# Patient Record
Sex: MALE | Race: WHITE | NOT HISPANIC OR LATINO | Employment: OTHER | URBAN - METROPOLITAN AREA
[De-identification: names, ages, dates, MRNs, and addresses within clinical notes are randomized per-mention and may not be internally consistent; named-entity substitution may affect disease eponyms.]

---

## 2017-04-18 ENCOUNTER — TRANSFERRED RECORDS (OUTPATIENT)
Dept: HEALTH INFORMATION MANAGEMENT | Facility: CLINIC | Age: 72
End: 2017-04-18

## 2017-04-28 ENCOUNTER — TRANSFERRED RECORDS (OUTPATIENT)
Dept: HEALTH INFORMATION MANAGEMENT | Facility: CLINIC | Age: 72
End: 2017-04-28

## 2017-05-02 ENCOUNTER — THERAPY VISIT (OUTPATIENT)
Dept: PHYSICAL THERAPY | Facility: CLINIC | Age: 72
End: 2017-05-02
Payer: MEDICARE

## 2017-05-02 DIAGNOSIS — M47.816 SPONDYLOSIS OF LUMBAR REGION WITHOUT MYELOPATHY OR RADICULOPATHY: Primary | ICD-10-CM

## 2017-05-02 PROCEDURE — G8979 MOBILITY GOAL STATUS: HCPCS | Mod: GP | Performed by: PHYSICAL THERAPIST

## 2017-05-02 PROCEDURE — G8978 MOBILITY CURRENT STATUS: HCPCS | Mod: GP | Performed by: PHYSICAL THERAPIST

## 2017-05-02 PROCEDURE — 97110 THERAPEUTIC EXERCISES: CPT | Mod: GP | Performed by: PHYSICAL THERAPIST

## 2017-05-02 PROCEDURE — 97161 PT EVAL LOW COMPLEX 20 MIN: CPT | Mod: GP | Performed by: PHYSICAL THERAPIST

## 2017-05-02 ASSESSMENT — ACTIVITIES OF DAILY LIVING (ADL)
HOS_ADL_ITEM_SCORE_TOTAL: 53
PUTTING_ON_SOCKS_AND_SHOES: SLIGHT DIFFICULTY
GETTING_INTO_AND_OUT_OF_AN_AVERAGE_CAR: SLIGHT DIFFICULTY
GOING_DOWN_1_FLIGHT_OF_STAIRS: MODERATE DIFFICULTY
WALKING_INITIALLY: NO DIFFICULTY AT ALL
GETTING_INTO_AND_OUT_OF_A_BATHTUB: NO DIFFICULTY AT ALL
GOING_UP_1_FLIGHT_OF_STAIRS: NO DIFFICULTY AT ALL
LIGHT_TO_MODERATE_WORK: NO DIFFICULTY AT ALL
HOS_ADL_HIGHEST_POTENTIAL_SCORE: 68
STANDING_FOR_15_MINUTES: EXTREME DIFFICULTY
TWISTING/PIVOTING_ON_INVOLVED_LEG: MODERATE DIFFICULTY
HOW_WOULD_YOU_RATE_YOUR_CURRENT_LEVEL_OF_FUNCTION_DURING_YOUR_USUAL_ACTIVITIES_OF_DAILY_LIVING_FROM_0_TO_100_WITH_100_BEING_YOUR_LEVEL_OF_FUNCTION_PRIOR_TO_YOUR_HIP_PROBLEM_AND_0_BEING_THE_INABILITY_TO_PERFORM_ANY_OF_YOUR_USUAL_DAILY_ACTIVITIES?: 90
SITTING_FOR_15_MINUTES: NO DIFFICULTY AT ALL
RECREATIONAL_ACTIVITIES: MODERATE DIFFICULTY
DEEP_SQUATTING: MODERATE DIFFICULTY
WALKING_DOWN_STEEP_HILLS: SLIGHT DIFFICULTY
ROLLING_OVER_IN_BED: NO DIFFICULTY AT ALL
WALKING_15_MINUTES_OR_GREATER: NO DIFFICULTY AT ALL
WALKING_UP_STEEP_HILLS: SLIGHT DIFFICULTY
HOS_ADL_SCORE(%): 77.94
HEAVY_WORK: SLIGHT DIFFICULTY
STEPPING_UP_AND_DOWN_CURBS: NO DIFFICULTY AT ALL
WALKING_APPROXIMATELY_10_MINUTES: NO DIFFICULTY AT ALL
HOS_ADL_COUNT: 17

## 2017-05-02 NOTE — PROGRESS NOTES
Subjective:    HPI                    Objective:    System    Physical Exam    General     ROS     Physical Therapy Initial Evaluation:   May 2, 2017  Precautions/Restrictions/MD instructions:   Per Orders: 1-2 visits. Patient needs reeducation PT for Lumbar condition. Diagnoses: Lx spondylosis without myelopathy and facet arthrosis. Lx Spinal Stenosis    Therapist Impression: Jeffrey is a 71 year-old male who presents to physical therapy with a complaint of groin pain and severe muscle spasms of the medial thigh. The location of the pain would typically suggest the hip as a pain generator, but the clinical findings leave it unclear if this is coming from the hip and leave open the possibility of the lumbar spine as the source of the pain. Given his history of lumbar conditions this increases in likelihood of his pain coming from the lumbar spine. Jeffrey was approved for one additional visit which we will use to further investigate the source of the pain and to establish the need for any exercises beyond his current home program that will improve his ability to function with tasks in daily life.     Subjective:   Chief Complaint:    Pain: In the left inguinal crease and the posteromedial thigh on the left.    Numbness/Tingling: None   Weakness: Sensation of wanting to give out on the right   Stiffness: None  New/Recurrent/Chronic: Chronic  Patient's Goal(s): Wants his hip to feel better.   DOI/onset: approximately 6 months   Referral Date: 4/28/2017  Mechanism of onset: Unknown  PMH/surgical history/trauma: Osteoarthritis, Prostate Cancer (surgically removed 2 years ago), Surgical history on the left ankle, left knee, back, left shoulder (twice), right shoulder, and appendectomy.   Medications: ibuprofen  General health as reported by patient: Excellent    Previous Treatment (Effect): Massage (helpful)  Imaging: MRI - Slightly progressed from previously, Provider is thinking it may be the hip.   Symptom Stability:  Changing, not present all the time, can be provoked  AM/PM: Worse in AM  Quality of Pain: muscle spasm  Pain: 0/10 at present, 0/10 at best, 8/10 at worst  Better: Has to let it go  Worse: standing up after sitting, hip flexion/lifting the thigh  Progression of Symptoms since onset: getting worse   Occupation: Retired Teacher Job duties: prolonged standing, lifting/carrying, pushing/pulling, repetitive tasks, maintaining a home  Sleeping: No disturbance from back or hip   Other current functional challenges: standing after sitting, going down stairs  Current Functional Status: stairs - pain when going down stairs and turning at the landing ; standing up - pain with standing after sitting, needs to go slowly   Current HEP/exercise regimen: pickleball, racquet ball, golf, lawn mowing  Transportation: Independent with driving  Live with Others: Wife at home, able bodied  Red Flags:   - Patient denies the following: Pain with Cough / Sneeze / Laughing ; Night Pain ; Weakness ; Numbness/Tingling ;     Objective:    Posture: Normal standing posture    AROM: (Major, Moderate, Minimal or Nil loss)  Movement Loss Kenneth Mod Min Nil Pain   Flexion    x    Extension x x   Groin pain   Side Gliding L    x    Side Gliding R x x   Groin pain       Neurological:    Motor Deficit:  Myotomes R L   L1-2 (hip flexion) 5 5   L3 (knee extension) 5 5   L4 (ankle DF) 5 5   L5 (g. toe ext) 5 5   S1 (ankle PF or knee flex) 5 5     Reflexes:    R L   Patellar 0+ 2+   Achilles 2+ 2+   Babinski (-) (-)     HIP: (* indicates patient's pain)   MMT L MMT R   Flexion 5 5   Abd ** 5   Ext Not tested Not tested   IR 5 5   ER 4+ 4+   Patient had pain that limited abduction strength testing in sidelying. Resisted abduction in supine did not provoke pain, suggesting the position--rather than the resistance alone--may have caused the pain. This fails to implicate the hip over the lumbar spine as the source of the pain    Other:  - FADIR and CHELSIE were  positive on the left. Create pain in the groin.   - No pain with ER/IR of the hip into overpressure  - Reviewed patient's exercise which included: Repeat flexion in lying, standing hip flexor stretch, calf stretch, quad stretch in standing with single leg stance, lower trunk rotation with TFL stretch, pretzel stretch, butterfly stretch      Assessment/Plan:      Patient is a 71 year old male with lumbar and left side hip complaints.    Patient has the following significant findings with corresponding treatment plan.                Diagnosis 1:  Lumbar spondylosis without myelopathy, facet arthrosis, lumbar spinal stenosis  Pain -  hot/cold therapy, manual therapy, splint/taping/bracing/orthotics, self management, education, directional preference exercise and home program  Decreased ROM/flexibility - manual therapy, therapeutic exercise, therapeutic activity and home program  Decreased function - therapeutic activities and home program    Therapy Evaluation Codes:   1) History comprised of:   Personal factors that impact the plan of care:      None.    Comorbidity factors that impact the plan of care are:      Hx of lumbar pain/conditions.     Medications impacting care: Anti-inflammatory.  2) Examination of Body Systems comprised of:   Body structures and functions that impact the plan of care:      Hip and Lumbar spine.   Activity limitations that impact the plan of care are:      Stairs and Standing.  3) Clinical presentation characteristics are:   Unstable/Unpredictable.  4) Decision-Making    Moderate complexity using standardized patient assessment instrument and/or measureable assessment of functional outcome.  Cumulative Therapy Evaluation is: Low complexity.    Previous and current functional limitations:  (See Goal Flow Sheet for this information)    Short term and Long term goals: (See Goal Flow Sheet for this information)     Communication ability:  Patient appears to be able to clearly communicate and  understand verbal and written communication and follow directions correctly.  Treatment Explanation - The following has been discussed with the patient:   RX ordered/plan of care  Anticipated outcomes  Possible risks and side effects  This patient would benefit from PT intervention to resume normal activities.   Rehab potential is good.    Frequency:  2 X week, once daily  Duration:  for 1 weeks to fulfill physician orders for 2 visits  Discharge Plan:  Achieve all LTG.  Independent in home treatment program.  Reach maximal therapeutic benefit.    Please refer to the daily flowsheet for treatment today, total treatment time and time spent performing 1:1 timed codes.

## 2017-05-02 NOTE — LETTER
DEPARTMENT OF HEALTH AND HUMAN SERVICES  [facility]    PLAN/UPDATED PLAN OF PROGRESS FOR OUTPATIENT REHABILITATION    PATIENTS NAME:  Jasen Rosario   : 1945  PROVIDER NUMBER:    [id number]  [id number]  PROVIDER NAME: [facility] PT  MEDICAL RECORD NUMBER: [id number]     START OF CARE DATE:  SOC Date: 17   TYPE:  PT    PRIMARY/TREATMENT DIAGNOSIS: (Pertinent Medical Diagnosis)  Spondylosis of lumbar region without myelopathy or radiculopathy    VISITS FROM START OF CARE:  Rxs Used: 1     Physical Therapy Initial Evaluation:   May 2, 2017  Precautions/Restrictions/MD instructions:   Per Orders: 1-2 visits. Patient needs reeducation PT for Lumbar condition. Diagnoses: Lx spondylosis without myelopathy and facet arthrosis. Lx Spinal Stenosis  Therapist Impression: Jeffrey is a 71 year-old male who presents to physical therapy with a complaint of groin pain and severe muscle spasms of the medial thigh. The location of the pain would typically suggest the hip as a pain generator, but the clinical findings leave it unclear if this is coming from the hip and leave open the possibility of the lumbar spine as the source of the pain. Given his history of lumbar conditions this increases in likelihood of his pain coming from the lumbar spine. Jeffrey was approved for one additional visit which we will use to further investigate the source of the pain and to establish the need for any exercises beyond his current home program that will improve his ability to function with tasks in daily life.   Subjective:   Chief Complaint:    Pain: In the left inguinal crease and the posteromedial thigh on the left.    Numbness/Tingling: None   Weakness: Sensation of wanting to give out on the right   Stiffness: None    PATIENTS NAME:  Jasen Rosario   : 1945  New/Recurrent/Chronic: Chronic  Patient's Goal(s): Wants his hip to feel better.   DOI/onset: approximately 6 months    Referral Date: 4/28/2017  Mechanism of onset: Unknown  PMH/surgical history/trauma: Osteoarthritis, Prostate Cancer (surgically removed 2 years ago), Surgical history on the left ankle, left knee, back, left shoulder (twice), right shoulder, and appendectomy.   Medications: ibuprofen  General health as reported by patient: Excellent    Previous Treatment (Effect): Massage (helpful)  Imaging: MRI - Slightly progressed from previously, Provider is thinking it may be the hip.   Symptom Stability: Changing, not present all the time, can be provoked  AM/PM: Worse in AM  Quality of Pain: muscle spasm  Pain: 0/10 at present, 0/10 at best, 8/10 at worst  Better: Has to let it go  Worse: standing up after sitting, hip flexion/lifting the thigh  Progression of Symptoms since onset: getting worse   Occupation: Retired Teacher Job duties: prolonged standing, lifting/carrying, pushing/pulling, repetitive tasks, maintaining a home  Sleeping: No disturbance from back or hip   Other current functional challenges: standing after sitting, going down stairs  Current Functional Status: stairs - pain when going down stairs and turning at the landing ; standing up - pain with standing after sitting, needs to go slowly   Current HEP/exercise regimen: pickleball, racquet ball, golf, lawn mowing  Transportation: Independent with driving  Live with Others: Wife at home, able bodied  Red Flags:   - Patient denies the following: Pain with Cough / Sneeze / Laughing ; Night Pain ; Weakness ; Numbness/Tingling ;     Objective:    Posture: Normal standing posture    AROM: (Major, Moderate, Minimal or Nil loss)  Movement Loss Kenneth Mod Min Nil Pain   Flexion    x    Extension x x   Groin pain   Side Gliding L    x    Side Gliding R x x   Groin pain       Neurological:    Motor Deficit:  Myotomes R L   L1-2 (hip flexion) 5 5   L3 (knee extension) 5 5   L4 (ankle DF) 5 5   L5 (g. toe ext) 5 5   S1 (ankle PF or knee flex) 5 5     Reflexes:    R L    Patellar 0+ 2+   Achilles 2+ 2+   Babinski (-) (-)     HIP: (* indicates patient's pain)   MMT L MMT R   Flexion 5 5   Abd ** 5   Ext Not tested Not tested   IR 5 5   ER 4+ 4+   Patient had pain that limited abduction strength testing in sidelying. Resisted abduction in supine did not provoke pain, suggesting the position--rather than the resistance alone--may have caused the pain. This fails to implicate the hip over the lumbar spine as the source of the pain          PATIENTS NAME:  Jasen Rosario   : 1945    Other:  - FADIR and CHELSIE were positive on the left. Create pain in the groin.   - No pain with ER/IR of the hip into overpressure  - Reviewed patient's exercise which included: Repeat flexion in lying, standing hip flexor stretch, calf stretch, quad stretch in standing with single leg stance, lower trunk rotation with TFL stretch, pretzel stretch, butterfly stretch    Assessment/Plan:      Patient is a 71 year old male with lumbar and left side hip complaints.    Patient has the following significant findings with corresponding treatment plan.                Diagnosis 1:  Lumbar spondylosis without myelopathy, facet arthrosis, lumbar spinal stenosis  Pain -  hot/cold therapy, manual therapy, splint/taping/bracing/orthotics, self management, education, directional preference exercise and home program  Decreased ROM/flexibility - manual therapy, therapeutic exercise, therapeutic activity and home program  Decreased function - therapeutic activities and home program    Therapy Evaluation Codes:   1) History comprised of:   Personal factors that impact the plan of care:      None.    Comorbidity factors that impact the plan of care are:      Hx of lumbar pain/conditions.     Medications impacting care: Anti-inflammatory.  2) Examination of Body Systems comprised of:   Body structures and functions that impact the plan of care:      Hip and Lumbar spine.   Activity limitations that impact the plan  "of care are:      Stairs and Standing.  3) Clinical presentation characteristics are:   Unstable/Unpredictable.  4) Decision-Making    Moderate complexity using standardized patient assessment instrument and/or measureable assessment of functional outcome.  Cumulative Therapy Evaluation is: Low complexity.    Previous and current functional limitations:  (See Goal Flow Sheet for this information)    Short term and Long term goals: (See Goal Flow Sheet for this information)     Communication ability:  Patient appears to be able to clearly communicate and understand verbal and written communication and follow directions correctly.        PATIENTS NAME:  Jasen Rosario   : 1945    Treatment Explanation - The following has been discussed with the patient:   RX ordered/plan of care  Anticipated outcomes  Possible risks and side effects  This patient would benefit from PT intervention to resume normal activities.   Rehab potential is good.    Frequency:  2 X week, once daily  Duration:  for 1 weeks to fulfill physician orders for 2 visits  Discharge Plan:  Achieve all LTG.  Independent in home treatment program.  Reach maximal therapeutic benefit.    Caregiver Signature/Credentials _____________________________ Date ________      Treating Provider: Sim Romo, PT   I have reviewed and certified the need for these services and plan of treatment while under my care.        PHYSICIAN'S SIGNATURE:   _____________________________________ Date___________     Matthew Baker    Certification period:  Beginning of Cert date period: 17 to  End of Cert period date: 17     Functional Level Progress Report: Please see attached \"Goal Flow sheet for Functional level.\"    ____X____ Continue Services or       ________ DC Services                Service dates: From  SOC Date: 17 date to present                         "

## 2017-05-03 PROBLEM — M47.816 SPONDYLOSIS OF LUMBAR REGION WITHOUT MYELOPATHY OR RADICULOPATHY: Status: ACTIVE | Noted: 2017-05-03

## 2017-05-12 ENCOUNTER — THERAPY VISIT (OUTPATIENT)
Dept: PHYSICAL THERAPY | Facility: CLINIC | Age: 72
End: 2017-05-12
Payer: MEDICARE

## 2017-05-12 DIAGNOSIS — M47.816 SPONDYLOSIS OF LUMBAR REGION WITHOUT MYELOPATHY OR RADICULOPATHY: ICD-10-CM

## 2017-05-12 PROCEDURE — G8979 MOBILITY GOAL STATUS: HCPCS | Mod: GP | Performed by: PHYSICAL THERAPIST

## 2017-05-12 PROCEDURE — 97112 NEUROMUSCULAR REEDUCATION: CPT | Mod: GP | Performed by: PHYSICAL THERAPIST

## 2017-05-12 PROCEDURE — G8980 MOBILITY D/C STATUS: HCPCS | Mod: GP | Performed by: PHYSICAL THERAPIST

## 2017-05-12 ASSESSMENT — ACTIVITIES OF DAILY LIVING (ADL)
HOS_ADL_HIGHEST_POTENTIAL_SCORE: 68
HOS_ADL_COUNT: 17
SITTING_FOR_15_MINUTES: NO DIFFICULTY AT ALL
HEAVY_WORK: SLIGHT DIFFICULTY
WALKING_APPROXIMATELY_10_MINUTES: NO DIFFICULTY AT ALL
WALKING_DOWN_STEEP_HILLS: SLIGHT DIFFICULTY
STANDING_FOR_15_MINUTES: NO DIFFICULTY AT ALL
HOS_ADL_SCORE(%): 89.71
STEPPING_UP_AND_DOWN_CURBS: NO DIFFICULTY AT ALL
GETTING_INTO_AND_OUT_OF_AN_AVERAGE_CAR: NO DIFFICULTY AT ALL
WALKING_INITIALLY: SLIGHT DIFFICULTY
WALKING_UP_STEEP_HILLS: NO DIFFICULTY AT ALL
LIGHT_TO_MODERATE_WORK: NO DIFFICULTY AT ALL
WALKING_15_MINUTES_OR_GREATER: NO DIFFICULTY AT ALL
RECREATIONAL_ACTIVITIES: SLIGHT DIFFICULTY
DEEP_SQUATTING: SLIGHT DIFFICULTY
PUTTING_ON_SOCKS_AND_SHOES: NO DIFFICULTY AT ALL
HOW_WOULD_YOU_RATE_YOUR_CURRENT_LEVEL_OF_FUNCTION_DURING_YOUR_USUAL_ACTIVITIES_OF_DAILY_LIVING_FROM_0_TO_100_WITH_100_BEING_YOUR_LEVEL_OF_FUNCTION_PRIOR_TO_YOUR_HIP_PROBLEM_AND_0_BEING_THE_INABILITY_TO_PERFORM_ANY_OF_YOUR_USUAL_DAILY_ACTIVITIES?: 95
TWISTING/PIVOTING_ON_INVOLVED_LEG: MODERATE DIFFICULTY
GOING_UP_1_FLIGHT_OF_STAIRS: NO DIFFICULTY AT ALL
HOS_ADL_ITEM_SCORE_TOTAL: 61
ROLLING_OVER_IN_BED: NO DIFFICULTY AT ALL
GOING_DOWN_1_FLIGHT_OF_STAIRS: NO DIFFICULTY AT ALL
GETTING_INTO_AND_OUT_OF_A_BATHTUB: NO DIFFICULTY AT ALL

## 2017-05-12 NOTE — PROGRESS NOTES
"Subjective:    HPI                    Objective:    System    Physical Exam    General     ROS    Assessment/Plan:      DISCHARGE REPORT    Progress reporting period is from 5/2/2107 to 5/12/2017.       SUBJECTIVE  Subjective changes noted by patient: Patient had an injection in the left hip 2 days ago and he is feeling pretty good. The muscle spasms are less and he was able to go for a bike ride yesterday. He was up and down from the floor a lot and still had some symptoms, but felt \"okay\".     Current Pain level: 1/10.     Initial Pain level: 8/10.   Changes in function:  Yes (See Goal flowsheet attached for changes in current functional level)  Adverse reaction to treatment or activity: None    OBJECTIVE  Changes noted in objective findings:  Yes, Patient demonstrated improved movement with less pain.   Objective:   Patient was able to walk and transfer without deviation. He had some difficulty with stabilization exercises targeting the core and the hips. This ultimately suggests lack of pelvifemoral control which may explain why his symptom-origin was unclear in the first place.     ASSESSMENT/PLAN  Updated problem list and treatment plan: Diagnosis 1:  Lumbar spondylosis without myelopathy, facet arthrosis, lumbar spinal stenosis  STG/LTGs have been met or progress has been made towards goals:  Yes (See Goal flow sheet completed today.)  Assessment of Progress: The patient's condition is improving.  Self Management Plans:  Patient has been instructed in a home treatment program.  Patient  has been instructed in self management of symptoms.  Jasen continues to require the following intervention to meet STG and LTG's:  PT intervention is no longer required to meet STG/LTG.    Recommendations:  Jeffrey has completed the two visits for which he was initially referred. His pain has improved significantly following the injection implicating the hip more than the low back. Jeffrey is read to be discharged from this " episode of physical therapy for his low back with instructions to return with orders for the hip if the pain should return.     Please refer to the daily flowsheet for treatment today, total treatment time and time spent performing 1:1 timed codes.

## 2017-06-01 ENCOUNTER — TRANSFERRED RECORDS (OUTPATIENT)
Dept: HEALTH INFORMATION MANAGEMENT | Facility: CLINIC | Age: 72
End: 2017-06-01

## 2017-06-06 ENCOUNTER — TRANSFERRED RECORDS (OUTPATIENT)
Dept: HEALTH INFORMATION MANAGEMENT | Facility: CLINIC | Age: 72
End: 2017-06-06

## 2017-06-06 ENCOUNTER — TELEPHONE (OUTPATIENT)
Dept: FAMILY MEDICINE | Facility: CLINIC | Age: 72
End: 2017-06-06

## 2017-06-06 NOTE — TELEPHONE ENCOUNTER
Reason for Call:  Same Day Appointment, Requested Provider:  Archie Meadows MD    PCP: Archie Meadows    Reason for visit: Pre op for hip surgery [facility] with Dr Sebastian Madrigal on 6/21.  Not Tues or Thursday's b4 10 am.     Duration of symptoms: ongoing    Have you been treated for this in the past? Yes    Additional comments: Please work him in before that.     Can we leave a detailed message on this number? YES    Phone number patient can be reached at: Cell number on file:    Telephone Information:   Mobile [phone]       Best Time: any    Call taken on 6/6/2017 at 2:43 PM by Francesca Maza

## 2017-06-07 NOTE — TELEPHONE ENCOUNTER
Pt called & was scheduled at 3pm which was an available time.  Nimisha Maza, Clinic Receptionist

## 2017-06-14 ENCOUNTER — OFFICE VISIT (OUTPATIENT)
Dept: FAMILY MEDICINE | Facility: CLINIC | Age: 72
End: 2017-06-14
Payer: COMMERCIAL

## 2017-06-14 VITALS
BODY MASS INDEX: 21.37 KG/M2 | WEIGHT: 141 LBS | TEMPERATURE: 97.8 F | HEIGHT: 68 IN | HEART RATE: 56 BPM | DIASTOLIC BLOOD PRESSURE: 66 MMHG | OXYGEN SATURATION: 97 % | SYSTOLIC BLOOD PRESSURE: 108 MMHG

## 2017-06-14 DIAGNOSIS — C85.90 LYMPHOMA, UNSPECIFIED BODY REGION, UNSPECIFIED LYMPHOMA TYPE (H): ICD-10-CM

## 2017-06-14 DIAGNOSIS — F41.1 ANXIETY STATE: ICD-10-CM

## 2017-06-14 DIAGNOSIS — Z51.81 MEDICATION MONITORING ENCOUNTER: ICD-10-CM

## 2017-06-14 DIAGNOSIS — M16.12 PRIMARY OSTEOARTHRITIS OF LEFT HIP: ICD-10-CM

## 2017-06-14 DIAGNOSIS — Z91.09 ENVIRONMENTAL ALLERGIES: ICD-10-CM

## 2017-06-14 DIAGNOSIS — F32.5 MAJOR DEPRESSIVE DISORDER, SINGLE EPISODE IN FULL REMISSION (H): ICD-10-CM

## 2017-06-14 DIAGNOSIS — C61 MALIGNANT NEOPLASM OF PROSTATE (H): ICD-10-CM

## 2017-06-14 DIAGNOSIS — Z01.818 PREOPERATIVE EXAMINATION: Primary | ICD-10-CM

## 2017-06-14 DIAGNOSIS — C61 PROSTATE CANCER (H): ICD-10-CM

## 2017-06-14 LAB
ALBUMIN UR-MCNC: ABNORMAL MG/DL
APPEARANCE UR: CLEAR
BILIRUB UR QL STRIP: NEGATIVE
COLOR UR AUTO: YELLOW
ERYTHROCYTE [DISTWIDTH] IN BLOOD BY AUTOMATED COUNT: 12.9 % (ref 10–15)
GLUCOSE UR STRIP-MCNC: NEGATIVE MG/DL
HCT VFR BLD AUTO: 43 % (ref 40–53)
HGB BLD-MCNC: 15 G/DL (ref 13.3–17.7)
HGB UR QL STRIP: NEGATIVE
KETONES UR STRIP-MCNC: ABNORMAL MG/DL
LEUKOCYTE ESTERASE UR QL STRIP: NEGATIVE
MCH RBC QN AUTO: 31.9 PG (ref 26.5–33)
MCHC RBC AUTO-ENTMCNC: 34.9 G/DL (ref 31.5–36.5)
MCV RBC AUTO: 92 FL (ref 78–100)
MUCOUS THREADS #/AREA URNS LPF: PRESENT /LPF
NITRATE UR QL: NEGATIVE
PH UR STRIP: 5.5 PH (ref 5–7)
PLATELET # BLD AUTO: 214 10E9/L (ref 150–450)
RBC # BLD AUTO: 4.7 10E12/L (ref 4.4–5.9)
RBC #/AREA URNS AUTO: ABNORMAL /HPF (ref 0–2)
SP GR UR STRIP: >1.03 (ref 1–1.03)
URN SPEC COLLECT METH UR: ABNORMAL
UROBILINOGEN UR STRIP-ACNC: 0.2 EU/DL (ref 0.2–1)
WBC # BLD AUTO: 10.5 10E9/L (ref 4–11)
WBC #/AREA URNS AUTO: ABNORMAL /HPF (ref 0–2)

## 2017-06-14 PROCEDURE — 36415 COLL VENOUS BLD VENIPUNCTURE: CPT | Performed by: FAMILY MEDICINE

## 2017-06-14 PROCEDURE — 81001 URINALYSIS AUTO W/SCOPE: CPT | Performed by: FAMILY MEDICINE

## 2017-06-14 PROCEDURE — 85027 COMPLETE CBC AUTOMATED: CPT | Performed by: FAMILY MEDICINE

## 2017-06-14 PROCEDURE — 80053 COMPREHEN METABOLIC PANEL: CPT | Performed by: FAMILY MEDICINE

## 2017-06-14 PROCEDURE — 93000 ELECTROCARDIOGRAM COMPLETE: CPT | Performed by: FAMILY MEDICINE

## 2017-06-14 PROCEDURE — 84153 ASSAY OF PSA TOTAL: CPT | Performed by: FAMILY MEDICINE

## 2017-06-14 PROCEDURE — 99214 OFFICE O/P EST MOD 30 MIN: CPT | Performed by: FAMILY MEDICINE

## 2017-06-14 NOTE — PATIENT INSTRUCTIONS
Follow up regarding Lymphoma and if you need to get a recheck appointment    Colonoscopy due in December 2017 -- follow up to schedule     Hold Baby Aspirin, multivitamin, fish oil, ibuprofen/advil/aleve prior to surgery - Tylenol is okay    [facility]                        To reach your care team during and after hours:   [phone]  To reach our pharmacy:        [phone]    Clinic Hours                        Our clinic hours are:    Monday   7:30 am to 7:00 pm                  Tuesday through Friday 7:30 am to 5:00 pm                             Saturday   8:00 am to 12:00 pm      Sunday   Closed      Pharmacy Hours                        Our pharmacy hours are:    Monday   8:30 am to 7:00 pm       Tuesday to Friday  8:30 am to 6:00 pm                       Saturday    9:00 am to 1:00 pm              Sunday    Closed              There is also information available at our web site:  [email or web address]    If your provider ordered any lab tests and you do not receive the results within 10 business days, please call the clinic.    If you need a medication refill please contact your pharmacy.  Please allow 2-3 business days for your refill to be completed.    Our clinic offers telephone visits and e visits.  Please ask one of your team members to explain more.      Use MyChart (secure email communication and access to your chart) to send your primary care provider a message or make an appointment. Ask someone on your Team how to sign up for [employer]t.  Immunizations                      Immunization History   Administered Date(s) Administered     Hepatitis A Vac Ped/Adol-2 Dose 06/28/2006, 12/28/2006     Hepatitis B 01/01/1995, 02/01/1995, 06/01/1995     Pneumococcal (PCV 13) 12/01/2015     Pneumococcal 23 valent 06/28/2006, 04/03/2013     TD (ADULT, 7+) 06/10/2001, 12/10/2010     TDAP Vaccine (Boostrix) 04/03/2013     Zoster vaccine, live 04/03/2013        Health Maintenance                          Health Maintenance Due   Topic Date Due     Prostate Test (PSA) - yearly  12/08/2015       Before Your Surgery      Call your surgeon if there is any change in your health. This includes signs of a cold or flu (such as a sore throat, runny nose, cough, rash or fever).    Do not smoke, drink alcohol or take over the counter medicine (unless your surgeon or primary care doctor tells you to) for the 24 hours before and after surgery.    If you take prescribed drugs: Follow your doctor s orders about which medicines to take and which to stop until after surgery.    Eating and drinking prior to surgery: follow the instructions from your surgeon    Take a shower or bath the night before surgery. Use the soap your surgeon gave you to gently clean your skin. If you do not have soap from your surgeon, use your regular soap. Do not shave or scrub the surgery site.  Wear clean pajamas and have clean sheets on your bed.

## 2017-06-14 NOTE — LETTER
[facility]  [address] [place] [address], [place] [address]                  [phone]   June 16, 2017    Jasen Rosario  [address] [facility] [id number]      Dear Jasen,    Here is a summary of your recent test results:    Labs are overall quite good, except low protein.     We advise:     Please proceed with surgery.   Balanced low cholesterol diet.     For additional lab test information, [email or web address] is an excellent reference.    Your test results are enclosed.      Please contact me if you have any questions.    In addition, here is a list of due or overdue Health Maintenance reminders.    There are no preventive care reminders to display for this patient.    Please call us at [phone] (or use "[employer]") to address the above recommendations.            Thank you very much for trusting [facility]..     Healthy regards,        Archie Meadows M.D.        Results for orders placed or performed in visit on 06/14/17   Comprehensive metabolic panel   Result Value Ref Range    Sodium 144 133 - 144 mmol/L    Potassium 4.6 3.4 - 5.3 mmol/L    Chloride 109 94 - 109 mmol/L    Carbon Dioxide 26 20 - 32 mmol/L    Anion Gap 9 3 - 14 mmol/L    Glucose 78 70 - 99 mg/dL    Urea Nitrogen 22 7 - 30 mg/dL    Creatinine 1.14 0.66 - 1.25 mg/dL    GFR Estimate 63 >60 mL/min/1.7m2    GFR Estimate If Black 76 >60 mL/min/1.7m2    Calcium 8.8 8.5 - 10.1 mg/dL    Bilirubin Total 0.5 0.2 - 1.3 mg/dL    Albumin 3.9 3.4 - 5.0 g/dL    Protein Total 6.3 (L) 6.8 - 8.8 g/dL    Alkaline Phosphatase 74 40 - 150 U/L    ALT 24 0 - 70 U/L    AST 17 0 - 45 U/L   CBC with platelets   Result Value Ref Range    WBC 10.5 4.0 - 11.0 10e9/L    RBC Count 4.70 4.4 - 5.9 10e12/L    Hemoglobin 15.0 13.3 - 17.7 g/dL    Hematocrit 43.0 40.0 - 53.0 %    MCV 92 78 - 100 fl    MCH 31.9 26.5 - 33.0 pg    MCHC 34.9 31.5 - 36.5 g/dL    RDW 12.9 10.0 - 15.0 %    Platelet Count 214 150 - 450  10e9/L   *UA reflex to Microscopic and Culture ([facility] and [facility] (except [place] and [place])   Result Value Ref Range    Color Urine Yellow     Appearance Urine Clear     Glucose Urine Negative NEG mg/dL    Bilirubin Urine Negative NEG    Ketones Urine Trace (A) NEG mg/dL    Specific Gravity Urine >1.030 1.003 - 1.035    Blood Urine Negative NEG    pH Urine 5.5 5.0 - 7.0 pH    Protein Albumin Urine Trace (A) NEG mg/dL    Urobilinogen Urine 0.2 0.2 - 1.0 EU/dL    Nitrite Urine Negative NEG    Leukocyte Esterase Urine Negative NEG    Source Midstream Urine    Urine Microscopic   Result Value Ref Range    WBC Urine O - 2 0 - 2 /HPF    RBC Urine O - 2 0 - 2 /HPF    Mucous Urine Present (A) NEG /LPF

## 2017-06-14 NOTE — PROGRESS NOTES
[address] [address]  [phone]  Dept: [phone]    PRE-OP EVALUATION:  Today's date: 2017    Jasen Rosario (: 1945) presents for pre-operative evaluation assessment as requested by Dr. Sebastian Madrigal.  He requires evaluation and anesthesia risk assessment prior to undergoing surgery/procedure for treatment of left hip pain.  Proposed procedure: Left hip replacement    Date of Surgery/ Procedure: 17  Time of Surgery/ Procedure: 1p  Hospital/Surgical Facility: [facility]  Primary Physician: Archie Meadows  Type of Anesthesia Anticipated: General    Patient has a Health Care Directive or Living Will:  YES     1. NO - Do you have a history of heart attack, stroke, stent, bypass or surgery on an artery in the head, neck, heart or legs?  2. NO - Do you ever have any pain or discomfort in your chest?  3. NO - Do you have a history of  Heart Failure?  4. NO - Are you troubled by shortness of breath when: walking on the level, up a slight hill or at night?  5. NO - Do you currently have a cold, bronchitis or other respiratory infection?  6. NO - Do you have a cough, shortness of breath or wheezing?  7. NO - Do you sometimes get pains in the calves of your legs when you walk?  8. NO - Do you or anyone in your family have previous history of blood clots?  9. NO - Do you or does anyone in your family have a serious bleeding problem such as prolonged bleeding following surgeries or cuts?  10. NO - Have you ever had problems with anemia or been told to take iron pills?  11. NO - Have you had any abnormal blood loss such as black, tarry or bloody stools, or abnormal vaginal bleeding?  12. NO - Have you ever had a blood transfusion?  13. NO - Have you or any of your relatives ever had problems with anesthesia?  14. NO - Do you have sleep apnea, excessive snoring or daytime drowsiness?  15. NO - Do you have any prosthetic heart valves?  16.  NO - Do you have prosthetic joints?  17. NO - Is there any chance that you may be pregnant?    HPI:                                                      Brief HPI related to upcoming procedure: Jasen reported to the clinic regarding a preoperative examination for a proposed left hip replacement procedure to treat chronic left hip pain.    Anxiety/Depression -- Jeffrey reported feeling a little more stressed out due to upcoming surgeries and needing to get chores done.     PHQ-9: 2  WALDO-7: 4    Left Knee -- Jeffrey reported plans for a left knee surgery after his hip heals, tentatively scheduled for 10/02/17.     Lymphoma -- Followed by [facility], unsure of need for recheck appointment.     Colon screening -- Next screening due 12/17.     Past/recent records reviewed and discussed for -- medications, oncology, family hx, surgical hx, social hx, immunizations, allergies.    See problem list for active medical problems.  Problems all longstanding and stable, except as noted/documented. See ROS for pertinent symptoms related to these conditions.                                                                                                  .  MEDICAL HISTORY:                                                      Patient Active Problem List    Diagnosis Date Noted     Prostate cancer (H)      Priority: High     Dr Smyth [facility] 3+3       Hyperlipidemia LDL goal <130      Priority: High     Lumbar stenosis      Priority: Medium     [facility] - Dr Irene - with neuritis       Lymphoma (H)      Priority: Medium     B Cell - Dr Barrios       Colon polyp      Priority: Medium     ED (erectile dysfunction)      Priority: Medium     Seasonal allergies      Priority: Medium     OA (osteoarthritis)      Priority: Medium     Left knee synvisc- Dr Balderas       Advanced directives, counseling/discussion 04/02/2013     Priority: Low     Major depressive disorder, single episode in full remission (H)      Priority: Low     Anxiety state  11/30/2004     Priority: Low     Problem list name updated by automated process. Provider to review        Past Medical History:   Diagnosis Date     Anxiety state, unspecified 1991     Colon polyp      ED (erectile dysfunction) 2005     Hyperlipidemia LDL goal <130 2001     Hypoglycemia, unspecified 1991     Infectious mononucleosis 1969     Lumbar stenosis     [facility] - Dr Irene - with neuritis     Lymphoma (H)     SLL - [facility] Cell - Dr Barrios     Major depressive disorder, single episode in full remission (H) 1991     OA (osteoarthritis)     Severe Lt Hip     Prostate cancer (H) 5/13    Dr Smyth - [facility] [place] 3+3 - intermediate progression risk - pT2cN0     Seasonal allergies      Past Surgical History:   Procedure Laterality Date     DAVINCI PROSTATECTOMY, LYMPHADENECTOMY N/A 4/13/2015    RALP - Dr Smyth     [facility] COLONOSCOPY THRU STOMA, DIAGNOSTIC  12/04, 12/14    colon polyp - serrated adenoma 5 mm - due 3 yrs     SURGICAL HISTORY OF -   1980    BACK SURGERY     SURGICAL HISTORY OF -   1987    ANKLE FX ORIF - Lt     SURGICAL HISTORY OF -   1972    LT KNEE MCL TEAR AND MENISCUS      SURGICAL HISTORY OF -   1960    APPY     SURGICAL HISTORY OF -   2001, 2008    LT SHOULDER ARTHROSCOPY     SURGICAL HISTORY OF -   1/07    Rt Shoulder Surgery - Dr Ferrer     Current Outpatient Prescriptions   Medication Sig Dispense Refill     Multiple Vitamin (DAILY MULTIVITAMIN PO) Take  by mouth daily.       NEW MED herba supplement  0     GLUCOSAMINE CHONDROITIN OR TABS 2 tabs daily  0     FISH OIL MAX-EPA 1000 MG OR CAPS 2 tabs daily  0     OTC products: None, except as noted above    Allergies   Allergen Reactions     Seasonal Allergies       Latex Allergy: NO    Social History   Substance Use Topics     Smoking status: Former Smoker     Packs/day: 2.00     Years: 7.00     Types: Cigarettes     Quit date: 1/1/1965     Smokeless tobacco: Never Used     Alcohol use 4.2 oz/week     7 Standard drinks or equivalent per week  "    History   Drug Use No       REVIEW OF SYSTEMS:                                                    Constitutional, HEENT, cardiovascular, pulmonary, GI, , musculoskeletal, neuro, skin, endocrine and psych systems are negative, except as in HPI or otherwise noted     This document serves as a record of the services and decisions personally performed and made by Archie Meadows MD FAA. It was created on their behalf by Sumit Almeida, a trained medical scribe. The creation of this document is based the provider's statements to the medical scribe.  Sumit Almeida June 14, 2017 3:57 PM      EXAM:                                                    /66  Pulse 56  Temp 97.8  F (36.6  C) (Oral)  Ht 5' 7.52\" (1.715 m)  Wt 141 lb (64 kg)  SpO2 97%  BMI 21.74 kg/m2    GENERAL APPEARANCE: healthy, alert and no distress, underweight     EYES: EOMI,  PERRL     HENT: ear canals and TMs- normal upon viewing with otoscope; Nose and Mouth- no ulcers, no lesions upon viewing with otoscope     NECK: no adenopathy, no asymmetry, masses, or scars and thyroid normal to palpation     RESP: lungs clear to auscultation - no rales, rhonchi or wheezes     CV: regular rates and rhythm, normal S1 S2, no S3 or S4 and no murmur, click or rub     ABDOMEN:  soft, nontender, no HSM or masses and bowel sounds normal     MS: extremities normal- no gross deformities noted, no evidence of inflammation in joints, FROM in all extremities.     SKIN: no suspicious lesions or rashes to visible skin     NEURO: mentation intact and speech normal     PSYCH: mentation appears normal. and affect normal/bright    DIAGNOSTICS:                                                    EKG: appears normal, NSR, normal axis, normal intervals, no acute ST/T changes c/w ischemia, no LVH by voltage criteria, unchanged from previous tracings    Recent Labs   Lab Test  08/04/16   0903  06/30/16   1355   12/23/09   1039   HGB  16.3  14.6   < >  15.9   PLT  183  158   < >  " 169   NA  139  137   < >  134   POTASSIUM  4.4  4.5   < >  5.3   CR  0.97  1.01   < >  1.08   A1C   --    --    --   5.7    < > = values in this interval not displayed.     IMPRESSION:                                                    Diagnosis/reason for consult: chronic left hip pain    The proposed surgical procedure is considered INTERMEDIATE risk.    REVISED CARDIAC RISK INDEX  The patient has the following serious cardiovascular risks for perioperative complications such as (MI, PE, VFib and 3  AV Block):  No serious cardiac risks  INTERPRETATION: 1 risks: Class II (low risk - 0.9% complication rate)    The patient has the following additional risks for perioperative complications:  The 10-year ASCVD risk score (Jimena HATCH Jr, et al., 2013) is: 11.8%    Values used to calculate the score:      Age: 71 years      Sex: Male      Is Non- : No      Diabetic: No      Tobacco smoker: No      Systolic Blood Pressure: 108 mmHg      Is BP treated: No      HDL Cholesterol: 83 mg/dL      Total Cholesterol: 197 mg/dL      ICD-10-CM    1. Preoperative examination Z01.818 EKG 12-lead complete w/read - Clinics     Comprehensive metabolic panel     CBC with platelets     *UA reflex to Microscopic and Culture ([facility] (except [place] and [place])     Urine Microscopic   2. Primary osteoarthritis of left hip M16.12    3. Prostate cancer (H) C61 Comprehensive metabolic panel     CBC with platelets   4. Lymphoma, unspecified body region, unspecified lymphoma type (H) C85.90 Comprehensive metabolic panel     CBC with platelets   5. Major depressive disorder, single episode in full remission (H) F32.5    6. Anxiety state F41.1    7. Environmental allergies Z91.09    8. Medication monitoring encounter Z51.81 EKG 12-lead complete w/read - Clinics     Comprehensive metabolic panel     CBC with platelets     *UA reflex to Microscopic and Culture ([facility] (except [place])       RECOMMENDATIONS:                                                      --Consult hospital rounder / IM to assist post-op medical management    --Patient is to take all scheduled medications on the day of surgery EXCEPT for modifications listed below.    Hold baby aspirin, multivitamin, fish oil, ibuprofen/advil/aleve prior to surgery -- okay to take Tylenol as needed for pain    APPROVAL GIVEN to proceed with proposed procedure, without further diagnostic evaluation     The information in this document, created by the medical scribe for me, accurately reflects the services I personally performed and the decisions made by me. I have reviewed and approved this document for accuracy.   Archie Meadows MD FAAFP     Signed Electronically by: MD Archie Hammond MD, FAAFP    [address]  [address] [phone] Fax    Copy of this evaluation report is provided to requesting physician.    [facility] Preop Guidelines

## 2017-06-14 NOTE — NURSING NOTE
"No chief complaint on file.      Initial /66  Pulse 56  Temp 97.8  F (36.6  C) (Oral)  Ht 5' 7.52\" (1.715 m)  Wt 141 lb (64 kg)  SpO2 97%  BMI 21.74 kg/m2 Estimated body mass index is 21.74 kg/(m^2) as calculated from the following:    Height as of this encounter: 5' 7.52\" (1.715 m).    Weight as of this encounter: 141 lb (64 kg)..  BP completed using cuff size: tosha Phillips MA  "

## 2017-06-14 NOTE — MR AVS SNAPSHOT
After Visit Summary   6/14/2017    Jasen Rosario    MRN: [id number]           Patient Information     Date Of Birth          1945        Visit Information        Provider Department      6/14/2017 3:00 PM Archie Meadows MD [facility]        Today's Diagnoses     Preoperative examination    -  1    Primary osteoarthritis of left hip        Prostate cancer (H)        Lymphoma, unspecified body region, unspecified lymphoma type (H)        Major depressive disorder, single episode in full remission (H)        Anxiety state        Environmental allergies        Medication monitoring encounter          Care Instructions    Follow up regarding Lymphoma and if you need to get a recheck appointment    Colonoscopy due in December 2017 -- follow up to schedule     Hold Baby Aspirin, multivitamin, fish oil, ibuprofen/advil/aleve prior to surgery - Tylenol is okay    [facility]  [place]                        To reach your care team during and after hours:   [phone]  To reach our pharmacy:        [phone]    Clinic Hours                        Our clinic hours are:    Monday   7:30 am to 7:00 pm                  Tuesday through Friday 7:30 am to 5:00 pm                             Saturday   8:00 am to 12:00 pm      Sunday   Closed      Pharmacy Hours                        Our pharmacy hours are:    Monday   8:30 am to 7:00 pm       Tuesday to Friday  8:30 am to 6:00 pm                       Saturday    9:00 am to 1:00 pm              Sunday    Closed              There is also information available at our web site:  [email or web address]    If your provider ordered any lab tests and you do not receive the results within 10 business days, please call the clinic.    If you need a medication refill please contact your pharmacy.  Please allow 2-3 business days for your refill to be completed.    Our clinic offers telephone visits and e visits.  Please ask one of your team  members to explain more.      Use [employer] (secure email communication and access to your chart) to send your primary care provider a message or make an appointment. Ask someone on your Team how to sign up for [employer].  Immunizations                      Immunization History   Administered Date(s) Administered     Hepatitis A Vac Ped/Adol-2 Dose 06/28/2006, 12/28/2006     Hepatitis B 01/01/1995, 02/01/1995, 06/01/1995     Pneumococcal (PCV 13) 12/01/2015     Pneumococcal 23 valent 06/28/2006, 04/03/2013     TD (ADULT, 7+) 06/10/2001, 12/10/2010     TDAP Vaccine (Boostrix) 04/03/2013     Zoster vaccine, live 04/03/2013        Health Maintenance                         Health Maintenance Due   Topic Date Due     Prostate Test (PSA) - yearly  12/08/2015       Before Your Surgery      Call your surgeon if there is any change in your health. This includes signs of a cold or flu (such as a sore throat, runny nose, cough, rash or fever).    Do not smoke, drink alcohol or take over the counter medicine (unless your surgeon or primary care doctor tells you to) for the 24 hours before and after surgery.    If you take prescribed drugs: Follow your doctor s orders about which medicines to take and which to stop until after surgery.    Eating and drinking prior to surgery: follow the instructions from your surgeon    Take a shower or bath the night before surgery. Use the soap your surgeon gave you to gently clean your skin. If you do not have soap from your surgeon, use your regular soap. Do not shave or scrub the surgery site.  Wear clean pajamas and have clean sheets on your bed.           Follow-ups after your visit        Your next 10 appointments already scheduled     Jun 21, 2017   Procedure with MD Seema Moody [facility] (--)    [address]  [facility] [address]   [phone]            Oct 02, 2017   Procedure with MD Seema Moody  "[facility] (--)    [address]  [place] [address] [phone]              Who to contact     If you have questions or need follow up information about today's clinic visit or your schedule please contact [facility] directly at [phone].  Normal or non-critical lab and imaging results will be communicated to you by MyChart, letter or phone within 4 business days after the clinic has received the results. If you do not hear from us within 7 days, please contact the clinic through [employer] or phone. If you have a critical or abnormal lab result, we will notify you by phone as soon as possible.  Submit refill requests through [employer] or call your pharmacy and they will forward the refill request to us. Please allow 3 business days for your refill to be completed.          Additional Information About Your Visit        [employer] Information     [employer] gives you secure access to your electronic health record. If you see a primary care provider, you can also send messages to your care team and make appointments. If you have questions, please call your primary care clinic.  If you do not have a primary care provider, please call [phone] and they will assist you.        Care EveryWhere ID     This is your Care EveryWhere ID. This could be used by other organizations to access your [place] medical records  [id number]        Your Vitals Were     Pulse Temperature Height Pulse Oximetry BMI (Body Mass Index)       56 97.8  F (36.6  C) (Oral) 5' 7.52\" (1.715 m) 97% 21.74 kg/m2        Blood Pressure from Last 3 Encounters:   06/14/17 108/66   08/04/16 108/62   06/30/16 124/78    Weight from Last 3 Encounters:   06/14/17 141 lb (64 kg)   08/04/16 141 lb (64 kg)   06/30/16 145 lb 8 oz (66 kg)              We Performed the Following     *UA reflex to Microscopic and Culture ([facility] (except [place] and [place])     CBC with platelets     Comprehensive " metabolic panel     EKG 12-lead complete w/read - Clinics     Urine Microscopic        Primary Care Provider Office Phone # Fax #    Archie Meadows [phone]       [facility] [address] [place] [address]        Thank you!     Thank you for choosing [facility]  for your care. Our goal is always to provide you with excellent care. Hearing back from our patients is one way we can continue to improve our services. Please take a few minutes to complete the written survey that you may receive in the mail after your visit with us. Thank you!             Your Updated Medication List - Protect others around you: Learn how to safely use, store and throw away your medicines at www.disposemymeds.org.          This list is accurate as of: 6/14/17  4:08 PM.  Always use your most recent med list.                   Brand Name Dispense Instructions for use    DAILY MULTIVITAMIN PO      Take  by mouth daily.       FISH OIL MAX-EPA 1000 MG Caps      2 tabs daily       GLUCOSAMINE CHONDROITIN Tabs      2 tabs daily       NEW MED      herba supplement

## 2017-06-15 LAB
ALBUMIN SERPL-MCNC: 3.9 G/DL (ref 3.4–5)
ALP SERPL-CCNC: 74 U/L (ref 40–150)
ALT SERPL W P-5'-P-CCNC: 24 U/L (ref 0–70)
ANION GAP SERPL CALCULATED.3IONS-SCNC: 9 MMOL/L (ref 3–14)
AST SERPL W P-5'-P-CCNC: 17 U/L (ref 0–45)
BILIRUB SERPL-MCNC: 0.5 MG/DL (ref 0.2–1.3)
BUN SERPL-MCNC: 22 MG/DL (ref 7–30)
CALCIUM SERPL-MCNC: 8.8 MG/DL (ref 8.5–10.1)
CHLORIDE SERPL-SCNC: 109 MMOL/L (ref 94–109)
CO2 SERPL-SCNC: 26 MMOL/L (ref 20–32)
CREAT SERPL-MCNC: 1.14 MG/DL (ref 0.66–1.25)
GFR SERPL CREATININE-BSD FRML MDRD: 63 ML/MIN/1.7M2
GLUCOSE SERPL-MCNC: 78 MG/DL (ref 70–99)
POTASSIUM SERPL-SCNC: 4.6 MMOL/L (ref 3.4–5.3)
PROT SERPL-MCNC: 6.3 G/DL (ref 6.8–8.8)
PSA SERPL-MCNC: NORMAL UG/L (ref 0–4)
SODIUM SERPL-SCNC: 144 MMOL/L (ref 133–144)

## 2017-06-15 ASSESSMENT — ANXIETY QUESTIONNAIRES
3. WORRYING TOO MUCH ABOUT DIFFERENT THINGS: SEVERAL DAYS
5. BEING SO RESTLESS THAT IT IS HARD TO SIT STILL: NOT AT ALL
GAD7 TOTAL SCORE: 4
2. NOT BEING ABLE TO STOP OR CONTROL WORRYING: SEVERAL DAYS
1. FEELING NERVOUS, ANXIOUS, OR ON EDGE: NOT AT ALL
6. BECOMING EASILY ANNOYED OR IRRITABLE: SEVERAL DAYS
7. FEELING AFRAID AS IF SOMETHING AWFUL MIGHT HAPPEN: NOT AT ALL
IF YOU CHECKED OFF ANY PROBLEMS ON THIS QUESTIONNAIRE, HOW DIFFICULT HAVE THESE PROBLEMS MADE IT FOR YOU TO DO YOUR WORK, TAKE CARE OF THINGS AT HOME, OR GET ALONG WITH OTHER PEOPLE: NOT DIFFICULT AT ALL

## 2017-06-15 ASSESSMENT — PATIENT HEALTH QUESTIONNAIRE - PHQ9: 5. POOR APPETITE OR OVEREATING: SEVERAL DAYS

## 2017-06-16 ASSESSMENT — ANXIETY QUESTIONNAIRES: GAD7 TOTAL SCORE: 4

## 2017-06-16 ASSESSMENT — PATIENT HEALTH QUESTIONNAIRE - PHQ9: SUM OF ALL RESPONSES TO PHQ QUESTIONS 1-9: 2

## 2017-06-20 NOTE — H&P (VIEW-ONLY)
[address] [address]  [phone]  Dept: [phone]    PRE-OP EVALUATION:  Today's date: 2017    Jasen Rosario (: 1945) presents for pre-operative evaluation assessment as requested by Dr. Sebastian Madrigal.  He requires evaluation and anesthesia risk assessment prior to undergoing surgery/procedure for treatment of left hip pain.  Proposed procedure: Left hip replacement    Date of Surgery/ Procedure: 17  Time of Surgery/ Procedure: 1p  Hospital/Surgical Facility: [facility]  Primary Physician: Archie Meadows  Type of Anesthesia Anticipated: General    Patient has a Health Care Directive or Living Will:  YES     1. NO - Do you have a history of heart attack, stroke, stent, bypass or surgery on an artery in the head, neck, heart or legs?  2. NO - Do you ever have any pain or discomfort in your chest?  3. NO - Do you have a history of  Heart Failure?  4. NO - Are you troubled by shortness of breath when: walking on the level, up a slight hill or at night?  5. NO - Do you currently have a cold, bronchitis or other respiratory infection?  6. NO - Do you have a cough, shortness of breath or wheezing?  7. NO - Do you sometimes get pains in the calves of your legs when you walk?  8. NO - Do you or anyone in your family have previous history of blood clots?  9. NO - Do you or does anyone in your family have a serious bleeding problem such as prolonged bleeding following surgeries or cuts?  10. NO - Have you ever had problems with anemia or been told to take iron pills?  11. NO - Have you had any abnormal blood loss such as black, tarry or bloody stools, or abnormal vaginal bleeding?  12. NO - Have you ever had a blood transfusion?  13. NO - Have you or any of your relatives ever had problems with anesthesia?  14. NO - Do you have sleep apnea, excessive snoring or daytime drowsiness?  15. NO - Do you have any prosthetic heart valves?  16.  NO - Do you have prosthetic joints?  17. NO - Is there any chance that you may be pregnant?    HPI:                                                      Brief HPI related to upcoming procedure: Jasen reported to the clinic regarding a preoperative examination for a proposed left hip replacement procedure to treat chronic left hip pain.    Anxiety/Depression -- Jeffrey reported feeling a little more stressed out due to upcoming surgeries and needing to get chores done.     PHQ-9: 2  WALDO-7: 4    Left Knee -- Jeffrey reported plans for a left knee surgery after his hip heals, tentatively scheduled for 10/02/17.     Lymphoma -- Followed by [facility], unsure of need for recheck appointment.     Colon screening -- Next screening due 12/17.     Past/recent records reviewed and discussed for -- medications, oncology, family hx, surgical hx, social hx, immunizations, allergies.    See problem list for active medical problems.  Problems all longstanding and stable, except as noted/documented. See ROS for pertinent symptoms related to these conditions.                                                                                                  .  MEDICAL HISTORY:                                                      Patient Active Problem List    Diagnosis Date Noted     Prostate cancer (H)      Priority: High     Dr Smyth [facility] 3+3       Hyperlipidemia LDL goal <130      Priority: High     Lumbar stenosis      Priority: Medium     TCO - Dr Irene - with neuritis       Lymphoma (H)      Priority: Medium     B Cell - Dr Barrios       Colon polyp      Priority: Medium     ED (erectile dysfunction)      Priority: Medium     Seasonal allergies      Priority: Medium     OA (osteoarthritis)      Priority: Medium     Left knee synvisc- Dr Balderas       Advanced directives, counseling/discussion 04/02/2013     Priority: Low     Major depressive disorder, single episode in full remission (H)      Priority: Low     Anxiety state  11/30/2004     Priority: Low     Problem list name updated by automated process. Provider to review        Past Medical History:   Diagnosis Date     Anxiety state, unspecified 1991     Colon polyp      ED (erectile dysfunction) 2005     Hyperlipidemia LDL goal <130 2001     Hypoglycemia, unspecified 1991     Infectious mononucleosis 1969     Lumbar stenosis     [facility] - Dr Irene - with neuritis     Lymphoma (H)     SLL - [facility] Cell - Dr Barrios     Major depressive disorder, single episode in full remission (H) 1991     OA (osteoarthritis)     Severe Lt Hip     Prostate cancer (H) 5/13    Dr Smyth - [facility] 3+3 - intermediate progression risk - pT2cN0     Seasonal allergies      Past Surgical History:   Procedure Laterality Date     DAVINCI PROSTATECTOMY, LYMPHADENECTOMY N/A 4/13/2015    RALP - Dr Smyth     HC COLONOSCOPY THRU STOMA, DIAGNOSTIC  12/04, 12/14    colon polyp - serrated adenoma 5 mm - due 3 yrs     SURGICAL HISTORY OF -   1980    BACK SURGERY     SURGICAL HISTORY OF -   1987    ANKLE FX ORIF - Lt     SURGICAL HISTORY OF -   1972    LT KNEE MCL TEAR AND MENISCUS      SURGICAL HISTORY OF -   1960    APPY     SURGICAL HISTORY OF -   2001, 2008    LT SHOULDER ARTHROSCOPY     SURGICAL HISTORY OF -   1/07    Rt Shoulder Surgery - Dr Ferrer     Current Outpatient Prescriptions   Medication Sig Dispense Refill     Multiple Vitamin (DAILY MULTIVITAMIN PO) Take  by mouth daily.       NEW MED herba supplement  0     GLUCOSAMINE CHONDROITIN OR TABS 2 tabs daily  0     FISH OIL MAX-EPA 1000 MG OR CAPS 2 tabs daily  0     OTC products: None, except as noted above    Allergies   Allergen Reactions     Seasonal Allergies       Latex Allergy: NO    Social History   Substance Use Topics     Smoking status: Former Smoker     Packs/day: 2.00     Years: 7.00     Types: Cigarettes     Quit date: 1/1/1965     Smokeless tobacco: Never Used     Alcohol use 4.2 oz/week     7 Standard drinks or equivalent per week  "    History   Drug Use No       REVIEW OF SYSTEMS:                                                    Constitutional, HEENT, cardiovascular, pulmonary, GI, , musculoskeletal, neuro, skin, endocrine and psych systems are negative, except as in HPI or otherwise noted     This document serves as a record of the services and decisions personally performed and made by Archie Meadows MD FAA. It was created on their behalf by Sumit Almeida, a trained medical scribe. The creation of this document is based the provider's statements to the medical scribe.  Sumit Almeida June 14, 2017 3:57 PM      EXAM:                                                    /66  Pulse 56  Temp 97.8  F (36.6  C) (Oral)  Ht 5' 7.52\" (1.715 m)  Wt 141 lb (64 kg)  SpO2 97%  BMI 21.74 kg/m2    GENERAL APPEARANCE: healthy, alert and no distress, underweight     EYES: EOMI,  PERRL     HENT: ear canals and TMs- normal upon viewing with otoscope; Nose and Mouth- no ulcers, no lesions upon viewing with otoscope     NECK: no adenopathy, no asymmetry, masses, or scars and thyroid normal to palpation     RESP: lungs clear to auscultation - no rales, rhonchi or wheezes     CV: regular rates and rhythm, normal S1 S2, no S3 or S4 and no murmur, click or rub     ABDOMEN:  soft, nontender, no HSM or masses and bowel sounds normal     MS: extremities normal- no gross deformities noted, no evidence of inflammation in joints, FROM in all extremities.     SKIN: no suspicious lesions or rashes to visible skin     NEURO: mentation intact and speech normal     PSYCH: mentation appears normal. and affect normal/bright    DIAGNOSTICS:                                                    EKG: appears normal, NSR, normal axis, normal intervals, no acute ST/T changes c/w ischemia, no LVH by voltage criteria, unchanged from previous tracings    Recent Labs   Lab Test  08/04/16   0903  06/30/16   1355   12/23/09   1039   HGB  16.3  14.6   < >  15.9   PLT  183  158   < >  " 169   NA  139  137   < >  134   POTASSIUM  4.4  4.5   < >  5.3   CR  0.97  1.01   < >  1.08   A1C   --    --    --   5.7    < > = values in this interval not displayed.     IMPRESSION:                                                    Diagnosis/reason for consult: chronic left hip pain    The proposed surgical procedure is considered INTERMEDIATE risk.    REVISED CARDIAC RISK INDEX  The patient has the following serious cardiovascular risks for perioperative complications such as (MI, PE, VFib and 3  AV Block):  No serious cardiac risks  INTERPRETATION: 1 risks: Class II (low risk - 0.9% complication rate)    The patient has the following additional risks for perioperative complications:  The 10-year ASCVD risk score (Jimena HATCH Jr, et al., 2013) is: 11.8%    Values used to calculate the score:      Age: 71 years      Sex: Male      Is Non- : No      Diabetic: No      Tobacco smoker: No      Systolic Blood Pressure: 108 mmHg      Is BP treated: No      HDL Cholesterol: 83 mg/dL      Total Cholesterol: 197 mg/dL      ICD-10-CM    1. Preoperative examination Z01.818 EKG 12-lead complete w/read - Clinics     Comprehensive metabolic panel     CBC with platelets     *UA reflex to Microscopic and Culture ([facility] (except [place] and [facility])     Urine Microscopic   2. Primary osteoarthritis of left hip M16.12    3. Prostate cancer (H) C61 Comprehensive metabolic panel     CBC with platelets   4. Lymphoma, unspecified body region, unspecified lymphoma type (H) C85.90 Comprehensive metabolic panel     CBC with platelets   5. Major depressive disorder, single episode in full remission (H) F32.5    6. Anxiety state F41.1    7. Environmental allergies Z91.09    8. Medication monitoring encounter Z51.81 EKG 12-lead complete w/read - Clinics     Comprehensive metabolic panel     CBC with platelets     *UA reflex to Microscopic and Culture ([facility] (except [place])       RECOMMENDATIONS:                                                      --Consult hospital rounder / IM to assist post-op medical management    --Patient is to take all scheduled medications on the day of surgery EXCEPT for modifications listed below.    Hold baby aspirin, multivitamin, fish oil, ibuprofen/advil/aleve prior to surgery -- okay to take Tylenol as needed for pain    APPROVAL GIVEN to proceed with proposed procedure, without further diagnostic evaluation     The information in this document, created by the medical scribe for me, accurately reflects the services I personally performed and the decisions made by me. I have reviewed and approved this document for accuracy.   Archie Meadows MD FAAFP     Signed Electronically by: MD Archie Hammond MD, FAAFP    [address]  [address] [phone] Fax    Copy of this evaluation report is provided to requesting physician.    [place] Preop Guidelines

## 2017-06-21 ENCOUNTER — APPOINTMENT (OUTPATIENT)
Dept: GENERAL RADIOLOGY | Facility: CLINIC | Age: 72
DRG: 470 | End: 2017-06-21
Attending: ORTHOPAEDIC SURGERY
Payer: MEDICARE

## 2017-06-21 ENCOUNTER — ANESTHESIA (OUTPATIENT)
Dept: SURGERY | Facility: CLINIC | Age: 72
DRG: 470 | End: 2017-06-21
Payer: MEDICARE

## 2017-06-21 ENCOUNTER — HOSPITAL ENCOUNTER (INPATIENT)
Facility: CLINIC | Age: 72
LOS: 3 days | Discharge: HOME | DRG: 470 | End: 2017-06-24
Attending: ORTHOPAEDIC SURGERY | Admitting: ORTHOPAEDIC SURGERY
Payer: MEDICARE

## 2017-06-21 ENCOUNTER — ANESTHESIA EVENT (OUTPATIENT)
Dept: SURGERY | Facility: CLINIC | Age: 72
DRG: 470 | End: 2017-06-21
Payer: MEDICARE

## 2017-06-21 DIAGNOSIS — Z96.642 STATUS POST TOTAL REPLACEMENT OF LEFT HIP: Primary | ICD-10-CM

## 2017-06-21 PROBLEM — Z96.649 S/P TOTAL HIP ARTHROPLASTY: Status: ACTIVE | Noted: 2017-06-21

## 2017-06-21 LAB
ABO + RH BLD: NORMAL
ABO + RH BLD: NORMAL
ANION GAP SERPL CALCULATED.3IONS-SCNC: 5 MMOL/L (ref 3–14)
BLD GP AB SCN SERPL QL: NORMAL
BLOOD BANK CMNT PATIENT-IMP: NORMAL
BUN SERPL-MCNC: 19 MG/DL (ref 7–30)
CALCIUM SERPL-MCNC: 8.6 MG/DL (ref 8.5–10.1)
CHLORIDE SERPL-SCNC: 105 MMOL/L (ref 94–109)
CO2 SERPL-SCNC: 28 MMOL/L (ref 20–32)
CREAT SERPL-MCNC: 1.06 MG/DL (ref 0.66–1.25)
GFR SERPL CREATININE-BSD FRML MDRD: 69 ML/MIN/1.7M2
GLUCOSE SERPL-MCNC: 96 MG/DL (ref 70–99)
HGB BLD-MCNC: 15.7 G/DL (ref 13.3–17.7)
PLATELET # BLD AUTO: 178 10E9/L (ref 150–450)
POTASSIUM SERPL-SCNC: 4.2 MMOL/L (ref 3.4–5.3)
SODIUM SERPL-SCNC: 138 MMOL/L (ref 133–144)
SPECIMEN EXP DATE BLD: NORMAL

## 2017-06-21 PROCEDURE — C1713 ANCHOR/SCREW BN/BN,TIS/BN: HCPCS | Performed by: ORTHOPAEDIC SURGERY

## 2017-06-21 PROCEDURE — 25000128 H RX IP 250 OP 636: Performed by: ORTHOPAEDIC SURGERY

## 2017-06-21 PROCEDURE — 25000125 ZZHC RX 250: Performed by: ANESTHESIOLOGY

## 2017-06-21 PROCEDURE — 25800025 ZZH RX 258: Performed by: ORTHOPAEDIC SURGERY

## 2017-06-21 PROCEDURE — 25000125 ZZHC RX 250: Performed by: NURSE ANESTHETIST, CERTIFIED REGISTERED

## 2017-06-21 PROCEDURE — 27210995 ZZH RX 272: Performed by: ORTHOPAEDIC SURGERY

## 2017-06-21 PROCEDURE — 86901 BLOOD TYPING SEROLOGIC RH(D): CPT | Performed by: ORTHOPAEDIC SURGERY

## 2017-06-21 PROCEDURE — 27210794 ZZH OR GENERAL SUPPLY STERILE: Performed by: ORTHOPAEDIC SURGERY

## 2017-06-21 PROCEDURE — 36415 COLL VENOUS BLD VENIPUNCTURE: CPT | Performed by: ORTHOPAEDIC SURGERY

## 2017-06-21 PROCEDURE — 85049 AUTOMATED PLATELET COUNT: CPT | Performed by: ORTHOPAEDIC SURGERY

## 2017-06-21 PROCEDURE — 25000128 H RX IP 250 OP 636: Performed by: ANESTHESIOLOGY

## 2017-06-21 PROCEDURE — 37000008 ZZH ANESTHESIA TECHNICAL FEE, 1ST 30 MIN: Performed by: ORTHOPAEDIC SURGERY

## 2017-06-21 PROCEDURE — 12000007 ZZH R&B INTERMEDIATE

## 2017-06-21 PROCEDURE — 86850 RBC ANTIBODY SCREEN: CPT | Performed by: ORTHOPAEDIC SURGERY

## 2017-06-21 PROCEDURE — 71000012 ZZH RECOVERY PHASE 1 LEVEL 1 FIRST HR: Performed by: ORTHOPAEDIC SURGERY

## 2017-06-21 PROCEDURE — 25000566 ZZH SEVOFLURANE, EA 15 MIN: Performed by: ORTHOPAEDIC SURGERY

## 2017-06-21 PROCEDURE — 25000128 H RX IP 250 OP 636: Performed by: NURSE ANESTHETIST, CERTIFIED REGISTERED

## 2017-06-21 PROCEDURE — 25000132 ZZH RX MED GY IP 250 OP 250 PS 637: Mod: GY | Performed by: ORTHOPAEDIC SURGERY

## 2017-06-21 PROCEDURE — 36000093 ZZH SURGERY LEVEL 4 1ST 30 MIN: Performed by: ORTHOPAEDIC SURGERY

## 2017-06-21 PROCEDURE — 71000013 ZZH RECOVERY PHASE 1 LEVEL 1 EA ADDTL HR: Performed by: ORTHOPAEDIC SURGERY

## 2017-06-21 PROCEDURE — 37000009 ZZH ANESTHESIA TECHNICAL FEE, EACH ADDTL 15 MIN: Performed by: ORTHOPAEDIC SURGERY

## 2017-06-21 PROCEDURE — A9270 NON-COVERED ITEM OR SERVICE: HCPCS | Mod: GY | Performed by: ORTHOPAEDIC SURGERY

## 2017-06-21 PROCEDURE — 40000986 XR PELVIS AND HIP PORTABLE LEFT 1 VIEW

## 2017-06-21 PROCEDURE — 86900 BLOOD TYPING SEROLOGIC ABO: CPT | Performed by: ORTHOPAEDIC SURGERY

## 2017-06-21 PROCEDURE — 36000063 ZZH SURGERY LEVEL 4 EA 15 ADDTL MIN: Performed by: ORTHOPAEDIC SURGERY

## 2017-06-21 PROCEDURE — C1776 JOINT DEVICE (IMPLANTABLE): HCPCS | Performed by: ORTHOPAEDIC SURGERY

## 2017-06-21 PROCEDURE — 40000170 ZZH STATISTIC PRE-PROCEDURE ASSESSMENT II: Performed by: ORTHOPAEDIC SURGERY

## 2017-06-21 PROCEDURE — 85018 HEMOGLOBIN: CPT | Performed by: ORTHOPAEDIC SURGERY

## 2017-06-21 PROCEDURE — 25000125 ZZHC RX 250: Performed by: ORTHOPAEDIC SURGERY

## 2017-06-21 PROCEDURE — 80048 BASIC METABOLIC PNL TOTAL CA: CPT | Performed by: ORTHOPAEDIC SURGERY

## 2017-06-21 PROCEDURE — 0SRB0JA REPLACEMENT OF LEFT HIP JOINT WITH SYNTHETIC SUBSTITUTE, UNCEMENTED, OPEN APPROACH: ICD-10-PCS | Performed by: ORTHOPAEDIC SURGERY

## 2017-06-21 DEVICE — IMP HEAD FEMORAL BIOM MOD 36MM -3  11-363661: Type: IMPLANTABLE DEVICE | Site: HIP | Status: FUNCTIONAL

## 2017-06-21 DEVICE — IMPLANTABLE DEVICE: Type: IMPLANTABLE DEVICE | Site: HIP | Status: FUNCTIONAL

## 2017-06-21 DEVICE — IMP SHELL BIOM G7 ACETAB PPS LIM HOLE 54MM SZ F 010000664: Type: IMPLANTABLE DEVICE | Site: HIP | Status: FUNCTIONAL

## 2017-06-21 DEVICE — IMP SCR BIOM G7 ACETABULAR DOME 6.5X25MM LOW PRO 010000998: Type: IMPLANTABLE DEVICE | Site: HIP | Status: FUNCTIONAL

## 2017-06-21 RX ORDER — DIPHENHYDRAMINE HYDROCHLORIDE 50 MG/ML
12.5 INJECTION INTRAMUSCULAR; INTRAVENOUS EVERY 6 HOURS PRN
Status: DISCONTINUED | OUTPATIENT
Start: 2017-06-21 | End: 2017-06-24 | Stop reason: HOSPADM

## 2017-06-21 RX ORDER — ONDANSETRON 4 MG/1
4 TABLET, ORALLY DISINTEGRATING ORAL EVERY 30 MIN PRN
Status: DISCONTINUED | OUTPATIENT
Start: 2017-06-21 | End: 2017-06-21 | Stop reason: HOSPADM

## 2017-06-21 RX ORDER — SODIUM CHLORIDE, SODIUM LACTATE, POTASSIUM CHLORIDE, CALCIUM CHLORIDE 600; 310; 30; 20 MG/100ML; MG/100ML; MG/100ML; MG/100ML
INJECTION, SOLUTION INTRAVENOUS CONTINUOUS
Status: DISCONTINUED | OUTPATIENT
Start: 2017-06-21 | End: 2017-06-21 | Stop reason: HOSPADM

## 2017-06-21 RX ORDER — KETOROLAC TROMETHAMINE 15 MG/ML
15 INJECTION, SOLUTION INTRAMUSCULAR; INTRAVENOUS EVERY 6 HOURS
Status: COMPLETED | OUTPATIENT
Start: 2017-06-21 | End: 2017-06-22

## 2017-06-21 RX ORDER — FENTANYL CITRATE 0.05 MG/ML
25-50 INJECTION, SOLUTION INTRAMUSCULAR; INTRAVENOUS
Status: DISCONTINUED | OUTPATIENT
Start: 2017-06-21 | End: 2017-06-21 | Stop reason: HOSPADM

## 2017-06-21 RX ORDER — GLYCOPYRROLATE 0.2 MG/ML
INJECTION, SOLUTION INTRAMUSCULAR; INTRAVENOUS PRN
Status: DISCONTINUED | OUTPATIENT
Start: 2017-06-21 | End: 2017-06-21

## 2017-06-21 RX ORDER — DIPHENHYDRAMINE HCL 12.5MG/5ML
12.5 LIQUID (ML) ORAL EVERY 6 HOURS PRN
Status: DISCONTINUED | OUTPATIENT
Start: 2017-06-21 | End: 2017-06-24 | Stop reason: HOSPADM

## 2017-06-21 RX ORDER — CYCLOBENZAPRINE HCL 5 MG
5 TABLET ORAL 3 TIMES DAILY PRN
Status: DISCONTINUED | OUTPATIENT
Start: 2017-06-21 | End: 2017-06-24 | Stop reason: HOSPADM

## 2017-06-21 RX ORDER — AMOXICILLIN 250 MG
1-2 CAPSULE ORAL 2 TIMES DAILY
Status: DISCONTINUED | OUTPATIENT
Start: 2017-06-21 | End: 2017-06-24 | Stop reason: HOSPADM

## 2017-06-21 RX ORDER — FENTANYL CITRATE 50 UG/ML
INJECTION, SOLUTION INTRAMUSCULAR; INTRAVENOUS PRN
Status: DISCONTINUED | OUTPATIENT
Start: 2017-06-21 | End: 2017-06-21

## 2017-06-21 RX ORDER — MEPERIDINE HYDROCHLORIDE 25 MG/ML
12.5 INJECTION INTRAMUSCULAR; INTRAVENOUS; SUBCUTANEOUS EVERY 5 MIN PRN
Status: DISCONTINUED | OUTPATIENT
Start: 2017-06-21 | End: 2017-06-21 | Stop reason: HOSPADM

## 2017-06-21 RX ORDER — ONDANSETRON 2 MG/ML
4 INJECTION INTRAMUSCULAR; INTRAVENOUS EVERY 6 HOURS PRN
Status: DISCONTINUED | OUTPATIENT
Start: 2017-06-21 | End: 2017-06-24 | Stop reason: HOSPADM

## 2017-06-21 RX ORDER — ONDANSETRON 2 MG/ML
INJECTION INTRAMUSCULAR; INTRAVENOUS PRN
Status: DISCONTINUED | OUTPATIENT
Start: 2017-06-21 | End: 2017-06-21

## 2017-06-21 RX ORDER — OXYCODONE HYDROCHLORIDE 5 MG/1
5-10 TABLET ORAL EVERY 4 HOURS PRN
Status: DISCONTINUED | OUTPATIENT
Start: 2017-06-21 | End: 2017-06-24 | Stop reason: HOSPADM

## 2017-06-21 RX ORDER — ONDANSETRON 4 MG/1
4 TABLET, ORALLY DISINTEGRATING ORAL EVERY 6 HOURS PRN
Status: DISCONTINUED | OUTPATIENT
Start: 2017-06-21 | End: 2017-06-24 | Stop reason: HOSPADM

## 2017-06-21 RX ORDER — LIDOCAINE HYDROCHLORIDE 20 MG/ML
INJECTION, SOLUTION INFILTRATION; PERINEURAL PRN
Status: DISCONTINUED | OUTPATIENT
Start: 2017-06-21 | End: 2017-06-21

## 2017-06-21 RX ORDER — CEFAZOLIN SODIUM 1 G/3ML
1 INJECTION, POWDER, FOR SOLUTION INTRAMUSCULAR; INTRAVENOUS EVERY 8 HOURS
Status: COMPLETED | OUTPATIENT
Start: 2017-06-21 | End: 2017-06-22

## 2017-06-21 RX ORDER — DEXTROSE MONOHYDRATE, SODIUM CHLORIDE, AND POTASSIUM CHLORIDE 50; 1.49; 4.5 G/1000ML; G/1000ML; G/1000ML
INJECTION, SOLUTION INTRAVENOUS CONTINUOUS
Status: DISCONTINUED | OUTPATIENT
Start: 2017-06-21 | End: 2017-06-24 | Stop reason: HOSPADM

## 2017-06-21 RX ORDER — PROPOFOL 10 MG/ML
INJECTION, EMULSION INTRAVENOUS PRN
Status: DISCONTINUED | OUTPATIENT
Start: 2017-06-21 | End: 2017-06-21

## 2017-06-21 RX ORDER — CEFAZOLIN SODIUM 2 G/100ML
2 INJECTION, SOLUTION INTRAVENOUS
Status: COMPLETED | OUTPATIENT
Start: 2017-06-21 | End: 2017-06-21

## 2017-06-21 RX ORDER — PROCHLORPERAZINE MALEATE 5 MG
5 TABLET ORAL EVERY 6 HOURS PRN
Status: DISCONTINUED | OUTPATIENT
Start: 2017-06-21 | End: 2017-06-24 | Stop reason: HOSPADM

## 2017-06-21 RX ORDER — ACETAMINOPHEN 325 MG/1
975 TABLET ORAL EVERY 8 HOURS
Status: COMPLETED | OUTPATIENT
Start: 2017-06-21 | End: 2017-06-24

## 2017-06-21 RX ORDER — CEFAZOLIN SODIUM 1 G/3ML
1 INJECTION, POWDER, FOR SOLUTION INTRAMUSCULAR; INTRAVENOUS SEE ADMIN INSTRUCTIONS
Status: DISCONTINUED | OUTPATIENT
Start: 2017-06-21 | End: 2017-06-21 | Stop reason: HOSPADM

## 2017-06-21 RX ORDER — PROPOFOL 10 MG/ML
INJECTION, EMULSION INTRAVENOUS CONTINUOUS PRN
Status: DISCONTINUED | OUTPATIENT
Start: 2017-06-21 | End: 2017-06-21

## 2017-06-21 RX ORDER — ACETAMINOPHEN 325 MG/1
650 TABLET ORAL EVERY 4 HOURS PRN
Status: DISCONTINUED | OUTPATIENT
Start: 2017-06-24 | End: 2017-06-24 | Stop reason: HOSPADM

## 2017-06-21 RX ORDER — NEOSTIGMINE METHYLSULFATE 1 MG/ML
VIAL (ML) INJECTION PRN
Status: DISCONTINUED | OUTPATIENT
Start: 2017-06-21 | End: 2017-06-21

## 2017-06-21 RX ORDER — EPHEDRINE SULFATE 50 MG/ML
INJECTION, SOLUTION INTRAMUSCULAR; INTRAVENOUS; SUBCUTANEOUS PRN
Status: DISCONTINUED | OUTPATIENT
Start: 2017-06-21 | End: 2017-06-21

## 2017-06-21 RX ORDER — NALOXONE HYDROCHLORIDE 0.4 MG/ML
.1-.4 INJECTION, SOLUTION INTRAMUSCULAR; INTRAVENOUS; SUBCUTANEOUS
Status: DISCONTINUED | OUTPATIENT
Start: 2017-06-21 | End: 2017-06-24 | Stop reason: HOSPADM

## 2017-06-21 RX ORDER — LIDOCAINE 40 MG/G
CREAM TOPICAL
Status: DISCONTINUED | OUTPATIENT
Start: 2017-06-21 | End: 2017-06-24 | Stop reason: HOSPADM

## 2017-06-21 RX ORDER — HYDROMORPHONE HYDROCHLORIDE 1 MG/ML
.3-.5 INJECTION, SOLUTION INTRAMUSCULAR; INTRAVENOUS; SUBCUTANEOUS
Status: DISCONTINUED | OUTPATIENT
Start: 2017-06-21 | End: 2017-06-24 | Stop reason: HOSPADM

## 2017-06-21 RX ORDER — ONDANSETRON 2 MG/ML
4 INJECTION INTRAMUSCULAR; INTRAVENOUS EVERY 30 MIN PRN
Status: DISCONTINUED | OUTPATIENT
Start: 2017-06-21 | End: 2017-06-21 | Stop reason: HOSPADM

## 2017-06-21 RX ORDER — ALBUTEROL SULFATE 0.83 MG/ML
2.5 SOLUTION RESPIRATORY (INHALATION) EVERY 4 HOURS PRN
Status: DISCONTINUED | OUTPATIENT
Start: 2017-06-21 | End: 2017-06-21 | Stop reason: HOSPADM

## 2017-06-21 RX ORDER — DEXAMETHASONE SODIUM PHOSPHATE 4 MG/ML
INJECTION, SOLUTION INTRA-ARTICULAR; INTRALESIONAL; INTRAMUSCULAR; INTRAVENOUS; SOFT TISSUE PRN
Status: DISCONTINUED | OUTPATIENT
Start: 2017-06-21 | End: 2017-06-21

## 2017-06-21 RX ORDER — MAGNESIUM HYDROXIDE 1200 MG/15ML
LIQUID ORAL PRN
Status: DISCONTINUED | OUTPATIENT
Start: 2017-06-21 | End: 2017-06-21 | Stop reason: HOSPADM

## 2017-06-21 RX ADMIN — PROPOFOL 50 MCG/KG/MIN: 10 INJECTION, EMULSION INTRAVENOUS at 12:37

## 2017-06-21 RX ADMIN — ACETAMINOPHEN 975 MG: 325 TABLET, FILM COATED ORAL at 23:27

## 2017-06-21 RX ADMIN — LIDOCAINE HYDROCHLORIDE 1 ML: 10 INJECTION, SOLUTION EPIDURAL; INFILTRATION; INTRACAUDAL; PERINEURAL at 10:28

## 2017-06-21 RX ADMIN — PROPOFOL 200 MG: 10 INJECTION, EMULSION INTRAVENOUS at 11:41

## 2017-06-21 RX ADMIN — ONDANSETRON 4 MG: 2 INJECTION INTRAMUSCULAR; INTRAVENOUS at 13:09

## 2017-06-21 RX ADMIN — HYDROMORPHONE HYDROCHLORIDE 0.3 MG: 1 INJECTION, SOLUTION INTRAMUSCULAR; INTRAVENOUS; SUBCUTANEOUS at 14:09

## 2017-06-21 RX ADMIN — Medication 5 MG: at 12:03

## 2017-06-21 RX ADMIN — DEXAMETHASONE SODIUM PHOSPHATE 4 MG: 4 INJECTION, SOLUTION INTRA-ARTICULAR; INTRALESIONAL; INTRAMUSCULAR; INTRAVENOUS; SOFT TISSUE at 11:43

## 2017-06-21 RX ADMIN — SODIUM CHLORIDE, POTASSIUM CHLORIDE, SODIUM LACTATE AND CALCIUM CHLORIDE: 600; 310; 30; 20 INJECTION, SOLUTION INTRAVENOUS at 10:29

## 2017-06-21 RX ADMIN — KETOROLAC TROMETHAMINE 15 MG: 15 INJECTION, SOLUTION INTRAMUSCULAR; INTRAVENOUS at 20:11

## 2017-06-21 RX ADMIN — GLYCOPYRROLATE 0.6 MG: 0.2 INJECTION, SOLUTION INTRAMUSCULAR; INTRAVENOUS at 13:14

## 2017-06-21 RX ADMIN — HYDROMORPHONE HYDROCHLORIDE 0.5 MG: 1 INJECTION, SOLUTION INTRAMUSCULAR; INTRAVENOUS; SUBCUTANEOUS at 13:26

## 2017-06-21 RX ADMIN — HYDROMORPHONE HYDROCHLORIDE 0.5 MG: 1 INJECTION, SOLUTION INTRAMUSCULAR; INTRAVENOUS; SUBCUTANEOUS at 16:03

## 2017-06-21 RX ADMIN — HYDROMORPHONE HYDROCHLORIDE 0.5 MG: 1 INJECTION, SOLUTION INTRAMUSCULAR; INTRAVENOUS; SUBCUTANEOUS at 15:01

## 2017-06-21 RX ADMIN — HYDROMORPHONE HYDROCHLORIDE 0.3 MG: 1 INJECTION, SOLUTION INTRAMUSCULAR; INTRAVENOUS; SUBCUTANEOUS at 14:18

## 2017-06-21 RX ADMIN — FENTANYL CITRATE 50 MCG: 50 INJECTION, SOLUTION INTRAMUSCULAR; INTRAVENOUS at 12:31

## 2017-06-21 RX ADMIN — LIDOCAINE HYDROCHLORIDE 50 MG: 20 INJECTION, SOLUTION INFILTRATION; PERINEURAL at 11:41

## 2017-06-21 RX ADMIN — CEFAZOLIN SODIUM 1 G: 1 INJECTION, POWDER, FOR SOLUTION INTRAMUSCULAR; INTRAVENOUS at 21:57

## 2017-06-21 RX ADMIN — TRANEXAMIC ACID 1 G: 100 INJECTION, SOLUTION INTRAVENOUS at 11:50

## 2017-06-21 RX ADMIN — OXYCODONE HYDROCHLORIDE 10 MG: 5 TABLET ORAL at 23:26

## 2017-06-21 RX ADMIN — ACETAMINOPHEN 325 MG: 325 TABLET, FILM COATED ORAL at 15:15

## 2017-06-21 RX ADMIN — TRANEXAMIC ACID 1 G: 100 INJECTION, SOLUTION INTRAVENOUS at 13:08

## 2017-06-21 RX ADMIN — MIDAZOLAM HYDROCHLORIDE 1 MG: 1 INJECTION, SOLUTION INTRAMUSCULAR; INTRAVENOUS at 11:27

## 2017-06-21 RX ADMIN — ROCURONIUM BROMIDE 40 MG: 10 INJECTION INTRAVENOUS at 11:41

## 2017-06-21 RX ADMIN — POTASSIUM CHLORIDE, DEXTROSE MONOHYDRATE AND SODIUM CHLORIDE: 150; 5; 450 INJECTION, SOLUTION INTRAVENOUS at 15:52

## 2017-06-21 RX ADMIN — ONDANSETRON 4 MG: 2 SOLUTION INTRAMUSCULAR; INTRAVENOUS at 15:07

## 2017-06-21 RX ADMIN — SODIUM CHLORIDE, POTASSIUM CHLORIDE, SODIUM LACTATE AND CALCIUM CHLORIDE: 600; 310; 30; 20 INJECTION, SOLUTION INTRAVENOUS at 13:11

## 2017-06-21 RX ADMIN — FENTANYL CITRATE 50 MCG: 50 INJECTION, SOLUTION INTRAMUSCULAR; INTRAVENOUS at 11:41

## 2017-06-21 RX ADMIN — OXYCODONE HYDROCHLORIDE 10 MG: 5 TABLET ORAL at 18:50

## 2017-06-21 RX ADMIN — HYDROMORPHONE HYDROCHLORIDE 0.5 MG: 1 INJECTION, SOLUTION INTRAMUSCULAR; INTRAVENOUS; SUBCUTANEOUS at 14:35

## 2017-06-21 RX ADMIN — HYDROMORPHONE HYDROCHLORIDE 0.4 MG: 1 INJECTION, SOLUTION INTRAMUSCULAR; INTRAVENOUS; SUBCUTANEOUS at 14:24

## 2017-06-21 RX ADMIN — NEOSTIGMINE METHYLSULFATE 3 MG: 1 INJECTION INTRAMUSCULAR; INTRAVENOUS; SUBCUTANEOUS at 13:14

## 2017-06-21 RX ADMIN — CEFAZOLIN SODIUM 1 G: 2 INJECTION, SOLUTION INTRAVENOUS at 13:30

## 2017-06-21 RX ADMIN — CEFAZOLIN SODIUM 2 G: 2 INJECTION, SOLUTION INTRAVENOUS at 11:43

## 2017-06-21 RX ADMIN — MIDAZOLAM HYDROCHLORIDE 1 MG: 1 INJECTION, SOLUTION INTRAMUSCULAR; INTRAVENOUS at 11:41

## 2017-06-21 RX ADMIN — FENTANYL CITRATE 50 MCG: 50 INJECTION, SOLUTION INTRAMUSCULAR; INTRAVENOUS at 12:26

## 2017-06-21 RX ADMIN — KETOROLAC TROMETHAMINE 15 MG: 15 INJECTION, SOLUTION INTRAMUSCULAR; INTRAVENOUS at 14:16

## 2017-06-21 RX ADMIN — FENTANYL CITRATE 50 MCG: 50 INJECTION, SOLUTION INTRAMUSCULAR; INTRAVENOUS at 12:17

## 2017-06-21 RX ADMIN — SENNOSIDES AND DOCUSATE SODIUM 1 TABLET: 8.6; 5 TABLET ORAL at 20:11

## 2017-06-21 RX ADMIN — PROCHLORPERAZINE EDISYLATE 5 MG: 5 INJECTION INTRAMUSCULAR; INTRAVENOUS at 15:52

## 2017-06-21 RX ADMIN — Medication 5 MG: at 13:13

## 2017-06-21 NOTE — PROGRESS NOTES
Admission medication history interview status for the 6/21/2017  admission is complete. See EPIC admission navigator for prior to admission medications     Medication history source reliability:Good    Medication history interview source(s):Patient    Medication history resources (including written lists, pill bottles, clinic record):None    Primary pharmacy[facility]    Additional medication history information not noted on PTA med list :None    Time spent in this activity: 45 minutes    Prior to Admission medications    Medication Sig Last Dose Taking? Auth Provider   Omega-3 Fatty Acids (FISH OIL PO) Take 1 tablet by mouth 2 times daily 6/14/2017 Yes Reported, Patient   GLUCOSAMINE-CHONDROITIN PO Take 2 tablets by mouth daily 6/14/2017 Yes Reported, Patient   TURMERIC PO Take 2 tablets by mouth 2 times daily 6/14/2017 Yes Reported, Patient   Acetaminophen (TYLENOL PO) Take 325-650 mg by mouth every 6 hours as needed for mild pain or fever 6/18/2017 at prn Yes Reported, Patient   Multiple Vitamin (DAILY MULTIVITAMIN PO) Take 2 tablets by mouth 2 times daily  6/14/2017 Yes Reported, Patient

## 2017-06-21 NOTE — IP AVS SNAPSHOT
MRN:[id number]                      After Visit Summary   6/21/2017    Jasen Rosario    MRN: [id number]           Thank you!     Thank you for choosing [facility] for your care. Our goal is always to provide you with excellent care. Hearing back from our patients is one way we can continue to improve our services. Please take a few minutes to complete the written survey that you may receive in the mail after you visit with us. Thank you!        Patient Information     Date Of Birth          1945        Designated Caregiver       Most Recent Value    Caregiver    Will someone help with your care after discharge? yes    Name of designated caregiver [facility]    Phone number of caregiver [phone]    Caregiver address [address]  [place]      About your hospital stay     You were admitted on:  June 21, 2017 You last received care in the:  [place] [address] [facility]    You were discharged on:  June 24, 2017        Reason for your hospital stay       BETTY                  Who to Call     For medical emergencies, please call 911.  For non-urgent questions about your medical care, please call your primary care provider or clinic, [phone]  For questions related to your surgery, please call your surgery clinic        Attending Provider     Provider Specialty    Anaya Madrigal MD Orthopedics       Primary Care Provider Office Phone # Fax #    Archie Meadows [phone]      Follow-up Appointments     Follow-up and recommended labs and tests        Office visit prearranged                  Your next 10 appointments already scheduled     Oct 02, 2017   Procedure with Anaya Madrigal MD   [facility] (--)    [address]  [facility] [id number] [phone]              Further instructions from your care team       DISCHARGE INSTRUCTIONS FOR YOUR TOTAL HIP REPLACEMENT     ANAYA MADRIGAL  MD    Wound Care:    Change your dressing daily. Inspect your incision at the time of dressing change for increased redness, swelling, and drainage.  Some discoloration and bruising is usually seen, but call my office if you are concerned or if you see changes in the wound appearance.  Also, call if you develop a fever above 101 degrees.     You may shower directly over the wound beginning 4 days after your surgery, but do not submerge wound under water until after the post operative clinic visit when the sutures are removed and the wound is completely sealed and without any drainage. Keep a dressing over the wound until after your post operative clinic visit.      Activities and outpatient Physical Therapy:  Physical activity may be resumed gradually according to your comfort level and the restrictions on your hip positioning as instructed in the pre-op class and by therapy. Do not cross your legs and do not bend past 90 degrees. You may bear weight as tolerated on the operated leg.  Use crutches or the walker as instructed by Physical Therapy.  Follow your home exercise program as instructed by your therapist.     Wear your anti-embolism stockings day and night until seen for your post operative appointment.  Keep them flat on your skin.  Do not allow them to roll up or crease your skin.  Remove twice daily for one hour at a time.  You may hand wash and air dry your stockings.  Notify my office if you experience calf pain.  Pump your ankles frequently.        If indicated, you may have outpatient physical therapy sessions when your return home. These visits are prescribed for you at the time of your surgery scheduling.  If so, you should have already scheduled your therapy sessions in advance.  If you have not done so, please immediately contact the therapy location of your choice to schedule the appointments for the physical therapy regimen that has been prescribed for you. You may discontinue the crutches or  "walker per the therapist's recommendation.      Medications:  New medications for you on discharge include a pain medication, a stool softener, and a blood thinner.  Detailed instructions come with those medications.  You will also receive instructions on when to resume your home medications.     If you routinely take Aspirin 81 mg, hold the aspirin while taking the formal blood thinner medication.  When the formal blood thinner is completed, please take Aspirin 325 mg   (1 tablet) daily for 4 weeks.  Then resume your Aspirin 81 mg daily if that is your routine.      Antibiotic coverage will be needed before any type of dental procedure. This is a life long recommendation.  You should notify your dentist of your total hip surgery and call your dentist or our office one week before a dental appointment for antibiotics.         Post operative clinic appointment:  Your post operative clinic visit has been prearranged.   Call [phone] with any questions.    Sebastian Madrigal MD    Pending Results     No orders found from 6/19/2017 to 6/22/2017.            Statement of Approval     Ordered          06/23/17 0910  I have reviewed and agree with all the recommendations and orders detailed in this document.  EFFECTIVE NOW     Approved and electronically signed by:  Sebastian Madrigal MD             Admission Information     Date & Time Provider Department Dept. Phone    6/21/2017 Sebastian Madrigal MD [place] [address] [facility] [phone]      Your Vitals Were     Blood Pressure Pulse Temperature Respirations Height Weight    106/66 (BP Location: Right arm) 63 98.8  F (37.1  C) (Oral) 16 1.715 m (5' 7.5\") 63 kg (139 lb)    Pulse Oximetry BMI (Body Mass Index)                98% 21.45 kg/m2          MyChart Information     [email or web address] gives you secure access to your electronic health record. If you see a primary care provider, you can also send messages to your care team and make appointments. If " you have questions, please call your primary care clinic.  If you do not have a primary care provider, please call [phone] and they will assist you.        Care EveryWhere ID     This is your Care EveryWhere ID. This could be used by other organizations to access your [facility] medical records  [id number]        Equal Access to Services     DEEPTHI MEDINA : Yumiko Singh, waaxda luqadaha, qaybta kaaldilma chu, lindy cadet. So [facility] [phone].    ATENCIÓN: Si habla español, tiene a dumont disposición servicios gratuitos de asistencia lingüística. Llame al [phone].    We comply with applicable federal civil rights laws and [place] laws. We do not discriminate on the basis of race, color, national origin, age, disability sex, sexual orientation or gender identity.               Review of your medicines      START taking        Dose / Directions    enoxaparin 40 MG/0.4ML injection   Commonly known as:  LOVENOX        Dose:  40 mg   Inject 0.4 mLs (40 mg) Subcutaneous every 24 hours for 10 days   Quantity:  4 mL   Refills:  0       oxyCODONE 5 MG IR tablet   Commonly known as:  ROXICODONE        Dose:  5-10 mg   Take 1-2 tablets (5-10 mg) by mouth every 4 hours as needed for moderate to severe pain   Quantity:  40 tablet   Refills:  0       senna-docusate 8.6-50 MG per tablet   Commonly known as:  SENOKOT-S;PERICOLACE        Dose:  1-2 tablet   Take 1-2 tablets by mouth 2 times daily   Quantity:  50 tablet   Refills:  0         CONTINUE these medicines which have NOT CHANGED        Dose / Directions    DAILY MULTIVITAMIN PO        Dose:  2 tablet   Take 2 tablets by mouth 2 times daily   Refills:  0       FISH OIL PO        Dose:  1 tablet   Take 1 tablet by mouth 2 times daily   Refills:  0       GLUCOSAMINE-CHONDROITIN PO        Dose:  2 tablet   Take 2 tablets by mouth daily   Refills:  0       TURMERIC PO        Dose:  2 tablet   Take 2 tablets by mouth 2  times daily   Refills:  0       TYLENOL PO        Dose:  325-650 mg   Take 325-650 mg by mouth every 6 hours as needed for mild pain or fever   Refills:  0            Where to get your medicines      These medications were sent to [facility] [place] - [address]  [address] [place] [address]     Phone:  [phone]     enoxaparin 40 MG/0.4ML injection    senna-docusate 8.6-50 MG per tablet         Some of these will need a paper prescription and others can be bought over the counter. Ask your nurse if you have questions.     Bring a paper prescription for each of these medications     oxyCODONE 5 MG IR tablet                Protect others around you: Learn how to safely use, store and throw away your medicines at www.disposemymeds.org.             Medication List: This is a list of all your medications and when to take them. Check marks below indicate your daily home schedule. Keep this list as a reference.      Medications           Morning Afternoon Evening Bedtime As Needed    DAILY MULTIVITAMIN PO   Take 2 tablets by mouth 2 times daily   Next Dose Due:  Continue on discharge                                   enoxaparin 40 MG/0.4ML injection   Commonly known as:  LOVENOX   Inject 0.4 mLs (40 mg) Subcutaneous every 24 hours for 10 days   Last time this was given:  40 mg on 6/24/2017  8:11 AM   Next Dose Due:  6/25                                     FISH OIL PO   Take 1 tablet by mouth 2 times daily   Next Dose Due:  Continue on discharge                                GLUCOSAMINE-CHONDROITIN PO   Take 2 tablets by mouth daily   Next Dose Due:  Continue on discharge                                oxyCODONE 5 MG IR tablet   Commonly known as:  ROXICODONE   Take 1-2 tablets (5-10 mg) by mouth every 4 hours as needed for moderate to severe pain   Last time this was given:  10 mg on 6/24/2017  6:00 AM   Next Dose Due:  @ 1pm                                   senna-docusate  8.6-50 MG per tablet   Commonly known as:  SENOKOT-S;PERICOLACE   Take 1-2 tablets by mouth 2 times daily   Last time this was given:  2 tablets on 6/24/2017  8:11 AM   Next Dose Due:  6/24 6/25 6/24               TURMERIC PO   Take 2 tablets by mouth 2 times daily   Next Dose Due:  Continue on discharge                                TYLENOL PO   Take 325-650 mg by mouth every 6 hours as needed for mild pain or fever   Last time this was given:  975 mg on 6/24/2017  8:11 AM   Next Dose Due:  @ 2pm

## 2017-06-21 NOTE — IP AVS SNAPSHOT
"          [place] [address] [facility]: [phone]                                              INTERAGENCY TRANSFER FORM - LAB / IMAGING / EKG / EMG RESULTS   2017                    Hospital Admission Date: 2017  MAXIMILIANO MARIE   : 1945  Sex: Male        Attending Provider: Sebastian Madrigal MD     Allergies:  Seasonal Allergies    Infection:  None   Service:  SURGERY    Ht:  1.715 m (5' 7.5\")   Wt:  63 kg (139 lb)   Admission Wt:  63 kg (139 lb)    BMI:  21.45 kg/m 2   BSA:  1.73 m 2            Patient PCP Information     Provider PCP Type    Archie Meadows MD General         Lab Results - 3 Days      Basic metabolic panel [[id number]] (Abnormal)  Resulted: 17, Result status: Final result    Ordering provider: Sebastian Madrigal MD  17 0001 Resulting lab: [facility]    Specimen Information    Type Source Collected On   Blood  17 0645          Components       Value Reference Range Flag Lab   Sodium 136 133 - 144 mmol/L  FrStHsLb   Potassium 4.0 3.4 - 5.3 mmol/L  FrStHsLb   Chloride 105 94 - 109 mmol/L  FrStHsLb   Carbon Dioxide 25 20 - 32 mmol/L  FrStHsLb   Anion Gap 6 3 - 14 mmol/L  FrStHsLb   Glucose 114 70 - 99 mg/dL H FrStHsLb   Urea Nitrogen 14 7 - 30 mg/dL  FrStHsLb   Creatinine 0.96 0.66 - 1.25 mg/dL  FrStHsLb   GFR Estimate 77 >60 mL/min/1.7m2  FrStHsLb   Comment:  Non  GFR Calc   GFR Estimate If Black -- >60 mL/min/1.7m2  FrStHsLb   Result:         >90   GFR Calc     Calcium 7.8 8.5 - 10.1 mg/dL L FrStHsLb   Result:              Hemoglobin [[id number]] (Abnormal)  Resulted: 17 0702, Result status: Final result    Ordering provider: Sebastian Madrigal MD  17 0001 Resulting lab: [facility]    Specimen Information    Type Source Collected On   Blood  17 0645          Components       Value Reference Range Flag Lab   Hemoglobin 12.5 13.3 - 17.7 g/dL L " FrStHsLb   Comment:  Delta Verified            Platelet count [[id number]]  Resulted: 06/21/17 2013, Result status: Final result    Ordering provider: Sebastian Madrigal MD  06/21/17 0947 Resulting lab: [facility]    Specimen Information    Type Source Collected On     06/21/17 0947          Components       Value Reference Range Flag Lab   Platelet Count 178 150 - 450 10e9/L  FrStHsLb            Basic metabolic panel [[id number]]  Resulted: 06/21/17 1006, Result status: Final result    Ordering provider: Sebastian Madrigal MD  06/21/17 0934 Resulting lab: [facility]    Specimen Information    Type Source Collected On   Blood  06/21/17 0947          Components       Value Reference Range Flag Lab   Sodium 138 133 - 144 mmol/L  FrStHsLb   Potassium 4.2 3.4 - 5.3 mmol/L  FrStHsLb   Chloride 105 94 - 109 mmol/L  FrStHsLb   Carbon Dioxide 28 20 - 32 mmol/L  FrStHsLb   Anion Gap 5 3 - 14 mmol/L  FrStHsLb   Glucose 96 70 - 99 mg/dL  FrStHsLb   Urea Nitrogen 19 7 - 30 mg/dL  FrStHsLb   Creatinine 1.06 0.66 - 1.25 mg/dL  FrStHsLb   GFR Estimate 69 >60 mL/min/1.7m2  FrStHsLb   Comment:  Non  GFR Calc   GFR Estimate If Black 83 >60 mL/min/1.7m2  FrStHsLb   Comment:  African American GFR Calc   Calcium 8.6 8.5 - 10.1 mg/dL  FrStHsLb            Hemoglobin [[id number]]  Resulted: 06/21/17 0954, Result status: Final result    Ordering provider: Sebastian Madrigal MD  06/21/17 0934 Resulting lab: [facility]    Specimen Information    Type Source Collected On   Blood  06/21/17 0947          Components       Value Reference Range Flag Lab   Hemoglobin 15.7 13.3 - 17.7 g/dL  FrStHsLb            Testing Performed By     Lab - Abbreviation Name Director Address Valid Date Range    14 - FrStHsLb [facility] Unknown [address] [place] [address] 05/08/15 1057 - Present            Unresulted Labs (24h ago through future)    Start        Ordered    06/24/17 0600  Platelet count  (enoxaparin (LOVENOX) (Weight  kg with CrCl greater than 30 mL/min is prechecked))  EVERY THREE DAYS,   Routine     Comments:  Repeat every 3 days while on VTE prophylaxis. If no result is listed, this lab has not been done the past 365 days. LATEST LAB RESULT: Platelet Count (10e9/L)       Date                     Value                 06/14/2017               214              ----------      06/21/17 1534    06/24/17 0600  Creatinine  EVERY THREE DAYS,   Routine     Comments:  Last Lab Result: Creatinine (mg/dL)       Date                     Value                 06/22/2017               0.96             ----------    06/22/17 1006    06/22/17 0600  Hemoglobin  DAILY,   Routine     Comments:  POD 1 and POD 2    06/21/17 1534    Unscheduled  Hemoglobin  CONDITIONAL X 2,   Routine     Comments:  Release on POD 1 and POD 2 if the morning Hgb is less than 8.0    06/21/17 1534         Imaging Results - 3 Days      XR Pelvis w Hip Port Left 1 View [[id number]]  Resulted: 06/21/17 1511, Result status: Final result    Ordering provider: Sebastian Madrigal MD  06/21/17 1413 Resulted by: Aleksandr Bustillos MD    Performed: 06/21/17 1417 - 06/21/17 1436 Resulting lab: RADIOLOGY RESULTS    Narrative:       XR PELVIS AND HIP PORTABLE LEFT 1 VIEW 6/21/2017 2:36 PM    HISTORY: Postop.    COMPARISON: None.      Impression:       IMPRESSION: Status post left total hip arthroplasty. Hardware is  intact. Alignment is anatomic. There is a surgical drain overlying the  left hip joint.    ALEKSANDR BUSTILLOS MD      Testing Performed By     Lab - Abbreviation Name Director Address Valid Date Range    104 - Rad Rslts RADIOLOGY RESULTS Unknown Unknown 02/16/05 1553 - Present            Encounter-Level Documents:     There are no encounter-level documents.      Order-Level Documents:     There are no order-level documents.

## 2017-06-21 NOTE — ANESTHESIA POSTPROCEDURE EVALUATION
Patient: Jasen Rosario    Procedure(s):  LEFT TOTAL HIP ARTHROPLASTY - Wound Class: I-Clean    Diagnosis:DJD LEFT HIP  Diagnosis Additional Information: No value filed.    Anesthesia Type:  General, ETT    Note:  Anesthesia Post Evaluation    Patient location during evaluation: PACU  Patient participation: Able to fully participate in evaluation  Level of consciousness: awake  Pain management: adequate  Airway patency: patent  Cardiovascular status: acceptable  Respiratory status: acceptable  Hydration status: acceptable  PONV: controlled     Anesthetic complications: None          Last vitals:  Vitals:    06/21/17 1615 06/21/17 1620 06/21/17 1645   BP: 111/68  123/68   Pulse: (!) 45  60   Resp: 16 16 16   Temp:      SpO2: 100%  99%         Electronically Signed By: Paradise Wagner MD  June 21, 2017  6:36 PM

## 2017-06-21 NOTE — ANESTHESIA PREPROCEDURE EVALUATION
Anesthesia Evaluation     . Pt has had prior anesthetic.     No history of anesthetic complications          ROS/MED HX    ENT/Pulmonary:     (+)allergic rhinitis, , . .   (-) asthma and sleep apnea   Neurologic: Comment: Lumbar stenosis- CLBP, radicular pain bilat legs      Cardiovascular:     (+) Dyslipidemia, ----. : . . . :. .      (-) OLSON   METS/Exercise Tolerance:  >4 METS   Hematologic:         Musculoskeletal:   (+) arthritis, , , -       GI/Hepatic:        (-) GERD   Renal/Genitourinary:         Endo:         Psychiatric:     (+) psychiatric history anxiety and depression      Infectious Disease:         Malignancy:   (+) Malignancy History of Lymphoma/Leukemia and Prostate          Other:                     Physical Exam      Airway   Mallampati: II  TM distance: >3 FB  Neck ROM: full    Dental   (+) caps    Cardiovascular   Rhythm and rate: regular      Pulmonary    breath sounds clear to auscultation                    Anesthesia Plan      History & Physical Review  History and physical reviewed and following examination; no interval change.    ASA Status:  2 .        Plan for General and ETT   PONV prophylaxis:  Ondansetron (or other 5HT-3) and Dexamethasone or Solumedrol  Propofol infusion      Postoperative Care      Consents  Anesthetic plan, risks, benefits and alternatives discussed with:  Patient.  Use of blood products discussed: Yes.   Use of blood products discussed with Patient.  .                          .  Procedure: Procedure(s):  ARTHROPLASTY HIP  Preop diagnosis: DJD LEFT HIP    Allergies   Allergen Reactions     Seasonal Allergies      Past Medical History:   Diagnosis Date     Anxiety state, unspecified 1991     Colon polyp      ED (erectile dysfunction) 2005     Hyperlipidemia LDL goal <130 2001     Hypoglycemia, unspecified 1991     Infectious mononucleosis 1969     Lumbar stenosis     TCO - Dr Irene - with neuritis     Lymphoma (H)     SLL - B Cell - Dr Denis Simms  depressive disorder, single episode in full remission (H) 1991     OA (osteoarthritis)     Severe Lt Hip     Prostate cancer (H) 5/13    Dr Smyth -  - [place] 3+3 - intermediate progression risk - pT2cN0     Seasonal allergies      Past Surgical History:   Procedure Laterality Date     DAVINCI PROSTATECTOMY, LYMPHADENECTOMY N/A 4/13/2015    RALP - Dr Smyth      COLONOSCOPY THRU STOMA, DIAGNOSTIC  12/04, 12/14    colon polyp - serrated adenoma 5 mm - due 3 yrs     SURGICAL HISTORY OF -   1980    BACK SURGERY     SURGICAL HISTORY OF -   1987    ANKLE FX ORIF - Lt     SURGICAL HISTORY OF -   1972    LT KNEE MCL TEAR AND MENISCUS      SURGICAL HISTORY OF -   1960    APPY     SURGICAL HISTORY OF -   2001, 2008    LT SHOULDER ARTHROSCOPY     SURGICAL HISTORY OF -   1/07    Rt Shoulder Surgery - Dr Ferrer     wisdom teeth       Prior to Admission medications    Medication Sig Start Date End Date Taking? Authorizing Provider   Omega-3 Fatty Acids (FISH OIL PO) Take 1 tablet by mouth 2 times daily   Yes Reported, Patient   GLUCOSAMINE-CHONDROITIN PO Take 2 tablets by mouth daily   Yes Reported, Patient   TURMERIC PO Take 2 tablets by mouth 2 times daily   Yes Reported, Patient   Acetaminophen (TYLENOL PO) Take 325-650 mg by mouth every 6 hours as needed for mild pain or fever   Yes Reported, Patient   Multiple Vitamin (DAILY MULTIVITAMIN PO) Take 2 tablets by mouth 2 times daily    Yes Reported, Patient     Current Facility-Administered Medications Ordered in Epic   Medication Dose Route Frequency Last Rate Last Dose     ceFAZolin sodium-dextrose (ANCEF) infusion 2 g  2 g Intravenous Pre-Op/Pre-procedure x 1 dose         ceFAZolin (ANCEF) 1 g vial to attach to  ml bag for ADULT or 50 ml bag for PEDS  1 g Intravenous See Admin Instructions         tranexamic acid (CYKLOKAPRON) 1 g in NaCl 0.9 % 60 mL bolus  1 g Intravenous Once         lidocaine 1 % 1 mL  1 mL Other Q1H PRN   1 mL at 06/21/17 3957      lactated ringers infusion   Intravenous Continuous 25 mL/hr at 06/21/17 1029       No current Epic-ordered outpatient prescriptions on file.     Wt Readings from Last 1 Encounters:   06/21/17 63 kg (139 lb)     Temp Readings from Last 1 Encounters:   06/21/17 36  C (96.8  F) (Temporal)     BP Readings from Last 6 Encounters:   06/21/17 120/79   06/14/17 108/66   08/04/16 108/62   06/30/16 124/78   04/08/16 116/75   02/06/16 126/84     Pulse Readings from Last 4 Encounters:   06/21/17 (!) 48   06/14/17 56   08/04/16 52   06/30/16 (!) 48     Resp Readings from Last 1 Encounters:   06/21/17 16     SpO2 Readings from Last 1 Encounters:   06/21/17 98%     Recent Labs   Lab Test  06/21/17   0947  06/14/17   1515   NA  138  144   POTASSIUM  4.2  4.6   CHLORIDE  105  109   CO2  28  26   ANIONGAP  5  9   GLC  96  78   BUN  19  22   CR  1.06  1.14   KALPESH  8.6  8.8     Recent Labs   Lab Test  06/21/17   0947  06/14/17   1515  08/04/16   0903   WBC   --   10.5  8.4   HGB  15.7  15.0  16.3   PLT   --   214  183     No results for input(s): INR in the last 90158 hours.    Invalid input(s): APTT   RECENT LABS:   ECG:   ECHO:   CXR:

## 2017-06-21 NOTE — ANESTHESIA CARE TRANSFER NOTE
Patient: Jasen Rosario    Procedure(s):  LEFT TOTAL HIP ARTHROPLASTY - Wound Class: I-Clean    Diagnosis: DJD LEFT HIP  Diagnosis Additional Information: No value filed.    Anesthesia Type:   General, ETT     Note:  Airway :Face Mask  Patient transferred to:PACU  Comments: Transferred to PACU, spontaneous respirations, 10L oxygen via facemask.  All monitors and alarms on and functioning, VSS.  Patient awake, comfortable.  Report to PACU RN.      Vitals: (Last set prior to Anesthesia Care Transfer)    CRNA VITALS  6/21/2017 1318 - 6/21/2017 1354      6/21/2017             SpO2: 96 %    Resp Rate (set): 10                Electronically Signed By: MEAGAN Leggett CRNA  June 21, 2017  1:54 PM

## 2017-06-21 NOTE — IP AVS SNAPSHOT
` `     [place] [address] [facility]: [phone]                 INTERAGENCY TRANSFER FORM - NOTES (H&P, Discharge Summary, Consults, Procedures, Therapies)   2017                    Hospital Admission Date: 2017  MAXIMILIANO MARIE   : 1945  Sex: Male        Patient PCP Information     Provider PCP Type    Archie Meadows MD General         History & Physicals      Interval H&P Note by Kimberlee Nielsen at 2017  7:05 AM     Author:  Kimberlee Nielsen Service:  (none) Author Type:  HUC    Filed:  2017  7:05 AM Date of Service:  2017  7:05 AM Creation Time:  2017  7:05 AM    Status:  Signed :  Kimberlee Nielsen ([facility]         This note is for the purpose of making the H & P performed in clinic within the last 30 days available in the hospital surgical encounter.[SS1.1]       Revision History        User Key Date/Time User Provider Type Action    > SS1.1 2017  7:05 AM Kimberlee Nielsen [facility] Sign          Source Note     Author:  Archie Meadows MD Service:  (none) Author Type:  Physician    Filed:  2017  4:12 PM Date of Service:  2017  3:00 PM Creation Time:  2017  7:05 AM    Status:  Signed :  Archie Meadows MD (Physician)              [address] [address]  [phone]  Dept: [phone]    PRE-OP EVALUATION:  Today's date: 2017    Maximiliano Marie (: 1945) presents for pre-operative evaluation assessment as requested by [KW1.1] Sebastian GONZALEZ[TS1.1] Rohan.  He requires evaluation and anesthesia risk assessment prior to undergoing surgery/procedure for treatment of[KW1.1] left[TS1.1] hip pain.  Proposed procedure: Left hip replacement    Date of Surgery/ Procedure: 17  Time of Surgery/ Procedure: 1pm  Hospital/Surgical Facility[facility]  Primary Physician: Archie Meadows  Type of Anesthesia Anticipated: General    Patient has a Health Care Directive or Living Will:  YES      1. NO - Do you have a history of heart attack, stroke, stent, bypass or surgery on an artery in the head, neck, heart or legs?  2. NO - Do you ever have any pain or discomfort in your chest?  3. NO - Do you have a history of  Heart Failure?  4. NO - Are you troubled by shortness of breath when: walking on the level, up a slight hill or at night?  5. NO - Do you currently have a cold, bronchitis or other respiratory infection?  6. NO - Do you have a cough, shortness of breath or wheezing?  7. NO - Do you sometimes get pains in the calves of your legs when you walk?  8. NO - Do you or anyone in your family have previous history of blood clots?  9. NO - Do you or does anyone in your family have a serious bleeding problem such as prolonged bleeding following surgeries or cuts?  10. NO - Have you ever had problems with anemia or been told to take iron pills?  11. NO - Have you had any abnormal blood loss such as black, tarry or bloody stools, or abnormal vaginal bleeding?  12. NO - Have you ever had a blood transfusion?  13. NO - Have you or any of your relatives ever had problems with anesthesia?  14. NO - Do you have sleep apnea, excessive snoring or daytime drowsiness?  15. NO - Do you have any prosthetic heart valves?  16. NO - Do you have prosthetic joints?  17. NO - Is there any chance that you may be pregnant?    HPI:                                                      Brief HPI related to upcoming procedure:[KW1.1] Jasen reported to the clinic regarding a preoperative examination for a proposed left hip replacement procedure to treat chronic left hip pain.    Anxiety/Depression -- Jeffrey reported feeling a little more stressed out due to upcoming surgeries and needing to get chores done.     PHQ-9: 2  WALDO-7: 4    Left Knee -- Jeffrey reported plans for a left knee surgery after his hip heals[BL1.1], tentatively scheduled for 10/02/17[TS1.1].     Lymphoma -- Followed by [facility], unsure of need for recheck  appointment.     Colon screening -- Next screening due 12/17.     Past/recent records reviewed and discussed for -- medications, oncology, family hx, surgical hx, social hx, immunizations, allergies.    See problem list for active medical problems.  Problems all longstanding and stable, except as noted/documented. See ROS for pertinent symptoms related to these conditions.                                                                                                  .[BL1.1]  MEDICAL HISTORY:[KW1.1]                                                      Patient Active Problem List    Diagnosis Date Noted     Prostate cancer (H)      Priority: High     Dr Libra MILLS - [place] 3+3       Hyperlipidemia LDL goal <130      Priority: High     Lumbar stenosis      Priority: Medium     [facility] - Dr Irene - with neuritis       Lymphoma (H)      Priority: Medium     B Cell - Dr Barrios       Colon polyp      Priority: Medium     ED (erectile dysfunction)      Priority: Medium     Seasonal allergies      Priority: Medium     OA (osteoarthritis)      Priority: Medium     Left knee synvisc- Dr Balderas       Advanced directives, counseling/discussion 04/02/2013     Priority: Low     Major depressive disorder, single episode in full remission (H)      Priority: Low     Anxiety state 11/30/2004     Priority: Low     Problem list name updated by automated process. Provider to review        Past Medical History:   Diagnosis Date     Anxiety state, unspecified 1991     Colon polyp      ED (erectile dysfunction) 2005     Hyperlipidemia LDL goal <130 2001     Hypoglycemia, unspecified 1991     Infectious mononucleosis 1969     Lumbar stenosis     [facility] - Dr Irene - with neuritis     Lymphoma (H)     SLL - B Cell - Dr Barrios     Major depressive disorder, single episode in full remission (H) 1991     OA (osteoarthritis)     Severe Lt Hip     Prostate cancer (H) 5/13    Dr Libra MILLS - [place] 3+3 - intermediate progression risk -  pT2cN0     Seasonal allergies      Past Surgical History:   Procedure Laterality Date     DAVINCI PROSTATECTOMY, LYMPHADENECTOMY N/A 4/13/2015    RALP - Dr Smyth     [facility] COLONOSCOPY THRU STOMA, DIAGNOSTIC  12/04, 12/14    colon polyp - serrated adenoma 5 mm - due 3 yrs     SURGICAL HISTORY OF -   1980    BACK SURGERY     SURGICAL HISTORY OF -   1987    ANKLE FX ORIF - Lt     SURGICAL HISTORY OF -   1972    LT KNEE MCL TEAR AND MENISCUS      SURGICAL HISTORY OF -   1960    APPY     SURGICAL HISTORY OF -   2001, 2008    LT SHOULDER ARTHROSCOPY     SURGICAL HISTORY OF -   1/07    Rt Shoulder Surgery - Dr Ferrer     Current Outpatient Prescriptions   Medication Sig Dispense Refill     Multiple Vitamin (DAILY MULTIVITAMIN PO) Take  by mouth daily.       NEW MED herba supplement  0     GLUCOSAMINE CHONDROITIN OR TABS 2 tabs daily  0     FISH OIL MAX-EPA 1000 MG OR CAPS 2 tabs daily  0[TS1.2]     OTC products:[KW1.1] None, except as noted above[KW1.2]    Allergies   Allergen Reactions     Seasonal Allergies[TS1.2]       Latex Allergy:[KW1.1] NO[KW1.2]    Social History   Substance Use Topics     Smoking status: Former Smoker     Packs/day: 2.00     Years: 7.00     Types: Cigarettes     Quit date: 1/1/1965     Smokeless tobacco: Never Used     Alcohol use 4.2 oz/week     7 Standard drinks or equivalent per week     History   Drug Use No[TS1.2]       REVIEW OF SYSTEMS:[KW1.1]                                                    Constitutional, HEENT, cardiovascular, pulmonary, GI, , musculoskeletal, neuro, skin, endocrine and psych systems are negative, except as in HPI or otherwise noted     This document serves as a record of the services and decisions personally performed and made by Archie Meadows MD FAA. It was created on their behalf by Sumit Almeida, a trained medical scribe. The creation of this document is based the provider's statements to the medical scribe.  Sumit Almeida June 14, 2017 3:57 PM[BL1.1]   "    EXAM:[KW1.1]                                                    /66  Pulse 56  Temp 97.8  F (36.6  C) (Oral)  Ht 5' 7.52\" (1.715 m)  Wt 141 lb (64 kg)  SpO2 97%  BMI 21.74 kg/m2[TS1.2]    GENERAL APPEARANCE: healthy, alert and no distress, underweight     EYES: EOMI,  PERRL     HENT: ear canals and TMs- normal upon viewing with otoscope; Nose and Mouth- no ulcers, no lesions upon viewing with otoscope     NECK: no adenopathy, no asymmetry, masses, or scars and thyroid normal to palpation     RESP: lungs clear to auscultation - no rales, rhonchi or wheezes     CV: regular rates and rhythm, normal S1 S2, no S3 or S4 and no murmur, click or rub     ABDOMEN:  soft, nontender, no HSM or masses and bowel sounds normal     MS: extremities normal- no gross deformities noted, no evidence of inflammation in joints, FROM in all extremities.     SKIN: no suspicious lesions or rashes to visible skin     NEURO: mentation intact and speech normal     PSYCH: mentation appears normal. and affect normal/bright    DIAGNOST[BL1.1]ICS:[KW1.1]                                                    EKG:[BL1.1] appears normal, NSR, normal axis, normal intervals, no acute ST/T changes c/w ischemia, no LVH by voltage criteria, unchanged from previous tracings[TS1.1]    Recent Labs   Lab Test  08/04/16   0903  06/30/16   1355   12/23/09   1039   HGB  16.3  14.6   < >  15.9   PLT  183  158   < >  169   NA  139  137   < >  134   POTASSIUM  4.4  4.5   < >  5.3   CR  0.97  1.01   < >  1.08   A1C   --    --    --   5.7    < > = values in this interval not displayed.     IMPRESSION:[KW1.1]                                                    Diagnosis/reason for consult: chronic left hip pain[BL1.1]    The proposed surgical procedure is considered[KW1.1] INTERMEDIATE[TS1.1] risk.    REVISED CARDIAC RISK INDEX  The patient has the following serious cardiovascular risks for perioperative complications such as (MI, PE, VFib and 3  AV " Block):[KW1.1]  No serious cardiac risks[TS1.1]  INTERPRETATION:[KW1.1] 1 risks: Class II (low risk - 0.9% complication rate)[TS1.1]    The patient has the following additional risks for perioperative complications:[KW1.1]  The 10-year ASCVD risk score (Jimena HATCH Jr, et al., 2013) is: 11.8%    Values used to calculate the score:      Age: 71 years      Sex: Male      Is Non- : No      Diabetic: No      Tobacco smoker: No      Systolic Blood Pressure: 108 mmHg      Is BP treated: No      HDL Cholesterol: 83 mg/dL      Total Cholesterol: 197 mg/dL      ICD-10-CM    1. Preoperative examination Z01.818 EKG 12-lead complete w/read - Clinics     Comprehensive metabolic panel     CBC with platelets     *UA reflex to Microscopic and Culture ([facility] (except [place] and [place])     Urine Microscopic   2. Primary osteoarthritis of left hip M16.12    3. Prostate cancer (H) C61 Comprehensive metabolic panel     CBC with platelets   4. Lymphoma, unspecified body region, unspecified lymphoma type (H) C85.90 Comprehensive metabolic panel     CBC with platelets   5. Major depressive disorder, single episode in full remission (H) F32.5    6. Anxiety state F41.1    7. Environmental allergies Z91.09    8. Medication monitoring encounter Z51.81 EKG 12-lead complete w/read - Clinics     Comprehensive metabolic panel     CBC with platelets     *UA reflex to Microscopic and Culture ([facility] (except [place] and [place])[TS1.2]       RECOMMENDATIONS:[KW1.1]                                                      --Consult hospital rounder / IM to assist post-op medical management    --Patient is to take all scheduled medications on the day of surgery EXCEPT for modifications listed below.    Hold baby aspirin, multivitamin, fish oil, ibuprofen/advil/aleve prior to surgery -- okay to take Tylenol as needed for pain    APPROVAL GIVEN to proceed with proposed procedure, without  further diagnostic evaluation     The information in this document, created by the medical scribe for me, accurately reflects the services I personally performed and the decisions made by me. I have reviewed and approved this document for accuracy.   Archie Meadows MD [facility][BL1.1]     Signed Electronically by: Archie Meadows MD[KW1.1]           Archie Meadows MD, FAAFP    [address]  [address] [phone] Fax[BL1.1]    Copy of this evaluation report is provided to requesting physician.    [place] Preop Guidelines[KW1.1]      Revision History        User Key Date/Time User Provider Type Action    > TS1.2 2017  7:05 AM Archie Meadows MD Physician Sign     TS1.1 2017  4:09 PM Archie Meadows MD Physician      BL1.1 2017  3:41 PM Zuhair Almeida      KW1.2 2017  3:31 PM Myla Phillips, [facility] Medical Assistant      KW1.1 2017  3:17 PM Myla Phillips, [facility] Medical Assistant                   H&P (View-Only) by Archie Meadows MD at 2017  3:00 PM     Author:  Archie Meadows MD Service:  (none) Author Type:  Physician    Filed:  2017  4:12 PM Date of Service:  2017  3:00 PM Creation Time:  2017  7:05 AM    Status:  Signed :  Archie Meadows MD (Physician)           [address] [id number] [phone]  Dept: [phone]    PRE-OP EVALUATION:  Today's date: 2017    Jasen Herrshannan (: 1945) presents for pre-operative evaluation assessment as requested by [KW1.1] Sebastian GONZALEZ[TS1.1] Rohan.  He requires evaluation and anesthesia risk assessment prior to undergoing surgery/procedure for treatment of[KW1.1] left[TS1.1] hip pain.  Proposed procedure: Left hip replacement    Date of Surgery/ Procedure: 17  Time of Surgery/ Procedure: 1pm  Hospital/Surgical Facility: [facility]  Primary Physician: Archie Meadows  Type of Anesthesia  Anticipated: General    Patient has a Health Care Directive or Living Will:  YES     1. NO - Do you have a history of heart attack, stroke, stent, bypass or surgery on an artery in the head, neck, heart or legs?  2. NO - Do you ever have any pain or discomfort in your chest?  3. NO - Do you have a history of  Heart Failure?  4. NO - Are you troubled by shortness of breath when: walking on the level, up a slight hill or at night?  5. NO - Do you currently have a cold, bronchitis or other respiratory infection?  6. NO - Do you have a cough, shortness of breath or wheezing?  7. NO - Do you sometimes get pains in the calves of your legs when you walk?  8. NO - Do you or anyone in your family have previous history of blood clots?  9. NO - Do you or does anyone in your family have a serious bleeding problem such as prolonged bleeding following surgeries or cuts?  10. NO - Have you ever had problems with anemia or been told to take iron pills?  11. NO - Have you had any abnormal blood loss such as black, tarry or bloody stools, or abnormal vaginal bleeding?  12. NO - Have you ever had a blood transfusion?  13. NO - Have you or any of your relatives ever had problems with anesthesia?  14. NO - Do you have sleep apnea, excessive snoring or daytime drowsiness?  15. NO - Do you have any prosthetic heart valves?  16. NO - Do you have prosthetic joints?  17. NO - Is there any chance that you may be pregnant?    HPI:                                                      Brief HPI related to upcoming procedure:[KW1.1] Jasen reported to the clinic regarding a preoperative examination for a proposed left hip replacement procedure to treat chronic left hip pain.    Anxiety/Depression -- Jeffrey reported feeling a little more stressed out due to upcoming surgeries and needing to get chores done.     PHQ-9: 2  WALDO-7: 4    Left Knee -- Jeffrey reported plans for a left knee surgery after his hip heals[BL1.1], tentatively scheduled for  10/02/17[TS1.1].     Lymphoma -- Followed by [facility], unsure of need for recheck appointment.     Colon screening -- Next screening due 12/17.     Past/recent records reviewed and discussed for -- medications, oncology, family hx, surgical hx, social hx, immunizations, allergies.    See problem list for active medical problems.  Problems all longstanding and stable, except as noted/documented. See ROS for pertinent symptoms related to these conditions.                                                                                                  .[BL1.1]  MEDICAL HISTORY:[KW1.1]                                                      Patient Active Problem List    Diagnosis Date Noted     Prostate cancer (H)      Priority: High     Dr Smyth -  - [place] 3+3       Hyperlipidemia LDL goal <130      Priority: High     Lumbar stenosis      Priority: Medium     [facility] - Dr Irene - with neuritis       Lymphoma (H)      Priority: Medium     B Cell - Dr Barrios       Colon polyp      Priority: Medium     ED (erectile dysfunction)      Priority: Medium     Seasonal allergies      Priority: Medium     OA (osteoarthritis)      Priority: Medium     Left knee synvisc- Dr Balderas       Advanced directives, counseling/discussion 04/02/2013     Priority: Low     Major depressive disorder, single episode in full remission (H)      Priority: Low     Anxiety state 11/30/2004     Priority: Low     Problem list name updated by automated process. Provider to review        Past Medical History:   Diagnosis Date     Anxiety state, unspecified 1991     Colon polyp      ED (erectile dysfunction) 2005     Hyperlipidemia LDL goal <130 2001     Hypoglycemia, unspecified 1991     Infectious mononucleosis 1969     Lumbar stenosis     [facility] - Dr Irene - with neuritis     Lymphoma (H)     L - B Cell - Dr Barrios     Major depressive disorder, single episode in full remission (H) 1991     OA (osteoarthritis)     Severe Lt Hip     Prostate cancer  (H) 5/13    Dr Smyth -  - [place] 3+3 - intermediate progression risk - pT2cN0     Seasonal allergies      Past Surgical History:   Procedure Laterality Date     DAVINCI PROSTATECTOMY, LYMPHADENECTOMY N/A 4/13/2015    RALP - Dr Symth      COLONOSCOPY THRU STOMA, DIAGNOSTIC  12/04, 12/14    colon polyp - serrated adenoma 5 mm - due 3 yrs     SURGICAL HISTORY OF -   1980    BACK SURGERY     SURGICAL HISTORY OF -   1987    ANKLE FX ORIF - Lt     SURGICAL HISTORY OF -   1972    LT KNEE MCL TEAR AND MENISCUS      SURGICAL HISTORY OF -   1960    APPY     SURGICAL HISTORY OF -   2001, 2008    LT SHOULDER ARTHROSCOPY     SURGICAL HISTORY OF -   1/07    Rt Shoulder Surgery - Dr Ferrer     Current Outpatient Prescriptions   Medication Sig Dispense Refill     Multiple Vitamin (DAILY MULTIVITAMIN PO) Take  by mouth daily.       NEW MED herba supplement  0     GLUCOSAMINE CHONDROITIN OR TABS 2 tabs daily  0     FISH OIL MAX-EPA 1000 MG OR CAPS 2 tabs daily  0[TS1.2]     OTC products:[KW1.1] None, except as noted above[KW1.2]    Allergies   Allergen Reactions     Seasonal Allergies[TS1.2]       Latex Allergy:[KW1.1] NO[KW1.2]    Social History   Substance Use Topics     Smoking status: Former Smoker     Packs/day: 2.00     Years: 7.00     Types: Cigarettes     Quit date: 1/1/1965     Smokeless tobacco: Never Used     Alcohol use 4.2 oz/week     7 Standard drinks or equivalent per week     History   Drug Use No[TS1.2]       REVIEW OF SYSTEMS:[KW1.1]                                                    Constitutional, HEENT, cardiovascular, pulmonary, GI, , musculoskeletal, neuro, skin, endocrine and psych systems are negative, except as in HPI or otherwise noted     This document serves as a record of the services and decisions personally performed and made by Archie Meadows MD FAAFP. It was created on their behalf by Sumit Almeida, a trained medical scribe. The creation of this document is based the provider's statements to  "the medical scribe.  Sumit Almeida June 14, 2017 3:57 PM[BL1.1]      EXAM:[KW1.1]                                                    /66  Pulse 56  Temp 97.8  F (36.6  C) (Oral)  Ht 5' 7.52\" (1.715 m)  Wt 141 lb (64 kg)  SpO2 97%  BMI 21.74 kg/m2[TS1.2]    GENERAL APPEARANCE: healthy, alert and no distress, underweight     EYES: EOMI,  PERRL     HENT: ear canals and TMs- normal upon viewing with otoscope; Nose and Mouth- no ulcers, no lesions upon viewing with otoscope     NECK: no adenopathy, no asymmetry, masses, or scars and thyroid normal to palpation     RESP: lungs clear to auscultation - no rales, rhonchi or wheezes     CV: regular rates and rhythm, normal S1 S2, no S3 or S4 and no murmur, click or rub     ABDOMEN:  soft, nontender, no HSM or masses and bowel sounds normal     MS: extremities normal- no gross deformities noted, no evidence of inflammation in joints, FROM in all extremities.     SKIN: no suspicious lesions or rashes to visible skin     NEURO: mentation intact and speech normal     PSYCH: mentation appears normal. and affect normal/bright    DIAGNOST[BL1.1]ICS:[KW1.1]                                                    EKG:[BL1.1] appears normal, NSR, normal axis, normal intervals, no acute ST/T changes c/w ischemia, no LVH by voltage criteria, unchanged from previous tracings[TS1.1]    Recent Labs   Lab Test  08/04/16   0903  06/30/16   1355   12/23/09   1039   HGB  16.3  14.6   < >  15.9   PLT  183  158   < >  169   NA  139  137   < >  134   POTASSIUM  4.4  4.5   < >  5.3   CR  0.97  1.01   < >  1.08   A1C   --    --    --   5.7    < > = values in this interval not displayed.     IMPRESSION:[KW1.1]                                                    Diagnosis/reason for consult: chronic left hip pain[BL1.1]    The proposed surgical procedure is considered[KW1.1] INTERMEDIATE[TS1.1] risk.    REVISED CARDIAC RISK INDEX  The patient has the following serious cardiovascular risks for " perioperative complications such as (MI, PE, VFib and 3  AV Block):[KW1.1]  No serious cardiac risks[TS1.1]  INTERPRETATION:[KW1.1] 1 risks: Class II (low risk - 0.9% complication rate)[TS1.1]    The patient has the following additional risks for perioperative complications:[KW1.1]  The 10-year ASCVD risk score (Jimena HATCH Jr, et al., 2013) is: 11.8%    Values used to calculate the score:      Age: 71 years      Sex: Male      Is Non- : No      Diabetic: No      Tobacco smoker: No      Systolic Blood Pressure: 108 mmHg      Is BP treated: No      HDL Cholesterol: 83 mg/dL      Total Cholesterol: 197 mg/dL      ICD-10-CM    1. Preoperative examination Z01.818 EKG 12-lead complete w/read - Clinics     Comprehensive metabolic panel     CBC with platelets     *UA reflex to Microscopic and Culture ([facility] (except [place] and [place])     Urine Microscopic   2. Primary osteoarthritis of left hip M16.12    3. Prostate cancer (H) C61 Comprehensive metabolic panel     CBC with platelets   4. Lymphoma, unspecified body region, unspecified lymphoma type (H) C85.90 Comprehensive metabolic panel     CBC with platelets   5. Major depressive disorder, single episode in full remission (H) F32.5    6. Anxiety state F41.1    7. Environmental allergies Z91.09    8. Medication monitoring encounter Z51.81 EKG 12-lead complete w/read - Clinics     Comprehensive metabolic panel     CBC with platelets     *UA reflex to Microscopic and Culture ([facility] (except [place] and [place])[TS1.2]       RECOMMENDATIONS:[KW1.1]                                                      --Consult hospital rounder / IM to assist post-op medical management    --Patient is to take all scheduled medications on the day of surgery EXCEPT for modifications listed below.    Hold baby aspirin, multivitamin, fish oil, ibuprofen/advil/aleve prior to surgery -- okay to take Tylenol as needed for  pain    APPROVAL GIVEN to proceed with proposed procedure, without further diagnostic evaluation     The information in this document, created by the medical scribe for me, accurately reflects the services I personally performed and the decisions made by me. I have reviewed and approved this document for accuracy.   Archie Meadows MD FAAFP[BL1.1]     Signed Electronically by: Archie Meadows MD[KW1.1]           Archie Meadows MD, FAAFP    [address]  [address]    [phone] Fax[BL1.1]    Copy of this evaluation report is provided to requesting physician.    [place] Preop Guidelines[KW1.1]     Revision History        User Key Date/Time User Provider Type Action    > TS1.2 6/20/2017  7:05 AM Archie Meadows MD Physician Sign     TS1.1 6/14/2017  4:09 PM Archie Meaodws MD Physician      BL1.1 6/14/2017  3:41 PM Zuhair Almeida      KW1.2 6/14/2017  3:31 PM Myla Phillips, [facility] Medical Assistant      KW1.1 6/14/2017  3:17 PM Myla Phillips, [facility] Medical Assistant                   Discharge Summaries     No notes of this type exist for this encounter.      Consult Notes     No notes of this type exist for this encounter.         Progress Notes - Physician (Notes from 06/19/17 through 06/22/17)      Progress Notes by Keisha Gibson PT at 6/22/2017  8:50 AM     Author:  Keisha Gibson PT Service:  (none) Author Type:  Physical Therapist    Filed:  6/22/2017  8:50 AM Date of Service:  6/22/2017  8:50 AM Creation Time:  6/22/2017  8:50 AM    Status:  Signed :  Keisha Gibson PT (Physical Therapist)          06/22/17 0800   Quick Adds   Type of Visit Initial PT Evaluation   Living Environment   Lives With spouse   Living Arrangements house   Home Accessibility (Has a tub and a walk in shower)   Number of Stairs to Enter Home 8  (both sides and a threshold step)   Number of Stairs Within Home 0   Transportation Available car;family or friend will  provide   Living Environment Comment Spouse works only one day per week, planning to retire and can assist   Self-Care   Usual Activity Tolerance excellent   Current Activity Tolerance moderate   Equipment Currently Used at Home cane, straight;walker, rolling   Functional Level Prior   Ambulation 0-->independent   Transferring 0-->independent   Toileting 0-->independent   Bathing 0-->independent   Dressing 0-->independent   Fall history within last six months no   Which of the above functional risks had a recent onset or change? ambulation;transferring;toileting;bathing;dressing   Prior Functional Level Comment Patient reports independence with mobility/ADL's prior to admission. Patient very active at baseline.   General Information   Onset of Illness/Injury or Date of Surgery - Date 06/21/17   Referring Physician MD Rohan   Patient/Family Goals Statement Return home   Pertinent History of Current Problem (include personal factors and/or comorbidities that impact the POC) 71 year old male s/p L BETTY.   Precautions/Limitations fall precautions;left hip precautions   Weight-Bearing Status - LLE weight-bearing as tolerated   General Info Comments L posterior-lateral precautions, WBAT, ambulate with assist   Cognitive Status Examination   Orientation orientation to person, place and time   Level of Consciousness alert   Follows Commands and Answers Questions 100% of the time;able to follow multistep instructions   Personal Safety and Judgment intact   Pain Assessment   Patient Currently in Pain (1/10L hip)   Integumentary/Edema   Integumentary/Edema Comments Drain intact to the L LE.    Posture    Posture Not impaired   Range of Motion (ROM)   ROM Comment All extremities WFL's, L LE with 90 degree restriction.    Strength   Strength Comments Sufficient for all mobility with SBA, no L LE buckling noted.    Bed Mobility   Bed Mobility Comments Supine to sit with SBA x 1 and verbal cues    Transfer Skills   Transfer  "Comments Sit to/from stand with CGA.    Gait   Gait Comments Ambulates 20' with FWW and CGA.    Balance   Balance Comments Standing dynamic balance fair with UE support on FWW.    Sensory Examination   Sensory Perception Comments Denies burning, numbness and tingling   Coordination   Coordination no deficits were identified   Modality Interventions   Planned Modality Interventions Cryotherapy   Planned Modality Interventions Comments PRN   General Therapy Interventions   Planned Therapy Interventions bed mobility training;gait training;strengthening;ROM;transfer training;home program guidelines;progressive activity/exercise   Clinical Impression   Criteria for Skilled Therapeutic Intervention yes, treatment indicated   PT Diagnosis Impaired gait   Influenced by the following impairments Pain, decreased LLE strength, impaired balance   Functional limitations due to impairments Decreased independence with bed mobility, transfers and ambulation   Clinical Presentation Stable/Uncomplicated   Clinical Presentation Rationale Medically stable.    Clinical Decision Making (Complexity) Low complexity   Therapy Frequency` 2 times/day   Predicted Duration of Therapy Intervention (days/wks) 3 days   Anticipated Discharge Disposition Home with Assist   Risk & Benefits of therapy have been explained Yes   Patient, Family & other staff in agreement with plan of care Yes   Clinical Impression Comments Patient appropriate for continued acute care PT to address strength and balance deficits in order to maximize independence with functional mobility.    [facility] [employer] TM \"6 Clicks\"   2016, Trustees of [facility], under license to [email or web address].  All rights reserved.   6 Clicks Short Forms Basic Mobility Inpatient Short Form   [facility] [employer]PAC  \"6 Clicks\" V.2 Basic Mobility Inpatient Short Form   1. Turning from your back to your side while in a flat bed without using bedrails? 3 - A Little   2. Moving from " lying on your back to sitting on the side of a flat bed without using bedrails? 3 - A Little   3. Moving to and from a bed to a chair (including a wheelchair)? 3 - A Little   4. Standing up from a chair using your arms (e.g., wheelchair, or bedside chair)? 3 - A Little   5. To walk in hospital room? 3 - A Little   6. Climbing 3-5 steps with a railing? 3 - A Little   Basic Mobility Raw Score (Score out of 24.Lower scores equate to lower levels of function) 18   Total Evaluation Time   Total Evaluation Time (Minutes) 10[BB1.1]        Revision History        User Key Date/Time User Provider Type Action    > BB1.1 2017  8:50 AM Keisha Gibson PT Physical Therapist Sign            Progress Notes by Sebastian Madrigal MD at 2017  8:14 AM     Author:  Sebastian Madrigal MD Service:  Orthopedics Author Type:  Physician    Filed:  2017  8:15 AM Date of Service:  2017  8:14 AM Creation Time:  2017  8:14 AM    Status:  Signed :  Sebastian Madrigal MD (Physician)         [facility] Orthopedic Post-Op / Progress Note  Jasen Rosario is a 71 year old male    Today's Date:2017  Admission Date: 2017  POD # 1 BETTY         Interval History:   doing well  Good pain control            Physical Exam:   All vitals have been reviewed  Temperatures:  Current - Temp: 97.7  F (36.5  C); Max - Temp  Av.6  F (36.4  C)  Min: 96.8  F (36  C)  Max: 98.3  F (36.8  C)  Pulse range: Pulse  Av.7  Min: 45  Max: 68  Blood pressure range: Systolic (24hrs), Av , Min:92 , Max:129   ; Diastolic (24hrs), Av, Min:53, Max:99      Intake/Output Summary (Last 24 hours) at 17 0814  Last data filed at 17 0757   Gross per 24 hour   Intake             3172 ml   Output             1770 ml   Net             1402 ml       Dressing dry and intact.          Data:   All laboratory data related to this surgery reviewed      Lab Results   Component Value Date      06/22/2017    POTASSIUM 4.0 06/22/2017    CHLORIDE 105 06/22/2017    CO2 25 06/22/2017     (H) 06/22/2017     Lab Results   Component Value Date    HGB 12.5 (L) 06/22/2017    HGB 15.7 06/21/2017    HGB 15.0 06/14/2017     Platelet Count (10e9/L)   Date Value   06/21/2017 178   06/14/2017 214   08/04/2016 183       All imaging studies related to this surgery reviewed         Assessment and Plan:    Assessment:  Doing well.  No immediate surgical complications identified.  No excessive bleeding  Pain well-controlled.    Plan:  Continue physical therapy  Pain control measures  Discharge plan: Home Friday PM or Sat AM    Sebastian Madrigal MD[JA1.1]          Revision History        User Key Date/Time User Provider Type Action    > JA1.1 6/22/2017  8:15 AM Sebastian Madrigal MD Physician Sign            Progress Notes by Halima Murillo at 6/21/2017  9:27 AM     Author:  Halima Murillo Service:  (none) Author Type:  (none)    Filed:  6/21/2017  9:27 AM Date of Service:  6/21/2017  9:27 AM Creation Time:  6/21/2017  9:27 AM    Status:  Signed :  Halima Murillo         Admission medication history interview status for the 6/21/2017  admission is complete. See EPIC admission navigator for prior to admission medications     Medication history source reliability:Good    Medication history interview source(s):Patient    Medication history resources (including written lists, pill bottles, clinic record):None    Primary pharmacy[facility]    Additional medication history information not noted on PTA med list :None    Time spent in this activity: 45 minutes    Prior to Admission medications    Medication Sig Last Dose Taking? Auth Provider   Omega-3 Fatty Acids (FISH OIL PO) Take 1 tablet by mouth 2 times daily 6/14/2017 Yes Reported, Patient   GLUCOSAMINE-CHONDROITIN PO Take 2 tablets by mouth daily 6/14/2017 Yes Reported, Patient   TURMERIC PO Take 2 tablets by mouth 2 times daily 6/14/2017 Yes  Reported, Patient   Acetaminophen (TYLENOL PO) Take 325-650 mg by mouth every 6 hours as needed for mild pain or fever 6/18/2017 at prn Yes Reported, Patient   Multiple Vitamin (DAILY MULTIVITAMIN PO) Take 2 tablets by mouth 2 times daily  6/14/2017 Yes Reported, Patient[DS1.1]            Revision History        User Key Date/Time User Provider Type Action    > DS1.1 6/21/2017  9:27 AM Halima Murillo (none) Sign                  Procedure Notes     No notes of this type exist for this encounter.         Progress Notes - Therapies (Notes from 06/19/17 through 06/22/17)      Progress Notes by Keisha Gibson PT at 6/22/2017  8:50 AM     Author:  Keisha Gibson PT Service:  (none) Author Type:  Physical Therapist    Filed:  6/22/2017  8:50 AM Date of Service:  6/22/2017  8:50 AM Creation Time:  6/22/2017  8:50 AM    Status:  Signed :  Keisha Gibson PT (Physical Therapist)          06/22/17 0800   Quick Adds   Type of Visit Initial PT Evaluation   Living Environment   Lives With spouse   Living Arrangements house   Home Accessibility (Has a tub and a walk in shower)   Number of Stairs to Enter Home 8  (both sides and a threshold step)   Number of Stairs Within Home 0   Transportation Available car;family or friend will provide   Living Environment Comment Spouse works only one day per week, planning to retire and can assist   Self-Care   Usual Activity Tolerance excellent   Current Activity Tolerance moderate   Equipment Currently Used at Home cane, straight;walker, rolling   Functional Level Prior   Ambulation 0-->independent   Transferring 0-->independent   Toileting 0-->independent   Bathing 0-->independent   Dressing 0-->independent   Fall history within last six months no   Which of the above functional risks had a recent onset or change? ambulation;transferring;toileting;bathing;dressing   Prior Functional Level Comment Patient reports independence with mobility/ADL's prior to admission. Patient very  active at baseline.   General Information   Onset of Illness/Injury or Date of Surgery - Date 06/21/17   Referring Physician MD Rohan   Patient/Family Goals Statement Return home   Pertinent History of Current Problem (include personal factors and/or comorbidities that impact the POC) 71 year old male s/p L BETTY.   Precautions/Limitations fall precautions;left hip precautions   Weight-Bearing Status - LLE weight-bearing as tolerated   General Info Comments L posterior-lateral precautions, WBAT, ambulate with assist   Cognitive Status Examination   Orientation orientation to person, place and time   Level of Consciousness alert   Follows Commands and Answers Questions 100% of the time;able to follow multistep instructions   Personal Safety and Judgment intact   Pain Assessment   Patient Currently in Pain (1/10L hip)   Integumentary/Edema   Integumentary/Edema Comments Drain intact to the L LE.    Posture    Posture Not impaired   Range of Motion (ROM)   ROM Comment All extremities WFL's, L LE with 90 degree restriction.    Strength   Strength Comments Sufficient for all mobility with SBA, no L LE buckling noted.    Bed Mobility   Bed Mobility Comments Supine to sit with SBA x 1 and verbal cues    Transfer Skills   Transfer Comments Sit to/from stand with CGA.    Gait   Gait Comments Ambulates 20' with FWW and CGA.    Balance   Balance Comments Standing dynamic balance fair with UE support on FWW.    Sensory Examination   Sensory Perception Comments Denies burning, numbness and tingling   Coordination   Coordination no deficits were identified   Modality Interventions   Planned Modality Interventions Cryotherapy   Planned Modality Interventions Comments PRN   General Therapy Interventions   Planned Therapy Interventions bed mobility training;gait training;strengthening;ROM;transfer training;home program guidelines;progressive activity/exercise   Clinical Impression   Criteria for Skilled Therapeutic Intervention  "yes, treatment indicated   PT Diagnosis Impaired gait   Influenced by the following impairments Pain, decreased LLE strength, impaired balance   Functional limitations due to impairments Decreased independence with bed mobility, transfers and ambulation   Clinical Presentation Stable/Uncomplicated   Clinical Presentation Rationale Medically stable.    Clinical Decision Making (Complexity) Low complexity   Therapy Frequency` 2 times/day   Predicted Duration of Therapy Intervention (days/wks) 3 days   Anticipated Discharge Disposition Home with Assist   Risk & Benefits of therapy have been explained Yes   Patient, Family & other staff in agreement with plan of care Yes   Clinical Impression Comments Patient appropriate for continued acute care PT to address strength and balance deficits in order to maximize independence with functional mobility.    [facility] [employer] TM \"6 Clicks\"   2016, Trustees of [facility], under license to [employer].  All rights reserved.   6 Clicks Short Forms Basic Mobility Inpatient Short Form   [facility] AM-PAC  \"6 Clicks\" V.2 Basic Mobility Inpatient Short Form   1. Turning from your back to your side while in a flat bed without using bedrails? 3 - A Little   2. Moving from lying on your back to sitting on the side of a flat bed without using bedrails? 3 - A Little   3. Moving to and from a bed to a chair (including a wheelchair)? 3 - A Little   4. Standing up from a chair using your arms (e.g., wheelchair, or bedside chair)? 3 - A Little   5. To walk in hospital room? 3 - A Little   6. Climbing 3-5 steps with a railing? 3 - A Little   Basic Mobility Raw Score (Score out of 24.Lower scores equate to lower levels of function) 18   Total Evaluation Time   Total Evaluation Time (Minutes) 10[BB1.1]        Revision History        User Key Date/Time User Provider Type Action    > BB1.1 6/22/2017  8:50 AM Keisha Gibson, PT Physical Therapist Sign            Progress Notes " by Halima Murillo at 6/21/2017  9:27 AM     Author:  Halima Murillo Service:  (none) Author Type:  (none)    Filed:  6/21/2017  9:27 AM Date of Service:  6/21/2017  9:27 AM Creation Time:  6/21/2017  9:27 AM    Status:  Signed :  Halima Murillo         Admission medication history interview status for the 6/21/2017  admission is complete. See EPIC admission navigator for prior to admission medications     Medication history source reliability:Good    Medication history interview source(s):Patient    Medication history resources (including written lists, pill bottles, clinic record):None    Primary pharmacy.[facility]    Additional medication history information not noted on PTA med list :None    Time spent in this activity: 45 minutes    Prior to Admission medications    Medication Sig Last Dose Taking? Auth Provider   Omega-3 Fatty Acids (FISH OIL PO) Take 1 tablet by mouth 2 times daily 6/14/2017 Yes Reported, Patient   GLUCOSAMINE-CHONDROITIN PO Take 2 tablets by mouth daily 6/14/2017 Yes Reported, Patient   TURMERIC PO Take 2 tablets by mouth 2 times daily 6/14/2017 Yes Reported, Patient   Acetaminophen (TYLENOL PO) Take 325-650 mg by mouth every 6 hours as needed for mild pain or fever 6/18/2017 at prn Yes Reported, Patient   Multiple Vitamin (DAILY MULTIVITAMIN PO) Take 2 tablets by mouth 2 times daily  6/14/2017 Yes Reported, Patient[DS1.1]            Revision History        User Key Date/Time User Provider Type Action    > DS1.1 6/21/2017  9:27 AM Halima Murillo (none) Sign

## 2017-06-21 NOTE — IP AVS SNAPSHOT
[address] [facility]    [address] [place] [address]    Phone:  [phone]                                       After Visit Summary   6/21/2017    Jasen Rosario    MRN: [id number]           After Visit Summary Signature Page     I have received my discharge instructions, and my questions have been answered. I have discussed any challenges I see with this plan with the nurse or doctor.    ..........................................................................................................................................  Patient/Patient Representative Signature      ..........................................................................................................................................  Patient Representative Print Name and Relationship to Patient    ..................................................               ................................................  Date                                            Time    ..........................................................................................................................................  Reviewed by Signature/Title    ...................................................              ..............................................  Date                                                            Time

## 2017-06-21 NOTE — OP NOTE
PATIENT NAME:  Jasen Rosario  MEDICAL RECORD:  [id number]  PATIENT BIRTHDAY:  1945  DATE OF SURGERY: 6/21/2017    SURGEON:  Sebastian Madrigal MD    1st  ASSISTANTt:  LAZARA Alejandre OPA-C      PREOPERATIVE DIAGNOSIS:  Degenerative osteoarthritis left hip.      POSTOPERATIVE DIAGNOSIS:  Degenerative osteoarthritis left hip.      PROCEDURE:  left total hip arthroplasty.     COMPONENTS:  Biomet - 54 mm G7 porous plasma coated acetabular shell, 36 mm acetabular liner, size 8 HO Taperloc  Porous  femoral stem, -3 mm length, 36 mm diameter femoral head.      ANESTHESIA:  General     INDICATIONS FOR PROCEDURE:  The patient was brought to the operating room for elective total hip arthroplasty for end-stage osteoarthritis.  The patient is given preoperative IV antibiotics and these will be continued for 24 hours.  The patient will also receive anticoagulation for postoperative thrombosis prophylaxis.  The patient understands the indications, alternatives, risks, benefits, and time involved for recovery and has consented to move ahead with the procedure.       DESCRIPTION OF PROCEDURE:  The patient was brought to the operating room and following suitable General anesthesia, the patient was placed in the lateral decubitus position and secured with the Wixson hip lyman.      The left hip was prepped and draped in the usual manner.      A full timeout was carried out and the patient, correct extremity, operative site and procedure were identified and confirmed by the entire operative team.      We then moved ahead with the single incision posterolateral approach.  A 10 cm incision was made over the posterolateral aspect of the hip and sharp dissection carried down through the subcutaneous tissue.  The gluteus ximena fascia was divided and the muscle split in line with its fibers.  The gluteus medius was retracted anteriorly, the piriformis tagged and released and the short external rotators released.      The  capsule was opened posteriorly in a T- fashion.  The hip was dislocated without difficulty.      The femoral head was removed at the predetermined level and the femur retracted anteriorly to expose the acetabulum.     There was moderate wear on the femoral head and severe wear on the acetabular surface.  The femoral head was sectioned and discoloration seen in the superolateral quadrant although not yet depressed or fractured.     I excised the massively torn labrum circumferentially and did a posterior capsulectomy.      We then prepared the acetabulum with serial reaming to a 53 mm size.  We press-fit in a 54 mm G7 porous plasma coated cup and secured it with a single 25 mm screw.  A trial 36 mm liner was positioned.      Next, attention was directed to the femur.  We rasped the femur to the 8 size and trialed off the rasp.  Once we had assured good position and selection of components, we removed the trial components and the femoral rasp.  We secured the size  8 HO Taperloc porous stem.  This was solid and in good position.      The 36 mm final liner was secured on the acetabular shell.       The final -3 mm length, 36 mm diameter femoral head component was secured and the hip reduced one final time.  We checked for motion, stability, alignment and leg lengths, all of which were excellent.      The wound was irrigated throughout with antibiotic solution using jet lavage.  It was closed over a medium Hemovac drain with 0 Ethibond in the piriformis repaired back to the greater trochanter.  0 Ethibond interrupted sutures and V-Loc running suture was used in the deep fascia of the gluteus ximena.  The subcutaneous tissue was closed with 0 and 2-0 Vicryl and the skin with skin staples.      Sterile bandage was applied.  The patient was transferred onto a cart and taken to [facility] in satisfactory condition.  There were no known intraoperative complications.     A surgical assistant was medically necessary for this case  for pre-op positioning as well as intra-op retraction and extremity support and positioning.  He was present the entire case.          ANAYA WASHINGTON MD      CC  Anaya Washington MD         [phone]  Fax

## 2017-06-22 ENCOUNTER — APPOINTMENT (OUTPATIENT)
Dept: PHYSICAL THERAPY | Facility: CLINIC | Age: 72
DRG: 470 | End: 2017-06-22
Attending: ORTHOPAEDIC SURGERY
Payer: MEDICARE

## 2017-06-22 LAB
ANION GAP SERPL CALCULATED.3IONS-SCNC: 6 MMOL/L (ref 3–14)
BUN SERPL-MCNC: 14 MG/DL (ref 7–30)
CALCIUM SERPL-MCNC: 7.8 MG/DL (ref 8.5–10.1)
CHLORIDE SERPL-SCNC: 105 MMOL/L (ref 94–109)
CO2 SERPL-SCNC: 25 MMOL/L (ref 20–32)
CREAT SERPL-MCNC: 0.96 MG/DL (ref 0.66–1.25)
GFR SERPL CREATININE-BSD FRML MDRD: 77 ML/MIN/1.7M2
GLUCOSE SERPL-MCNC: 114 MG/DL (ref 70–99)
HGB BLD-MCNC: 12.5 G/DL (ref 13.3–17.7)
POTASSIUM SERPL-SCNC: 4 MMOL/L (ref 3.4–5.3)
SODIUM SERPL-SCNC: 136 MMOL/L (ref 133–144)

## 2017-06-22 PROCEDURE — 25800025 ZZH RX 258: Performed by: ORTHOPAEDIC SURGERY

## 2017-06-22 PROCEDURE — 97161 PT EVAL LOW COMPLEX 20 MIN: CPT | Mod: GP

## 2017-06-22 PROCEDURE — A9270 NON-COVERED ITEM OR SERVICE: HCPCS | Mod: GY | Performed by: ORTHOPAEDIC SURGERY

## 2017-06-22 PROCEDURE — 36415 COLL VENOUS BLD VENIPUNCTURE: CPT | Performed by: ORTHOPAEDIC SURGERY

## 2017-06-22 PROCEDURE — 25000132 ZZH RX MED GY IP 250 OP 250 PS 637: Mod: GY | Performed by: ORTHOPAEDIC SURGERY

## 2017-06-22 PROCEDURE — 40000193 ZZH STATISTIC PT WARD VISIT

## 2017-06-22 PROCEDURE — 97530 THERAPEUTIC ACTIVITIES: CPT | Mod: GP

## 2017-06-22 PROCEDURE — 97116 GAIT TRAINING THERAPY: CPT | Mod: GP

## 2017-06-22 PROCEDURE — 97110 THERAPEUTIC EXERCISES: CPT | Mod: GP

## 2017-06-22 PROCEDURE — 85018 HEMOGLOBIN: CPT | Performed by: ORTHOPAEDIC SURGERY

## 2017-06-22 PROCEDURE — 80048 BASIC METABOLIC PNL TOTAL CA: CPT | Performed by: ORTHOPAEDIC SURGERY

## 2017-06-22 PROCEDURE — 12000007 ZZH R&B INTERMEDIATE

## 2017-06-22 PROCEDURE — 25000128 H RX IP 250 OP 636: Performed by: ORTHOPAEDIC SURGERY

## 2017-06-22 RX ORDER — OXYCODONE HYDROCHLORIDE 5 MG/1
5-10 TABLET ORAL EVERY 4 HOURS PRN
Qty: 50 TABLET | Refills: 0 | Status: SHIPPED | OUTPATIENT
Start: 2017-06-22 | End: 2017-06-23

## 2017-06-22 RX ORDER — AMOXICILLIN 250 MG
1-2 CAPSULE ORAL 2 TIMES DAILY
Qty: 50 TABLET | Refills: 0 | Status: ON HOLD | OUTPATIENT
Start: 2017-06-22 | End: 2017-10-02

## 2017-06-22 RX ADMIN — SENNOSIDES AND DOCUSATE SODIUM 1 TABLET: 8.6; 5 TABLET ORAL at 21:28

## 2017-06-22 RX ADMIN — POTASSIUM CHLORIDE, DEXTROSE MONOHYDRATE AND SODIUM CHLORIDE: 150; 5; 450 INJECTION, SOLUTION INTRAVENOUS at 04:38

## 2017-06-22 RX ADMIN — CEFAZOLIN SODIUM 1 G: 1 INJECTION, POWDER, FOR SOLUTION INTRAMUSCULAR; INTRAVENOUS at 04:38

## 2017-06-22 RX ADMIN — OXYCODONE HYDROCHLORIDE 5 MG: 5 TABLET ORAL at 21:28

## 2017-06-22 RX ADMIN — ACETAMINOPHEN 975 MG: 325 TABLET, FILM COATED ORAL at 23:30

## 2017-06-22 RX ADMIN — OXYCODONE HYDROCHLORIDE 5 MG: 5 TABLET ORAL at 04:37

## 2017-06-22 RX ADMIN — KETOROLAC TROMETHAMINE 15 MG: 15 INJECTION, SOLUTION INTRAMUSCULAR; INTRAVENOUS at 07:48

## 2017-06-22 RX ADMIN — ACETAMINOPHEN 975 MG: 325 TABLET, FILM COATED ORAL at 06:01

## 2017-06-22 RX ADMIN — ENOXAPARIN SODIUM 40 MG: 40 INJECTION SUBCUTANEOUS at 07:48

## 2017-06-22 RX ADMIN — ACETAMINOPHEN 975 MG: 325 TABLET, FILM COATED ORAL at 14:09

## 2017-06-22 RX ADMIN — SENNOSIDES AND DOCUSATE SODIUM 1 TABLET: 8.6; 5 TABLET ORAL at 07:48

## 2017-06-22 RX ADMIN — KETOROLAC TROMETHAMINE 15 MG: 15 INJECTION, SOLUTION INTRAMUSCULAR; INTRAVENOUS at 02:55

## 2017-06-22 RX ADMIN — OXYCODONE HYDROCHLORIDE 5 MG: 5 TABLET ORAL at 13:12

## 2017-06-22 RX ADMIN — CYCLOBENZAPRINE HYDROCHLORIDE 5 MG: 5 TABLET, FILM COATED ORAL at 20:38

## 2017-06-22 RX ADMIN — OXYCODONE HYDROCHLORIDE 5 MG: 5 TABLET ORAL at 09:11

## 2017-06-22 RX ADMIN — OXYCODONE HYDROCHLORIDE 5 MG: 5 TABLET ORAL at 17:28

## 2017-06-22 NOTE — PROGRESS NOTES
[facility] Orthopedic Post-Op / Progress Note  Jasen Rosario is a 71 year old male    Today's Date:2017  Admission Date: 2017  POD # 1 BETTY         Interval History:   doing well  Good pain control            Physical Exam:   All vitals have been reviewed  Temperatures:  Current - Temp: 97.7  F (36.5  C); Max - Temp  Av.6  F (36.4  C)  Min: 96.8  F (36  C)  Max: 98.3  F (36.8  C)  Pulse range: Pulse  Av.7  Min: 45  Max: 68  Blood pressure range: Systolic (24hrs), Av , Min:92 , Max:129   ; Diastolic (24hrs), Av, Min:53, Max:99      Intake/Output Summary (Last 24 hours) at 17 0814  Last data filed at 17 0757   Gross per 24 hour   Intake             3172 ml   Output             1770 ml   Net             1402 ml       Dressing dry and intact.          Data:   All laboratory data related to this surgery reviewed      Lab Results   Component Value Date     2017    POTASSIUM 4.0 2017    CHLORIDE 105 2017    CO2 25 2017     (H) 2017     Lab Results   Component Value Date    HGB 12.5 (L) 2017    HGB 15.7 2017    HGB 15.0 2017     Platelet Count (10e9/L)   Date Value   2017 178   2017 214   2016 183       All imaging studies related to this surgery reviewed         Assessment and Plan:    Assessment:  Doing well.  No immediate surgical complications identified.  No excessive bleeding  Pain well-controlled.    Plan:  Continue physical therapy  Pain control measures  Discharge plan: Home Friday PM or Sat AM    Sebastian Madrigal MD

## 2017-06-22 NOTE — PLAN OF CARE
Problem: Goal Outcome Summary  Goal: Goal Outcome Summary  PT: Orders received, evaluation completed and treatment initiated. 71 year old male s/p L BETTY. WBAT, posterior/lateral precautions. Patient reports independence with mobility/ADL's at baseline. Lives with his spouse in a split level home with 7 stairs (one rail to enter). Patient also reports needing to navigate a platform step. Wife able to assist on discharge. Patient has a straight cane and has access to borrow a FWW.      Discharge Planner PT   Patient plan for discharge: Home with assist  Current status: Supine to sit with SBA. Sit to/from stand with CGA. Ambulates 15', 200' with FWW and CGA progressing to SBA. Educated on precautions. Able to perform mobility within precautions with verbal cues.   Barriers to return to prior living situation: Stairs to navigate, but will address in therapy.   Recommendations for discharge: TBD POD#2  Rationale for recommendations: TBD POD#2       Entered by: Keisha Gibson 06/22/2017 9:35 AM

## 2017-06-22 NOTE — PROGRESS NOTES
06/22/17 0800   Quick Adds   Type of Visit Initial PT Evaluation   Living Environment   Lives With spouse   Living Arrangements house   Home Accessibility (Has a tub and a walk in shower)   Number of Stairs to Enter Home 8  (both sides and a threshold step)   Number of Stairs Within Home 0   Transportation Available car;family or friend will provide   Living Environment Comment Spouse works only one day per week, planning to retire and can assist   Self-Care   Usual Activity Tolerance excellent   Current Activity Tolerance moderate   Equipment Currently Used at Home cane, straight;walker, rolling   Functional Level Prior   Ambulation 0-->independent   Transferring 0-->independent   Toileting 0-->independent   Bathing 0-->independent   Dressing 0-->independent   Fall history within last six months no   Which of the above functional risks had a recent onset or change? ambulation;transferring;toileting;bathing;dressing   Prior Functional Level Comment Patient reports independence with mobility/ADL's prior to admission. Patient very active at baseline.   General Information   Onset of Illness/Injury or Date of Surgery - Date 06/21/17   Referring Physician MD Rohan   Patient/Family Goals Statement Return home   Pertinent History of Current Problem (include personal factors and/or comorbidities that impact the POC) 71 year old male s/p L BETTY.   Precautions/Limitations fall precautions;left hip precautions   Weight-Bearing Status - LLE weight-bearing as tolerated   General Info Comments L posterior-lateral precautions, WBAT, ambulate with assist   Cognitive Status Examination   Orientation orientation to person, place and time   Level of Consciousness alert   Follows Commands and Answers Questions 100% of the time;able to follow multistep instructions   Personal Safety and Judgment intact   Pain Assessment   Patient Currently in Pain (1/10L hip)   Integumentary/Edema   Integumentary/Edema Comments Drain intact to the  "L LE.    Posture    Posture Not impaired   Range of Motion (ROM)   ROM Comment All extremities WFL's, L LE with 90 degree restriction.    Strength   Strength Comments Sufficient for all mobility with SBA, no L LE buckling noted.    Bed Mobility   Bed Mobility Comments Supine to sit with SBA x 1 and verbal cues    Transfer Skills   Transfer Comments Sit to/from stand with CGA.    Gait   Gait Comments Ambulates 20' with FWW and CGA.    Balance   Balance Comments Standing dynamic balance fair with UE support on FWW.    Sensory Examination   Sensory Perception Comments Denies burning, numbness and tingling   Coordination   Coordination no deficits were identified   Modality Interventions   Planned Modality Interventions Cryotherapy   Planned Modality Interventions Comments PRN   General Therapy Interventions   Planned Therapy Interventions bed mobility training;gait training;strengthening;ROM;transfer training;home program guidelines;progressive activity/exercise   Clinical Impression   Criteria for Skilled Therapeutic Intervention yes, treatment indicated   PT Diagnosis Impaired gait   Influenced by the following impairments Pain, decreased LLE strength, impaired balance   Functional limitations due to impairments Decreased independence with bed mobility, transfers and ambulation   Clinical Presentation Stable/Uncomplicated   Clinical Presentation Rationale Medically stable.    Clinical Decision Making (Complexity) Low complexity   Therapy Frequency` 2 times/day   Predicted Duration of Therapy Intervention (days/wks) 3 days   Anticipated Discharge Disposition Home with Assist   Risk & Benefits of therapy have been explained Yes   Patient, Family & other staff in agreement with plan of care Yes   Clinical Impression Comments Patient appropriate for continued acute care PT to address strength and balance deficits in order to maximize independence with functional mobility.    [facility] AM-PAC TM \"6 Clicks\"   2016, " "Trustees of [facility], under license to [employer].  All rights reserved.   6 Clicks Short Forms Basic Mobility Inpatient Short Form   [facility] AM-PAC  \"6 Clicks\" V.2 Basic Mobility Inpatient Short Form   1. Turning from your back to your side while in a flat bed without using bedrails? 3 - A Little   2. Moving from lying on your back to sitting on the side of a flat bed without using bedrails? 3 - A Little   3. Moving to and from a bed to a chair (including a wheelchair)? 3 - A Little   4. Standing up from a chair using your arms (e.g., wheelchair, or bedside chair)? 3 - A Little   5. To walk in hospital room? 3 - A Little   6. Climbing 3-5 steps with a railing? 3 - A Little   Basic Mobility Raw Score (Score out of 24.Lower scores equate to lower levels of function) 18   Total Evaluation Time   Total Evaluation Time (Minutes) 10     "

## 2017-06-22 NOTE — DISCHARGE INSTRUCTIONS
DISCHARGE INSTRUCTIONS FOR YOUR TOTAL HIP REPLACEMENT     ANAYA WASHINGTON MD    Wound Care:    Change your dressing daily. Inspect your incision at the time of dressing change for increased redness, swelling, and drainage.  Some discoloration and bruising is usually seen, but call my office if you are concerned or if you see changes in the wound appearance.  Also, call if you develop a fever above 101 degrees.     You may shower directly over the wound beginning 4 days after your surgery, but do not submerge wound under water until after the post operative clinic visit when the sutures are removed and the wound is completely sealed and without any drainage. Keep a dressing over the wound until after your post operative clinic visit.      Activities and outpatient Physical Therapy:  Physical activity may be resumed gradually according to your comfort level and the restrictions on your hip positioning as instructed in the pre-op class and by therapy. Do not cross your legs and do not bend past 90 degrees. You may bear weight as tolerated on the operated leg.  Use crutches or the walker as instructed by Physical Therapy.  Follow your home exercise program as instructed by your therapist.     Wear your anti-embolism stockings day and night until seen for your post operative appointment.  Keep them flat on your skin.  Do not allow them to roll up or crease your skin.  Remove twice daily for one hour at a time.  You may hand wash and air dry your stockings.  Notify my office if you experience calf pain.  Pump your ankles frequently.        If indicated, you may have outpatient physical therapy sessions when your return home. These visits are prescribed for you at the time of your surgery scheduling.  If so, you should have already scheduled your therapy sessions in advance.  If you have not done so, please immediately contact the therapy location of your choice to schedule the appointments for the physical therapy regimen  that has been prescribed for you. You may discontinue the crutches or walker per the therapist's recommendation.      Medications:  New medications for you on discharge include a pain medication, a stool softener, and a blood thinner.  Detailed instructions come with those medications.  You will also receive instructions on when to resume your home medications.     If you routinely take Aspirin 81 mg, hold the aspirin while taking the formal blood thinner medication.  When the formal blood thinner is completed, please take Aspirin 325 mg   (1 tablet) daily for 4 weeks.  Then resume your Aspirin 81 mg daily if that is your routine.      Antibiotic coverage will be needed before any type of dental procedure. This is a life long recommendation.  You should notify your dentist of your total hip surgery and call your dentist or our office one week before a dental appointment for antibiotics.         Post operative clinic appointment:  Your post operative clinic visit has been prearranged.   Call [phone] with any questions.    Sebastian Madrigal MD

## 2017-06-22 NOTE — PLAN OF CARE
Problem: Goal Outcome Summary  Goal: Goal Outcome Summary  Outcome: Improving  Patient sat at edge of bed with assist of 1; tolerated well, denied nausea or lightheadedness.  Pain managed with Oxycodone and IV Dilaudid.  VSS.  A/Ox4.  Dressing CDI.  CMS intact.  Hemovac and tejeda patent.

## 2017-06-22 NOTE — PLAN OF CARE
Problem: Goal Outcome Summary  Goal: Goal Outcome Summary  Outcome: No Change  [id number] Patient A/Ox4. VSS on RA. CMS intact. Patient c/o hip pain managed with scheduled toradol and oxycodone, schedled tylenol . IVF, antibotic given. [id number]. [place] d/c'd this am due to void. Dressing CDI. Up with 1 + walker.  PCD's on without machine, unable to locate a machine with [facility] or other floors . [facility] says more will be available tomorrow am will need to get then. Tolerating regular diet. Will continue to monitor

## 2017-06-22 NOTE — PLAN OF CARE
Problem: Goal Outcome Summary  Goal: Goal Outcome Summary  Outcome: Improving  A&O X4. CMS intact. Drg changed. Pain is being managed with po pain meds. A-1 with walker. Eating, drinking, and voiding adequately. BP's have been soft, but pt states this is baseline for him and is asymptomatic, otherwise VSS on RA. Progressing per POC.

## 2017-06-23 ENCOUNTER — APPOINTMENT (OUTPATIENT)
Dept: OCCUPATIONAL THERAPY | Facility: CLINIC | Age: 72
DRG: 470 | End: 2017-06-23
Attending: ORTHOPAEDIC SURGERY
Payer: MEDICARE

## 2017-06-23 ENCOUNTER — APPOINTMENT (OUTPATIENT)
Dept: PHYSICAL THERAPY | Facility: CLINIC | Age: 72
DRG: 470 | End: 2017-06-23
Attending: ORTHOPAEDIC SURGERY
Payer: MEDICARE

## 2017-06-23 LAB
GLUCOSE BLDC GLUCOMTR-MCNC: 107 MG/DL (ref 70–99)
HGB BLD-MCNC: 13 G/DL (ref 13.3–17.7)

## 2017-06-23 PROCEDURE — 40000193 ZZH STATISTIC PT WARD VISIT: Performed by: PHYSICAL THERAPIST

## 2017-06-23 PROCEDURE — 36415 COLL VENOUS BLD VENIPUNCTURE: CPT | Performed by: ORTHOPAEDIC SURGERY

## 2017-06-23 PROCEDURE — 25000132 ZZH RX MED GY IP 250 OP 250 PS 637: Mod: GY | Performed by: ORTHOPAEDIC SURGERY

## 2017-06-23 PROCEDURE — 40000133 ZZH STATISTIC OT WARD VISIT: Performed by: OCCUPATIONAL THERAPIST

## 2017-06-23 PROCEDURE — 12000007 ZZH R&B INTERMEDIATE

## 2017-06-23 PROCEDURE — 00000146 ZZHCL STATISTIC GLUCOSE BY METER IP

## 2017-06-23 PROCEDURE — 97166 OT EVAL MOD COMPLEX 45 MIN: CPT | Mod: GO | Performed by: OCCUPATIONAL THERAPIST

## 2017-06-23 PROCEDURE — 97116 GAIT TRAINING THERAPY: CPT | Mod: GP | Performed by: PHYSICAL THERAPIST

## 2017-06-23 PROCEDURE — 97535 SELF CARE MNGMENT TRAINING: CPT | Mod: GO | Performed by: OCCUPATIONAL THERAPIST

## 2017-06-23 PROCEDURE — 97530 THERAPEUTIC ACTIVITIES: CPT | Mod: GP | Performed by: PHYSICAL THERAPIST

## 2017-06-23 PROCEDURE — 97530 THERAPEUTIC ACTIVITIES: CPT | Mod: GO | Performed by: OCCUPATIONAL THERAPIST

## 2017-06-23 PROCEDURE — 25000128 H RX IP 250 OP 636: Performed by: ORTHOPAEDIC SURGERY

## 2017-06-23 PROCEDURE — 85018 HEMOGLOBIN: CPT | Performed by: ORTHOPAEDIC SURGERY

## 2017-06-23 PROCEDURE — A9270 NON-COVERED ITEM OR SERVICE: HCPCS | Mod: GY | Performed by: ORTHOPAEDIC SURGERY

## 2017-06-23 PROCEDURE — 97110 THERAPEUTIC EXERCISES: CPT | Mod: GP | Performed by: PHYSICAL THERAPIST

## 2017-06-23 RX ORDER — OXYCODONE HYDROCHLORIDE 5 MG/1
5-10 TABLET ORAL EVERY 4 HOURS PRN
Qty: 40 TABLET | Refills: 0 | Status: ON HOLD | OUTPATIENT
Start: 2017-06-23 | End: 2017-10-02

## 2017-06-23 RX ADMIN — OXYCODONE HYDROCHLORIDE 10 MG: 5 TABLET ORAL at 13:15

## 2017-06-23 RX ADMIN — OXYCODONE HYDROCHLORIDE 10 MG: 5 TABLET ORAL at 17:26

## 2017-06-23 RX ADMIN — SENNOSIDES AND DOCUSATE SODIUM 2 TABLET: 8.6; 5 TABLET ORAL at 21:36

## 2017-06-23 RX ADMIN — HYDROMORPHONE HYDROCHLORIDE 0.5 MG: 1 INJECTION, SOLUTION INTRAMUSCULAR; INTRAVENOUS; SUBCUTANEOUS at 10:58

## 2017-06-23 RX ADMIN — OXYCODONE HYDROCHLORIDE 10 MG: 5 TABLET ORAL at 09:12

## 2017-06-23 RX ADMIN — ACETAMINOPHEN 975 MG: 325 TABLET, FILM COATED ORAL at 06:00

## 2017-06-23 RX ADMIN — OXYCODONE HYDROCHLORIDE 5 MG: 5 TABLET ORAL at 02:30

## 2017-06-23 RX ADMIN — ACETAMINOPHEN 975 MG: 325 TABLET, FILM COATED ORAL at 16:12

## 2017-06-23 RX ADMIN — SENNOSIDES AND DOCUSATE SODIUM 2 TABLET: 8.6; 5 TABLET ORAL at 09:12

## 2017-06-23 RX ADMIN — OXYCODONE HYDROCHLORIDE 10 MG: 5 TABLET ORAL at 21:36

## 2017-06-23 RX ADMIN — ENOXAPARIN SODIUM 40 MG: 40 INJECTION SUBCUTANEOUS at 09:12

## 2017-06-23 ASSESSMENT — ACTIVITIES OF DAILY LIVING (ADL): PREVIOUS_RESPONSIBILITIES: MEAL PREP;HOUSEKEEPING;LAUNDRY;SHOPPING;YARDWORK;MEDICATION MANAGEMENT;FINANCES;DRIVING

## 2017-06-23 NOTE — PLAN OF CARE
Problem: Goal Outcome Summary  Goal: Goal Outcome Summary  Outcome: No Change  1900-0730 Patient A/O x4. Slight elevated temperature, BP soft other VSS on RA. CMS intact. Dressing CDI. Voiding in the BR. Pain managed with oxycodone, scheduled Tylenol. Flexeril given for muscle tightness. Patient ambulated in san with assist 1 + walker + gait belt. Tolerating regular diet. IV: SL.D/C home 6/23 pm or 6/24 am. Will continue to monitor

## 2017-06-23 NOTE — PLAN OF CARE
Problem: Goal Outcome Summary  Goal: Goal Outcome Summary  Discharge Planner PT   Patient plan for discharge: return home with assist from family  Current status: No OOB this am with PT as patient had hypotensive episode with OT prior to PT arrival; BP was 82/39; Able to participate in L hip ex's in supine; will attempt mobility in pm as tolerated  Barriers to return to prior living situation: stairs; split level home; will have assist  Recommendations for discharge: Agree with plans for return home with assist  Rationale for recommendations: pain, impaired functional mobility       Entered by: Mary Moody 06/23/2017 12:12 PM

## 2017-06-23 NOTE — PROGRESS NOTES
06/23/17 1014   Quick Adds   Type of Visit Initial Occupational Therapy Evaluation   Living Environment   Lives With spouse   Living Arrangements house   Home Accessibility stairs to enter home;stairs within home   Number of Stairs to Enter Home 8   Transportation Available car;family or friend will provide   Self-Care   Usual Activity Tolerance excellent   Current Activity Tolerance moderate   Regular Exercise yes   Activity/Exercise Type (stretches daily for back. running, plays pickle ball, etc)   Equipment Currently Used at Home raised toilet  (borrowed RTS)   Activity/Exercise/Self-Care Comment walk in shower   Functional Level Prior   Ambulation 0-->independent   Transferring 0-->independent   Toileting 0-->independent   Bathing 0-->independent   Dressing 0-->independent   Eating 0-->independent   Communication 0-->understands/communicates without difficulty   Swallowing 0-->swallows foods/liquids without difficulty   Cognition 0 - no cognition issues reported   Prior Functional Level Comment Pt I with all ADL/IADL's and very active, retired.    General Information   Onset of Illness/Injury or Date of Surgery - Date 06/21/17   Referring Physician Sebastian Madrigal MD   Patient/Family Goals Statement Return home   Additional Occupational Profile Info/Pertinent History of Current Problem s/p L BETTY 6/21/17   Precautions/Limitations fall precautions;left hip precautions   Weight-Bearing Status - LLE weight-bearing as tolerated   Cognitive Status Examination   Orientation orientation to person, place and time   Level of Consciousness alert   Able to Follow Commands WNL/WFL   Personal Safety (Cognitive) WNL/WFL   Memory intact   Attention No deficits were identified   Organization/Problem Solving No deficits were identified   Executive Function No deficits were identified   Visual Perception   Visual Perception Wears glasses   Sensory Examination   Sensory Quick Adds No deficits were identified   Pain  Assessment   Patient Currently in Pain Yes, see Vital Sign flowsheet  (3/10 hip pain)   Range of Motion (ROM)   ROM Comment B UE AROM WFL   Strength   Strength Comments B UE strength WNL   Bed Mobility Skill: Scooting/Bridging   Level of [place]: Scooting/Bridging stand-by assist   Physical Assist/Nonphysical Assist: Scooting/Bridging 1 person assist;verbal cues   Bed Mobility Skill: Sit to Supine   Level of [place]: Sit/Supine minimum assist (75% patients effort)   Physical Assist/Nonphysical Assist: Sit/Supine 1 person assist;verbal cues   Bed Mobility Skill: Supine to Sit   Level of [place]: Supine/Sit minimum assist (75% patients effort)   Physical Assist/Nonphysical Assist: Supine/Sit 1 person assist;verbal cues   Transfer Skill: Sit to Stand   Level of [place]: Sit/Stand stand-by assist   Physical Assist/Nonphysical Assist: Sit/Stand 1 person assist;verbal cues   Transfer Skill: Sit to Stand weight-bearing as tolerated   Assistive Device for Transfer: Sit/Stand standard walker   Upper Body Dressing   Level of [place]: Dress Upper Body independent   Physical Assist/Nonphysical Assist: Dress Upper Body set-up required   Lower Body Dressing   Level of [place]: Dress Lower Body moderate assist (50% patients effort)   Physical Assist/Nonphysical Assist: Dress Lower Body 1 person assist;verbal cues   Eating/Self Feeding   Level of [place]: Eating independent   Instrumental Activities of Daily Living (IADL)   Previous Responsibilities meal prep;housekeeping;laundry;shopping;yardwork;medication management;finances;driving   Activities of Daily Living Analysis   Impairments Contributing to Impaired Activities of Daily Living balance impaired;pain;post surgical precautions;strength decreased;ROM decreased  (dizzy)   General Therapy Interventions   Planned Therapy Interventions ADL retraining;transfer training   Clinical Impression   Criteria for Skilled Therapeutic Interventions Met  "yes, treatment indicated   OT Diagnosis decreased ADLs and functioanl mobility   Influenced by the following impairments impaired balance, strength and ROM in L hip, hip predcautions, pain and dizziness   Assessment of Occupational Performance 3-5 Performance Deficits   Identified Performance Deficits impaired independence with ADL/IADls' ie dressing, toielt and shower transfer, driving.   Clinical Decision Making (Complexity) Moderate complexity   Therapy Frequency daily   Predicted Duration of Therapy Intervention (days/wks) 2 days   Anticipated Equipment Needs at Discharge (will talk with wife )   Anticipated Discharge Disposition Home with Assist  (wife A with ADL/IADl's)   Risks and Benefits of Treatment have been explained. Yes   Patient, Family & other staff in agreement with plan of care Yes   [facility] AM-PAC  \"6 Clicks\" Daily Activity Inpatient Short Form   1. Putting on and taking off regular lower body clothing? 3 - A Little   2. Bathing (including washing, rinsing, drying)? 3 - A Little   3. Toileting, which includes using toilet, bedpan or urinal? 3 - A Little   4. Putting on and taking off regular upper body clothing? 4 - None   5. Taking care of personal grooming such as brushing teeth? 4 - None   6. Eating meals? 4 - None   Daily Activity Raw Score (Score out of 24.Lower scores equate to lower levels of function) 21   Total Evaluation Time   Total Evaluation Time (Minutes) 10     "

## 2017-06-23 NOTE — PLAN OF CARE
Problem: Goal Outcome Summary  Goal: Goal Outcome Summary  OT: Eval completed and treatment initiated. Pt lives in a house with his wife and is retired. Pt very active, plays pickle ball, runs, strengthening, etc. Pt independent with all ADL/IADL's. Pt s/p L BETTY on 6/21/17. Pt limited due to pain, hip precautions, decreased balance, strength and ROM in L hip and dizziness with BP drop with standing.     Discharge Planner OT   Patient plan for discharge: home with wife A           Current status: pt educated in leg  and able to complete supine to sit with SBA/CGA and cues for tech, pt educated in AE for LE dress and doff/piyush sock with reacher and sock aide with Supervision after cue for tech. Donned uw with reacher use with Supervision and cues for tech and shoes independently with set up seated at EOB. Pt sit to stand with SBA cues for ww safety and to extend surgical LE before standing and felt dizzy, BP taken seated at EOB and dropped to 82/39, assisted pt to lay down and BP lying 97/56, nursing present and aware of BP. Unable to complete toielt transfer at this time. Pt has RTS at home and will get a reacher on his own through [employer].   Barriers to return to prior living situation: dizziness, hip precautions, wife able to A with ADl/IADL's at home.   Recommendations for discharge: home with wife A with ADL/IADLs  Rationale for recommendations: if pt feeling well, Pt's wife will be able to A with ADL/IADl's to insure safety at home.        Entered by: Clara Elise 06/23/2017 11:05 AM

## 2017-06-23 NOTE — PLAN OF CARE
Problem: Goal Outcome Summary  Goal: Goal Outcome Summary  Outcome: Improving  POD#:2. Alert and oriented x4. Up with assist of 1 and walker. Pain controlled with PRN oxycodone and IV dilaudid x1. Voiding adequately via urinal/bathroom. VSS on RA. Dried drainage on dressing. CMS intact. IV SL. Episodes of dizziness x2 with both OT and PT, orthostatic blood pressures WNL. Was hoping for DC today but now unsure after dizziness. Called doctor Madrigal, received busy signal, will try to call back again before end of shift. Continue to monitor.

## 2017-06-23 NOTE — PLAN OF CARE
Problem: Goal Outcome Summary  Goal: Goal Outcome Summary  PT: Patient still with some dizziness during mobility; /65 prior to mobility; 110/60 after walking; 105/60 after doing stairs; didn't feel faint but not quite himself. Likely not safe to discharge as dizziness increasing his risk of falling, otherwise doing well from a mobility standpoint. Likely safer to discharge after am session tomorrow if dizziness resolved.

## 2017-06-23 NOTE — PROGRESS NOTES
[facility] Orthopedic Post-Op / Progress Note  Jasen Rosario is a 71 year old male    Today's Date:2017  Admission Date: 2017  POD # 2 BETTY         Interval History:   doing well  Mobilizing nicely and wants to go home this afternoon            Physical Exam:   All vitals have been reviewed  Temperatures:  Current - Temp: 97.9  F (36.6  C); Max - Temp  Av.5  F (36.9  C)  Min: 97.9  F (36.6  C)  Max: 99.1  F (37.3  C)  Pulse range: Pulse  Av.5  Min: 56  Max: 61  Blood pressure range: Systolic (24hrs), Av , Min:96 , Max:121   ; Diastolic (24hrs), Av, Min:52, Max:66      Intake/Output Summary (Last 24 hours) at 17 0858  Last data filed at 17 0600   Gross per 24 hour   Intake             2100 ml   Output             1975 ml   Net              125 ml       Wound clean and dry with minimal or no drainage.  Surrounding skin with minimal erythema.          Data:   All laboratory data related to this surgery reviewed      Lab Results   Component Value Date     2017    POTASSIUM 4.0 2017    CHLORIDE 105 2017    CO2 25 2017     (H) 2017     Lab Results   Component Value Date    HGB 13.0 (L) 2017    HGB 12.5 (L) 2017    HGB 15.7 2017     Platelet Count (10e9/L)   Date Value   2017 178   2017 214   2016 183       All imaging studies related to this surgery reviewed         Assessment and Plan:    Assessment:  Doing well.    Plan:  Continue physical therapy  Pain control measures  Discharge plan: Home today    Sebastian Madrigal MD

## 2017-06-23 NOTE — DISCHARGE SUMMARY
DISCHARGE SUMMARY    NAME:  Jasen Rosario  AGE:  71 year old  YOB: 1945  MRN#:  [id number]    Jasen Rosario was admitted for elective total hip arthroplasty.  The surgery was performed on 6/21/2017.  The postoperative course is documented in the medical record.  There were no complications. The patient was felt ready for discharge home on POD #2 with post-discharge physical therapy and outpatient visit prearranged.     Lab Results   Component Value Date    HGB 13.0 (L) 06/23/2017    HGB 12.5 (L) 06/22/2017    HGB 15.7 06/21/2017       The patient received 0 units transfusion.      FINAL DISCHARGE DIAGNOSIS:  Degenerative osteoarthritis hip.               Acute blood loss anemia     SURGICAL PROCEDURE THIS ADMISSION:  Left total hip arthroplasty.         ANAYA WASHINGTON MD      CC: Fax [phone]         Anaya Washington MD

## 2017-06-24 ENCOUNTER — APPOINTMENT (OUTPATIENT)
Dept: OCCUPATIONAL THERAPY | Facility: CLINIC | Age: 72
DRG: 470 | End: 2017-06-24
Attending: ORTHOPAEDIC SURGERY
Payer: MEDICARE

## 2017-06-24 ENCOUNTER — APPOINTMENT (OUTPATIENT)
Dept: PHYSICAL THERAPY | Facility: CLINIC | Age: 72
DRG: 470 | End: 2017-06-24
Attending: ORTHOPAEDIC SURGERY
Payer: MEDICARE

## 2017-06-24 VITALS
SYSTOLIC BLOOD PRESSURE: 106 MMHG | OXYGEN SATURATION: 98 % | TEMPERATURE: 98.8 F | DIASTOLIC BLOOD PRESSURE: 66 MMHG | RESPIRATION RATE: 16 BRPM | BODY MASS INDEX: 21.07 KG/M2 | HEART RATE: 63 BPM | WEIGHT: 139 LBS | HEIGHT: 68 IN

## 2017-06-24 LAB
CREAT SERPL-MCNC: 1.04 MG/DL (ref 0.66–1.25)
GFR SERPL CREATININE-BSD FRML MDRD: 70 ML/MIN/1.7M2
PLATELET # BLD AUTO: 141 10E9/L (ref 150–450)

## 2017-06-24 PROCEDURE — 97535 SELF CARE MNGMENT TRAINING: CPT | Mod: GO | Performed by: OCCUPATIONAL THERAPY ASSISTANT

## 2017-06-24 PROCEDURE — 82565 ASSAY OF CREATININE: CPT | Performed by: ORTHOPAEDIC SURGERY

## 2017-06-24 PROCEDURE — 97116 GAIT TRAINING THERAPY: CPT | Mod: GP

## 2017-06-24 PROCEDURE — A9270 NON-COVERED ITEM OR SERVICE: HCPCS | Mod: GY | Performed by: ORTHOPAEDIC SURGERY

## 2017-06-24 PROCEDURE — 25000128 H RX IP 250 OP 636: Performed by: ORTHOPAEDIC SURGERY

## 2017-06-24 PROCEDURE — 36415 COLL VENOUS BLD VENIPUNCTURE: CPT | Performed by: ORTHOPAEDIC SURGERY

## 2017-06-24 PROCEDURE — 85049 AUTOMATED PLATELET COUNT: CPT | Performed by: ORTHOPAEDIC SURGERY

## 2017-06-24 PROCEDURE — 97110 THERAPEUTIC EXERCISES: CPT | Mod: GP

## 2017-06-24 PROCEDURE — 97530 THERAPEUTIC ACTIVITIES: CPT | Mod: GP

## 2017-06-24 PROCEDURE — 40000133 ZZH STATISTIC OT WARD VISIT: Performed by: OCCUPATIONAL THERAPY ASSISTANT

## 2017-06-24 PROCEDURE — 97530 THERAPEUTIC ACTIVITIES: CPT | Mod: GO | Performed by: OCCUPATIONAL THERAPY ASSISTANT

## 2017-06-24 PROCEDURE — 25000132 ZZH RX MED GY IP 250 OP 250 PS 637: Mod: GY | Performed by: ORTHOPAEDIC SURGERY

## 2017-06-24 PROCEDURE — 40000193 ZZH STATISTIC PT WARD VISIT

## 2017-06-24 RX ADMIN — ACETAMINOPHEN 975 MG: 325 TABLET, FILM COATED ORAL at 00:36

## 2017-06-24 RX ADMIN — OXYCODONE HYDROCHLORIDE 10 MG: 5 TABLET ORAL at 01:57

## 2017-06-24 RX ADMIN — SENNOSIDES AND DOCUSATE SODIUM 2 TABLET: 8.6; 5 TABLET ORAL at 08:11

## 2017-06-24 RX ADMIN — OXYCODONE HYDROCHLORIDE 10 MG: 5 TABLET ORAL at 10:25

## 2017-06-24 RX ADMIN — ENOXAPARIN SODIUM 40 MG: 40 INJECTION SUBCUTANEOUS at 08:11

## 2017-06-24 RX ADMIN — OXYCODONE HYDROCHLORIDE 10 MG: 5 TABLET ORAL at 06:00

## 2017-06-24 RX ADMIN — ACETAMINOPHEN 975 MG: 325 TABLET, FILM COATED ORAL at 08:11

## 2017-06-24 NOTE — PLAN OF CARE
Problem: Goal Outcome Summary  Goal: Goal Outcome Summary  Outcome: Declining  Discharge Planner PT   Patient plan for discharge: d/c home with family  Current status: sit > supine with Min A to R LE, supine > sit with SBA and cues for technique; sit <> stand with FWW and SBA with cues for hand placement.  Patient ambulated up/down 3 steps with cane/rail requiring SBA and demonstrating a step to gait pattern.  Patient ambulated > 200 feet with FWW and SBA/supervision.            Barriers to return to prior living situation: none  Recommendations for discharge: d/c home with family.  Patient issued HEP handout for reference for continuation of current exercises  Rationale for recommendations: Able to demonstrate functional mobility with SBA/supervision and able to ambulate up/down steps using rail/cane which should allow him to safely access his home with family assist as needed.       Entered by: Elaine Giraldo 06/24/2017 12:14 PM     Physical Therapy Discharge Summary     Reason for therapy discharge:    Discharged to home.     Progress towards therapy goal(s). See goals on Care Plan in [facility] electronic health record for goal details.  Goals met     Therapy recommendation(s):    Continue home exercise program.

## 2017-06-24 NOTE — PLAN OF CARE
Problem: Goal Outcome Summary  Goal: Goal Outcome Summary  A&O. VSS on RA. Up with A1 and walker. Pain managed with oxycodone and tylenol. CMS intact. Dressing CDI. Pt denies dizziness overnight. Voiding adequately per urinal. Slept between cares. Plans to DC home today.

## 2017-06-24 NOTE — PLAN OF CARE
Problem: Goal Outcome Summary  Goal: Goal Outcome Summary  Discharge Planner OT   Patient plan for discharge: Home with spouse assist as needed  Current status: Pt completes transfers slowly, supine to sit EOB SBA, cues needed to follow hip precautions during sit to stands, stood with SBA, amb with FWW SBA in room mobility, LB dressing with AE mod I. Pt to acquire needed AE in community.   Barriers to return to prior living situation: hip precautions  Recommendations for discharge: Home with spouse assist as needed  Rationale for recommendations: pt spouse to assist as needed for daily task       Entered by: Josseline Aponte 06/24/2017 8:07 AM      PT to d/c home today with spouse assist as needed, GOALS PARTIALLY MET, see discharge summary

## 2017-06-24 NOTE — PLAN OF CARE
Problem: Goal Outcome Summary  Goal: Goal Outcome Summary  Outcome: Adequate for Discharge Date Met:  06/24/17  VSS, A&Ox4. CMS intact. New dressing applied. Up A1 to BR. Discharge instructions reviewed with pt and spouse. All questions answered. Discharged home.

## 2017-06-24 NOTE — PLAN OF CARE
Problem: Goal Outcome Summary  Goal: Goal Outcome Summary  Outcome: Improving  Writer notified Dr. Madrigal that patient had been dizzy with all therapies today, okay to change d/c to be tomorrow per MD. Up this shift with assist of 1 and walker. Patient walked in halls this evening and denied dizziness, stated he felt better than during the day. Adequate I/O. Pain managed with oral pain meds. Plan to d/c home tomorrow.

## 2017-06-26 ENCOUNTER — TELEPHONE (OUTPATIENT)
Dept: ONCOLOGY | Facility: CLINIC | Age: 72
End: 2017-06-26

## 2017-06-26 ENCOUNTER — TELEPHONE (OUTPATIENT)
Dept: FAMILY MEDICINE | Facility: CLINIC | Age: 72
End: 2017-06-26

## 2017-06-26 NOTE — TELEPHONE ENCOUNTER
Pt is calling for his recent PSA lab results.  States that he saw Dr Smith on 4/8/2016.  Pt does not have any scheduled follow-up appts with Dr Smith.  Pt states that he will continue with yearly PSA blood tests, and only needs to follow-up with Dr Smith if his PSA increases.  Pt had his PSA done on 6/14/2017, but states that the results have not been released to [employer] yet.  Pt is requesting his recent PSA results.  Advised pt that his PSA results were normal.  Results were released to [employer] per pt request.  Advised pt that if he does not receive his test results via [employer] in the future, he should contact the clinic for results.  The PSA results will not be automatically released to [employer].  Will also update/discuss with Dr Smith when he is in the clinic on 6/28.  Patient verbalized understanding and agreement with plan.  Pt was instructed to call the clinic with any questions, concerns, or worsening symptoms.     Margaux Sunshine RN BSN

## 2017-06-26 NOTE — TELEPHONE ENCOUNTER
Patient discharged from [facility] for inpatient hospital stay on 6/24 for djd left hip, s/p total hip arthroplasty.    Please contact patient to follow up; no appointment scheduled at this time.    ER / IP:  0/1    Care Coordination:  amy Black

## 2017-06-26 NOTE — TELEPHONE ENCOUNTER
"ED / Discharge Outreach Protocol    Patient Contact    Attempt # 1    Was call answered?  Yes.  \"May I please speak with Jasen\"  Is patient available?   Yes    ED/Discharge Protocol    \"Hi, my name is Izabella Richmond, a registered nurse, and I am calling on behalf of Dr. Meadows's office at [facility].  I am calling to follow up and see how things are going for you after your recent visit.\"    \"I see that you were in the (ER/UC/IP) on 6/21/2017.    How are you doing now that you are home?\" pt is doing well, some swelling may have the operated hip a little longer, no concerns    Is patient experiencing symptoms that may require a hospital visit?  no    Discharge Instructions    \"Let's review your discharge instructions.  What is/are the follow-up recommendations?  Pt. Response: pt is not scheduled for follow up but will call today to schedule this. Does not need to see TS.     \"Were you instructed to make a follow-up appointment?\"  Pt. Response: Yes.  Has appointment been made?   No.  \"Can I help you schedule that appointment?\" no, pt will call them today to schedule.      \"When you see the provider, I would recommend that you bring your discharge instructions with you.    Medications    \"How many new medications are you on since your hospitalization/ED visit?\"    2 or more - Epic MTM referral needed. Pt declined, noted lovenox and pain medications, senna.  \"How many of your current medicines changed (dose, timing, name, etc.) while you were in the hospital/ED visit?\"   2 or more - Epic MTM referral needed  \"Do you have questions about your medications?\"   No  \"Were you newly diagnosed with heart failure, COPD, diabetes or did you have a heart attack?\"   No  For patients on insulin: \"Did you start on insulin in the hospital or did you have your insulin dose changed?\"   No    Medication reconciliation completed? Yes    Was MTM referral placed (*Make sure to put transitions as reason for referral)?   No    Call " "Summary    \"Do you have any questions or concerns about your condition or care plan at the moment?\"    No  Triage nurse advice given: continue to monitor symptoms, call the surgeon to schedule follow up appt. The patient indicates understanding of these issues and agrees with the plan.      Patient was in ER 0x in the past year (assess appropriateness of ER visits.)      \"If you have questions or things don't continue to improve, we encourage you contact us through the main clinic number,  [phone].  Even if the clinic is not open, triage nurses are available 24/7 to help you.     We would like you to know that our clinic has extended hours (provide information).  We also have urgent care (provide details on closest location and hours/contact info)\"      \"Thank you for your time and take care!\"    Josette Richmond RN        "

## 2017-06-28 NOTE — TELEPHONE ENCOUNTER
Dr Smith has been notified.  No changes to treatment plan at this time.    Margaux Sunshine RN BSN

## 2017-07-06 ENCOUNTER — TRANSFERRED RECORDS (OUTPATIENT)
Dept: HEALTH INFORMATION MANAGEMENT | Facility: CLINIC | Age: 72
End: 2017-07-06

## 2017-08-23 ENCOUNTER — TRANSFERRED RECORDS (OUTPATIENT)
Dept: HEALTH INFORMATION MANAGEMENT | Facility: CLINIC | Age: 72
End: 2017-08-23

## 2017-09-20 ENCOUNTER — OFFICE VISIT (OUTPATIENT)
Dept: FAMILY MEDICINE | Facility: CLINIC | Age: 72
End: 2017-09-20
Payer: COMMERCIAL

## 2017-09-20 VITALS
HEART RATE: 63 BPM | TEMPERATURE: 97.9 F | OXYGEN SATURATION: 98 % | SYSTOLIC BLOOD PRESSURE: 106 MMHG | BODY MASS INDEX: 21.52 KG/M2 | HEIGHT: 68 IN | DIASTOLIC BLOOD PRESSURE: 56 MMHG | WEIGHT: 142 LBS

## 2017-09-20 DIAGNOSIS — Z91.09 ENVIRONMENTAL ALLERGIES: ICD-10-CM

## 2017-09-20 DIAGNOSIS — F32.5 MAJOR DEPRESSIVE DISORDER, SINGLE EPISODE IN FULL REMISSION (H): ICD-10-CM

## 2017-09-20 DIAGNOSIS — Z01.818 PREOPERATIVE EXAMINATION: Primary | ICD-10-CM

## 2017-09-20 DIAGNOSIS — Z51.81 MEDICATION MONITORING ENCOUNTER: ICD-10-CM

## 2017-09-20 DIAGNOSIS — M15.0 PRIMARY OSTEOARTHRITIS INVOLVING MULTIPLE JOINTS: ICD-10-CM

## 2017-09-20 DIAGNOSIS — C61 PROSTATE CANCER (H): ICD-10-CM

## 2017-09-20 DIAGNOSIS — C85.90 LYMPHOMA, UNSPECIFIED BODY REGION, UNSPECIFIED LYMPHOMA TYPE (H): ICD-10-CM

## 2017-09-20 DIAGNOSIS — F41.1 ANXIETY STATE: ICD-10-CM

## 2017-09-20 LAB
ALBUMIN SERPL-MCNC: 3.9 G/DL (ref 3.4–5)
ALBUMIN UR-MCNC: ABNORMAL MG/DL
ALP SERPL-CCNC: 79 U/L (ref 40–150)
ALT SERPL W P-5'-P-CCNC: 23 U/L (ref 0–70)
ANION GAP SERPL CALCULATED.3IONS-SCNC: 7 MMOL/L (ref 3–14)
APPEARANCE UR: CLEAR
AST SERPL W P-5'-P-CCNC: 17 U/L (ref 0–45)
BILIRUB SERPL-MCNC: 0.5 MG/DL (ref 0.2–1.3)
BILIRUB UR QL STRIP: NEGATIVE
BUN SERPL-MCNC: 18 MG/DL (ref 7–30)
CALCIUM SERPL-MCNC: 9.3 MG/DL (ref 8.5–10.1)
CHLORIDE SERPL-SCNC: 109 MMOL/L (ref 94–109)
CO2 SERPL-SCNC: 27 MMOL/L (ref 20–32)
COLOR UR AUTO: YELLOW
CREAT SERPL-MCNC: 1.05 MG/DL (ref 0.66–1.25)
ERYTHROCYTE [DISTWIDTH] IN BLOOD BY AUTOMATED COUNT: 12.6 % (ref 10–15)
GFR SERPL CREATININE-BSD FRML MDRD: 69 ML/MIN/1.7M2
GLUCOSE SERPL-MCNC: 95 MG/DL (ref 70–99)
GLUCOSE UR STRIP-MCNC: NEGATIVE MG/DL
HCT VFR BLD AUTO: 44.9 % (ref 40–53)
HGB BLD-MCNC: 15.6 G/DL (ref 13.3–17.7)
HGB UR QL STRIP: NEGATIVE
KETONES UR STRIP-MCNC: NEGATIVE MG/DL
LEUKOCYTE ESTERASE UR QL STRIP: NEGATIVE
MCH RBC QN AUTO: 31.3 PG (ref 26.5–33)
MCHC RBC AUTO-ENTMCNC: 34.7 G/DL (ref 31.5–36.5)
MCV RBC AUTO: 90 FL (ref 78–100)
MUCOUS THREADS #/AREA URNS LPF: PRESENT /LPF
NITRATE UR QL: NEGATIVE
PH UR STRIP: 5.5 PH (ref 5–7)
PLATELET # BLD AUTO: 177 10E9/L (ref 150–450)
POTASSIUM SERPL-SCNC: 4 MMOL/L (ref 3.4–5.3)
PROT SERPL-MCNC: 6.5 G/DL (ref 6.8–8.8)
RBC # BLD AUTO: 4.99 10E12/L (ref 4.4–5.9)
RBC #/AREA URNS AUTO: ABNORMAL /HPF
SODIUM SERPL-SCNC: 143 MMOL/L (ref 133–144)
SOURCE: ABNORMAL
SP GR UR STRIP: 1.02 (ref 1–1.03)
UROBILINOGEN UR STRIP-ACNC: 0.2 EU/DL (ref 0.2–1)
WBC # BLD AUTO: 5.8 10E9/L (ref 4–11)
WBC #/AREA URNS AUTO: ABNORMAL /HPF

## 2017-09-20 PROCEDURE — 36415 COLL VENOUS BLD VENIPUNCTURE: CPT | Performed by: FAMILY MEDICINE

## 2017-09-20 PROCEDURE — 81001 URINALYSIS AUTO W/SCOPE: CPT | Performed by: FAMILY MEDICINE

## 2017-09-20 PROCEDURE — 85027 COMPLETE CBC AUTOMATED: CPT | Performed by: FAMILY MEDICINE

## 2017-09-20 PROCEDURE — 99214 OFFICE O/P EST MOD 30 MIN: CPT | Performed by: FAMILY MEDICINE

## 2017-09-20 PROCEDURE — 80053 COMPREHEN METABOLIC PANEL: CPT | Performed by: FAMILY MEDICINE

## 2017-09-20 PROCEDURE — 93000 ELECTROCARDIOGRAM COMPLETE: CPT | Performed by: FAMILY MEDICINE

## 2017-09-20 NOTE — MR AVS SNAPSHOT
After Visit Summary   9/20/2017    Jasen Rosario    MRN: [id number]           Patient Information     Date Of Birth          1945        Visit Information        Provider Department      9/20/2017 8:40 AM Archie Meadows MD 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 (H)        Anxiety state        Medication monitoring encounter          Care Instructions    Avoid use of Aspirin/Ibuprofen/Advil/Aleve for one week prior to surgery -- Tylenol is okay      [facility] [place]                        To reach your care team during and after hours:   [phone]  To reach our pharmacy:        [phone]    Clinic Hours                        Our clinic hours are:    Monday   7:30 am to 7:00 pm                  Tuesday through Friday 7:30 am to 5:00 pm                             Saturday   8:00 am to 12:00 pm      Sunday   Closed      Pharmacy Hours                        Our pharmacy hours are:    Monday   8:30 am to 7:00 pm       Tuesday to Friday  8:30 am to 6:00 pm                       Saturday    9:00 am to 1:00 pm              Sunday    Closed              There is also information available at our web site:  [email or web address]    If your provider ordered any lab tests and you do not receive the results within 10 business days, please call the clinic.    If you need a medication refill please contact your pharmacy.  Please allow 2-3 business days for your refill to be completed.    Our clinic offers telephone visits and e visits.  Please ask one of your team members to explain more.      Use [employer] (secure email communication and access to your chart) to send your primary care provider a  message or make an appointment. Ask someone on your Team how to sign up for [employer].  Immunizations                      Immunization History   Administered Date(s) Administered     HEPA 06/28/2006, 12/28/2006     HepB 01/01/1995, 02/01/1995, 06/01/1995     Pneumococcal (PCV 13) 12/01/2015     Pneumococcal 23 valent 06/28/2006, 04/03/2013     TD (ADULT, 7+) 06/10/2001, 12/10/2010     TDAP Vaccine (Boostrix) 04/03/2013     Zoster vaccine, live 04/03/2013        Health Maintenance                         Health Maintenance Due   Topic Date Due     FALL RISK ASSESSMENT  08/04/2017     Cholesterol Lab - yearly  08/04/2017     Wellness Visit with your Primary Provider - yearly  08/10/2017     Colon Cancer Screening - FIT Test - yearly  08/08/2017     Flu Vaccine - yearly  09/01/2017       Before Your Surgery      Call your surgeon if there is any change in your health. This includes signs of a cold or flu (such as a sore throat, runny nose, cough, rash or fever).    Do not smoke, drink alcohol or take over the counter medicine (unless your surgeon or primary care doctor tells you to) for the 24 hours before and after surgery.    If you take prescribed drugs: Follow your doctor s orders about which medicines to take and which to stop until after surgery.    Eating and drinking prior to surgery: follow the instructions from your surgeon    Take a shower or bath the night before surgery. Use the soap your surgeon gave you to gently clean your skin. If you do not have soap from your surgeon, use your regular soap. Do not shave or scrub the surgery site.  Wear clean pajamas and have clean sheets on your bed.           Follow-ups after your visit        Your next 10 appointments already scheduled     Oct 02, 2017   Procedure with Sebastian [facility]rigal MD   [facility]    [address]  [place] [address] [phone]              Who to contact     If you have questions or need  "follow up information about today's clinic visit or your schedule please contact [facility] directly at [phone].  Normal or non-critical lab and imaging results will be communicated to you by MyChart, letter or phone within 4 business days after the clinic has received the results. If you do not hear from us within 7 days, please contact the clinic through [employer] or phone. If you have a critical or abnormal lab result, we will notify you by phone as soon as possible.  Submit refill requests through [employer] or call your pharmacy and they will forward the refill request to us. Please allow 3 business days for your refill to be completed.          Additional Information About Your Visit        [employer] Information     [employer] gives you secure access to your electronic health record. If you see a primary care provider, you can also send messages to your care team and make appointments. If you have questions, please call your primary care clinic.  If you do not have a primary care provider, please call [phone] and they will assist you.        Care EveryWhere ID     This is your Care EveryWhere ID. This could be used by other organizations to access your [place] medical records  [id number]        Your Vitals Were     Pulse Temperature Height Pulse Oximetry BMI (Body Mass Index)       63 97.9  F (36.6  C) (Oral) 5' 7.5\" (1.715 m) 98% 21.91 kg/m2        Blood Pressure from Last 3 Encounters:   09/20/17 106/56   06/24/17 106/66   06/14/17 108/66    Weight from Last 3 Encounters:   09/20/17 142 lb (64.4 kg)   06/21/17 139 lb (63 kg)   06/14/17 141 lb (64 kg)              We Performed the Following     *UA reflex to Microscopic and Culture ([place] and [facility] (except [place] and [place])     CBC with platelets     Comprehensive metabolic panel     EKG 12-lead complete w/read - Clinics        Primary Care Provider Office Phone # Fax #    Archie Meadows [phone]       " [address] [facility] [address]        Equal Access to Services     ZEFERINOSAIDA ADAM : Hadii christiano ku hadlulao Sojammieali, waaxda luqadaha, qaybta kaalmada minniedavidjoshua, waxay idiin haybhavanimagi quintanajudykwadwo cadet. So [facility] [phone].    ATENCIÓN: Si habla español, tiene a dumont disposición servicios gratuitos de asistencia lingüística. [facility] [phone].    We comply with applicable federal civil rights laws and [place] laws. We do not discriminate on the basis of race, color, national origin, age, disability sex, sexual orientation or gender identity.            Thank you!     Thank you for choosing [facility]  for your care. Our goal is always to provide you with excellent care. Hearing back from our patients is one way we can continue to improve our services. Please take a few minutes to complete the written survey that you may receive in the mail after your visit with us. Thank you!             Your Updated Medication List - Protect others around you: Learn how to safely use, store and throw away your medicines at www.disposemymeds.org.          This list is accurate as of: 9/20/17  9:10 AM.  Always use your most recent med list.                   Brand Name Dispense Instructions for use Diagnosis    DAILY MULTIVITAMIN PO      Take 2 tablets by mouth 2 times daily        FISH OIL PO      Take 1 tablet by mouth 2 times daily        GLUCOSAMINE-CHONDROITIN PO      Take 2 tablets by mouth daily        oxyCODONE 5 MG IR tablet    ROXICODONE    40 tablet    Take 1-2 tablets (5-10 mg) by mouth every 4 hours as needed for moderate to severe pain    Status post total replacement of left hip       senna-docusate 8.6-50 MG per tablet    SENOKOT-S;PERICOLACE    50 tablet    Take 1-2 tablets by mouth 2 times daily    Status post total replacement of left hip       TURMERIC PO      Take 2 tablets by mouth 2 times daily        TYLENOL PO      Take 325-650 mg by mouth every 6 hours as needed for mild pain  or fever

## 2017-09-20 NOTE — PATIENT INSTRUCTIONS
Avoid use of Aspirin/Ibuprofen/Advil/Aleve for one week prior to surgery -- Tylenol is okay      [facility] [place]                        To reach your care team during and after hours:   [phone]  To reach our pharmacy:        [phone]    Clinic Hours                        Our clinic hours are:    Monday   7:30 am to 7:00 pm                  Tuesday through Friday 7:30 am to 5:00 pm                             Saturday   8:00 am to 12:00 pm      Sunday   Closed      Pharmacy Hours                        Our pharmacy hours are:    Monday   8:30 am to 7:00 pm       Tuesday to Friday  8:30 am to 6:00 pm                       Saturday    9:00 am to 1:00 pm              Sunday    Closed              There is also information available at our web site:  [email or web address]    If your provider ordered any lab tests and you do not receive the results within 10 business days, please call the clinic.    If you need a medication refill please contact your pharmacy.  Please allow 2-3 business days for your refill to be completed.    Our clinic offers telephone visits and e visits.  Please ask one of your team members to explain more.      Use [employer] (secure email communication and access to your chart) to send your primary care provider a message or make an appointment. Ask someone on your Team how to sign up for [employer].  Immunizations                      Immunization History   Administered Date(s) Administered     HEPA 06/28/2006, 12/28/2006     HepB 01/01/1995, 02/01/1995, 06/01/1995     Pneumococcal (PCV 13) 12/01/2015     Pneumococcal 23 valent 06/28/2006, 04/03/2013     TD (ADULT, 7+) 06/10/2001, 12/10/2010     TDAP Vaccine (Boostrix) 04/03/2013     Zoster vaccine, live 04/03/2013        Health Maintenance                         Health Maintenance Due   Topic Date Due     FALL RISK ASSESSMENT  08/04/2017     Cholesterol Lab - yearly  08/04/2017     Wellness Visit with your Primary Provider - yearly   08/10/2017     Colon Cancer Screening - FIT Test - yearly  08/08/2017     Flu Vaccine - yearly  09/01/2017       Before Your Surgery      Call your surgeon if there is any change in your health. This includes signs of a cold or flu (such as a sore throat, runny nose, cough, rash or fever).    Do not smoke, drink alcohol or take over the counter medicine (unless your surgeon or primary care doctor tells you to) for the 24 hours before and after surgery.    If you take prescribed drugs: Follow your doctor s orders about which medicines to take and which to stop until after surgery.    Eating and drinking prior to surgery: follow the instructions from your surgeon    Take a shower or bath the night before surgery. Use the soap your surgeon gave you to gently clean your skin. If you do not have soap from your surgeon, use your regular soap. Do not shave or scrub the surgery site.  Wear clean pajamas and have clean sheets on your bed.

## 2017-09-20 NOTE — LETTER
[facility]  [address] [place] [address], [place] [address] [phone]   September 21, 2017    Jasen Rosario  [address] [facility] [address]      Dear Jasen,    Here is a summary of your recent test results:    Labs are overall quite good.     We advise:     Please proceed with surgery.     Your test results are enclosed.      Please contact me if you have any questions.             Thank you very much for trusting [facility]..     Healthy regards,        Archie Meadows M.D.        Results for orders placed or performed in visit on 09/20/17   Comprehensive metabolic panel   Result Value Ref Range    Sodium 143 133 - 144 mmol/L    Potassium 4.0 3.4 - 5.3 mmol/L    Chloride 109 94 - 109 mmol/L    Carbon Dioxide 27 20 - 32 mmol/L    Anion Gap 7 3 - 14 mmol/L    Glucose 95 70 - 99 mg/dL    Urea Nitrogen 18 7 - 30 mg/dL    Creatinine 1.05 0.66 - 1.25 mg/dL    GFR Estimate 69 >60 mL/min/1.7m2    GFR Estimate If Black 84 >60 mL/min/1.7m2    Calcium 9.3 8.5 - 10.1 mg/dL    Bilirubin Total 0.5 0.2 - 1.3 mg/dL    Albumin 3.9 3.4 - 5.0 g/dL    Protein Total 6.5 (L) 6.8 - 8.8 g/dL    Alkaline Phosphatase 79 40 - 150 U/L    ALT 23 0 - 70 U/L    AST 17 0 - 45 U/L   CBC with platelets   Result Value Ref Range    WBC 5.8 4.0 - 11.0 10e9/L    RBC Count 4.99 4.4 - 5.9 10e12/L    Hemoglobin 15.6 13.3 - 17.7 g/dL    Hematocrit 44.9 40.0 - 53.0 %    MCV 90 78 - 100 fl    MCH 31.3 26.5 - 33.0 pg    MCHC 34.7 31.5 - 36.5 g/dL    RDW 12.6 10.0 - 15.0 %    Platelet Count 177 150 - 450 10e9/L   *UA reflex to Microscopic and Culture ([place] and [facility] (except [place] and [place])   Result Value Ref Range    Color Urine Yellow     Appearance Urine Clear     Glucose Urine Negative NEG^Negative mg/dL    Bilirubin Urine Negative NEG^Negative    Ketones Urine Negative NEG^Negative mg/dL    Specific Gravity Urine 1.025 1.003 - 1.035    Blood Urine  Negative NEG^Negative    pH Urine 5.5 5.0 - 7.0 pH    Protein Albumin Urine Trace (A) NEG^Negative mg/dL    Urobilinogen Urine 0.2 0.2 - 1.0 EU/dL    Nitrite Urine Negative NEG^Negative    Leukocyte Esterase Urine Negative NEG^Negative    Source Midstream Urine    Urine Microscopic   Result Value Ref Range    WBC Urine O - 2 OTO2^O - 2 /HPF    RBC Urine O - 2 OTO2^O - 2 /HPF    Mucous Urine Present (A) NEG^Negative /LPF

## 2017-09-20 NOTE — NURSING NOTE
"Chief Complaint   Patient presents with     Pre-Op Exam       Initial /56  Pulse 63  Temp 97.9  F (36.6  C) (Oral)  Ht 5' 7.5\" (1.715 m)  Wt 142 lb (64.4 kg)  SpO2 98%  BMI 21.91 kg/m2 Estimated body mass index is 21.91 kg/(m^2) as calculated from the following:    Height as of this encounter: 5' 7.5\" (1.715 m).    Weight as of this encounter: 142 lb (64.4 kg)..  BP completed using cuff size: regular  Myla Phillips MA  "

## 2017-09-20 NOTE — PROGRESS NOTES
[address] [address]  [phone]  Dept: [phone]    PRE-OP EVALUATION:  Today's date: 2017    Jasen Rosario (: 1945) presents for pre-operative evaluation assessment as requested by Dr. Sebastian Madrigal.  He requires evaluation and anesthesia risk assessment prior to undergoing surgery/procedure for treatment of left knee pain.  Proposed procedure: Arthroplasty left knee    Date of Surgery/ Procedure: 10/2/17  Time of Surgery/ Procedure: 7:30am  Hospital/Surgical Facility: [facility]  Primary Physician: Archie Meadows  Type of Anesthesia Anticipated: General    Patient has a Health Care Directive or Living Will:  YES in chart    Preop Questions 2017   1.  Do you have a history of heart attack, stroke, stent, bypass or surgery on an artery in the head, neck, heart or legs? No   2.  Do you ever have any pain or discomfort in your chest? No   3.  Do you have a history of  Heart Failure? No   4.   Are you troubled by shortness of breath when:  walking on a level surface, or up a slight hill, or at night? No   5.  Do you currently have a cold, bronchitis or other respiratory infection? No   6.  Do you have a cough, shortness of breath, or wheezing? No   7.  Do you sometimes get pains in the calves of your legs when you walk? No   8. Do you or anyone in your family have previous history of blood clots? No   9.  Do you or does anyone in your family have a serious bleeding problem such as prolonged bleeding following surgeries or cuts? No   10. Have you ever had problems with anemia or been told to take iron pills? No   11. Have you had any abnormal blood loss such as black, tarry or bloody stools? No   12. Have you ever had a blood transfusion? No   13. Have you or any of your relatives ever had problems with anesthesia? No   14. Do you have sleep apnea, excessive snoring or daytime drowsiness? No   15. Do you have any prosthetic heart  valves? No   16. Do you have prosthetic joints? YES        HPI:                                                      Brief HPI related to upcoming procedure: Sampson reported to the clinic regarding a preoperative examination for a proposed left knee arthoplasty procedure to treat left knee pain.     Hx of Prostate Cancer -- No concerns, followed by Urology.     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    Allergies -- No concerns this season.     Anxiety/Depression -- No concerns currently.     Past/recent records reviewed and discussed for -- family hx, social hx, surgical hx, medications, immunizations, allergies.    See problem list for active medical problems.  Problems all longstanding and stable, except as noted/documented. See ROS for pertinent symptoms related to these conditions.                                                                                                  .    MEDICAL HISTORY:                                                    Patient Active Problem List    Diagnosis Date Noted     Prostate cancer (H)      Priority: High     Dr Smyth -  - Darian 3+3       Hyperlipidemia LDL goal <130      Priority: High     Environmental allergies 09/20/2017     Priority: Medium     S/P total hip arthroplasty 06/21/2017     Priority: Medium     Lumbar stenosis      Priority: Medium     TCO - Dr Irene - with neuritis       Lymphoma (H)      Priority: Medium     B Cell - Dr Barrios       Colon polyp      Priority: Medium     ED (erectile dysfunction)      Priority: Medium     Seasonal allergies      Priority: Medium     OA (osteoarthritis)      Priority: Medium     Left knee synvisc- Dr Balderas       Advanced directives, counseling/discussion 04/02/2013     Priority: Low     Major depressive disorder, single episode in full remission (H)      Priority: Low     Anxiety state 11/30/2004     Priority: Low      Problem list name updated by automated process. Provider to review        Past Medical History:   Diagnosis Date     Anxiety state, unspecified 1991     Colon polyp      ED (erectile dysfunction) 2005     Hyperlipidemia LDL goal <130 2001     Hypoglycemia, unspecified 1991     Infectious mononucleosis 1969     Lumbar stenosis     TCO - Dr Irene - with neuritis     Lymphoma (H)     SLL - B Cell - Dr Barrios     Major depressive disorder, single episode in full remission (H) 1991     OA (osteoarthritis)     Severe Lt Hip     Prostate cancer (H) 5/13    Dr Smyth -  - Darian 3+3 - intermediate progression risk - pT2cN0     Seasonal allergies      Past Surgical History:   Procedure Laterality Date     ARTHROPLASTY HIP Left 6/21/2017    Left BETTY - Dr MELISSA GUALLPA PROSTATECTOMY, LYMPHADENECTOMY N/A 4/13/2015    RALP - Dr Smyth     DENTAL SURGERY  1985    wisdom teeth     HC COLONOSCOPY THRU STOMA, DIAGNOSTIC  12/04, 12/14    colon polyp - serrated adenoma 5 mm - due 3 yrs     SURGICAL HISTORY OF -   1980    BACK SURGERY     SURGICAL HISTORY OF -  Left 1987    ANKLE FX ORIF - Lt     SURGICAL HISTORY OF -  Left 1972    LT KNEE MCL TEAR AND MENISCUS      SURGICAL HISTORY OF -   1960    APPY     SURGICAL HISTORY OF -  Left 2001, 2008    LT SHOULDER ARTHROSCOPY     SURGICAL HISTORY OF -  Right 01/2007    Rt Shoulder Surgery - Dr Ferrer     Current Outpatient Prescriptions   Medication Sig Dispense Refill     oxyCODONE (ROXICODONE) 5 MG IR tablet Take 1-2 tablets (5-10 mg) by mouth every 4 hours as needed for moderate to severe pain 40 tablet 0     senna-docusate (SENOKOT-S;PERICOLACE) 8.6-50 MG per tablet Take 1-2 tablets by mouth 2 times daily 50 tablet 0     Omega-3 Fatty Acids (FISH OIL PO) Take 1 tablet by mouth 2 times daily       GLUCOSAMINE-CHONDROITIN PO Take 2 tablets by mouth daily       Acetaminophen (TYLENOL PO) Take 325-650 mg by mouth every 6 hours as needed for mild pain or fever        "Multiple Vitamin (DAILY MULTIVITAMIN PO) Take 2 tablets by mouth 2 times daily        OTC products: None, except as noted above    Allergies   Allergen Reactions     Seasonal Allergies       Latex Allergy: NO    Social History   Substance Use Topics     Smoking status: Former Smoker     Packs/day: 2.00     Years: 7.00     Types: Cigarettes     Quit date: 1/1/1965     Smokeless tobacco: Never Used     Alcohol use 3.0 - 3.6 oz/week     5 - 6 Standard drinks or equivalent per week     History   Drug Use No       REVIEW OF SYSTEMS:                                                    Constitutional, HEENT, cardiovascular, pulmonary, GI, , musculoskeletal, neuro, skin, endocrine and psych systems are negative, except as in HPI or otherwise noted     This document serves as a record of the services and decisions personally performed and made by Archie Meadows MD FAA. It was created on their behalf by Sumit Almeida, a trained medical scribe. The creation of this document is based the provider's statements to the medical scribe.  Sumit Almeida September 20, 2017 8:58 AM    EXAM:                                                    /56  Pulse 63  Temp 97.9  F (36.6  C) (Oral)  Ht 5' 7.5\" (1.715 m)  Wt 142 lb (64.4 kg)  SpO2 98%  BMI 21.91 kg/m2    GENERAL APPEARANCE: healthy, alert and no distress     EYES: EOMI,  PERRL     HENT: ear canals and TM's normal and nose and mouth without ulcers or lesions     NECK: no adenopathy, no asymmetry, masses, or scars and thyroid normal to palpation     RESP: lungs clear to auscultation - no rales, rhonchi or wheezes     CV: regular rates and rhythm, normal S1 S2, no S3 or S4 and no murmur, click or rub     ABDOMEN:  soft, nontender, no HSM or masses and bowel sounds normal     MS: extremities normal- no gross deformities noted, no evidence of inflammation in joints, FROM in all extremities.     SKIN: no suspicious lesions or rashes     NEURO: mentation intact and speech normal     " PSYCH: mentation appears normal. and affect normal/bright    DIAGNOSTICS:                                                    EKG: sinus bradycardia, normal axis, normal intervals, no acute ST/T changes c/w ischemia, no LVH by voltage criteria    Recent Labs   Lab Test  06/24/17   0705  06/23/17   0629  06/22/17   0645  06/21/17   0947   12/23/09   1039   HGB   --   13.0*  12.5*  15.7   < >  15.9   PLT  141*   --    --   178   < >  169   NA   --    --   136  138   < >  134   POTASSIUM   --    --   4.0  4.2   < >  5.3   CR  1.04   --   0.96  1.06   < >  1.08   A1C   --    --    --    --    --   5.7    < > = values in this interval not displayed.      IMPRESSION:                                                    Reason for surgery/procedure: left knee pain    The proposed surgical procedure is considered INTERMEDIATE risk.    REVISED CARDIAC RISK INDEX  The patient has the following serious cardiovascular risks for perioperative complications such as (MI, PE, VFib and 3  AV Block):  No serious cardiac risks  INTERPRETATION: 0 risks: Class I (very low risk - 0.4% complication rate)    The patient has the following additional risks for perioperative complications:  The 10-year ASCVD risk score ([place] DC Jr, et al., 2013) is: 12.4%    Values used to calculate the score:      Age: 72 years      Sex: Male      Is Non- : No      Diabetic: No      Tobacco smoker: No      Systolic Blood Pressure: 106 mmHg      Is BP treated: No      HDL Cholesterol: 83 mg/dL      Total Cholesterol: 197 mg/dL      ICD-10-CM    1. Preoperative examination Z01.818 EKG 12-lead complete w/read - Clinics     Comprehensive metabolic panel     CBC with platelets     *UA reflex to Microscopic and Culture ([place] [facility] (except [place] and [place])     Urine Microscopic   2. Primary osteoarthritis involving multiple joints M15.0    3. Lymphoma, unspecified body region, unspecified lymphoma type (H) C85.90 CBC  with platelets   4. Prostate cancer (H) C61    5. Environmental allergies Z91.09    6. Major depressive disorder, single episode in full remission (H) F32.5    7. Anxiety state F41.1    8. Medication monitoring encounter Z51.81 EKG 12-lead complete w/read - Clinics     Comprehensive metabolic panel     CBC with platelets     *UA reflex to Microscopic and Culture ([facility] (except [place] and [place])       RECOMMENDATIONS:                                                      --Patient is to take all scheduled medications on the day of surgery EXCEPT for modifications listed below.    Avoid use of Aspirin/Ibuprofen/Advil/Aleve for one week prior to surgery -- Tylenol is okay    Hold vitamins, fish oil, glucoseamine    APPROVAL GIVEN to proceed with proposed procedure, without further diagnostic evaluation     The information in this document, created by the medical scribe for me, accurately reflects the services I personally performed and the decisions made by me. I have reviewed and approved this document for accuracy.   Archie Meadows MD FAAFP     Signed Electronically by: Archie Meadows MD    Copy of this evaluation report is provided to requesting physician.    [facility] Preop Guidelines

## 2017-09-21 ENCOUNTER — TRANSFERRED RECORDS (OUTPATIENT)
Dept: HEALTH INFORMATION MANAGEMENT | Facility: CLINIC | Age: 72
End: 2017-09-21

## 2017-09-28 NOTE — H&P (VIEW-ONLY)
[address] [address]  [phone]  Dept: [phone]    PRE-OP EVALUATION:  Today's date: 2017    Jasen Rosario (: 1945) presents for pre-operative evaluation assessment as requested by Dr. Sebastian Madrigal.  He requires evaluation and anesthesia risk assessment prior to undergoing surgery/procedure for treatment of left knee pain.  Proposed procedure: Arthroplasty left knee    Date of Surgery/ Procedure: 10/2/17  Time of Surgery/ Procedure: 7:30am  Hospital/Surgical Facility: [facility]  Primary Physician: Archie Meadows  Type of Anesthesia Anticipated: General    Patient has a Health Care Directive or Living Will:  YES in chart    Preop Questions 2017   1.  Do you have a history of heart attack, stroke, stent, bypass or surgery on an artery in the head, neck, heart or legs? No   2.  Do you ever have any pain or discomfort in your chest? No   3.  Do you have a history of  Heart Failure? No   4.   Are you troubled by shortness of breath when:  walking on a level surface, or up a slight hill, or at night? No   5.  Do you currently have a cold, bronchitis or other respiratory infection? No   6.  Do you have a cough, shortness of breath, or wheezing? No   7.  Do you sometimes get pains in the calves of your legs when you walk? No   8. Do you or anyone in your family have previous history of blood clots? No   9.  Do you or does anyone in your family have a serious bleeding problem such as prolonged bleeding following surgeries or cuts? No   10. Have you ever had problems with anemia or been told to take iron pills? No   11. Have you had any abnormal blood loss such as black, tarry or bloody stools? No   12. Have you ever had a blood transfusion? No   13. Have you or any of your relatives ever had problems with anesthesia? No   14. Do you have sleep apnea, excessive snoring or daytime drowsiness? No   15. Do you have any prosthetic heart  valves? No   16. Do you have prosthetic joints? YES        HPI:                                                      Brief HPI related to upcoming procedure: Sampson reported to the clinic regarding a preoperative examination for a proposed left knee arthoplasty procedure to treat left knee pain.     Hx of Prostate Cancer -- No concerns, followed by Urology.     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    Allergies -- No concerns this season.     Anxiety/Depression -- No concerns currently.     Past/recent records reviewed and discussed for -- family hx, social hx, surgical hx, medications, immunizations, allergies.    See problem list for active medical problems.  Problems all longstanding and stable, except as noted/documented. See ROS for pertinent symptoms related to these conditions.                                                                                                  .    MEDICAL HISTORY:                                                    Patient Active Problem List    Diagnosis Date Noted     Prostate cancer (H)      Priority: High     Dr Smyth -  - Darian 3+3       Hyperlipidemia LDL goal <130      Priority: High     Environmental allergies 09/20/2017     Priority: Medium     S/P total hip arthroplasty 06/21/2017     Priority: Medium     Lumbar stenosis      Priority: Medium     [facility] - Dr Irene - with neuritis       Lymphoma (H)      Priority: Medium     B Cell - Dr Barrios       Colon polyp      Priority: Medium     ED (erectile dysfunction)      Priority: Medium     Seasonal allergies      Priority: Medium     OA (osteoarthritis)      Priority: Medium     Left knee synvisc- Dr Balderas       Advanced directives, counseling/discussion 04/02/2013     Priority: Low     Major depressive disorder, single episode in full remission (H)      Priority: Low     Anxiety state 11/30/2004     Priority: Low      Problem list name updated by automated process. Provider to review        Past Medical History:   Diagnosis Date     Anxiety state, unspecified 1991     Colon polyp      ED (erectile dysfunction) 2005     Hyperlipidemia LDL goal <130 2001     Hypoglycemia, unspecified 1991     Infectious mononucleosis 1969     Lumbar stenosis     TCO - Dr Irene - with neuritis     Lymphoma (H)     SLL - B Cell - Dr Barrios     Major depressive disorder, single episode in full remission (H) 1991     OA (osteoarthritis)     Severe Lt Hip     Prostate cancer (H) 5/13    Dr Smyth -  - Darian 3+3 - intermediate progression risk - pT2cN0     Seasonal allergies      Past Surgical History:   Procedure Laterality Date     ARTHROPLASTY HIP Left 6/21/2017    Left BETTY - Dr MELISSA GUALLPA PROSTATECTOMY, LYMPHADENECTOMY N/A 4/13/2015    RALP - Dr Smyth     DENTAL SURGERY  1985    wisdom teeth     HC COLONOSCOPY THRU STOMA, DIAGNOSTIC  12/04, 12/14    colon polyp - serrated adenoma 5 mm - due 3 yrs     SURGICAL HISTORY OF -   1980    BACK SURGERY     SURGICAL HISTORY OF -  Left 1987    ANKLE FX ORIF - Lt     SURGICAL HISTORY OF -  Left 1972    LT KNEE MCL TEAR AND MENISCUS      SURGICAL HISTORY OF -   1960    APPY     SURGICAL HISTORY OF -  Left 2001, 2008    LT SHOULDER ARTHROSCOPY     SURGICAL HISTORY OF -  Right 01/2007    Rt Shoulder Surgery - Dr Ferrer     Current Outpatient Prescriptions   Medication Sig Dispense Refill     oxyCODONE (ROXICODONE) 5 MG IR tablet Take 1-2 tablets (5-10 mg) by mouth every 4 hours as needed for moderate to severe pain 40 tablet 0     senna-docusate (SENOKOT-S;PERICOLACE) 8.6-50 MG per tablet Take 1-2 tablets by mouth 2 times daily 50 tablet 0     Omega-3 Fatty Acids (FISH OIL PO) Take 1 tablet by mouth 2 times daily       GLUCOSAMINE-CHONDROITIN PO Take 2 tablets by mouth daily       Acetaminophen (TYLENOL PO) Take 325-650 mg by mouth every 6 hours as needed for mild pain or fever        "Multiple Vitamin (DAILY MULTIVITAMIN PO) Take 2 tablets by mouth 2 times daily        OTC products: None, except as noted above    Allergies   Allergen Reactions     Seasonal Allergies       Latex Allergy: NO    Social History   Substance Use Topics     Smoking status: Former Smoker     Packs/day: 2.00     Years: 7.00     Types: Cigarettes     Quit date: 1/1/1965     Smokeless tobacco: Never Used     Alcohol use 3.0 - 3.6 oz/week     5 - 6 Standard drinks or equivalent per week     History   Drug Use No       REVIEW OF SYSTEMS:                                                    Constitutional, HEENT, cardiovascular, pulmonary, GI, , musculoskeletal, neuro, skin, endocrine and psych systems are negative, except as in HPI or otherwise noted     This document serves as a record of the services and decisions personally performed and made by Archie Meadows MD FAA. It was created on their behalf by Sumit Almeida, a trained medical scribe. The creation of this document is based the provider's statements to the medical scribe.  Sumit Almeida September 20, 2017 8:58 AM    EXAM:                                                    /56  Pulse 63  Temp 97.9  F (36.6  C) (Oral)  Ht 5' 7.5\" (1.715 m)  Wt 142 lb (64.4 kg)  SpO2 98%  BMI 21.91 kg/m2    GENERAL APPEARANCE: healthy, alert and no distress     EYES: EOMI,  PERRL     HENT: ear canals and TM's normal and nose and mouth without ulcers or lesions     NECK: no adenopathy, no asymmetry, masses, or scars and thyroid normal to palpation     RESP: lungs clear to auscultation - no rales, rhonchi or wheezes     CV: regular rates and rhythm, normal S1 S2, no S3 or S4 and no murmur, click or rub     ABDOMEN:  soft, nontender, no HSM or masses and bowel sounds normal     MS: extremities normal- no gross deformities noted, no evidence of inflammation in joints, FROM in all extremities.     SKIN: no suspicious lesions or rashes     NEURO: mentation intact and speech normal     " PSYCH: mentation appears normal. and affect normal/bright    DIAGNOSTICS:                                                    EKG: sinus bradycardia, normal axis, normal intervals, no acute ST/T changes c/w ischemia, no LVH by voltage criteria    Recent Labs   Lab Test  06/24/17   0705  06/23/17   0629  06/22/17   0645  06/21/17   0947   12/23/09   1039   HGB   --   13.0*  12.5*  15.7   < >  15.9   PLT  141*   --    --   178   < >  169   NA   --    --   136  138   < >  134   POTASSIUM   --    --   4.0  4.2   < >  5.3   CR  1.04   --   0.96  1.06   < >  1.08   A1C   --    --    --    --    --   5.7    < > = values in this interval not displayed.      IMPRESSION:                                                    Reason for surgery/procedure: left knee pain    The proposed surgical procedure is considered INTERMEDIATE risk.    REVISED CARDIAC RISK INDEX  The patient has the following serious cardiovascular risks for perioperative complications such as (MI, PE, VFib and 3  AV Block):  No serious cardiac risks  INTERPRETATION: 0 risks: Class I (very low risk - 0.4% complication rate)    The patient has the following additional risks for perioperative complications:  The 10-year ASCVD risk score ([place] DC Jr, et al., 2013) is: 12.4%    Values used to calculate the score:      Age: 72 years      Sex: Male      Is Non- : No      Diabetic: No      Tobacco smoker: No      Systolic Blood Pressure: 106 mmHg      Is BP treated: No      HDL Cholesterol: 83 mg/dL      Total Cholesterol: 197 mg/dL      ICD-10-CM    1. Preoperative examination Z01.818 EKG 12-lead complete w/read - Clinics     Comprehensive metabolic panel     CBC with platelets     *UA reflex to Microscopic and Culture ([place] [facility] (except [place] and [place])     Urine Microscopic   2. Primary osteoarthritis involving multiple joints M15.0    3. Lymphoma, unspecified body region, unspecified lymphoma type (H) C85.90 CBC  with platelets   4. Prostate cancer (H) C61    5. Environmental allergies Z91.09    6. Major depressive disorder, single episode in full remission (H) F32.5    7. Anxiety state F41.1    8. Medication monitoring encounter Z51.81 EKG 12-lead complete w/read - Clinics     Comprehensive metabolic panel     CBC with platelets     *UA reflex to Microscopic and Culture ([facility] (except [place] and [place])       RECOMMENDATIONS:                                                      --Patient is to take all scheduled medications on the day of surgery EXCEPT for modifications listed below.    Avoid use of Aspirin/Ibuprofen/Advil/Aleve for one week prior to surgery -- Tylenol is okay    Hold vitamins, fish oil, glucoseamine    APPROVAL GIVEN to proceed with proposed procedure, without further diagnostic evaluation     The information in this document, created by the medical scribe for me, accurately reflects the services I personally performed and the decisions made by me. I have reviewed and approved this document for accuracy.   Archie Meadows MD FAAFP     Signed Electronically by: Archie Meadows MD    Copy of this evaluation report is provided to requesting physician.    [facility] Preop Guidelines

## 2017-10-01 ENCOUNTER — ANESTHESIA EVENT (OUTPATIENT)
Dept: SURGERY | Facility: CLINIC | Age: 72
DRG: 470 | End: 2017-10-01
Payer: MEDICARE

## 2017-10-02 ENCOUNTER — HOSPITAL ENCOUNTER (INPATIENT)
Facility: CLINIC | Age: 72
LOS: 3 days | Discharge: HOME | DRG: 470 | End: 2017-10-05
Attending: ORTHOPAEDIC SURGERY | Admitting: ORTHOPAEDIC SURGERY
Payer: MEDICARE

## 2017-10-02 ENCOUNTER — APPOINTMENT (OUTPATIENT)
Dept: GENERAL RADIOLOGY | Facility: CLINIC | Age: 72
DRG: 470 | End: 2017-10-02
Attending: ORTHOPAEDIC SURGERY
Payer: MEDICARE

## 2017-10-02 ENCOUNTER — APPOINTMENT (OUTPATIENT)
Dept: PHYSICAL THERAPY | Facility: CLINIC | Age: 72
DRG: 470 | End: 2017-10-02
Attending: ORTHOPAEDIC SURGERY
Payer: MEDICARE

## 2017-10-02 ENCOUNTER — SURGERY (OUTPATIENT)
Age: 72
End: 2017-10-02

## 2017-10-02 ENCOUNTER — ANESTHESIA (OUTPATIENT)
Dept: SURGERY | Facility: CLINIC | Age: 72
DRG: 470 | End: 2017-10-02
Payer: MEDICARE

## 2017-10-02 DIAGNOSIS — Z96.652 STATUS POST TOTAL LEFT KNEE REPLACEMENT: Primary | ICD-10-CM

## 2017-10-02 PROBLEM — Z96.659 S/P TOTAL KNEE ARTHROPLASTY: Status: ACTIVE | Noted: 2017-10-02

## 2017-10-02 LAB
ANION GAP SERPL CALCULATED.3IONS-SCNC: 4 MMOL/L (ref 3–14)
BUN SERPL-MCNC: 16 MG/DL (ref 7–30)
CALCIUM SERPL-MCNC: 8.6 MG/DL (ref 8.5–10.1)
CHLORIDE SERPL-SCNC: 107 MMOL/L (ref 94–109)
CO2 SERPL-SCNC: 29 MMOL/L (ref 20–32)
CREAT SERPL-MCNC: 1.13 MG/DL (ref 0.66–1.25)
GFR SERPL CREATININE-BSD FRML MDRD: 64 ML/MIN/1.7M2
GLUCOSE SERPL-MCNC: 100 MG/DL (ref 70–99)
HGB BLD-MCNC: 16.2 G/DL (ref 13.3–17.7)
PLATELET # BLD AUTO: 174 10E9/L (ref 150–450)
POTASSIUM SERPL-SCNC: 3.5 MMOL/L (ref 3.4–5.3)
SODIUM SERPL-SCNC: 140 MMOL/L (ref 133–144)

## 2017-10-02 PROCEDURE — 27110028 ZZH OR GENERAL SUPPLY NON-STERILE: Performed by: ORTHOPAEDIC SURGERY

## 2017-10-02 PROCEDURE — 0SRD0J9 REPLACEMENT OF LEFT KNEE JOINT WITH SYNTHETIC SUBSTITUTE, CEMENTED, OPEN APPROACH: ICD-10-PCS | Performed by: ORTHOPAEDIC SURGERY

## 2017-10-02 PROCEDURE — 80048 BASIC METABOLIC PNL TOTAL CA: CPT | Performed by: ORTHOPAEDIC SURGERY

## 2017-10-02 PROCEDURE — 25800025 ZZH RX 258: Performed by: ORTHOPAEDIC SURGERY

## 2017-10-02 PROCEDURE — 36415 COLL VENOUS BLD VENIPUNCTURE: CPT | Performed by: ORTHOPAEDIC SURGERY

## 2017-10-02 PROCEDURE — C1776 JOINT DEVICE (IMPLANTABLE): HCPCS | Performed by: ORTHOPAEDIC SURGERY

## 2017-10-02 PROCEDURE — 40000986 XR KNEE PORT LT 1/2 VW: Mod: LT

## 2017-10-02 PROCEDURE — 36000093 ZZH SURGERY LEVEL 4 1ST 30 MIN: Performed by: ORTHOPAEDIC SURGERY

## 2017-10-02 PROCEDURE — 25000128 H RX IP 250 OP 636: Performed by: NURSE ANESTHETIST, CERTIFIED REGISTERED

## 2017-10-02 PROCEDURE — 25000125 ZZHC RX 250: Performed by: NURSE ANESTHETIST, CERTIFIED REGISTERED

## 2017-10-02 PROCEDURE — 25000125 ZZHC RX 250: Performed by: ORTHOPAEDIC SURGERY

## 2017-10-02 PROCEDURE — 85049 AUTOMATED PLATELET COUNT: CPT | Performed by: ORTHOPAEDIC SURGERY

## 2017-10-02 PROCEDURE — 27810169 ZZH OR IMPLANT GENERAL: Performed by: ORTHOPAEDIC SURGERY

## 2017-10-02 PROCEDURE — A9270 NON-COVERED ITEM OR SERVICE: HCPCS | Mod: GY | Performed by: ORTHOPAEDIC SURGERY

## 2017-10-02 PROCEDURE — 27210794 ZZH OR GENERAL SUPPLY STERILE: Performed by: ORTHOPAEDIC SURGERY

## 2017-10-02 PROCEDURE — 25000566 ZZH SEVOFLURANE, EA 15 MIN: Performed by: ORTHOPAEDIC SURGERY

## 2017-10-02 PROCEDURE — 71000012 ZZH RECOVERY PHASE 1 LEVEL 1 FIRST HR: Performed by: ORTHOPAEDIC SURGERY

## 2017-10-02 PROCEDURE — 40000193 ZZH STATISTIC PT WARD VISIT: Performed by: PHYSICAL THERAPIST

## 2017-10-02 PROCEDURE — 37000009 ZZH ANESTHESIA TECHNICAL FEE, EACH ADDTL 15 MIN: Performed by: ORTHOPAEDIC SURGERY

## 2017-10-02 PROCEDURE — 25000128 H RX IP 250 OP 636: Performed by: ANESTHESIOLOGY

## 2017-10-02 PROCEDURE — 37000008 ZZH ANESTHESIA TECHNICAL FEE, 1ST 30 MIN: Performed by: ORTHOPAEDIC SURGERY

## 2017-10-02 PROCEDURE — 97161 PT EVAL LOW COMPLEX 20 MIN: CPT | Mod: GP | Performed by: PHYSICAL THERAPIST

## 2017-10-02 PROCEDURE — 12000007 ZZH R&B INTERMEDIATE

## 2017-10-02 PROCEDURE — 97110 THERAPEUTIC EXERCISES: CPT | Mod: GP | Performed by: PHYSICAL THERAPIST

## 2017-10-02 PROCEDURE — 36000063 ZZH SURGERY LEVEL 4 EA 15 ADDTL MIN: Performed by: ORTHOPAEDIC SURGERY

## 2017-10-02 PROCEDURE — 97116 GAIT TRAINING THERAPY: CPT | Mod: GP | Performed by: PHYSICAL THERAPIST

## 2017-10-02 PROCEDURE — 25000128 H RX IP 250 OP 636: Performed by: ORTHOPAEDIC SURGERY

## 2017-10-02 PROCEDURE — 85018 HEMOGLOBIN: CPT | Performed by: ORTHOPAEDIC SURGERY

## 2017-10-02 PROCEDURE — 27210995 ZZH RX 272: Performed by: ORTHOPAEDIC SURGERY

## 2017-10-02 PROCEDURE — 25000132 ZZH RX MED GY IP 250 OP 250 PS 637: Mod: GY | Performed by: ORTHOPAEDIC SURGERY

## 2017-10-02 PROCEDURE — 97530 THERAPEUTIC ACTIVITIES: CPT | Mod: GP | Performed by: PHYSICAL THERAPIST

## 2017-10-02 PROCEDURE — 40000170 ZZH STATISTIC PRE-PROCEDURE ASSESSMENT II: Performed by: ORTHOPAEDIC SURGERY

## 2017-10-02 DEVICE — IMP BASEPLATE TIBIAL GENESIS II SZ 5 LT TI 71420168: Type: IMPLANTABLE DEVICE | Site: KNEE | Status: FUNCTIONAL

## 2017-10-02 DEVICE — BONE CEMENT RADIOPAQUE SIMPLEX HV FULL DOSE 6194-1-001: Type: IMPLANTABLE DEVICE | Site: KNEE | Status: FUNCTIONAL

## 2017-10-02 DEVICE — IMP COMP PATELLA GENESIS II 13X29MM 71420574: Type: IMPLANTABLE DEVICE | Site: KNEE | Status: FUNCTIONAL

## 2017-10-02 DEVICE — IMP COMP FEMORAL NP CR SZ 6 LT 71423206: Type: IMPLANTABLE DEVICE | Site: KNEE | Status: FUNCTIONAL

## 2017-10-02 DEVICE — IMP INSERT ARTICULAR S&N LGN CR XLPE SZ5-6 9MM 71453121: Type: IMPLANTABLE DEVICE | Site: KNEE | Status: FUNCTIONAL

## 2017-10-02 RX ORDER — LIDOCAINE HYDROCHLORIDE 20 MG/ML
INJECTION, SOLUTION INFILTRATION; PERINEURAL PRN
Status: DISCONTINUED | OUTPATIENT
Start: 2017-10-02 | End: 2017-10-02

## 2017-10-02 RX ORDER — AMOXICILLIN 250 MG
1-2 CAPSULE ORAL 2 TIMES DAILY
Status: DISCONTINUED | OUTPATIENT
Start: 2017-10-02 | End: 2017-10-05 | Stop reason: HOSPADM

## 2017-10-02 RX ORDER — ROPIVACAINE HYDROCHLORIDE 5 MG/ML
INJECTION, SOLUTION EPIDURAL; INFILTRATION; PERINEURAL PRN
Status: DISCONTINUED | OUTPATIENT
Start: 2017-10-02 | End: 2017-10-02

## 2017-10-02 RX ORDER — MAGNESIUM HYDROXIDE 1200 MG/15ML
LIQUID ORAL PRN
Status: DISCONTINUED | OUTPATIENT
Start: 2017-10-02 | End: 2017-10-02 | Stop reason: HOSPADM

## 2017-10-02 RX ORDER — HYDROMORPHONE HYDROCHLORIDE 1 MG/ML
.3-.5 INJECTION, SOLUTION INTRAMUSCULAR; INTRAVENOUS; SUBCUTANEOUS
Status: DISCONTINUED | OUTPATIENT
Start: 2017-10-02 | End: 2017-10-05 | Stop reason: HOSPADM

## 2017-10-02 RX ORDER — DEXTROSE MONOHYDRATE, SODIUM CHLORIDE, AND POTASSIUM CHLORIDE 50; 1.49; 4.5 G/1000ML; G/1000ML; G/1000ML
INJECTION, SOLUTION INTRAVENOUS CONTINUOUS
Status: DISCONTINUED | OUTPATIENT
Start: 2017-10-02 | End: 2017-10-05 | Stop reason: HOSPADM

## 2017-10-02 RX ORDER — DIPHENHYDRAMINE HYDROCHLORIDE 50 MG/ML
12.5 INJECTION INTRAMUSCULAR; INTRAVENOUS EVERY 6 HOURS PRN
Status: DISCONTINUED | OUTPATIENT
Start: 2017-10-02 | End: 2017-10-05 | Stop reason: HOSPADM

## 2017-10-02 RX ORDER — ONDANSETRON 2 MG/ML
INJECTION INTRAMUSCULAR; INTRAVENOUS PRN
Status: DISCONTINUED | OUTPATIENT
Start: 2017-10-02 | End: 2017-10-02

## 2017-10-02 RX ORDER — CHLORHEXIDINE GLUCONATE 40 MG/ML
SOLUTION TOPICAL ONCE
Status: DISCONTINUED | OUTPATIENT
Start: 2017-10-02 | End: 2017-10-03

## 2017-10-02 RX ORDER — DEXAMETHASONE SODIUM PHOSPHATE 4 MG/ML
INJECTION, SOLUTION INTRA-ARTICULAR; INTRALESIONAL; INTRAMUSCULAR; INTRAVENOUS; SOFT TISSUE PRN
Status: DISCONTINUED | OUTPATIENT
Start: 2017-10-02 | End: 2017-10-02

## 2017-10-02 RX ORDER — NALOXONE HYDROCHLORIDE 0.4 MG/ML
.1-.4 INJECTION, SOLUTION INTRAMUSCULAR; INTRAVENOUS; SUBCUTANEOUS
Status: DISCONTINUED | OUTPATIENT
Start: 2017-10-02 | End: 2017-10-05 | Stop reason: HOSPADM

## 2017-10-02 RX ORDER — CHLORHEXIDINE GLUCONATE 40 MG/ML
SOLUTION TOPICAL ONCE
Status: COMPLETED | OUTPATIENT
Start: 2017-10-02 | End: 2017-10-02

## 2017-10-02 RX ORDER — SODIUM CHLORIDE, SODIUM LACTATE, POTASSIUM CHLORIDE, CALCIUM CHLORIDE 600; 310; 30; 20 MG/100ML; MG/100ML; MG/100ML; MG/100ML
INJECTION, SOLUTION INTRAVENOUS CONTINUOUS
Status: DISCONTINUED | OUTPATIENT
Start: 2017-10-02 | End: 2017-10-02

## 2017-10-02 RX ORDER — FENTANYL CITRATE 50 UG/ML
INJECTION, SOLUTION INTRAMUSCULAR; INTRAVENOUS PRN
Status: DISCONTINUED | OUTPATIENT
Start: 2017-10-02 | End: 2017-10-02

## 2017-10-02 RX ORDER — NEOSTIGMINE METHYLSULFATE 1 MG/ML
VIAL (ML) INJECTION PRN
Status: DISCONTINUED | OUTPATIENT
Start: 2017-10-02 | End: 2017-10-02

## 2017-10-02 RX ORDER — CEFAZOLIN SODIUM 1 G/3ML
1 INJECTION, POWDER, FOR SOLUTION INTRAMUSCULAR; INTRAVENOUS SEE ADMIN INSTRUCTIONS
Status: DISCONTINUED | OUTPATIENT
Start: 2017-10-02 | End: 2017-10-02

## 2017-10-02 RX ORDER — ONDANSETRON 4 MG/1
4 TABLET, ORALLY DISINTEGRATING ORAL EVERY 30 MIN PRN
Status: DISCONTINUED | OUTPATIENT
Start: 2017-10-02 | End: 2017-10-02 | Stop reason: HOSPADM

## 2017-10-02 RX ORDER — SODIUM CHLORIDE, SODIUM LACTATE, POTASSIUM CHLORIDE, CALCIUM CHLORIDE 600; 310; 30; 20 MG/100ML; MG/100ML; MG/100ML; MG/100ML
INJECTION, SOLUTION INTRAVENOUS CONTINUOUS
Status: DISCONTINUED | OUTPATIENT
Start: 2017-10-02 | End: 2017-10-02 | Stop reason: HOSPADM

## 2017-10-02 RX ORDER — CEFAZOLIN SODIUM 1 G/3ML
1 INJECTION, POWDER, FOR SOLUTION INTRAMUSCULAR; INTRAVENOUS EVERY 8 HOURS
Status: COMPLETED | OUTPATIENT
Start: 2017-10-02 | End: 2017-10-03

## 2017-10-02 RX ORDER — KETOROLAC TROMETHAMINE 15 MG/ML
15 INJECTION, SOLUTION INTRAMUSCULAR; INTRAVENOUS EVERY 6 HOURS
Status: COMPLETED | OUTPATIENT
Start: 2017-10-02 | End: 2017-10-03

## 2017-10-02 RX ORDER — DIPHENHYDRAMINE HCL 12.5MG/5ML
12.5 LIQUID (ML) ORAL EVERY 6 HOURS PRN
Status: DISCONTINUED | OUTPATIENT
Start: 2017-10-02 | End: 2017-10-05 | Stop reason: HOSPADM

## 2017-10-02 RX ORDER — CYCLOBENZAPRINE HCL 5 MG
5 TABLET ORAL 3 TIMES DAILY PRN
Status: DISCONTINUED | OUTPATIENT
Start: 2017-10-02 | End: 2017-10-05 | Stop reason: HOSPADM

## 2017-10-02 RX ORDER — HYDROMORPHONE HYDROCHLORIDE 1 MG/ML
.3-.5 INJECTION, SOLUTION INTRAMUSCULAR; INTRAVENOUS; SUBCUTANEOUS EVERY 5 MIN PRN
Status: DISCONTINUED | OUTPATIENT
Start: 2017-10-02 | End: 2017-10-02 | Stop reason: HOSPADM

## 2017-10-02 RX ORDER — FENTANYL CITRATE 50 UG/ML
100 INJECTION, SOLUTION INTRAMUSCULAR; INTRAVENOUS ONCE
Status: COMPLETED | OUTPATIENT
Start: 2017-10-02 | End: 2017-10-02

## 2017-10-02 RX ORDER — EPHEDRINE SULFATE 50 MG/ML
INJECTION, SOLUTION INTRAVENOUS PRN
Status: DISCONTINUED | OUTPATIENT
Start: 2017-10-02 | End: 2017-10-02

## 2017-10-02 RX ORDER — ONDANSETRON 2 MG/ML
4 INJECTION INTRAMUSCULAR; INTRAVENOUS EVERY 6 HOURS PRN
Status: DISCONTINUED | OUTPATIENT
Start: 2017-10-02 | End: 2017-10-05 | Stop reason: HOSPADM

## 2017-10-02 RX ORDER — FENTANYL CITRATE 0.05 MG/ML
25-50 INJECTION, SOLUTION INTRAMUSCULAR; INTRAVENOUS
Status: DISCONTINUED | OUTPATIENT
Start: 2017-10-02 | End: 2017-10-02 | Stop reason: HOSPADM

## 2017-10-02 RX ORDER — PROCHLORPERAZINE MALEATE 5 MG
5 TABLET ORAL EVERY 6 HOURS PRN
Status: DISCONTINUED | OUTPATIENT
Start: 2017-10-02 | End: 2017-10-05 | Stop reason: HOSPADM

## 2017-10-02 RX ORDER — CEFAZOLIN SODIUM 2 G/100ML
2 INJECTION, SOLUTION INTRAVENOUS
Status: COMPLETED | OUTPATIENT
Start: 2017-10-02 | End: 2017-10-02

## 2017-10-02 RX ORDER — PROPOFOL 10 MG/ML
INJECTION, EMULSION INTRAVENOUS CONTINUOUS PRN
Status: DISCONTINUED | OUTPATIENT
Start: 2017-10-02 | End: 2017-10-02

## 2017-10-02 RX ORDER — PROPOFOL 10 MG/ML
INJECTION, EMULSION INTRAVENOUS PRN
Status: DISCONTINUED | OUTPATIENT
Start: 2017-10-02 | End: 2017-10-02

## 2017-10-02 RX ORDER — MEPERIDINE HYDROCHLORIDE 25 MG/ML
12.5 INJECTION INTRAMUSCULAR; INTRAVENOUS; SUBCUTANEOUS EVERY 5 MIN PRN
Status: DISCONTINUED | OUTPATIENT
Start: 2017-10-02 | End: 2017-10-02 | Stop reason: HOSPADM

## 2017-10-02 RX ORDER — LIDOCAINE 40 MG/G
CREAM TOPICAL
Status: DISCONTINUED | OUTPATIENT
Start: 2017-10-02 | End: 2017-10-05 | Stop reason: HOSPADM

## 2017-10-02 RX ORDER — HYDROCODONE BITARTRATE AND ACETAMINOPHEN 5; 325 MG/1; MG/1
1 TABLET ORAL
Status: DISCONTINUED | OUTPATIENT
Start: 2017-10-02 | End: 2017-10-05 | Stop reason: HOSPADM

## 2017-10-02 RX ORDER — ALBUTEROL SULFATE 0.83 MG/ML
2.5 SOLUTION RESPIRATORY (INHALATION) EVERY 4 HOURS PRN
Status: DISCONTINUED | OUTPATIENT
Start: 2017-10-02 | End: 2017-10-02 | Stop reason: HOSPADM

## 2017-10-02 RX ORDER — ONDANSETRON 2 MG/ML
4 INJECTION INTRAMUSCULAR; INTRAVENOUS EVERY 30 MIN PRN
Status: DISCONTINUED | OUTPATIENT
Start: 2017-10-02 | End: 2017-10-02 | Stop reason: HOSPADM

## 2017-10-02 RX ORDER — ONDANSETRON 4 MG/1
4 TABLET, ORALLY DISINTEGRATING ORAL EVERY 6 HOURS PRN
Status: DISCONTINUED | OUTPATIENT
Start: 2017-10-02 | End: 2017-10-05 | Stop reason: HOSPADM

## 2017-10-02 RX ORDER — GLYCOPYRROLATE 0.2 MG/ML
INJECTION, SOLUTION INTRAMUSCULAR; INTRAVENOUS PRN
Status: DISCONTINUED | OUTPATIENT
Start: 2017-10-02 | End: 2017-10-02

## 2017-10-02 RX ORDER — SODIUM CHLORIDE, SODIUM LACTATE, POTASSIUM CHLORIDE, CALCIUM CHLORIDE 600; 310; 30; 20 MG/100ML; MG/100ML; MG/100ML; MG/100ML
INJECTION, SOLUTION INTRAVENOUS CONTINUOUS PRN
Status: DISCONTINUED | OUTPATIENT
Start: 2017-10-02 | End: 2017-10-02

## 2017-10-02 RX ADMIN — HYDROMORPHONE HYDROCHLORIDE 0.5 MG: 1 INJECTION, SOLUTION INTRAMUSCULAR; INTRAVENOUS; SUBCUTANEOUS at 14:32

## 2017-10-02 RX ADMIN — KETOROLAC TROMETHAMINE 15 MG: 15 INJECTION, SOLUTION INTRAMUSCULAR; INTRAVENOUS at 16:44

## 2017-10-02 RX ADMIN — HYDROMORPHONE HYDROCHLORIDE 0.5 MG: 1 INJECTION, SOLUTION INTRAMUSCULAR; INTRAVENOUS; SUBCUTANEOUS at 12:40

## 2017-10-02 RX ADMIN — MIDAZOLAM HYDROCHLORIDE 1 MG: 1 INJECTION, SOLUTION INTRAMUSCULAR; INTRAVENOUS at 07:20

## 2017-10-02 RX ADMIN — ROCURONIUM BROMIDE 30 MG: 10 INJECTION INTRAVENOUS at 07:24

## 2017-10-02 RX ADMIN — CEFAZOLIN SODIUM 1 G: 1 INJECTION, POWDER, FOR SOLUTION INTRAMUSCULAR; INTRAVENOUS at 16:47

## 2017-10-02 RX ADMIN — CEFAZOLIN SODIUM 2 G: 2 INJECTION, SOLUTION INTRAVENOUS at 07:37

## 2017-10-02 RX ADMIN — CYCLOBENZAPRINE HYDROCHLORIDE 5 MG: 5 TABLET, FILM COATED ORAL at 13:44

## 2017-10-02 RX ADMIN — EPHEDRINE SULFATE 5 MG: 50 INJECTION, SOLUTION INTRAVENOUS at 07:33

## 2017-10-02 RX ADMIN — MIDAZOLAM HYDROCHLORIDE 1.5 MG: 1 INJECTION, SOLUTION INTRAMUSCULAR; INTRAVENOUS at 06:53

## 2017-10-02 RX ADMIN — KETOROLAC TROMETHAMINE 15 MG: 15 INJECTION, SOLUTION INTRAMUSCULAR; INTRAVENOUS at 21:56

## 2017-10-02 RX ADMIN — PROPOFOL 190 MG: 10 INJECTION, EMULSION INTRAVENOUS at 07:23

## 2017-10-02 RX ADMIN — POTASSIUM CHLORIDE, DEXTROSE MONOHYDRATE AND SODIUM CHLORIDE: 150; 5; 450 INJECTION, SOLUTION INTRAVENOUS at 13:44

## 2017-10-02 RX ADMIN — EPHEDRINE SULFATE 5 MG: 50 INJECTION, SOLUTION INTRAVENOUS at 08:37

## 2017-10-02 RX ADMIN — HYDROMORPHONE HYDROCHLORIDE 0.5 MG: 1 INJECTION, SOLUTION INTRAMUSCULAR; INTRAVENOUS; SUBCUTANEOUS at 10:14

## 2017-10-02 RX ADMIN — LIDOCAINE HYDROCHLORIDE 40 MG: 20 INJECTION, SOLUTION INFILTRATION; PERINEURAL at 07:23

## 2017-10-02 RX ADMIN — PROPOFOL 50 MCG/KG/MIN: 10 INJECTION, EMULSION INTRAVENOUS at 07:24

## 2017-10-02 RX ADMIN — SODIUM CHLORIDE, POTASSIUM CHLORIDE, SODIUM LACTATE AND CALCIUM CHLORIDE: 600; 310; 30; 20 INJECTION, SOLUTION INTRAVENOUS at 08:15

## 2017-10-02 RX ADMIN — FENTANYL CITRATE 50 MCG: 50 INJECTION, SOLUTION INTRAMUSCULAR; INTRAVENOUS at 07:23

## 2017-10-02 RX ADMIN — SODIUM CHLORIDE 1000 ML: 0.9 IRRIGANT IRRIGATION at 08:01

## 2017-10-02 RX ADMIN — ONDANSETRON 4 MG: 2 INJECTION INTRAMUSCULAR; INTRAVENOUS at 09:07

## 2017-10-02 RX ADMIN — FENTANYL CITRATE 75 MCG: 50 INJECTION, SOLUTION INTRAMUSCULAR; INTRAVENOUS at 07:00

## 2017-10-02 RX ADMIN — SENNOSIDES AND DOCUSATE SODIUM 1 TABLET: 8.6; 5 TABLET ORAL at 20:59

## 2017-10-02 RX ADMIN — FENTANYL CITRATE 50 MCG: 50 INJECTION, SOLUTION INTRAMUSCULAR; INTRAVENOUS at 07:58

## 2017-10-02 RX ADMIN — NEOSTIGMINE METHYLSULFATE 4 MG: 1 INJECTION INTRAMUSCULAR; INTRAVENOUS; SUBCUTANEOUS at 08:49

## 2017-10-02 RX ADMIN — TRANEXAMIC ACID 1 G: 100 INJECTION, SOLUTION INTRAVENOUS at 08:39

## 2017-10-02 RX ADMIN — HYDROCODONE BITARTRATE AND ACETAMINOPHEN 1 TABLET: 5; 325 TABLET ORAL at 20:59

## 2017-10-02 RX ADMIN — ROPIVACAINE HYDROCHLORIDE 20 ML: 5 INJECTION, SOLUTION EPIDURAL; INFILTRATION; PERINEURAL at 06:54

## 2017-10-02 RX ADMIN — HYDROCODONE BITARTRATE AND ACETAMINOPHEN 1 TABLET: 5; 325 TABLET ORAL at 17:57

## 2017-10-02 RX ADMIN — KETOROLAC TROMETHAMINE 15 MG: 15 INJECTION, SOLUTION INTRAMUSCULAR; INTRAVENOUS at 10:00

## 2017-10-02 RX ADMIN — WATER 1000 ML: 100 IRRIGANT IRRIGATION at 08:02

## 2017-10-02 RX ADMIN — GLYCOPYRROLATE 0.6 MG: 0.2 INJECTION, SOLUTION INTRAMUSCULAR; INTRAVENOUS at 08:49

## 2017-10-02 RX ADMIN — ROPIVACAINE HYDROCHLORIDE 60.5 ML: 2 INJECTION, SOLUTION EPIDURAL; INFILTRATION at 08:02

## 2017-10-02 RX ADMIN — GENTAMICIN SULFATE 2000 ML: 40 INJECTION, SOLUTION INTRAMUSCULAR; INTRAVENOUS at 08:01

## 2017-10-02 RX ADMIN — FENTANYL CITRATE 50 MCG: 50 INJECTION, SOLUTION INTRAMUSCULAR; INTRAVENOUS at 07:51

## 2017-10-02 RX ADMIN — SODIUM CHLORIDE, POTASSIUM CHLORIDE, SODIUM LACTATE AND CALCIUM CHLORIDE: 600; 310; 30; 20 INJECTION, SOLUTION INTRAVENOUS at 06:15

## 2017-10-02 RX ADMIN — SODIUM CHLORIDE, POTASSIUM CHLORIDE, SODIUM LACTATE AND CALCIUM CHLORIDE: 600; 310; 30; 20 INJECTION, SOLUTION INTRAVENOUS at 07:18

## 2017-10-02 RX ADMIN — CYCLOBENZAPRINE HYDROCHLORIDE 5 MG: 5 TABLET, FILM COATED ORAL at 21:57

## 2017-10-02 RX ADMIN — DEXAMETHASONE SODIUM PHOSPHATE 4 MG: 4 INJECTION, SOLUTION INTRA-ARTICULAR; INTRALESIONAL; INTRAMUSCULAR; INTRAVENOUS; SOFT TISSUE at 07:44

## 2017-10-02 RX ADMIN — CHLORHEXIDINE GLUCONATE: 40 SOLUTION TOPICAL at 07:02

## 2017-10-02 ASSESSMENT — LIFESTYLE VARIABLES: TOBACCO_USE: 1

## 2017-10-02 NOTE — OP NOTE
PATIENT NAME:  Jasen Rosario  MEDICAL RECORD #: [id number]  PATIENT BIRTHDAY:  1945  DATE OF SURGERY: 10/2/2017    SURGEON:    Sebastian Madrigal MD    1st ASSISTANT:  LAZARA Alejandre OPA-C    PREOPERATIVE DIAGNOSIS:  Degenerative osteoarthritis left knee.    POSTOPERATIVE DIAGNOSIS:  Degenerative osteoarthritis left knee.    PROCEDURE: left total knee arthroplasty.    COMPONENTS: [employer] - Size 6 CR femoral component, size 5 fully stemmed tibial baseplate with 9 mm CR XLPE high flexion articular insert, and 29 mm patellar component.    ANESTHESIA: Spinal     INDICATIONS FOR PROCEDURE:  The patient was brought to the operating room for elective total knee replacement for advanced degenerative osteoarthritis.  The patient received IV antibiotics preoperatively.  These will be continued for 24 hours.  The patient also will receive anticoagulants  for postoperative thrombosis prophylaxis.  The patient understands the indications, alternatives, risks, benefits, and time involved for recovery and wishes to proceed.  The patient is consented for the procedure.      DESCRIPTION OF PROCEDURE:  The patient was brought to the operating room  and following suitable Spinal anesthesia, the left knee was prepped and draped in the usual manner.  Full timeout was carried out and the patient and proper extremity and operative site identified and confirmed by all members of the operative team.    We next exsanguinated the operative leg with a Olu bandage and the tourniquet was elevated to 350 mmHg on the thigh.    A 10 cm longitudinal incision was made anteriorly for the MIS technique.  Sharp dissection was carried down through the subcutaneous tissue.  A median parapatellar arthrotomy was carried out and the knee flexed to 90 degrees.    There was severe wear in the medial compartment and moderate wear in the patellofemoral  compartment and moderate wear in the lateral compartment.    The [employer]hew  instrumentation was used.  The femur was cut for a size 6 CR  implant.  The tibia was cut for a size 5 fully stemmed base plate.  The patella was cut for a 29 mm patellar button.    The trial components were removed from the knee.  The bone surfaces were prepared with jet lavage and careful drying technique.     The posterior capsule was injected for postoperative relief with 30 cc of saline, 30 cc of 0.2% Ropivacaine, and 15 mg of Toradol.       We then cemented the components with HD Simplex cement beginning with the tibial baseplate, followed by the femoral component, followed by the patellar component.    The knee was held in extension with the trial insert in place in the tibia until the cement was set.  Once the cement was set up, the trial component was removed.  We selected the 9 mm CR XLPE high flexion articular insert.  This insert was secured and the knee checked one final time for motion, stability, alignment and soft tissue balance, all of which were excellent.    The wound was irrigated throughout with antibiotic solution using jet lavage.  The tourniquet was deflated prior to wound closure and all bleeding controlled with electrocautery.  We then re-exsanguinated the leg and reinflated the tourniquet during wound closure.    The wound was closed over a medium Hemovac drain with spaced 0 Ethibond interrupted and V-Loc running suture in the parapatellar arthrotomy, 2-0 undyed Vicryl in subcutaneous tissue and skin staples in the skin.  A sterile soft dressing was applied.  The tourniquet deflated one final time.  The patient was taken to the PAR in satisfactory condition.  There were no known complications during the procedure.    A surgical assistant was medically necessary for this procedure for pre-op positioning as well as intra-op retraction and extremity support and positioning.  He was present for the entire procedure.      ANAYA WASHINGTON MD    CC  Anaya Washington MD          [phone]  Fax

## 2017-10-02 NOTE — PLAN OF CARE
Problem: Patient Care Overview  Goal: Plan of Care/Patient Progress Review  Discharge Planner PT    Evaluation completed, treatment initiated. 71 yo male POD#0 L TKA, WBAT, KI at night. PMH includes L BETTY ~ 3 months ago. Resides in a house with stairs to enter as well as stairs within up to main level, down to basement.   Patient plan for discharge: Home with OP PT         Current status: Min A at the LLE sup<>sit, CGA sit<>stand. Bedside gait with WW and KI donned (pt unable to do SLR I), pt woozy with lower HR 50s and stable BPs. L knee AROM  0-10-50.  Barriers to return to prior living situation: Stairs, wife works outside the home, but not fulltime.  Recommendations for discharge: TBD  Rationale for recommendations: TBD       Entered by: Lissy Fonseca 10/02/2017 4:20 PM

## 2017-10-02 NOTE — ANESTHESIA CARE TRANSFER NOTE
Patient: Jasen Rosario    Procedure(s):  LEFT TOTAL KNEE ARTHROPLASTY - Wound Class: I-Clean    Diagnosis: LEFT KNEE DJD  Diagnosis Additional Information: No value filed.    Anesthesia Type:   General     Note:  Airway :ETT  Patient transferred to:PACU  Comments: Patient sleepy, spontaneous respirations, good tidal volumes 400c  Vital signs stable. Patient to PACU with ETT and oxygen. Extubated on arrival to PACU by Dr. Wagner. Dentition as preop. IV patent. Patient  Report given, care transferred,  and questions answered.  /92  HR 68  RR 18  sats 100%  Temp 97.2      Vitals: (Last set prior to Anesthesia Care Transfer)    CRNA VITALS  10/2/2017 0854 - 10/2/2017 0929      10/2/2017             Pulse: 70    Ht Rate: 70    SpO2: 100 %    Resp Rate (observed): 21                Electronically Signed By: MEAGAN Bee CRNA  October 2, 2017  9:29 AM

## 2017-10-02 NOTE — IP AVS SNAPSHOT
[address] [facility]    [address] [place] [address]    Phone:  [phone]                                       After Visit Summary   10/2/2017    Jasen Rosario    MRN: [id number]           After Visit Summary Signature Page     I have received my discharge instructions, and my questions have been answered. I have discussed any challenges I see with this plan with the nurse or doctor.    ..........................................................................................................................................  Patient/Patient Representative Signature      ..........................................................................................................................................  Patient Representative Print Name and Relationship to Patient    ..................................................               ................................................  Date                                            Time    ..........................................................................................................................................  Reviewed by Signature/Title    ...................................................              ..............................................  Date                                                            Time

## 2017-10-02 NOTE — ANESTHESIA POSTPROCEDURE EVALUATION
Patient: Jasen Rosario    Procedure(s):  LEFT TOTAL KNEE ARTHROPLASTY - Wound Class: I-Clean    Diagnosis:LEFT KNEE DJD  Diagnosis Additional Information: No value filed.    Anesthesia Type:  General    Note:  Anesthesia Post Evaluation    Patient location during evaluation: PACU  Patient participation: Able to fully participate in evaluation  Level of consciousness: awake  Pain management: adequate  Airway patency: patent  Cardiovascular status: acceptable  Respiratory status: acceptable  Hydration status: acceptable  PONV: controlled     Anesthetic complications: None          Last vitals:  Vitals:    10/02/17 1315 10/02/17 1415 10/02/17 1556   BP: 119/70 104/61 109/67   Resp:  20 16   Temp:   37.2  C (98.9  F)   SpO2: 99% 98% 95%         Electronically Signed By: Paradise Wagner MD  October 2, 2017  5:11 PM

## 2017-10-02 NOTE — PLAN OF CARE
Problem: Knee Arthroplasty (Total, Partial) (Adult)  Goal: Signs and Symptoms of Listed Potential Problems Will be Absent, Minimized or Managed (Knee Arthroplasty)  Signs and symptoms of listed potential problems will be absent, minimized or managed by discharge/transition of care (reference Knee Arthroplasty (Total, Partial) (Adult) CPG).   Outcome: Improving  Pt is DOS and arrived around 1115, IV infusing and VSS on RA. Pt is tolerating a clear liquid diet well. Dressing is C/D/I and CMS intact with +2 pulses and patent hemovac. Pt is voiding adequate amounts in the urinal. Pain treated with IV dilaudid, flexeril and scheduled tylenol. Pt has not gotten OOB or dangled yet, first therapy is at 1500. Pt had general anesthesia with femoral and adductor canal block and EBL of 10cc. He is progressing well per plan of care.

## 2017-10-02 NOTE — PROGRESS NOTES
10/02/17 1522   Quick Adds   Type of Visit Initial PT Evaluation   Living Environment   Lives With spouse;child(dario), adult   Living Arrangements house   Home Accessibility stairs to enter home;stairs within home   Number of Stairs to Enter Home 2   Number of Stairs Within Home 11   Transportation Available car;family or friend will provide   Self-Care   Dominant Hand right   Usual Activity Tolerance excellent   Regular Exercise yes   Activity/Exercise/Self-Care Comment (stretches daily for back. running, plays pickle ball, etc   Functional Level Prior   Ambulation 0-->independent   Transferring 0-->independent   Toileting 0-->independent   Bathing 0-->independent   Dressing 0-->independent   Eating 0-->independent   Communication 0-->understands/communicates without difficulty   Swallowing 0-->swallows foods/liquids without difficulty   Cognition 0 - no cognition issues reported   Fall history within last six months no   Which of the above functional risks had a recent onset or change? ambulation;transferring   General Information   Onset of Illness/Injury or Date of Surgery - Date 10/02/17   Referring Physician Sebastian Madrigal MD   Patient/Family Goals Statement Home with OP PT   Pertinent History of Current Problem (include personal factors and/or comorbidities that impact the POC) 73 yo male POD # 0 L TKA. PMH includes a L hip replacement ~ 3 months ago.   Precautions/Limitations fall precautions  (No pillow behind the knee; KI on at night)   Weight-Bearing Status - LLE weight-bearing as tolerated   General Observations Wife in room at bedside, supportive   Cognitive Status Examination   Orientation orientation to person, place and time   Level of Consciousness alert   Follows Commands and Answers Questions 100% of the time;able to follow multistep instructions   Personal Safety and Judgment intact   Pain Assessment   Patient Currently in Pain No   Integumentary/Edema   Integumentary/Edema Comments ACE  "in place, hemovac drain LLE   Range of Motion (ROM)   ROM Comment L knee 10-50 degrees    Strength   Strength Comments Demonstrates moderate quad contraction on the L with QS and Min A for SLR   Bed Mobility   Bed Mobility Comments Min A at the LLE sup>sit   Transfer Skills   Transfer Comments Sit>stand CGA   Gait   Gait Comments CGA at gait belt, use of WW and KI with bedside gait. Pt woozy, therefore, distance limited.   Balance   Balance Comments Sitting balance fair +, not safe to reach to the floor.  STanding balance requries UE support secondary to L knee weakness and reprots woozy feeling   Sensory Examination   Sensory Perception Comments Able to wiggle the L toes, intact mazin ight touch   Modality Interventions   Planned Modality Interventions Cryotherapy   General Therapy Interventions   Planned Therapy Interventions balance training;bed mobility training;gait training;neuromuscular re-education;ROM;transfer training;stretching;strengthening;home program guidelines;progressive activity/exercise   Clinical Impression   Criteria for Skilled Therapeutic Intervention yes, treatment indicated   PT Diagnosis Gait Impairment   Influenced by the following impairments Post surgical L LE weakness, L LE decreased knee ROM, fatigue, decreased balance   Functional limitations due to impairments Decreased functional independence   Clinical Presentation Stable/Uncomplicated   Clinical Presentation Rationale seeabove   Clinical Decision Making (Complexity) Low complexity   Therapy Frequency` 2 times/day   Predicted Duration of Therapy Intervention (days/wks) 4 days   Anticipated Equipment Needs at Discharge walker   Anticipated Discharge Disposition Home with Outpatient Therapy   Risk & Benefits of therapy have been explained Yes   Patient, Family & other staff in agreement with plan of care Yes   [facility] AM-PAC TM \"6 Clicks\"   2016, Trustees of [facility], under license to [employer].  All rights " "reserved.   6 Clicks Short Forms Basic Mobility Inpatient Short Form   [facility] AM-PAC  \"6 Clicks\" V.2 Basic Mobility Inpatient Short Form   1. Turning from your back to your side while in a flat bed without using bedrails? 3 - A Little   2. Moving from lying on your back to sitting on the side of a flat bed without using bedrails? 3 - A Little   3. Moving to and from a bed to a chair (including a wheelchair)? 3 - A Little   4. Standing up from a chair using your arms (e.g., wheelchair, or bedside chair)? 3 - A Little   5. To walk in hospital room? 3 - A Little   6. Climbing 3-5 steps with a railing? 2 - A Lot   Basic Mobility Raw Score (Score out of 24.Lower scores equate to lower levels of function) 17   Total Evaluation Time   Total Evaluation Time (Minutes) 10     "

## 2017-10-02 NOTE — ANESTHESIA PROCEDURE NOTES
Peripheral nerve/Neuraxial procedure note : Femoral and Adductor canal  Pre-Procedure  Performed by CELESTINO DELEON  Location: pre-op      Pre-Anesthestic Checklist: patient identified, site marked, risks and benefits discussed, informed consent, monitors and equipment checked, pre-op evaluation, at physician/surgeon's request and post-op pain management    Timeout  Correct Patient: Yes   Correct Procedure: Yes   Correct Site: Yes   Correct Laterality: Yes     Site Marked: Yes   .   Procedure Documentation    .    Procedure:    Femoral and Adductor canal.  Local skin infiltrated with 5 mL of 1% lidocaine.     Ultrasound used to identify targeted nerve, plexus, or vascular marker and placed a needle adjacent to it., Ultrasound was used to visualize the spread of the anesthetic in close proximity to the above stated nerve. A permanent image is entered into the patient's record.  Patient Prep;mask, sterile gloves, chlorhexidine gluconate and isopropyl alcohol.  .  Needle: short bevel Needle Gauge: 21.  Needle Length (millimeters) 80  Insertion Method: Single Shot.       Assessment/Narrative  Paresthesias: No.  .  The placement was negative for: blood aspirated, painful injection and site bleeding.  Bolus given via needle..   Secured via.   Complications: none. Comments:  Real time US used to identify nerve, needle and observe injection of local anesthetic around nerve.  Low pressure.  No complications.  US image documented.

## 2017-10-02 NOTE — ANESTHESIA PREPROCEDURE EVALUATION
Anesthesia Evaluation     . Pt has had prior anesthetic.     No history of anesthetic complications          ROS/MED HX    ENT/Pulmonary:     (+)allergic rhinitis, tobacco use, Past use , . .   (-) sleep apnea   Neurologic: Comment: Lumbar stenosis, radicular pain bilat legs , CLBP    (+)neuropathy     Cardiovascular: Comment: SB    (+) Dyslipidemia, ----. : . . . :. .      (-) OLSON and valvular problems/murmurs   METS/Exercise Tolerance:  >4 METS   Hematologic:         Musculoskeletal:   (+) arthritis, , , -       GI/Hepatic:        (-) GERD   Renal/Genitourinary:         Endo:         Psychiatric:     (+) psychiatric history anxiety and depression      Infectious Disease:         Malignancy:   (+) Malignancy History of Prostate and Lymphoma/Leukemia          Other:                     Physical Exam      Airway   Mallampati: II  TM distance: >3 FB  Neck ROM: full    Dental   (+) caps    Cardiovascular   Rhythm and rate: regular      Pulmonary    breath sounds clear to auscultation                    Anesthesia Plan      History & Physical Review  History and physical reviewed and following examination; no interval change.    ASA Status:  2 .        Plan for General, Periph. Nerve Block for postop pain and ETT   PONV prophylaxis:  Ondansetron (or other 5HT-3) and Dexamethasone or Solumedrol  Propofol infusion      Postoperative Care      Consents  Anesthetic plan, risks, benefits and alternatives discussed with:  Patient.  Use of blood products discussed: Yes.   Use of blood products discussed with Patient.  .                          .  Procedure: Procedure(s):  ARTHROPLASTY KNEE  Preop diagnosis: LEFT KNEE DJD  Patient Active Problem List    Diagnosis Date Noted     Prostate cancer (H)      Priority: High     Dr Smyth - [facility] - [place] 3+3       Hyperlipidemia LDL goal <130      Priority: High     Environmental allergies 09/20/2017     Priority: Medium     S/P total hip arthroplasty 06/21/2017     Priority: Medium      Lumbar stenosis      Priority: Medium     [facility] - Dr Irene - with neuritis       Lymphoma (H)      Priority: Medium     B Cell - Dr Barrios       Colon polyp      Priority: Medium     ED (erectile dysfunction)      Priority: Medium     Seasonal allergies      Priority: Medium     OA (osteoarthritis)      Priority: Medium     Left knee synvisc- Dr Balderas       Advanced directives, counseling/discussion 04/02/2013     Priority: Low     Major depressive disorder, single episode in full remission (H)      Priority: Low     Anxiety state 11/30/2004     Priority: Low     Problem list name updated by automated process. Provider to review       Allergies   Allergen Reactions     Seasonal Allergies        No current facility-administered medications on file prior to encounter.   Current Outpatient Prescriptions on File Prior to Encounter:  Multiple Vitamin (DAILY MULTIVITAMIN PO) Take 2 tablets by mouth daily      Lab Results   Component Value Date    WBC 5.8 09/20/2017     Lab Results   Component Value Date    RBC 4.99 09/20/2017     Lab Results   Component Value Date    HGB 16.2 10/02/2017     Lab Results   Component Value Date    HCT 44.9 09/20/2017     Lab Results   Component Value Date    MCV 90 09/20/2017     Lab Results   Component Value Date    MCH 31.3 09/20/2017     Lab Results   Component Value Date    MCHC 34.7 09/20/2017     Lab Results   Component Value Date    RDW 12.6 09/20/2017     Lab Results   Component Value Date     09/20/2017     No results found for: INR  Last Basic Metabolic Panel:  Lab Results   Component Value Date     10/02/2017      Lab Results   Component Value Date    POTASSIUM 3.5 10/02/2017     Lab Results   Component Value Date    CHLORIDE 107 10/02/2017     Lab Results   Component Value Date    KALPESH 8.6 10/02/2017     Lab Results   Component Value Date    CO2 29 10/02/2017     Lab Results   Component Value Date    BUN 16 10/02/2017     Lab Results   Component Value Date  "   CR 1.13 10/02/2017     Lab Results   Component Value Date     10/02/2017     Blood pressure 121/79, temperature 36.6  C (97.9  F), temperature source Temporal, resp. rate 18, height 0.67 m (2' 2.38\"), weight 64.9 kg (143 lb), SpO2 100 %.  ECHO:   "

## 2017-10-02 NOTE — IP AVS SNAPSHOT
MRN:[id number]                      After Visit Summary   10/2/2017    Jasen Rosario    MRN: [id number]           Thank you!     Thank you for choosing [facility] for your care. Our goal is always to provide you with excellent care. Hearing back from our patients is one way we can continue to improve our services. Please take a few minutes to complete the written survey that you may receive in the mail after you visit with us. Thank you!        Patient Information     Date Of Birth          1945        Designated Caregiver       Most Recent Value    Caregiver    Will someone help with your care after discharge? yes    Name of designated caregiver Danielle-spouse    Phone number of caregiver     Caregiver address prior [place]      About your hospital stay     You were admitted on:  October 2, 2017 You last received care in the:  [place] [facility] [address] [facility]    You were discharged on:  October 5, 2017        Reason for your hospital stay       tka                  Who to Call     For medical emergencies, please call 911.  For non-urgent questions about your medical care, please call your primary care provider or clinic, [phone]  For questions related to your surgery, please call your surgery clinic        Attending Provider     Provider Specialty    Anaya Washington MD Orthopedics       Primary Care Provider Office Phone # Fax #    [facility] [phone]      Follow-up Appointments     Follow-up and recommended labs and tests        Office visit prearranged                  Further instructions from your care team       DISCHARGE INSTRUCTIONS AFTER YOUR TOTAL KNEE REPLACEMENT     ANAYA WASHINGTON MD       Instructions to care for your wound at home:   Change the dressing daily.  Inspect your incision at the time of dressing change for increased redness, tenderness, swelling, or drainage along the incision line.  Some bruising or discoloration  is usually present, but call my office for any changes in appearance that concern you.  Also call if you develop a fever above 101 degrees.     You may shower directly over the wound beginning 4 days after your surgery, but do not submerge the wound under water until after your post operative visit when the sutures are removed and the wound is completely sealed without drainage.    Activities:  Physical activity may be resumed gradually according to your comfort level. You may bear weight on your operative leg as tolerated with your crutches or walker as instructed by your therapist.  Follow your home exercise program.  Ice your knee after exercising.        Wear your knee immobilizer at night only until your office visit in 2 weeks to maintain full knee extension.  Do not use the immobilizer during the day unless otherwise instructed.      Wear the anti-embolism stockings day and night until seen in the office for your post operative visit. Remove them twice daily for one hour at a time. Keep the compression stockings flat on your leg.  Do not allow them to roll up or crease your skin.  Call if you develop calf pain.     Outpatient Physical Therapy and home exercises:  Outpatient physical therapy visits are required following discharge from the hospital. The referral for these outpatient therapy visits is routinely given to you at the time of your surgical scheduling. You should have already scheduled your therapy sessions in advance.  If you have not done so, please immediately call the therapy site of your choice to schedule the physical therapy regimen that has been prescribed for you.  You may discontinue the crutches or walker per the therapist's recommendation.      Medications:  New medications for you on discharge will include a pain medication, a stool softener while on the narcotic pain medication, and a blood thinner.  Detailed instructions will come with those medications.  You will also receive  "instructions on when to resume your home medications.     If you routinely take Aspirin 81 mg, hold the Aspirin while taking the formal blood thinner medication. Then take Aspirin 325 mg (1 tablet) daily for 4 weeks.  Then resume your Aspirin 81 mg daily if that is your routine.        Antibiotic coverage will be needed before any type of dental procedure.  This is a life long recommendation.  You should notify your dentist of your total knee surgery and call your dentist or our office one week before a dental appointment for antibiotics.        Clinic follow-up appointment:  Your clinic follow-up appointment has been prearranged.  Call [phone] with any questions.    Sebastian Madrigal MD     Pending Results     No orders found from 9/30/2017 to 10/3/2017.            Statement of Approval     Ordered          10/04/17 1608  I have reviewed and agree with all the recommendations and orders detailed in this document.  EFFECTIVE NOW     Approved and electronically signed by:  Sebastian Madrigal MD             Admission Information     Date & Time Provider Department Dept. Phone    10/2/2017 Sebastian Madrigal MD [place] [address] [facility] [phone]      Your Vitals Were     Blood Pressure Pulse Temperature Respirations Height Weight    104/67 77 98.3  F (36.8  C) (Oral) 16 1.715 m (5' 7.5\") 64.9 kg (143 lb)    Pulse Oximetry BMI (Body Mass Index)                94% 22.07 kg/m2          MyChart Information     [employer] gives you secure access to your electronic health record. If you see a primary care provider, you can also send messages to your care team and make appointments. If you have questions, please call your primary care clinic.  If you do not have a primary care provider, please call [phone] and they will assist you.        Care EveryWhere ID     This is your Care EveryWhere ID. This could be used by other organizations to access your [place] medical records  [id number]   "      Equal Access to Services     [facility]: Hadii christiano cosme kyletammi Sojammieali, waaxda luqadaha, qaybta kaalmada vlad, lindy cadet. So [facility] [phone].    ATENCIÓN: Si habla español, tiene a dumont disposición servicios gratuitos de asistencia lingüística. Kingcharlene al [phone].    We comply with applicable federal civil rights laws and [place] laws. We do not discriminate on the basis of race, color, national origin, age, disability, sex, sexual orientation, or gender identity.               Review of your medicines      START taking        Dose / Directions    enoxaparin 40 MG/0.4ML injection   Commonly known as:  LOVENOX        Dose:  40 mg   Inject 0.4 mLs (40 mg) Subcutaneous every 24 hours for 10 days   Quantity:  4 mL   Refills:  0       HYDROcodone-acetaminophen 5-325 MG per tablet   Commonly known as:  NORCO        Dose:  1 tablet   Take 1 tablet by mouth every 3 hours as needed for moderate to severe pain   Quantity:  50 tablet   Refills:  0       senna-docusate 8.6-50 MG per tablet   Commonly known as:  SENOKOT-S;PERICOLACE        Dose:  1-2 tablet   Take 1-2 tablets by mouth 2 times daily   Quantity:  50 tablet   Refills:  0         CONTINUE these medicines which have NOT CHANGED        Dose / Directions    DAILY MULTIVITAMIN PO   Notes to Patient:  Not given in hospital, resume as per home        Dose:  2 tablet   Take 2 tablets by mouth daily   Refills:  0       FISH OIL PO   Notes to Patient:  Not given in hospital, resume as per home        Dose:  2 tablet   Take 2 tablets by mouth daily   Refills:  0       GLUCOSAMINE-CHONDROITIN PO   Notes to Patient:  Not given in hospital, resume as per home        Dose:  2 tablet   Take 2 tablets by mouth daily   Refills:  0            Where to get your medicines      These medications were sent to [facility] - [address]  [address] [place] [address]     Phone:  [phone]      enoxaparin 40 MG/0.4ML injection    senna-docusate 8.6-50 MG per tablet         Some of these will need a paper prescription and others can be bought over the counter. Ask your nurse if you have questions.     Bring a paper prescription for each of these medications     HYDROcodone-acetaminophen 5-325 MG per tablet                Protect others around you: Learn how to safely use, store and throw away your medicines at www.disposemymeds.org.             Medication List: This is a list of all your medications and when to take them. Check marks below indicate your daily home schedule. Keep this list as a reference.      Medications           Morning Afternoon Evening Bedtime As Needed    DAILY MULTIVITAMIN PO   Take 2 tablets by mouth daily   Notes to Patient:  Not given in hospital, resume as per home                                   enoxaparin 40 MG/0.4ML injection   Commonly known as:  LOVENOX   Inject 0.4 mLs (40 mg) Subcutaneous every 24 hours for 10 days   Last time this was given:  40 mg on 10/5/2017  7:59 AM   Next Dose Due:  Tomorrow 10/6 in am                                   FISH OIL PO   Take 2 tablets by mouth daily   Notes to Patient:  Not given in hospital, resume as per home                                   GLUCOSAMINE-CHONDROITIN PO   Take 2 tablets by mouth daily   Notes to Patient:  Not given in hospital, resume as per home                                   HYDROcodone-acetaminophen 5-325 MG per tablet   Commonly known as:  NORCO   Take 1 tablet by mouth every 3 hours as needed for moderate to severe pain   Last time this was given:  1 tablet on 10/5/2017  3:24 PM   Next Dose Due:  Today at 6:30pm if needed                                   senna-docusate 8.6-50 MG per tablet   Commonly known as:  SENOKOT-S;PERICOLACE   Take 1-2 tablets by mouth 2 times daily   Last time this was given:  2 tablets on 10/5/2017  7:59 AM   Next Dose Due:  Today 10/5 in pm

## 2017-10-03 ENCOUNTER — APPOINTMENT (OUTPATIENT)
Dept: PHYSICAL THERAPY | Facility: CLINIC | Age: 72
DRG: 470 | End: 2017-10-03
Attending: ORTHOPAEDIC SURGERY
Payer: MEDICARE

## 2017-10-03 LAB
ANION GAP SERPL CALCULATED.3IONS-SCNC: 5 MMOL/L (ref 3–14)
BUN SERPL-MCNC: 10 MG/DL (ref 7–30)
CALCIUM SERPL-MCNC: 8.2 MG/DL (ref 8.5–10.1)
CHLORIDE SERPL-SCNC: 109 MMOL/L (ref 94–109)
CO2 SERPL-SCNC: 28 MMOL/L (ref 20–32)
CREAT SERPL-MCNC: 1.1 MG/DL (ref 0.66–1.25)
GFR SERPL CREATININE-BSD FRML MDRD: 66 ML/MIN/1.7M2
GLUCOSE SERPL-MCNC: 91 MG/DL (ref 70–99)
HGB BLD-MCNC: 13.2 G/DL (ref 13.3–17.7)
PLATELET # BLD AUTO: 142 10E9/L (ref 150–450)
POTASSIUM SERPL-SCNC: 4.4 MMOL/L (ref 3.4–5.3)
SODIUM SERPL-SCNC: 142 MMOL/L (ref 133–144)

## 2017-10-03 PROCEDURE — 36415 COLL VENOUS BLD VENIPUNCTURE: CPT | Performed by: ORTHOPAEDIC SURGERY

## 2017-10-03 PROCEDURE — 97530 THERAPEUTIC ACTIVITIES: CPT | Mod: GP | Performed by: PHYSICAL THERAPIST

## 2017-10-03 PROCEDURE — 97116 GAIT TRAINING THERAPY: CPT | Mod: GP | Performed by: PHYSICAL THERAPIST

## 2017-10-03 PROCEDURE — 40000193 ZZH STATISTIC PT WARD VISIT: Performed by: PHYSICAL THERAPIST

## 2017-10-03 PROCEDURE — 80048 BASIC METABOLIC PNL TOTAL CA: CPT | Performed by: ORTHOPAEDIC SURGERY

## 2017-10-03 PROCEDURE — 97530 THERAPEUTIC ACTIVITIES: CPT | Mod: GP | Performed by: PHYSICAL THERAPY ASSISTANT

## 2017-10-03 PROCEDURE — 25000128 H RX IP 250 OP 636: Performed by: ORTHOPAEDIC SURGERY

## 2017-10-03 PROCEDURE — 40000193 ZZH STATISTIC PT WARD VISIT: Performed by: PHYSICAL THERAPY ASSISTANT

## 2017-10-03 PROCEDURE — 25800025 ZZH RX 258: Performed by: ORTHOPAEDIC SURGERY

## 2017-10-03 PROCEDURE — 85049 AUTOMATED PLATELET COUNT: CPT | Performed by: ORTHOPAEDIC SURGERY

## 2017-10-03 PROCEDURE — 25000132 ZZH RX MED GY IP 250 OP 250 PS 637: Mod: GY | Performed by: ORTHOPAEDIC SURGERY

## 2017-10-03 PROCEDURE — 12000007 ZZH R&B INTERMEDIATE

## 2017-10-03 PROCEDURE — 97110 THERAPEUTIC EXERCISES: CPT | Mod: GP | Performed by: PHYSICAL THERAPIST

## 2017-10-03 PROCEDURE — 97110 THERAPEUTIC EXERCISES: CPT | Mod: GP | Performed by: PHYSICAL THERAPY ASSISTANT

## 2017-10-03 PROCEDURE — A9270 NON-COVERED ITEM OR SERVICE: HCPCS | Mod: GY | Performed by: ORTHOPAEDIC SURGERY

## 2017-10-03 PROCEDURE — 85018 HEMOGLOBIN: CPT | Performed by: ORTHOPAEDIC SURGERY

## 2017-10-03 RX ADMIN — ENOXAPARIN SODIUM 40 MG: 40 INJECTION SUBCUTANEOUS at 08:41

## 2017-10-03 RX ADMIN — CEFAZOLIN SODIUM 1 G: 1 INJECTION, POWDER, FOR SOLUTION INTRAMUSCULAR; INTRAVENOUS at 00:28

## 2017-10-03 RX ADMIN — HYDROCODONE BITARTRATE AND ACETAMINOPHEN 1 TABLET: 5; 325 TABLET ORAL at 00:28

## 2017-10-03 RX ADMIN — HYDROCODONE BITARTRATE AND ACETAMINOPHEN 1 TABLET: 5; 325 TABLET ORAL at 21:00

## 2017-10-03 RX ADMIN — SENNOSIDES AND DOCUSATE SODIUM 2 TABLET: 8.6; 5 TABLET ORAL at 08:42

## 2017-10-03 RX ADMIN — HYDROCODONE BITARTRATE AND ACETAMINOPHEN 1 TABLET: 5; 325 TABLET ORAL at 18:01

## 2017-10-03 RX ADMIN — CYCLOBENZAPRINE HYDROCHLORIDE 5 MG: 5 TABLET, FILM COATED ORAL at 14:57

## 2017-10-03 RX ADMIN — HYDROCODONE BITARTRATE AND ACETAMINOPHEN 1 TABLET: 5; 325 TABLET ORAL at 05:06

## 2017-10-03 RX ADMIN — SENNOSIDES AND DOCUSATE SODIUM 2 TABLET: 8.6; 5 TABLET ORAL at 20:30

## 2017-10-03 RX ADMIN — HYDROCODONE BITARTRATE AND ACETAMINOPHEN 1 TABLET: 5; 325 TABLET ORAL at 14:48

## 2017-10-03 RX ADMIN — HYDROCODONE BITARTRATE AND ACETAMINOPHEN 1 TABLET: 5; 325 TABLET ORAL at 08:42

## 2017-10-03 RX ADMIN — HYDROCODONE BITARTRATE AND ACETAMINOPHEN 1 TABLET: 5; 325 TABLET ORAL at 11:52

## 2017-10-03 RX ADMIN — POTASSIUM CHLORIDE, DEXTROSE MONOHYDRATE AND SODIUM CHLORIDE: 150; 5; 450 INJECTION, SOLUTION INTRAVENOUS at 03:20

## 2017-10-03 RX ADMIN — CYCLOBENZAPRINE HYDROCHLORIDE 5 MG: 5 TABLET, FILM COATED ORAL at 06:39

## 2017-10-03 RX ADMIN — KETOROLAC TROMETHAMINE 15 MG: 15 INJECTION, SOLUTION INTRAMUSCULAR; INTRAVENOUS at 04:04

## 2017-10-03 RX ADMIN — HYDROMORPHONE HYDROCHLORIDE 0.5 MG: 1 INJECTION, SOLUTION INTRAMUSCULAR; INTRAVENOUS; SUBCUTANEOUS at 13:17

## 2017-10-03 NOTE — PLAN OF CARE
Problem: Patient Care Overview  Goal: Plan of Care/Patient Progress Review  Outcome: Improving  BP soft, baseline for patient, norco (x3) and IV dilaudid (x1) given for pain. Voiding adequately in urinal.

## 2017-10-03 NOTE — PROGRESS NOTES
[facility] Orthopedic Post-Op / Progress Note  Jasen Rosario is a 72 year old male    Today's Date:10/3/2017  Admission Date: 10/2/2017  POD # 1 TKA         Interval History:   doing well  Pain well managed.            Physical Exam:   All vitals have been reviewed  Temperatures:  Current - Temp: 97.4  F (36.3  C); Max - Temp  Av  F (36.7  C)  Min: 97.4  F (36.3  C)  Max: 98.9  F (37.2  C)  Pulse range: No Data Recorded  Blood pressure range: Systolic (24hrs), Av , Min:91 , Max:119   ; Diastolic (24hrs), Av, Min:48, Max:70      Intake/Output Summary (Last 24 hours) at 10/03/17 1232  Last data filed at 10/03/17 1100   Gross per 24 hour   Intake              240 ml   Output             2975 ml   Net            -2735 ml       Dressing dry and intact.          Data:   All laboratory data related to this surgery reviewed      Lab Results   Component Value Date     10/03/2017    POTASSIUM 4.4 10/03/2017    CHLORIDE 109 10/03/2017    CO2 28 10/03/2017    GLC 91 10/03/2017     Lab Results   Component Value Date    HGB 13.2 (L) 10/03/2017    HGB 16.2 10/02/2017    HGB 15.6 2017     Platelet Count (10e9/L)   Date Value   10/03/2017 142 (L)   10/02/2017 174   2017 177       All imaging studies related to this surgery reviewed         Assessment and Plan:    Assessment:  Doing well.  No immediate surgical complications identified.  No excessive bleeding  Pain well-controlled.    Plan:  Continue physical therapy  Pain control measures  Discharge plan: Home Wed or Thursday    Sebastian Madrigal MD

## 2017-10-03 NOTE — PLAN OF CARE
Problem: Patient Care Overview  Goal: Plan of Care/Patient Progress Review  Discharge Planner PT   Patient plan for discharge: return home with assist and OP PT  Current status: min A for bed mobility; CGA for transfers and CGA/SBA for gait x 80 feet; use of a rolling walker and KI; no buckling of L LE; able to tolerate all L TKA ex's  Barriers to return to prior living situation: stairs; will have assist  Recommendations for discharge: Defer to POD #2  Rationale for recommendations: defer to POD #2       Entered by: Mary Moody 10/03/2017 10:14 AM

## 2017-10-03 NOTE — PLAN OF CARE
Problem: Patient Care Overview  Goal: Plan of Care/Patient Progress Review  Outcome: Improving  Pain managed with Norco and Flexeril, voiding in urinal, tolerating regular diet, VSS on RA, CMS intact.

## 2017-10-03 NOTE — PLAN OF CARE
Problem: Knee Arthroplasty (Total, Partial) (Adult)  Goal: Signs and Symptoms of Listed Potential Problems Will be Absent, Minimized or Managed (Knee Arthroplasty)  Signs and symptoms of listed potential problems will be absent, minimized or managed by discharge/transition of care (reference Knee Arthroplasty (Total, Partial) (Adult) CPG).   A/O x4. Up ax1 GB walker. Voiding adequately per urinal. VSS on RA. CMS intact. Immobilizer on at NOC. Pain controlled with oxyCODONE and flexeril. Drsg C/D/I. HVC in place and patent. Progressing per POC. Will cont to monitor.

## 2017-10-03 NOTE — PLAN OF CARE
Problem: Patient Care Overview  Goal: Plan of Care/Patient Progress Review  Discharge Planner PT   Patient plan for discharge: return home with assist and OP PT    Current status: Pt performed bed mobility with min assist and sit to/from stand transfers with CGA.  Pt performed gait training 15 ft x 1 using wheeled walker with KI on and no knee buckling noted.  Following exs pt stood up from bed and became very light-headed.  Sat down on EOB a couple minutes and then needed to ly back down due to feeling faint.  Pt remained alert.  Supine BP was 98/57 and HR 56 bpm.  Pt remained lying down about 5 minutes until symptoms resolved.  Upon sitting up on EOB again pt c/o feeling light-headed.  Seated BP was 79/49 and HR 64 bpm.  Pt was able to stand and transfer to w/c using wheeled walker with CGA.  Pt returned to his room via w/c and was assisted back into bed.  Nurse aware of low BP and pt c/o feeling light-headed.    Barriers to return to prior living situation: stairs; will have assist  Recommendations for discharge: Defer to POD #2  Rationale for recommendations: defer to POD #2       Entered by: Clara Herrera 10/03/2017 2:54 PM

## 2017-10-04 ENCOUNTER — APPOINTMENT (OUTPATIENT)
Dept: PHYSICAL THERAPY | Facility: CLINIC | Age: 72
DRG: 470 | End: 2017-10-04
Attending: ORTHOPAEDIC SURGERY
Payer: MEDICARE

## 2017-10-04 ENCOUNTER — APPOINTMENT (OUTPATIENT)
Dept: OCCUPATIONAL THERAPY | Facility: CLINIC | Age: 72
DRG: 470 | End: 2017-10-04
Attending: ORTHOPAEDIC SURGERY
Payer: MEDICARE

## 2017-10-04 LAB — HGB BLD-MCNC: 12.5 G/DL (ref 13.3–17.7)

## 2017-10-04 PROCEDURE — 97530 THERAPEUTIC ACTIVITIES: CPT | Mod: GP | Performed by: PHYSICAL THERAPY ASSISTANT

## 2017-10-04 PROCEDURE — 85018 HEMOGLOBIN: CPT | Performed by: ORTHOPAEDIC SURGERY

## 2017-10-04 PROCEDURE — 97535 SELF CARE MNGMENT TRAINING: CPT | Mod: GO

## 2017-10-04 PROCEDURE — 25000132 ZZH RX MED GY IP 250 OP 250 PS 637: Mod: GY | Performed by: ORTHOPAEDIC SURGERY

## 2017-10-04 PROCEDURE — 25000128 H RX IP 250 OP 636: Performed by: ORTHOPAEDIC SURGERY

## 2017-10-04 PROCEDURE — 12000007 ZZH R&B INTERMEDIATE

## 2017-10-04 PROCEDURE — 97110 THERAPEUTIC EXERCISES: CPT | Mod: GP | Performed by: PHYSICAL THERAPY ASSISTANT

## 2017-10-04 PROCEDURE — 36415 COLL VENOUS BLD VENIPUNCTURE: CPT | Performed by: ORTHOPAEDIC SURGERY

## 2017-10-04 PROCEDURE — 97165 OT EVAL LOW COMPLEX 30 MIN: CPT | Mod: GO

## 2017-10-04 PROCEDURE — 97530 THERAPEUTIC ACTIVITIES: CPT | Mod: GO

## 2017-10-04 PROCEDURE — A9270 NON-COVERED ITEM OR SERVICE: HCPCS | Mod: GY | Performed by: ORTHOPAEDIC SURGERY

## 2017-10-04 PROCEDURE — 40000193 ZZH STATISTIC PT WARD VISIT: Performed by: PHYSICAL THERAPY ASSISTANT

## 2017-10-04 PROCEDURE — 97116 GAIT TRAINING THERAPY: CPT | Mod: GP | Performed by: PHYSICAL THERAPY ASSISTANT

## 2017-10-04 PROCEDURE — 40000133 ZZH STATISTIC OT WARD VISIT

## 2017-10-04 RX ORDER — HYDROCODONE BITARTRATE AND ACETAMINOPHEN 5; 325 MG/1; MG/1
1 TABLET ORAL
Qty: 50 TABLET | Refills: 0 | Status: SHIPPED | OUTPATIENT
Start: 2017-10-04 | End: 2018-03-08

## 2017-10-04 RX ORDER — AMOXICILLIN 250 MG
1-2 CAPSULE ORAL 2 TIMES DAILY
Qty: 50 TABLET | Refills: 0 | Status: SHIPPED | OUTPATIENT
Start: 2017-10-04 | End: 2018-03-08

## 2017-10-04 RX ADMIN — SENNOSIDES AND DOCUSATE SODIUM 2 TABLET: 8.6; 5 TABLET ORAL at 22:07

## 2017-10-04 RX ADMIN — HYDROCODONE BITARTRATE AND ACETAMINOPHEN 1 TABLET: 5; 325 TABLET ORAL at 22:07

## 2017-10-04 RX ADMIN — SENNOSIDES AND DOCUSATE SODIUM 2 TABLET: 8.6; 5 TABLET ORAL at 08:45

## 2017-10-04 RX ADMIN — CYCLOBENZAPRINE HYDROCHLORIDE 5 MG: 5 TABLET, FILM COATED ORAL at 00:15

## 2017-10-04 RX ADMIN — HYDROCODONE BITARTRATE AND ACETAMINOPHEN 1 TABLET: 5; 325 TABLET ORAL at 16:17

## 2017-10-04 RX ADMIN — CYCLOBENZAPRINE HYDROCHLORIDE 5 MG: 5 TABLET, FILM COATED ORAL at 22:09

## 2017-10-04 RX ADMIN — ENOXAPARIN SODIUM 40 MG: 40 INJECTION SUBCUTANEOUS at 08:45

## 2017-10-04 RX ADMIN — CYCLOBENZAPRINE HYDROCHLORIDE 5 MG: 5 TABLET, FILM COATED ORAL at 10:20

## 2017-10-04 RX ADMIN — HYDROCODONE BITARTRATE AND ACETAMINOPHEN 1 TABLET: 5; 325 TABLET ORAL at 08:45

## 2017-10-04 RX ADMIN — HYDROCODONE BITARTRATE AND ACETAMINOPHEN 1 TABLET: 5; 325 TABLET ORAL at 13:01

## 2017-10-04 RX ADMIN — HYDROCODONE BITARTRATE AND ACETAMINOPHEN 1 TABLET: 5; 325 TABLET ORAL at 04:58

## 2017-10-04 RX ADMIN — HYDROCODONE BITARTRATE AND ACETAMINOPHEN 1 TABLET: 5; 325 TABLET ORAL at 19:00

## 2017-10-04 ASSESSMENT — ACTIVITIES OF DAILY LIVING (ADL): PREVIOUS_RESPONSIBILITIES: MEAL PREP;HOUSEKEEPING;LAUNDRY;SHOPPING;MEDICATION MANAGEMENT;YARDWORK;FINANCES;DRIVING

## 2017-10-04 NOTE — PLAN OF CARE
Problem: Patient Care Overview  Goal: Plan of Care/Patient Progress Review  Discharge Planner PT   Patient plan for discharge: return home with assist and OP PT  Current status: Pt performed bed mobility with min assist and sit to/from stand transfers with SBA.  Pt performed gait training 15 ft x 1 and 200 ft x 1 using wheeled walker and CGA.  Good stability without use of KI.  Pt performed 3 stairs x 1 trial using bilateral rails with CGA.  Pt c/o increased pain today but denies dizziness.  Knee AAROM is 12-72 degrees.  Barriers to return to prior living situation: stairs; will have assist  Recommendations for discharge: Home with OP PT per plan established by the PT.   Rationale for recommendations: Pt is progressing well and will have assist of wife at home and OP PT to progress strength and ROM.       Entered by: Clara Herrera 10/04/2017 9:59 AM

## 2017-10-04 NOTE — PLAN OF CARE
Problem: Patient Care Overview  Goal: Plan of Care/Patient Progress Review  Outcome: Improving  Pt A&Ox4, VSS, CMS intact, up with assist of one and walker to BR, voiding well, Norco/flexeril for pain management, dressing changed, incision C,D,I with staples, tolerating regular diet, prune juice x1 for c/o constipation, passing flatus, denies N/V

## 2017-10-04 NOTE — PLAN OF CARE
Problem: Patient Care Overview  Goal: Plan of Care/Patient Progress Review  Pt a/o x 4. VSS. Pain well controlled with prn norco, prn flexeril. CMS intact, dressing is CDI. Pt not able to tolerate sitting in chair for extended period of time, max of 15 minutes. Tolerating regular diet, up with SBA.

## 2017-10-04 NOTE — PROGRESS NOTES
[facility] Orthopedic Post-Op / Progress Note  Jasen Rosario is a 72 year old male    Today's Date:10/4/2017  Admission Date: 10/2/2017  POD # 2   TKA         Interval History:   doing well  More painful today but progressing nicely            Physical Exam:   All vitals have been reviewed  Temperatures:  Current - Temp: 98.6  F (37  C); Max - Temp  Av.5  F (36.9  C)  Min: 98.3  F (36.8  C)  Max: 98.8  F (37.1  C)  Pulse range: Pulse  Av.5  Min: 77  Max: 84  Blood pressure range: Systolic (24hrs), Av , Min:109 , Max:117   ; Diastolic (24hrs), Av, Min:61, Max:75      Intake/Output Summary (Last 24 hours) at 10/04/17 1603  Last data filed at 10/04/17 1300   Gross per 24 hour   Intake              760 ml   Output             1375 ml   Net             -615 ml       Wound clean and dry with minimal or no drainage.  Surrounding skin with minimal erythema.          Data:   All laboratory data related to this surgery reviewed      Lab Results   Component Value Date     10/03/2017    POTASSIUM 4.4 10/03/2017    CHLORIDE 109 10/03/2017    CO2 28 10/03/2017    GLC 91 10/03/2017     Lab Results   Component Value Date    HGB 12.5 (L) 10/04/2017    HGB 13.2 (L) 10/03/2017    HGB 16.2 10/02/2017     Platelet Count (10e9/L)   Date Value   10/03/2017 142 (L)   10/02/2017 174   2017 177       All imaging studies related to this surgery reviewed         Assessment and Plan:    Assessment:  Doing well.  No immediate surgical complications identified.  No excessive bleeding  Pain well-controlled.  Tolerating physical therapy and rehabilitation well.    Plan:  Continue physical therapy  Pain control measures  Discharge plan: Home tomorrow    Sebastian Madrigal MD

## 2017-10-04 NOTE — PROGRESS NOTES
10/04/17 1216   Quick Adds   Type of Visit Initial Occupational Therapy Evaluation   Living Environment   Lives With spouse;child(dario), adult   Living Arrangements house   Home Accessibility stairs to enter home;stairs within home   Number of Stairs to Enter Home 2   Number of Stairs Within Home 11   Transportation Available family or friend will provide;car   Self-Care   Dominant Hand right   Usual Activity Tolerance excellent   Current Activity Tolerance fair   Regular Exercise yes   Activity/Exercise Type team sports;running/jogging  (plays pickle ball)   Exercise Amount/Frequency 3-5 times/wk   Equipment Currently Used at Home none   Activity/Exercise/Self-Care Comment Owns (from previous hip surgery) but does not use: reacher, sock-aid, long shoe horn and sponge, raised toilet seat, crutches, FWW   Functional Level Prior   Ambulation 0-->independent   Transferring 0-->independent   Toileting 0-->independent   Bathing 0-->independent   Dressing 0-->independent   Eating 0-->independent   Communication 0-->understands/communicates without difficulty   Swallowing 0-->swallows foods/liquids without difficulty   Cognition 0 - no cognition issues reported   Fall history within last six months no   Which of the above functional risks had a recent onset or change? ambulation;transferring   Prior Functional Level Comment Pt I with all ADL/IADL's and very active, retired.    General Information   Onset of Illness/Injury or Date of Surgery - Date 10/02/17   Referring Physician Dr. Sebastian Madrigal   Patient/Family Goals Statement return home w/OP PT   Additional Occupational Profile Info/Pertinent History of Current Problem 71 yo male POD#2 L TKA. PMH includes L BETTY approx 3mo ago.   Precautions/Limitations fall precautions   Weight-Bearing Status - LLE weight-bearing as tolerated   Cognitive Status Examination   Cognitive Comment globallyintact   Pain Assessment   Patient Currently in Pain Yes, see Vital Sign flowsheet    Range of Motion (ROM)   ROM Quick Adds No deficits were identified  (BUE)   Strength   Manual Muscle Testing Quick Adds No deficits were identified  (BUE)   Mobility   Bed Mobility Comments Min A to bring affected LLE to floor for supine to sit   Transfer Skill: Sit to Stand   Level of Person: Sit/Stand contact guard   Physical Assist/Nonphysical Assist: Sit/Stand 1 person assist   Assistive Device for Transfer: Sit/Stand rolling walker   Transfer Skill: Toilet Transfer   Level of Person: Toilet contact guard   Physical Assist/Nonphysical Assist: Toilet 1 person assist;verbal cues   Weight-Bearing Restrictions: Toilet weight-bearing as tolerated   Assistive Device rolling walker   Lower Body Dressing   Level of Person: Dress Lower Body moderate assist (50% patients effort)   Physical Assist/Nonphysical Assist: Dress Lower Body 1 person assist;set-up required   Toileting   Level of Person: Toilet stand-by assist   Physical Assist/Nonphysical Assist: Toilet supervision;verbal cues   Eating/Self Feeding   Level of Person: Eating independent   Physical Assist/Nonphysical Assist: Eating set-up required   Instrumental Activities of Daily Living (IADL)   Previous Responsibilities meal prep;housekeeping;laundry;shopping;medication management;yardwork;finances;driving   IADL Comments Spouse and daughter can assist as needed   Activities of Daily Living Analysis   Impairments Contributing to Impaired Activities of Daily Living balance impaired;pain;post surgical precautions;ROM decreased;strength decreased  (LLE)   General Therapy Interventions   Planned Therapy Interventions ADL retraining;IADL retraining;transfer training   Clinical Impression   Criteria for Skilled Therapeutic Interventions Met yes, treatment indicated   OT Diagnosis decreased ADL/IADL performance   Influenced by the following impairments functional mobility   Assessment of Occupational Performance 1-3 Performance Deficits  "  Identified Performance Deficits dressing, bathing, functional mobiltiy   Clinical Decision Making (Complexity) Low complexity   Therapy Frequency daily   Predicted Duration of Therapy Intervention (days/wks) eval and one treatment only   Anticipated Discharge Disposition Home with Assist;Home with Outpatient Therapy   Risks and Benefits of Treatment have been explained. Yes   Patient, Family & other staff in agreement with plan of care Yes   [facility] TM \"6 Clicks\"   2016, Trustees of [facility], under license to [employer].  All rights reserved.   6 Clicks Short Forms Daily Activity Inpatient Short Form   [facility]  \"6 Clicks\" Daily Activity Inpatient Short Form   1. Putting on and taking off regular lower body clothing? 3 - A Little   2. Bathing (including washing, rinsing, drying)? 3 - A Little   3. Toileting, which includes using toilet, bedpan or urinal? 4 - None   4. Putting on and taking off regular upper body clothing? 4 - None   5. Taking care of personal grooming such as brushing teeth? 4 - None   6. Eating meals? 4 - None   Daily Activity Raw Score (Score out of 24.Lower scores equate to lower levels of function) 22   Total Evaluation Time   Total Evaluation Time (Minutes) 15     " unknown

## 2017-10-04 NOTE — DISCHARGE INSTRUCTIONS
DISCHARGE INSTRUCTIONS AFTER YOUR TOTAL KNEE REPLACEMENT     ANAYA WASHINGTON MD       Instructions to care for your wound at home:   Change the dressing daily.  Inspect your incision at the time of dressing change for increased redness, tenderness, swelling, or drainage along the incision line.  Some bruising or discoloration is usually present, but call my office for any changes in appearance that concern you.  Also call if you develop a fever above 101 degrees.     You may shower directly over the wound beginning 4 days after your surgery, but do not submerge the wound under water until after your post operative visit when the sutures are removed and the wound is completely sealed without drainage.    Activities:  Physical activity may be resumed gradually according to your comfort level. You may bear weight on your operative leg as tolerated with your crutches or walker as instructed by your therapist.  Follow your home exercise program.  Ice your knee after exercising.        Wear your knee immobilizer at night only until your office visit in 2 weeks to maintain full knee extension.  Do not use the immobilizer during the day unless otherwise instructed.      Wear the anti-embolism stockings day and night until seen in the office for your post operative visit. Remove them twice daily for one hour at a time. Keep the compression stockings flat on your leg.  Do not allow them to roll up or crease your skin.  Call if you develop calf pain.     Outpatient Physical Therapy and home exercises:  Outpatient physical therapy visits are required following discharge from the hospital. The referral for these outpatient therapy visits is routinely given to you at the time of your surgical scheduling. You should have already scheduled your therapy sessions in advance.  If you have not done so, please immediately call the therapy site of your choice to schedule the physical therapy regimen that has been prescribed for you.  You  may discontinue the crutches or walker per the therapist's recommendation.      Medications:  New medications for you on discharge will include a pain medication, a stool softener while on the narcotic pain medication, and a blood thinner.  Detailed instructions will come with those medications.  You will also receive instructions on when to resume your home medications.     If you routinely take Aspirin 81 mg, hold the Aspirin while taking the formal blood thinner medication. Then take Aspirin 325 mg (1 tablet) daily for 4 weeks.  Then resume your Aspirin 81 mg daily if that is your routine.        Antibiotic coverage will be needed before any type of dental procedure.  This is a life long recommendation.  You should notify your dentist of your total knee surgery and call your dentist or our office one week before a dental appointment for antibiotics.        Clinic follow-up appointment:  Your clinic follow-up appointment has been prearranged.  Call [phone] with any questions.    Sebastian Madrigal MD

## 2017-10-04 NOTE — PLAN OF CARE
Problem: Patient Care Overview  Goal: Plan of Care/Patient Progress Review  OT eval and treatment completed.   Discharge Planner OT   Patient plan for discharge: home w/spouse assist  Current status: SBA bed mob, toilet transfer/toileting, Min A LB dressing which spouse will provide. Pt has any needed adaptive equipment at home from having had recent L BETTY.  Pt near baseline level of ADL function; will defer further mobility training to PT. No further skilled OT intervention needed a this time.  Barriers to return to prior living situation: none noted  Recommendations for discharge: home w/spouse assist  Rationale for recommendations: pt progressing well, OT goals met       Entered by: Oscar Kinsey 10/04/2017 12:39 PM

## 2017-10-04 NOTE — PLAN OF CARE
Problem: Patient Care Overview  Goal: Plan of Care/Patient Progress Review  Outcome: No Change  A&Ox4. VSS. CMS intact. Dressing CDI. Up SBA. Regular diet. Norco and flexeril for pain. Voiding. Continue to monitor.

## 2017-10-04 NOTE — DISCHARGE SUMMARY
DISCHARGE SUMMARY    NAME:  Jasen Rosario  AGE:  72 year old  YOB: 1945  MRN#:  [id number]    Jasen Rosario was admitted for elective total knee arthroplasty.  The surgery was performed on 10/2/2017.  The postoperative course is documented in the medical record.  There were no complications. The patient was felt ready for discharge home on POD #3 with post-discharge physical therapy and outpatient visit prearranged.     Lab Results   Component Value Date    HGB 12.5 (L) 10/04/2017    HGB 13.2 (L) 10/03/2017    HGB 16.2 10/02/2017       The patient received 0 units transfusion.      FINAL DISCHARGE DIAGNOSIS:  Degenerative osteoarthritis knee.               Acute blood loss anemia     SURGICAL PROCEDURE THIS ADMISSION:  Left  total knee arthroplasty.         ANAYA WASHINGTON MD      CC: Fax [phone]         Anaya Washington MD

## 2017-10-05 ENCOUNTER — APPOINTMENT (OUTPATIENT)
Dept: PHYSICAL THERAPY | Facility: CLINIC | Age: 72
DRG: 470 | End: 2017-10-05
Attending: ORTHOPAEDIC SURGERY
Payer: MEDICARE

## 2017-10-05 VITALS
TEMPERATURE: 98.3 F | BODY MASS INDEX: 21.67 KG/M2 | HEIGHT: 68 IN | WEIGHT: 143 LBS | HEART RATE: 77 BPM | SYSTOLIC BLOOD PRESSURE: 104 MMHG | OXYGEN SATURATION: 94 % | DIASTOLIC BLOOD PRESSURE: 67 MMHG | RESPIRATION RATE: 16 BRPM

## 2017-10-05 LAB
CREAT SERPL-MCNC: 1.03 MG/DL (ref 0.66–1.25)
GFR SERPL CREATININE-BSD FRML MDRD: 71 ML/MIN/1.7M2
PLATELET # BLD AUTO: 143 10E9/L (ref 150–450)

## 2017-10-05 PROCEDURE — 97110 THERAPEUTIC EXERCISES: CPT | Mod: GP | Performed by: PHYSICAL THERAPY ASSISTANT

## 2017-10-05 PROCEDURE — A9270 NON-COVERED ITEM OR SERVICE: HCPCS | Mod: GY | Performed by: ORTHOPAEDIC SURGERY

## 2017-10-05 PROCEDURE — 36415 COLL VENOUS BLD VENIPUNCTURE: CPT | Performed by: ORTHOPAEDIC SURGERY

## 2017-10-05 PROCEDURE — 97530 THERAPEUTIC ACTIVITIES: CPT | Mod: GP | Performed by: PHYSICAL THERAPY ASSISTANT

## 2017-10-05 PROCEDURE — 25000128 H RX IP 250 OP 636: Performed by: ORTHOPAEDIC SURGERY

## 2017-10-05 PROCEDURE — 97116 GAIT TRAINING THERAPY: CPT | Mod: GP | Performed by: PHYSICAL THERAPY ASSISTANT

## 2017-10-05 PROCEDURE — 25000132 ZZH RX MED GY IP 250 OP 250 PS 637: Mod: GY | Performed by: ORTHOPAEDIC SURGERY

## 2017-10-05 PROCEDURE — 40000193 ZZH STATISTIC PT WARD VISIT: Performed by: PHYSICAL THERAPY ASSISTANT

## 2017-10-05 PROCEDURE — 85049 AUTOMATED PLATELET COUNT: CPT | Performed by: ORTHOPAEDIC SURGERY

## 2017-10-05 PROCEDURE — 82565 ASSAY OF CREATININE: CPT | Performed by: ORTHOPAEDIC SURGERY

## 2017-10-05 RX ADMIN — HYDROCODONE BITARTRATE AND ACETAMINOPHEN 1 TABLET: 5; 325 TABLET ORAL at 15:24

## 2017-10-05 RX ADMIN — HYDROCODONE BITARTRATE AND ACETAMINOPHEN 1 TABLET: 5; 325 TABLET ORAL at 12:24

## 2017-10-05 RX ADMIN — SENNOSIDES AND DOCUSATE SODIUM 2 TABLET: 8.6; 5 TABLET ORAL at 07:59

## 2017-10-05 RX ADMIN — ENOXAPARIN SODIUM 40 MG: 40 INJECTION SUBCUTANEOUS at 07:59

## 2017-10-05 RX ADMIN — CYCLOBENZAPRINE HYDROCHLORIDE 5 MG: 5 TABLET, FILM COATED ORAL at 06:12

## 2017-10-05 RX ADMIN — HYDROCODONE BITARTRATE AND ACETAMINOPHEN 1 TABLET: 5; 325 TABLET ORAL at 07:59

## 2017-10-05 NOTE — PLAN OF CARE
Problem: Patient Care Overview  Goal: Plan of Care/Patient Progress Review  Discharge Planner PT   Patient plan for discharge: return home with assist and OP PT  Current status: Pt performed bed mobility with very minimal assist and sit to/from stand transfers independently.  Pt performed gait training 15 ft x 1 and 200 ft x 1 using wheeled walker and SBA.  Good stability without use of KI.  Pt declined trial of stairs as states he feels comfortable with them. Knee AAROM is 10-86 degrees.  Barriers to return to prior living situation: None  Recommendations for discharge: Home with OP PT per plan established by the PT.   Rationale for recommendations: Pt is progressing well and will have assist of wife at home and OP PT to progress strength and ROM.       Entered by: Clara Herrera 10/05/2017 9:28 AM       Pt is discharging home today with assist and OP PT.  PT goals partially met.Physical Therapy Discharge Summary    Reason for therapy discharge:    Discharged to home with outpatient therapy.    Progress towards therapy goal(s). See goals on Care Plan in [facility] electronic health record for goal details.  Goals partially met.  Barriers to achieving goals:   discharge from facility.    Therapy recommendation(s):    Continued therapy is recommended.  Rationale/Recommendations:  OP PT to maximize return to PLOF.

## 2017-10-05 NOTE — PLAN OF CARE
Problem: Patient Care Overview  Goal: Plan of Care/Patient Progress Review  Outcome: Adequate for Discharge Date Met:  10/05/17  Pt A&Ox4, VSS, CMS intact, dressing changed, incision C,D,I with staples, up with SBA and walker, voiding well, had BM today, Norco for pain management, tolerating regular diet. Pt discharged today with spouse. Pt verbalizes understanding of discharge instructions/medications/follow up plan. Pt spouse present during education.

## 2017-10-05 NOTE — PLAN OF CARE
Problem: Patient Care Overview  Goal: Plan of Care/Patient Progress Review  Outcome: Improving  Patient is A&O, VSS on RA, CMS intact, regular diet, up with assist of 1. He is taking Norco and Flexeril for pain control, voiding adequately, and dressing CDI. Plan is to d/c today, will continue to monitor.

## 2017-10-06 ENCOUNTER — TELEPHONE (OUTPATIENT)
Dept: FAMILY MEDICINE | Facility: CLINIC | Age: 72
End: 2017-10-06

## 2017-10-06 NOTE — TELEPHONE ENCOUNTER
ED / Discharge Outreach Protocol    Patient Contact    Attempt # 1    Was call answered?  No.  Left message on voicemail with information to call me back.  Josette Richmond RN  [facility]

## 2017-10-06 NOTE — TELEPHONE ENCOUNTER
Pt was DC'd from  SD on 10/5/2017 after being treated for Left Knee Djd, S/P Total Knee Arthroplasty.  Next scheduled appt with PCP is not scheduled.  Please call patient.  Thank you!  Lois Ervin

## 2017-10-09 NOTE — TELEPHONE ENCOUNTER
"Called   Telephone Information:   Mobile [phone]     ED/Discharge Protocol    \"Hi, my name is Clara Ortiz, a registered nurse, and I am calling on behalf of Dr. Meadows's office at [facility].  I am calling to follow up and see how things are going for you after your recent visit.\"    \"I see that you were in the (ER/UC/IP) on  DC'd from [facility] on 10/5/2017 after being treated for Left Knee Djd, S/P Total Knee Arthroplasty.    How are you doing now that you are home?\" It's been extremely painful. Leg is very swollen, having a hard time sleeping at night due to the pain. PT saw patient on Friday and told patient it was looking fine. Patient is taking hydrocodone 325mg (1 tab q.3h)  Pain: 0/10 when resting, 6/10 with activity    DENIES: redness/warmth     Advised patient to use ice, elevate leg, continue to take pain medication, call surgeon to discuss pain    Is patient experiencing symptoms that may require a hospital visit?  No    Discharge Instructions    \"Let's review your discharge instructions.  What is/are the follow-up recommendations?  Pt. Response: PT, surgeon    \"Were you instructed to make a follow-up appointment?\"  Pt. Response: No.       \"When you see the provider, I would recommend that you bring your discharge instructions with you.    Medications    \"How many new medications are you on since your hospitalization/ED visit?\"    0-1  \"How many of your current medicines changed (dose, timing, name, etc.) while you were in the hospital/ED visit?\"   0-1  \"Do you have questions about your medications?\"   No  \"Were you newly diagnosed with heart failure, COPD, diabetes or did you have a heart attack?\"   No  For patients on insulin: \"Did you start on insulin in the hospital or did you have your insulin dose changed?\"   No    Medication reconciliation completed? Yes    Was MTM referral placed (*Make sure to put transitions as reason for referral)?   No    Call Summary    \"Do you have any questions or " "concerns about your condition or care plan at the moment?\"    No  Triage nurse advice given: ice, elevate leg, continue to take pain medication, call surgeon to discuss pain    Patient was in ER 2 in the past year (assess appropriateness of ER visits.)      \"If you have questions or things don't continue to improve, we encourage you contact us through the main clinic number,  [phone].  Even if the clinic is not open, triage nurses are available 24/7 to help you.     We would like you to know that our clinic has extended hours (provide information).  We also have urgent care (provide details on closest location and hours/contact info)\"      \"Thank you for your time and take care!\"      Clara Ortiz RN  [facility]    "

## 2017-10-12 ENCOUNTER — TRANSFERRED RECORDS (OUTPATIENT)
Dept: HEALTH INFORMATION MANAGEMENT | Facility: CLINIC | Age: 72
End: 2017-10-12

## 2017-10-26 ENCOUNTER — TRANSFERRED RECORDS (OUTPATIENT)
Dept: HEALTH INFORMATION MANAGEMENT | Facility: CLINIC | Age: 72
End: 2017-10-26

## 2017-11-28 ENCOUNTER — TRANSFERRED RECORDS (OUTPATIENT)
Dept: HEALTH INFORMATION MANAGEMENT | Facility: CLINIC | Age: 72
End: 2017-11-28

## 2017-12-19 ENCOUNTER — OFFICE VISIT (OUTPATIENT)
Dept: FAMILY MEDICINE | Facility: CLINIC | Age: 72
End: 2017-12-19
Payer: COMMERCIAL

## 2017-12-19 DIAGNOSIS — D22.9 MULTIPLE BENIGN MELANOCYTIC NEVI: ICD-10-CM

## 2017-12-19 DIAGNOSIS — L57.0 AK (ACTINIC KERATOSIS): Primary | ICD-10-CM

## 2017-12-19 DIAGNOSIS — L82.1 SEBORRHEIC KERATOSES: ICD-10-CM

## 2017-12-19 DIAGNOSIS — D18.01 CAPILLARY HEMANGIOMA OF SKIN: ICD-10-CM

## 2017-12-19 DIAGNOSIS — C44.219: ICD-10-CM

## 2017-12-19 DIAGNOSIS — D48.5 NEOPLASM OF UNCERTAIN BEHAVIOR OF SKIN: ICD-10-CM

## 2017-12-19 PROCEDURE — 17003 DESTRUCT PREMALG LES 2-14: CPT | Performed by: FAMILY MEDICINE

## 2017-12-19 PROCEDURE — 88305 TISSUE EXAM BY PATHOLOGIST: CPT | Performed by: FAMILY MEDICINE

## 2017-12-19 PROCEDURE — 11310 SHAVE SKIN LESION 0.5 CM/<: CPT | Mod: 59 | Performed by: FAMILY MEDICINE

## 2017-12-19 PROCEDURE — 17000 DESTRUCT PREMALG LESION: CPT | Mod: 51 | Performed by: FAMILY MEDICINE

## 2017-12-19 PROCEDURE — 99213 OFFICE O/P EST LOW 20 MIN: CPT | Mod: 25 | Performed by: FAMILY MEDICINE

## 2017-12-19 PROCEDURE — 11311 SHAVE SKIN LESION 0.6-1.0 CM: CPT | Mod: 59 | Performed by: FAMILY MEDICINE

## 2017-12-19 PROCEDURE — 99000 SPECIMEN HANDLING OFFICE-LAB: CPT | Performed by: FAMILY MEDICINE

## 2017-12-19 NOTE — PATIENT INSTRUCTIONS
"FUTURE APPOINTMENTS  Follow up in 1 year(s) for a full-body skin cancer screening.  Follow up per pathology report.     WOUND CARE INSTRUCTIONS  1. After 24 hours, change dressing daily.  2. Wash hands before every dressing change.  3. Wash the wound area with a mild soap, then rinse  4. Gently pat dry with a sterile gauze or Q-tip.  5. Apply Vaseline or Aquaphor only over entire wound. Do NOT use Neosporin - as many people react to neomycin.  6. Finally, cover with a bandage or sterile non-stick gauze with micropore paper tape.  7. Repeat once daily until wound has healed.    Do not let the wound dry out.  The wound will heal faster and with better cosmetic results if routinely kept moist with step #5. It is a false belief that a wound heals better when it is exposed to air and allowed to dry out.      Soap, water and shampoo will not hurt this area.    Do not go swimming or take baths, but showering is encouraged.    Limit use of the area where the procedure was done for a few days to allow for optimal healing.    If you experience bleeding:  Repeat steps #2-5 and hold firm pressure on the area for 10 minutes without checking to see if the bleeding has stopped. \"Checking\" pulls off the protective wound clot and restarts the bleeding all over again. Re-apply pressure for 10 minutes if necessary to stop bleeding.  Use additional sterile gauze and tape to maintain pressure once bleeding has stopped.    Signs of Infection:  Infection can occur in any area where skin has been disrupted.  If you notice persistent redness, swelling, colored drainage, increasing pain, fever or other signs of infection, please call us at: [phone] and ask to have me or my colleague paged. We will call you back to discuss.    Pathology Results:  You will be notified, generally via letter or MyChart, in approximately 10 days. If there is anything we need to discuss or further treatment needed, I will call you to discuss it.    Skin " tissue can be some of the most challenging tissue for pathologists to examine, therefore we may use a specialist outside the [employer] system to read certain submitted tissue samples. When submitted to these outside specialists, [employer] will notify you that your tissue sample result has returned. However, this only means that the tissue sample has been sent to the specialist. These results are not available in [employer], so I will contact you when I receive the final report.    PATIENT INFORMATION : WOUNDS  During the healing process you will notice a number of changes. All wounds develop a small halo of redness surrounding the wound.  This means healing is occurring. Severe itching with extensive redness usually indicates sensitivity to the ointment or bandage tape used to dress the wound.  You should call our office if this develops.      Swelling  and/or discoloration around your surgical site is common, particularly when performed around the eye.    All wounds normally drain.  The larger the wound the more drainage there will be.  After 7-10 days, you will notice the wound beginning to shrink and new skin will begin to grow.  The wound is healed when you can see skin has formed over the entire area.  A healed wound has a healthy, shiny look to the surface and is red to dark pink in color to normalize.  Wounds may take approximately 4-6 weeks to heal.  Larger wounds may take 6-8 weeks. After the wound is healed you may discontinue dressing changes.    You may experience a sensation of tightness as your wound heals. This is normal and will gradually subside.    Your healed wound may be sensitive to temperature changes. This sensitivity improves with time, but if you re having a lot of discomfort, try to avoid temperature extremes.    Patients frequently experience itching after their wound appears to have healed because of the continue healing under the skin.  Plain Vaseline will help relieve the  itching.    CRYOTHERAPY FOR ACTINIC KERATOSES POST-TREATMENT CARE INSTRUCTIONS  Actinic keratoses are benign, scaly or gritty lesions that appear in sun-exposed areas and may progress to skin cancers. For this reason, it is important to treat them before they become cancerous.  Liquid nitrogen is mildly uncomfortable when applied to the skin, but the discomfort rapidly subsides.    Post-Treatment:  You may experience burning and/or stinging immediately following the procedure. The discomfort from the procedure may persist over the next 12-24 hours. The area treated will look pinker and slightly swollen before the healing process begins. You may also notice redness, swelling, tenderness, weeping and crusts or scabs. Healing time is approximately 10-14 days.    Blister - You may or may notice blistering from the freezing. If you develop an uncomfortable blister from today's treatment, you may gently puncture this with a needle that has been cleaned with alcohol. However, do not remove the protective skin layer of the blister.    Scab - After a few days, you may notice scaliness or scab formation. Do not pick at the scabs because this may cause slower healing and a permanent scar.    The skin may appear temporarily darker at the treatment site, but this usually fades over a period of months, provided that the area is protected from the sun.    Care of the areas treated:    Wash the area with a mild cleanser.    Gently pat dry.    Do not rub.     Keep protected from the sun during the healing process and for a full year following treatment as the skin continues to remodel during this time.    Apply Vaseline or Aquaphor ointment sparingly to the site for the first 7 days after treatment.    Do not use Neosporin, as many people eventually develop a medication allergy, that can easily be confused with an infection, to Neomycin.    Return if:  There should not be any residual scaling. If there is any concern that the lesion  "has persisted after 4-6 weeks, make an appointment for a re-check. Healing time does vary depending on your individual healing process and the area of the body treated. Most patients will be healed in one month.    Signs of Infection:  Thankfully this is rare. However if you notice persistent colored drainage, increasing pain, fever or other signs of infection, please call us at: [phone]    SUN PROTECTION INSTRUCTIONS  Sun damage can lead to skin cancer and premature aging of the skin.      The best way to protect from sun damage to your skin is to avoid the sun during peak hours (10 am - 2 pm) even on overcast days.      Use UPF sun-protective clothing, which while more expensive initially provides longer lasting coverage without having to worry about remembering to re-apply.  1. Wear a wide-brimmed hat and sunglasses.   2. Wear sun-protective clothing.  [employer] and other [employer] make sun protective clothing that are stylish, comfortable and cool. [employer] and other [employer] make UV arm sleeves suitable for golfing, gardening and other activities.      Sunscreen instructions:  1. Use sunscreens with Zinc Oxide, Titanium Dioxide or Avobenzone to protect from UVA rays.  2. Use SPF 30-50+ to protect from UVB rays.  3. Re-apply every 2 hours even if water resistant.  4. Apply on your face every day even when cloudy and even in the winter. UVA \"aging rays\" penetrate window glass and are just as strong in the winter as in the summer.    Product Recommendations:    Good examples include: Blue Lizard, [employer]taMD, Solbar    Good daily moisturizers with SPF: Vanicream, CeraVe.    For sensitive skin, consider : [employer] Essential Defense Mineral Shield Broad Spectrum SPF 35      Never use tanning beds. Tanning beds are associated with much higher risks of skin cancer.    All tanning damages the skin. Aim for ivory skin year round and you will have less trouble with your skin in years to come. There is " "no merit in getting \"a base tan\" before a warm weather vacation, as any tanning indicates your body's response to sun damage.    Stop smoking. Smokers have higher rates of skin cancer and also have premature skin wrinkling.    FYI  You should use about 3 tablespoons of sunscreen to protect your whole body. Thus a typical eight ounce bottle of sunscreen should last 4 applications. Remember, that the SPF rating is compromised if you don t apply enough. Most people only apply 1/2 - 1/3 of the amount they need. Also don t forget areas such as your ears, feet, upper back and harder to reach places. Keep in mind that these amounts should be increased for larger body sizes.    Sunscreens with titanium dioxide and/or zinc oxide in the active ingredients are physical blockers as opposed to chemical blockers. Chemical-free sunscreens should not irritate the skin.    Spray-on sunscreens may be used for touch-up application only, not as a base layer. Also, use with caution around small children due to inhalation risk.    Avoid retinyl palmitate products.    Avoid combination products that include both sunscreen and insect repellant, as sunscreen should be applied every 2 hours, but insect repellant should not be applied as frequently.    SPF means sun protection factor, which is just the degree to which the sunscreen can protect against UVB rays. There is no rating system for UVA rays. SPF is calculated as the time skin will burn when sunscreen is applied vs. skin without sunscreen.    Water resistant sunscreens should be re-applied every 1-2 hours.    For more information:  http://www.skincancer.org/prevention/sun-protection/sunscreen/sunscreens-safe-and-effective  "

## 2017-12-19 NOTE — MR AVS SNAPSHOT
"              After Visit Summary   12/19/2017    Jasen Rosario    MRN: [id number]           Patient Information     Date Of Birth          1945        Visit Information        Provider Department      12/19/2017 4:00 PM Mary Castellanos MD [facility]        Today's Diagnoses     AK (actinic keratosis)    -  1    Neoplasm of uncertain behavior of skin        Seborrheic keratoses        Capillary hemangioma of skin        Multiple benign melanocytic nevi          Care Instructions    FUTURE APPOINTMENTS  Follow up in 1 year(s) for a full-body skin cancer screening.  Follow up per pathology report.     WOUND CARE INSTRUCTIONS  1. After 24 hours, change dressing daily.  2. Wash hands before every dressing change.  3. Wash the wound area with a mild soap, then rinse  4. Gently pat dry with a sterile gauze or Q-tip.  5. Apply Vaseline or Aquaphor only over entire wound. Do NOT use Neosporin - as many people react to neomycin.  6. Finally, cover with a bandage or sterile non-stick gauze with micropore paper tape.  7. Repeat once daily until wound has healed.    Do not let the wound dry out.  The wound will heal faster and with better cosmetic results if routinely kept moist with step #5. It is a false belief that a wound heals better when it is exposed to air and allowed to dry out.      Soap, water and shampoo will not hurt this area.    Do not go swimming or take baths, but showering is encouraged.    Limit use of the area where the procedure was done for a few days to allow for optimal healing.    If you experience bleeding:  Repeat steps #2-5 and hold firm pressure on the area for 10 minutes without checking to see if the bleeding has stopped. \"Checking\" pulls off the protective wound clot and restarts the bleeding all over again. Re-apply pressure for 10 minutes if necessary to stop bleeding.  Use additional sterile gauze and tape to maintain pressure once bleeding has " stopped.    Signs of Infection:  Infection can occur in any area where skin has been disrupted.  If you notice persistent redness, swelling, colored drainage, increasing pain, fever or other signs of infection, please call us at: [phone] and ask to have me or my colleague paged. We will call you back to discuss.    Pathology Results:  You will be notified, generally via letter or MyChart, in approximately 10 days. If there is anything we need to discuss or further treatment needed, I will call you to discuss it.    Skin tissue can be some of the most challenging tissue for pathologists to examine, therefore we may use a specialist outside the [employer] system to read certain submitted tissue samples. When submitted to these outside specialists, [employer] will notify you that your tissue sample result has returned. However, this only means that the tissue sample has been sent to the specialist. These results are not available in [facility], so I will contact you when I receive the final report.    PATIENT INFORMATION : WOUNDS  During the healing process you will notice a number of changes. All wounds develop a small halo of redness surrounding the wound.  This means healing is occurring. Severe itching with extensive redness usually indicates sensitivity to the ointment or bandage tape used to dress the wound.  You should call our office if this develops.      Swelling  and/or discoloration around your surgical site is common, particularly when performed around the eye.    All wounds normally drain.  The larger the wound the more drainage there will be.  After 7-10 days, you will notice the wound beginning to shrink and new skin will begin to grow.  The wound is healed when you can see skin has formed over the entire area.  A healed wound has a healthy, shiny look to the surface and is red to dark pink in color to normalize.  Wounds may take approximately 4-6 weeks to heal.  Larger wounds may take 6-8 weeks. After  the wound is healed you may discontinue dressing changes.    You may experience a sensation of tightness as your wound heals. This is normal and will gradually subside.    Your healed wound may be sensitive to temperature changes. This sensitivity improves with time, but if you re having a lot of discomfort, try to avoid temperature extremes.    Patients frequently experience itching after their wound appears to have healed because of the continue healing under the skin.  Plain Vaseline will help relieve the itching.    CRYOTHERAPY FOR ACTINIC KERATOSES POST-TREATMENT CARE INSTRUCTIONS  Actinic keratoses are benign, scaly or gritty lesions that appear in sun-exposed areas and may progress to skin cancers. For this reason, it is important to treat them before they become cancerous.  Liquid nitrogen is mildly uncomfortable when applied to the skin, but the discomfort rapidly subsides.    Post-Treatment:  You may experience burning and/or stinging immediately following the procedure. The discomfort from the procedure may persist over the next 12-24 hours. The area treated will look pinker and slightly swollen before the healing process begins. You may also notice redness, swelling, tenderness, weeping and crusts or scabs. Healing time is approximately 10-14 days.    Blister - You may or may notice blistering from the freezing. If you develop an uncomfortable blister from today's treatment, you may gently puncture this with a needle that has been cleaned with alcohol. However, do not remove the protective skin layer of the blister.    Scab - After a few days, you may notice scaliness or scab formation. Do not pick at the scabs because this may cause slower healing and a permanent scar.    The skin may appear temporarily darker at the treatment site, but this usually fades over a period of months, provided that the area is protected from the sun.    Care of the areas treated:    Wash the area with a mild  "cleanser.    Gently pat dry.    Do not rub.     Keep protected from the sun during the healing process and for a full year following treatment as the skin continues to remodel during this time.    Apply Vaseline or Aquaphor ointment sparingly to the site for the first 7 days after treatment.    Do not use Neosporin, as many people eventually develop a medication allergy, that can easily be confused with an infection, to Neomycin.    Return if:  There should not be any residual scaling. If there is any concern that the lesion has persisted after 4-6 weeks, make an appointment for a re-check. Healing time does vary depending on your individual healing process and the area of the body treated. Most patients will be healed in one month.    Signs of Infection:  Thankfully this is rare. However if you notice persistent colored drainage, increasing pain, fever or other signs of infection, please call us at: [phone]    SUN PROTECTION INSTRUCTIONS  Sun damage can lead to skin cancer and premature aging of the skin.      The best way to protect from sun damage to your skin is to avoid the sun during peak hours (10 am - 2 pm) even on overcast days.      Use UPF sun-protective clothing, which while more expensive initially provides longer lasting coverage without having to worry about remembering to re-apply.  1. Wear a wide-brimmed hat and sunglasses.   2. Wear sun-protective clothing.  [employer] and other [employer] make sun protective clothing that are stylish, comfortable and cool. [employer] and other [employer] make UV arm sleeves suitable for golfing, gardening and other activities.      Sunscreen instructions:  1. Use sunscreens with Zinc Oxide, Titanium Dioxide or Avobenzone to protect from UVA rays.  2. Use SPF 30-50+ to protect from UVB rays.  3. Re-apply every 2 hours even if water resistant.  4. Apply on your face every day even when cloudy and even in the winter. UVA \"aging rays\" penetrate window " "glass and are just as strong in the winter as in the summer.    Product Recommendations:    Good examples include: Blue Lizard, EltaMD, Solbar    Good daily moisturizers with SPF: Vanicream, CeraVe.    For sensitive skin, consider : SkinMedica Essential Defense Mineral Shield Broad Spectrum SPF 35      Never use tanning beds. Tanning beds are associated with much higher risks of skin cancer.    All tanning damages the skin. Aim for ivory skin year round and you will have less trouble with your skin in years to come. There is no merit in getting \"a base tan\" before a warm weather vacation, as any tanning indicates your body's response to sun damage.    Stop smoking. Smokers have higher rates of skin cancer and also have premature skin wrinkling.    FYI  You should use about 3 tablespoons of sunscreen to protect your whole body. Thus a typical eight ounce bottle of sunscreen should last 4 applications. Remember, that the SPF rating is compromised if you don t apply enough. Most people only apply 1/2 - 1/3 of the amount they need. Also don t forget areas such as your ears, feet, upper back and harder to reach places. Keep in mind that these amounts should be increased for larger body sizes.    Sunscreens with titanium dioxide and/or zinc oxide in the active ingredients are physical blockers as opposed to chemical blockers. Chemical-free sunscreens should not irritate the skin.    Spray-on sunscreens may be used for touch-up application only, not as a base layer. Also, use with caution around small children due to inhalation risk.    Avoid retinyl palmitate products.    Avoid combination products that include both sunscreen and insect repellant, as sunscreen should be applied every 2 hours, but insect repellant should not be applied as frequently.    SPF means sun protection factor, which is just the degree to which the sunscreen can protect against UVB rays. There is no rating system for UVA rays. SPF is calculated as " the time skin will burn when sunscreen is applied vs. skin without sunscreen.    Water resistant sunscreens should be re-applied every 1-2 hours.    For more information:  http://www.skincancer.org/prevention/sun-protection/sunscreen/sunscreens-safe-and-effective          Follow-ups after your visit        Who to contact     If you have questions or need follow up information about today's clinic visit or your schedule please contact [facility] directly at [phone].  Normal or non-critical lab and imaging results will be communicated to you by [employer], letter or phone within 4 business days after the clinic has received the results. If you do not hear from us within 7 days, please contact the clinic through [employer] or phone. If you have a critical or abnormal lab result, we will notify you by phone as soon as possible.  Submit refill requests through [employer] or call your pharmacy and they will forward the refill request to us. Please allow 3 business days for your refill to be completed.          Additional Information About Your Visit        MyChart Information     [employer] gives you secure access to your electronic health record. If you see a primary care provider, you can also send messages to your care team and make appointments. If you have questions, please call your primary care clinic.  If you do not have a primary care provider, please call [phone] and they will assist you.        Care EveryWhere ID     This is your Care EveryWhere ID. This could be used by other organizations to access your [facility] medical records  [id number]         Blood Pressure from Last 3 Encounters:   10/05/17 104/67   09/20/17 106/56   06/24/17 106/66    Weight from Last 3 Encounters:   10/02/17 143 lb (64.9 kg)   09/20/17 142 lb (64.4 kg)   06/21/17 139 lb (63 kg)              Today, you had the following     No orders found for display       Primary Care Provider Office Phone # Fax #    Archie  MD Abdiel [phone]       [address] [facility] [address]        Equal Access to Services     DEEPTHI MEDINA : Hadii aad ku hadlulatammi Singh, wakarinda mariumstoneyha, jaquanta kakaylynda jaxsonshreyas, lindy aprilin hayaan jaxsondionicio harris lakristimagi chichi. So [facility] [phone].    ATENCIÓN: Si habla español, tiene a dumont disposición servicios gratuitos de asistencia lingüística. Llame al [phone].    We comply with applicable federal civil rights laws and [place] laws. We do not discriminate on the basis of race, color, national origin, age, disability, sex, sexual orientation, or gender identity.            Thank you!     Thank you for choosing [facility]  for your care. Our goal is always to provide you with excellent care. Hearing back from our patients is one way we can continue to improve our services. Please take a few minutes to complete the written survey that you may receive in the mail after your visit with us. Thank you!             Your Updated Medication List - Protect others around you: Learn how to safely use, store and throw away your medicines at www.disposemymeds.org.          This list is accurate as of: 12/19/17  4:09 PM.  Always use your most recent med list.                   Brand Name Dispense Instructions for use Diagnosis    DAILY MULTIVITAMIN PO      Take 2 tablets by mouth daily        FISH OIL PO      Take 2 tablets by mouth daily        GLUCOSAMINE-CHONDROITIN PO      Take 2 tablets by mouth daily        HYDROcodone-acetaminophen 5-325 MG per tablet    NORCO    50 tablet    Take 1 tablet by mouth every 3 hours as needed for moderate to severe pain    Status post total left knee replacement       senna-docusate 8.6-50 MG per tablet    SENOKOT-S;PERICOLACE    50 tablet    Take 1-2 tablets by mouth 2 times daily    Status post total left knee replacement

## 2017-12-19 NOTE — LETTER
12/19/2017         RE: Jasen Rosario  [address]  [facility] [id number]        Dear Colleague,    Thank you for referring your patient, Jasen Rosario, to the [facility] - [facility]. Please see a copy of my visit note below.    [facility] (formerly [facility]) PRIMARY CARE SKIN    CC : skin cancer screening (full-body)  SUBJECTIVE:                                                    Jasen Rosario is a 72 year old male who presents to clinic today for a full-body skin exam because of changing lesions.    Bothersome lesions noticed by the patient or other skin concerns :  Issue One : lesion on left forehead.  Issue Two : lesion on right forearm present for at least 2 years. He denies scratching it except at onset.    Personal history of skin cancer : NO.  Family history of skin cancer : NO.    Sun Exposure History  Previous history of significant sun exposure:  Blistering sunburns : YES - few  Tanning beds : YES - when younger.  Sunscreen Use : YES.  UV-protective clothing use : NO  Wide-brimmed hats : NO  UV-protective sunglasses : NO  Avoids mid-day sun : NO    Occupation : jaci  (indoor).    Refer to electronic medical record (EMR) for past medical history and medications.    INTEGUMENTARY/SKIN: POSITIVE for changing lesion  ROS : 14 point review of systems was negative except the symptoms listed above in the HPI.    This document serves as a record of the services and decisions personally performed and made by Anna Castellanos MD. It was created on her behalf by Raimundo Clark, a trained medical scribe.  The creation of this document is based on the scribe's personal observations and the provider's statements to the medical scribe.  Raimundo Clark, December 19, 2017 3:51 PM      OBJECTIVE:                                                    GENERAL: healthy, alert and no distress  SKIN: Ruiz Skin Type - III.  This patient was examined from the top of the head to  the bottom of the feet  including scalp, face, neck, back, chest, breasts, buttocks, both arms, both legs, both hands, both feet, all 10 fingers and all 10 toes. The dermatoscope was used to help evaluate pigmented lesions.  Skin Pertinent Findings:  Right arm(s) and left arm(s) : Multiple brown, macule(s) most consistent with benign solar lentigo. Scattered brown macules most consistent with benign (melanocytic nevi).    Right forearm : 6 mm x 4 mm in size, brown, macule(s) most consistent with benign solar lentigo.    Chest, Abdomen : Scattered stuck-on appearing papules, raised, brown, coarse-textured, round lesion(s) most consistent with seborrheic keratoses. slightly raised, red lesion(s) consistent with capillary hemangioma.    Back :  Multiple stuck-on appearing papules, raised, brown, coarse-textured, round lesion(s) most consistent with seborrheic keratoses. brown, macule(s) most consistent with benign solar lentigo.    Significant Findings:  Left lateral forehead : 9 mm x 4 mm in size, scaly, indurated, erythematous lesion. ?  Actinic keratosis ? Squamous cell carcinoma ? Basal cell carcinoma ? Other      Left ear, inferior lopez : 5 mm in size, smooth, raised, firm nodule. ? Basal cell carcinoma ? Other      Face, arms : 3 mm - 4 mm in size, erythematous, scaly, non-indurated lesion(s) most consistent with actinic keratoses  Name : Liquid Nitrogen Cry-Ac Cryotherapy.  Indication : Pre-malignant lesion.  Location(s) : right lateral face - x3, right forehead - x2, left lateral face anterior to tragus - x1, left side of nose - x1, superior to left medial eyebrow - x1, right proximal forearm - x1, right proximal upper arm - x1.  Completed by : Anna Castellanos MD  Note : Discussed natural history of lesion and treatment options. Prior to treatment, we discussed inflammation, tenderness post-procedure, the healing process, and the risks of pain, infection, scarring, blistering, and hypo-/hyperpigmentation after  healing. Explained that these lesions may grow back and may need additional treatment or re-treatment. The patient expressed a desire to proceed with cryotherapy.    The lesion(s) was treated with liquid nitrogen Cry-Ac, five second freeze repeated twice with a pause to allow for the area to thaw. If this lesion should recur, then it needs to be re-evaluated.    Patient tolerated the procedure well and left in good condition.  Total number of lesions treated : 10.    Diagnostic Test Results:  none           ASSESSMENT:                                                      Encounter Diagnoses   Name Primary?     AK (actinic keratosis) Yes     Neoplasm of uncertain behavior of skin      Seborrheic keratoses      Capillary hemangioma of skin      Multiple benign melanocytic nevi          PLAN:                                                    Patient Instructions   FUTURE APPOINTMENTS  Follow up in 1 year(s) for a full-body skin cancer screening.  Follow up per pathology report.     WOUND CARE INSTRUCTIONS  1. After 24 hours, change dressing daily.  2. Wash hands before every dressing change.  3. Wash the wound area with a mild soap, then rinse  4. Gently pat dry with a sterile gauze or Q-tip.  5. Apply Vaseline or Aquaphor only over entire wound. Do NOT use Neosporin - as many people react to neomycin.  6. Finally, cover with a bandage or sterile non-stick gauze with micropore paper tape.  7. Repeat once daily until wound has healed.    Do not let the wound dry out.  The wound will heal faster and with better cosmetic results if routinely kept moist with step #5. It is a false belief that a wound heals better when it is exposed to air and allowed to dry out.      Soap, water and shampoo will not hurt this area.    Do not go swimming or take baths, but showering is encouraged.    Limit use of the area where the procedure was done for a few days to allow for optimal healing.    If you experience bleeding:  Repeat steps  "#2-5 and hold firm pressure on the area for 10 minutes without checking to see if the bleeding has stopped. \"Checking\" pulls off the protective wound clot and restarts the bleeding all over again. Re-apply pressure for 10 minutes if necessary to stop bleeding.  Use additional sterile gauze and tape to maintain pressure once bleeding has stopped.    Signs of Infection:  Infection can occur in any area where skin has been disrupted.  If you notice persistent redness, swelling, colored drainage, increasing pain, fever or other signs of infection, please call us at: [phone] and ask to have me or my colleague paged. We will call you back to discuss.    Pathology Results:  You will be notified, generally via letter or GoodApril[employer], in approximately 10 days. If there is anything we need to discuss or further treatment needed, I will call you to discuss it.    Skin tissue can be some of the most challenging tissue for pathologists to examine, therefore we may use a specialist outside the [facility] [facility] system to read certain submitted tissue samples. When submitted to these outside specialists, [employer] will notify you that your tissue sample result has returned. However, this only means that the tissue sample has been sent to the specialist. These results are not available in [employer], so I will contact you when I receive the final report.    PATIENT INFORMATION : WOUNDS  During the healing process you will notice a number of changes. All wounds develop a small halo of redness surrounding the wound.  This means healing is occurring. Severe itching with extensive redness usually indicates sensitivity to the ointment or bandage tape used to dress the wound.  You should call our office if this develops.      Swelling  and/or discoloration around your surgical site is common, particularly when performed around the eye.    All wounds normally drain.  The larger the wound the more drainage there will be.  After 7-10 days, you will " notice the wound beginning to shrink and new skin will begin to grow.  The wound is healed when you can see skin has formed over the entire area.  A healed wound has a healthy, shiny look to the surface and is red to dark pink in color to normalize.  Wounds may take approximately 4-6 weeks to heal.  Larger wounds may take 6-8 weeks. After the wound is healed you may discontinue dressing changes.    You may experience a sensation of tightness as your wound heals. This is normal and will gradually subside.    Your healed wound may be sensitive to temperature changes. This sensitivity improves with time, but if you re having a lot of discomfort, try to avoid temperature extremes.    Patients frequently experience itching after their wound appears to have healed because of the continue healing under the skin.  Plain Vaseline will help relieve the itching.    CRYOTHERAPY FOR ACTINIC KERATOSES POST-TREATMENT CARE INSTRUCTIONS  Actinic keratoses are benign, scaly or gritty lesions that appear in sun-exposed areas and may progress to skin cancers. For this reason, it is important to treat them before they become cancerous.  Liquid nitrogen is mildly uncomfortable when applied to the skin, but the discomfort rapidly subsides.    Post-Treatment:  You may experience burning and/or stinging immediately following the procedure. The discomfort from the procedure may persist over the next 12-24 hours. The area treated will look pinker and slightly swollen before the healing process begins. You may also notice redness, swelling, tenderness, weeping and crusts or scabs. Healing time is approximately 10-14 days.    Blister - You may or may notice blistering from the freezing. If you develop an uncomfortable blister from today's treatment, you may gently puncture this with a needle that has been cleaned with alcohol. However, do not remove the protective skin layer of the blister.    Scab - After a few days, you may notice scaliness  or scab formation. Do not pick at the scabs because this may cause slower healing and a permanent scar.    The skin may appear temporarily darker at the treatment site, but this usually fades over a period of months, provided that the area is protected from the sun.    Care of the areas treated:    Wash the area with a mild cleanser.    Gently pat dry.    Do not rub.     Keep protected from the sun during the healing process and for a full year following treatment as the skin continues to remodel during this time.    Apply Vaseline or Aquaphor ointment sparingly to the site for the first 7 days after treatment.    Do not use Neosporin, as many people eventually develop a medication allergy, that can easily be confused with an infection, to Neomycin.    Return if:  There should not be any residual scaling. If there is any concern that the lesion has persisted after 4-6 weeks, make an appointment for a re-check. Healing time does vary depending on your individual healing process and the area of the body treated. Most patients will be healed in one month.    Signs of Infection:  Thankfully this is rare. However if you notice persistent colored drainage, increasing pain, fever or other signs of infection, please call us at: [phone]    SUN PROTECTION INSTRUCTIONS  Sun damage can lead to skin cancer and premature aging of the skin.      The best way to protect from sun damage to your skin is to avoid the sun during peak hours (10 am - 2 pm) even on overcast days.      Use UPF sun-protective clothing, which while more expensive initially provides longer lasting coverage without having to worry about remembering to re-apply.  1. Wear a wide-brimmed hat and sunglasses.   2. Wear sun-protective clothing.  [employer] and other [employer] make sun protective clothing that are stylish, comfortable and cool. [employer] and other [employer] make UV arm sleeves suitable for golfing, gardening and other  "activities.      Sunscreen instructions:  1. Use sunscreens with Zinc Oxide, Titanium Dioxide or Avobenzone to protect from UVA rays.  2. Use SPF 30-50+ to protect from UVB rays.  3. Re-apply every 2 hours even if water resistant.  4. Apply on your face every day even when cloudy and even in the winter. UVA \"aging rays\" penetrate window glass and are just as strong in the winter as in the summer.    Product Recommendations:    Good examples include: Blue Lizard, EltaMD, Solbar    Good daily moisturizers with SPF: Vanicream, CeraVe.    For sensitive skin, consider : SkinMedica Essential Defense Mineral Shield Broad Spectrum SPF 35      Never use tanning beds. Tanning beds are associated with much higher risks of skin cancer.    All tanning damages the skin. Aim for ivory skin year round and you will have less trouble with your skin in years to come. There is no merit in getting \"a base tan\" before a warm weather vacation, as any tanning indicates your body's response to sun damage.    Stop smoking. Smokers have higher rates of skin cancer and also have premature skin wrinkling.    FYI  You should use about 3 tablespoons of sunscreen to protect your whole body. Thus a typical eight ounce bottle of sunscreen should last 4 applications. Remember, that the SPF rating is compromised if you don t apply enough. Most people only apply 1/2 - 1/3 of the amount they need. Also don t forget areas such as your ears, feet, upper back and harder to reach places. Keep in mind that these amounts should be increased for larger body sizes.    Sunscreens with titanium dioxide and/or zinc oxide in the active ingredients are physical blockers as opposed to chemical blockers. Chemical-free sunscreens should not irritate the skin.    Spray-on sunscreens may be used for touch-up application only, not as a base layer. Also, use with caution around small children due to inhalation risk.    Avoid retinyl palmitate products.    Avoid combination " products that include both sunscreen and insect repellant, as sunscreen should be applied every 2 hours, but insect repellant should not be applied as frequently.    SPF means sun protection factor, which is just the degree to which the sunscreen can protect against UVB rays. There is no rating system for UVA rays. SPF is calculated as the time skin will burn when sunscreen is applied vs. skin without sunscreen.    Water resistant sunscreens should be re-applied every 1-2 hours.    For more information:  http://www.skincancer.org/prevention/sun-protection/sunscreen/sunscreens-safe-and-effective    The patient was counseled about sunscreens and sun avoidance. The patient was counseled to check the skin regularly and report any lesion that is new, changing, itching, scabbing, bleeding or otherwise bothersome. The patient was discharged ambulatory and in stable condition.      PROCEDURES:                                                    Name : Shave Excision  Indication : Excision of tissue for pathology evaluation.  Location(s) : Left lateral forehead : 9 mm x 4 mm in size, scaly, indurated, erythematous lesion. ?  Actinic keratosis ? Squamous cell carcinoma ? Basal cell carcinoma ? Other.  Completed by : Anna Castellanos MD  Photo Taken : yes.  Anesthesia : Patient was anesthetized by infiltrating the area surrounding the lesion with 1% lidocaine.   epinephrine [id number] : Yes.  Buffered with bicarbonate : Yes.  Note : Discussed the risk of pain, infection, scarring, hypo- or hyperpigmentation and recurrence or need for re-treatment. The benefits of treatment and alternative treatments were also discussed.    During this procedure, the universal protocol was utilized. The patient's identity was confirmed by no less than two patient identifiers, correct procedure was verified, correct site was verified and marked as applicable and a final pause was completed.    Sterile technique was used throughout the procedure. The  skin was cleaned and prepped with surgical cleanser. Once adequate anesthesia was obtained, the lesion was removed with a deep scallop shave procedure. The specimen was sent to pathology.    Direct pressure and aluminum chloride and monopolar cautery was applied for hemostasis. No bleeding was present upon the completion of the procedure. The wound was coated with antibacterial ointment. A dry sterile dressing was applied. Patient tolerated the procedure well and left in satisfactory condition.    Primary provider and referring provider will be informed regarding the tissue report when it returns.    Name : Shave Excision  Indication : Excision of tissue for pathology evaluation.  Location(s) : Left ear, inferior lopez : 5 mm in size, smooth, raised, firm nodule. ? Basal cell carcinoma ? Other  Completed by : Anna Castellanos MD  Photo Taken : yes.  Anesthesia : Patient was anesthetized by infiltrating the area surrounding the lesion with 1% lidocaine.   epinephrine [id number] : Yes.  Buffered with bicarbonate : Yes.  Note : Discussed the risk of pain, infection, scarring, hypo- or hyperpigmentation and recurrence or need for re-treatment. The benefits of treatment and alternative treatments were also discussed.    During this procedure, the universal protocol was utilized. The patient's identity was confirmed by no less than two patient identifiers, correct procedure was verified, correct site was verified and marked as applicable and a final pause was completed.    Sterile technique was used throughout the procedure. The skin was cleaned and prepped with surgical cleanser. Once adequate anesthesia was obtained, the lesion was removed with a deep scallop shave procedure using a #15 blade. The specimen was sent to pathology.    Direct pressure and aluminum chloride and monopolar cautery was applied for hemostasis. No bleeding was present upon the completion of the procedure. The wound was coated with antibacterial  ointment. A dry sterile dressing was applied. Patient tolerated the procedure well and left in satisfactory condition.    Primary provider and referring provider will be informed regarding the tissue report when it returns.      The information in this document, created by the medical scribe for me, accurately reflects the services I personally performed and the decisions made by me. I have reviewed and approved this document for accuracy prior to leaving the patient care area.  Anna Castellanos MD December 19, 2017 3:51 PM  [facility] - PRIMARY CARE SKIN    Again, thank you for allowing me to participate in the care of your patient.        Sincerely,        Mary Castellanos MD

## 2017-12-19 NOTE — PROGRESS NOTES
[facility] - PRIMARY CARE SKIN    CC : skin cancer screening (full-body)  SUBJECTIVE:                                                    Jasen Rosario is a 72 year old male who presents to clinic today for a full-body skin exam because of changing lesions.    Bothersome lesions noticed by the patient or other skin concerns :  Issue One : lesion on left forehead.  Issue Two : lesion on right forearm present for at least 2 years. He denies scratching it except at onset.    Personal history of skin cancer : NO.  Family history of skin cancer : NO.    Sun Exposure History  Previous history of significant sun exposure:  Blistering sunburns : YES - few  Tanning beds : YES - when younger.  Sunscreen Use : YES.  UV-protective clothing use : NO  Wide-brimmed hats : NO  UV-protective sunglasses : NO  Avoids mid-day sun : NO    Occupation : jaci  (indoor).    Refer to electronic medical record (EMR) for past medical history and medications.    INTEGUMENTARY/SKIN: POSITIVE for changing lesion  ROS : 14 point review of systems was negative except the symptoms listed above in the HPI.    This document serves as a record of the services and decisions personally performed and made by Anna Castellanos MD. It was created on her behalf by Raimundo Clark, a trained medical scribe.  The creation of this document is based on the scribe's personal observations and the provider's statements to the medical scribe.  Raimundo Clark, December 19, 2017 3:51 PM      OBJECTIVE:                                                    GENERAL: healthy, alert and no distress  SKIN: Ruiz Skin Type - III.  This patient was examined from the top of the head to the bottom of the feet  including scalp, face, neck, back, chest, breasts, buttocks, both arms, both legs, both hands, both feet, all 10 fingers and all 10 toes. The dermatoscope was used to help evaluate pigmented lesions.  Skin Pertinent Findings:  Right arm(s) and  left arm(s) : Multiple brown, macule(s) most consistent with benign solar lentigo. Scattered brown macules most consistent with benign (melanocytic nevi).    Right forearm : 6 mm x 4 mm in size, brown, macule(s) most consistent with benign solar lentigo.    Chest, Abdomen : Scattered stuck-on appearing papules, raised, brown, coarse-textured, round lesion(s) most consistent with seborrheic keratoses. slightly raised, red lesion(s) consistent with capillary hemangioma.    Back :  Multiple stuck-on appearing papules, raised, brown, coarse-textured, round lesion(s) most consistent with seborrheic keratoses. brown, macule(s) most consistent with benign solar lentigo.    Significant Findings:  Left lateral forehead : 9 mm x 4 mm in size, scaly, indurated, erythematous lesion. ?  Actinic keratosis ? Squamous cell carcinoma ? Basal cell carcinoma ? Other      Left ear, inferior lopez : 5 mm in size, smooth, raised, firm nodule. ? Basal cell carcinoma ? Other      Face, arms : 3 mm - 4 mm in size, erythematous, scaly, non-indurated lesion(s) most consistent with actinic keratoses  Name : Liquid Nitrogen Cry-Ac Cryotherapy.  Indication : Pre-malignant lesion.  Location(s) : right lateral face - x3, right forehead - x2, left lateral face anterior to tragus - x1, left side of nose - x1, superior to left medial eyebrow - x1, right proximal forearm - x1, right proximal upper arm - x1.  Completed by : Anna Castellanos MD  Note : Discussed natural history of lesion and treatment options. Prior to treatment, we discussed inflammation, tenderness post-procedure, the healing process, and the risks of pain, infection, scarring, blistering, and hypo-/hyperpigmentation after healing. Explained that these lesions may grow back and may need additional treatment or re-treatment. The patient expressed a desire to proceed with cryotherapy.    The lesion(s) was treated with liquid nitrogen Cry-Ac, five second freeze repeated twice with a pause  "to allow for the area to thaw. If this lesion should recur, then it needs to be re-evaluated.    Patient tolerated the procedure well and left in good condition.  Total number of lesions treated : 10.    Diagnostic Test Results:  none           ASSESSMENT:                                                      Encounter Diagnoses   Name Primary?     AK (actinic keratosis) Yes     Neoplasm of uncertain behavior of skin      Seborrheic keratoses      Capillary hemangioma of skin      Multiple benign melanocytic nevi          PLAN:                                                    Patient Instructions   FUTURE APPOINTMENTS  Follow up in 1 year(s) for a full-body skin cancer screening.  Follow up per pathology report.     WOUND CARE INSTRUCTIONS  1. After 24 hours, change dressing daily.  2. Wash hands before every dressing change.  3. Wash the wound area with a mild soap, then rinse  4. Gently pat dry with a sterile gauze or Q-tip.  5. Apply Vaseline or Aquaphor only over entire wound. Do NOT use Neosporin - as many people react to neomycin.  6. Finally, cover with a bandage or sterile non-stick gauze with micropore paper tape.  7. Repeat once daily until wound has healed.    Do not let the wound dry out.  The wound will heal faster and with better cosmetic results if routinely kept moist with step #5. It is a false belief that a wound heals better when it is exposed to air and allowed to dry out.      Soap, water and shampoo will not hurt this area.    Do not go swimming or take baths, but showering is encouraged.    Limit use of the area where the procedure was done for a few days to allow for optimal healing.    If you experience bleeding:  Repeat steps #2-5 and hold firm pressure on the area for 10 minutes without checking to see if the bleeding has stopped. \"Checking\" pulls off the protective wound clot and restarts the bleeding all over again. Re-apply pressure for 10 minutes if necessary to stop bleeding.  Use " additional sterile gauze and tape to maintain pressure once bleeding has stopped.    Signs of Infection:  Infection can occur in any area where skin has been disrupted.  If you notice persistent redness, swelling, colored drainage, increasing pain, fever or other signs of infection, please call us at: [phone] and ask to have me or my colleague paged. We will call you back to discuss.    Pathology Results:  You will be notified, generally via letter or MyChart, in approximately 10 days. If there is anything we need to discuss or further treatment needed, I will call you to discuss it.    Skin tissue can be some of the most challenging tissue for pathologists to examine, therefore we may use a specialist outside the [facility] system to read certain submitted tissue samples. When submitted to these outside specialists, [facility] will notify you that your tissue sample result has returned. However, this only means that the tissue sample has been sent to the specialist. These results are not available in [facility], so I will contact you when I receive the final report.    PATIENT INFORMATION : WOUNDS  During the healing process you will notice a number of changes. All wounds develop a small halo of redness surrounding the wound.  This means healing is occurring. Severe itching with extensive redness usually indicates sensitivity to the ointment or bandage tape used to dress the wound.  You should call our office if this develops.      Swelling  and/or discoloration around your surgical site is common, particularly when performed around the eye.    All wounds normally drain.  The larger the wound the more drainage there will be.  After 7-10 days, you will notice the wound beginning to shrink and new skin will begin to grow.  The wound is healed when you can see skin has formed over the entire area.  A healed wound has a healthy, shiny look to the surface and is red to dark pink in color to normalize.  Wounds may take  approximately 4-6 weeks to heal.  Larger wounds may take 6-8 weeks. After the wound is healed you may discontinue dressing changes.    You may experience a sensation of tightness as your wound heals. This is normal and will gradually subside.    Your healed wound may be sensitive to temperature changes. This sensitivity improves with time, but if you re having a lot of discomfort, try to avoid temperature extremes.    Patients frequently experience itching after their wound appears to have healed because of the continue healing under the skin.  Plain Vaseline will help relieve the itching.    CRYOTHERAPY FOR ACTINIC KERATOSES POST-TREATMENT CARE INSTRUCTIONS  Actinic keratoses are benign, scaly or gritty lesions that appear in sun-exposed areas and may progress to skin cancers. For this reason, it is important to treat them before they become cancerous.  Liquid nitrogen is mildly uncomfortable when applied to the skin, but the discomfort rapidly subsides.    Post-Treatment:  You may experience burning and/or stinging immediately following the procedure. The discomfort from the procedure may persist over the next 12-24 hours. The area treated will look pinker and slightly swollen before the healing process begins. You may also notice redness, swelling, tenderness, weeping and crusts or scabs. Healing time is approximately 10-14 days.    Blister - You may or may notice blistering from the freezing. If you develop an uncomfortable blister from today's treatment, you may gently puncture this with a needle that has been cleaned with alcohol. However, do not remove the protective skin layer of the blister.    Scab - After a few days, you may notice scaliness or scab formation. Do not pick at the scabs because this may cause slower healing and a permanent scar.    The skin may appear temporarily darker at the treatment site, but this usually fades over a period of months, provided that the area is protected from the  sun.    Care of the areas treated:    Wash the area with a mild cleanser.    Gently pat dry.    Do not rub.     Keep protected from the sun during the healing process and for a full year following treatment as the skin continues to remodel during this time.    Apply Vaseline or Aquaphor ointment sparingly to the site for the first 7 days after treatment.    Do not use Neosporin, as many people eventually develop a medication allergy, that can easily be confused with an infection, to Neomycin.    Return if:  There should not be any residual scaling. If there is any concern that the lesion has persisted after 4-6 weeks, make an appointment for a re-check. Healing time does vary depending on your individual healing process and the area of the body treated. Most patients will be healed in one month.    Signs of Infection:  Thankfully this is rare. However if you notice persistent colored drainage, increasing pain, fever or other signs of infection, please call us at: [phone]    SUN PROTECTION INSTRUCTIONS  Sun damage can lead to skin cancer and premature aging of the skin.      The best way to protect from sun damage to your skin is to avoid the sun during peak hours (10 am - 2 pm) even on overcast days.      Use UPF sun-protective clothing, which while more expensive initially provides longer lasting coverage without having to worry about remembering to re-apply.  1. Wear a wide-brimmed hat and sunglasses.   2. Wear sun-protective clothing.  [employer] other [employer] make sun protective clothing that are stylish, comfortable and cool. [employer] and other [employer] make UV arm sleeves suitable for golfing, gardening and other activities.      Sunscreen instructions:  1. Use sunscreens with Zinc Oxide, Titanium Dioxide or Avobenzone to protect from UVA rays.  2. Use SPF 30-50+ to protect from UVB rays.  3. Re-apply every 2 hours even if water resistant.  4. Apply on your face every day even when  "cloudy and even in the winter. UVA \"aging rays\" penetrate window glass and are just as strong in the winter as in the summer.    Product Recommendations:    Good examples include: [employer] [place], [place], Solemma    Good daily moisturizers with SPF: Vanicream, CeraVe.    For sensitive skin, consider : Skin[employer] Essential Defense Mineral Shield Broad Spectrum SPF 35      Never use tanning beds. Tanning beds are associated with much higher risks of skin cancer.    All tanning damages the skin. Aim for ivory skin year round and you will have less trouble with your skin in years to come. There is no merit in getting \"a base tan\" before a warm weather vacation, as any tanning indicates your body's response to sun damage.    Stop smoking. Smokers have higher rates of skin cancer and also have premature skin wrinkling.    FYI  You should use about 3 tablespoons of sunscreen to protect your whole body. Thus a typical eight ounce bottle of sunscreen should last 4 applications. Remember, that the SPF rating is compromised if you don t apply enough. Most people only apply 1/2 - 1/3 of the amount they need. Also don t forget areas such as your ears, feet, upper back and harder to reach places. Keep in mind that these amounts should be increased for larger body sizes.    Sunscreens with titanium dioxide and/or zinc oxide in the active ingredients are physical blockers as opposed to chemical blockers. Chemical-free sunscreens should not irritate the skin.    Spray-on sunscreens may be used for touch-up application only, not as a base layer. Also, use with caution around small children due to inhalation risk.    Avoid retinyl palmitate products.    Avoid combination products that include both sunscreen and insect repellant, as sunscreen should be applied every 2 hours, but insect repellant should not be applied as frequently.    SPF means sun protection factor, which is just the degree to which the sunscreen can protect against UVB " rays. There is no rating system for UVA rays. SPF is calculated as the time skin will burn when sunscreen is applied vs. skin without sunscreen.    Water resistant sunscreens should be re-applied every 1-2 hours.    For more information:  http://www.skincancer.org/prevention/sun-protection/sunscreen/sunscreens-safe-and-effective    The patient was counseled about sunscreens and sun avoidance. The patient was counseled to check the skin regularly and report any lesion that is new, changing, itching, scabbing, bleeding or otherwise bothersome. The patient was discharged ambulatory and in stable condition.      PROCEDURES:                                                    Name : Shave Excision  Indication : Excision of tissue for pathology evaluation.  Location(s) : Left lateral forehead : 9 mm x 4 mm in size, scaly, indurated, erythematous lesion. ?  Actinic keratosis ? Squamous cell carcinoma ? Basal cell carcinoma ? Other.  Completed by : Anna Castellanos MD  Photo Taken : yes.  Anesthesia : Patient was anesthetized by infiltrating the area surrounding the lesion with 1% lidocaine.   epinephrine [id number] : Yes.  Buffered with bicarbonate : Yes.  Note : Discussed the risk of pain, infection, scarring, hypo- or hyperpigmentation and recurrence or need for re-treatment. The benefits of treatment and alternative treatments were also discussed.    During this procedure, the universal protocol was utilized. The patient's identity was confirmed by no less than two patient identifiers, correct procedure was verified, correct site was verified and marked as applicable and a final pause was completed.    Sterile technique was used throughout the procedure. The skin was cleaned and prepped with surgical cleanser. Once adequate anesthesia was obtained, the lesion was removed with a deep scallop shave procedure. The specimen was sent to pathology.    Direct pressure and aluminum chloride and monopolar cautery was applied for  hemostasis. No bleeding was present upon the completion of the procedure. The wound was coated with antibacterial ointment. A dry sterile dressing was applied. Patient tolerated the procedure well and left in satisfactory condition.    Primary provider and referring provider will be informed regarding the tissue report when it returns.    Name : Shave Excision  Indication : Excision of tissue for pathology evaluation.  Location(s) : Left ear, inferior lopez : 5 mm in size, smooth, raised, firm nodule. ? Basal cell carcinoma ? Other  Completed by : Anna Castellanos MD  Photo Taken : yes.  Anesthesia : Patient was anesthetized by infiltrating the area surrounding the lesion with 1% lidocaine.   epinephrine [id number] : Yes.  Buffered with bicarbonate : Yes.  Note : Discussed the risk of pain, infection, scarring, hypo- or hyperpigmentation and recurrence or need for re-treatment. The benefits of treatment and alternative treatments were also discussed.    During this procedure, the universal protocol was utilized. The patient's identity was confirmed by no less than two patient identifiers, correct procedure was verified, correct site was verified and marked as applicable and a final pause was completed.    Sterile technique was used throughout the procedure. The skin was cleaned and prepped with surgical cleanser. Once adequate anesthesia was obtained, the lesion was removed with a deep scallop shave procedure using a #15 blade. The specimen was sent to pathology.    Direct pressure and aluminum chloride and monopolar cautery was applied for hemostasis. No bleeding was present upon the completion of the procedure. The wound was coated with antibacterial ointment. A dry sterile dressing was applied. Patient tolerated the procedure well and left in satisfactory condition.    Primary provider and referring provider will be informed regarding the tissue report when it returns.      The information in this document, created  by the medical scribe for me, accurately reflects the services I personally performed and the decisions made by me. I have reviewed and approved this document for accuracy prior to leaving the patient care area.  Anna Castellanos MD December 19, 2017 3:51 PM  [facility] - PRIMARY CARE SKIN

## 2017-12-27 NOTE — PROGRESS NOTES
ADDENDUM:                                                    December 27, 2017 11:26 AM  Pathology report results:

## 2018-01-03 ENCOUNTER — TELEPHONE (OUTPATIENT)
Dept: FAMILY MEDICINE | Facility: CLINIC | Age: 73
End: 2018-01-03

## 2018-01-03 NOTE — TELEPHONE ENCOUNTER
Encounter : voicemail    Asked the patient to call back to  to discuss the result.  Left information regarding the pathology report :  Yes     Pathology report: nodular basal cell carcinoma on the left ear     Recommendations: Referral for Mohs surgery with Dr. Young

## 2018-01-03 NOTE — LETTER
[address]  [id number] [phone]        1/22/2018       Jasen Rosario  [address]  [place] [address]      Dear Jasen:    You are scheduled for Mohs Surgery on: March 8th, 2018 at 7:00 am     Please check in at [facility], [address].     You don't need to arrive more than 5-10 minutes prior to your appointment time.     Be sure to eat a good breakfast and bathe and wash your hair prior to surgery.     If you are taking any anti-coagulants that are prescribed by your Doctor (such as Coumadin/Warfarin, Plavix, Aspirin, Ibuprofen), please continue taking them.     However, if you are taking anti-coagulants over the counter without a Doctor's order for a medical condition, please discontinue them 10 days prior to surgery.   Please have a  to and from the clinic  Please wear loose comfortable clothing as it could possibly be 4-6 hours until your surgery is completed depending upon how many layers of tissue need to be removed.      Thank you,    TESHA Young MD

## 2018-01-10 NOTE — TELEPHONE ENCOUNTER
Left message on answering machine for patient to call back.  Sent [employer] message    Kelli GONZALEZRN  [facility]  [phone]

## 2018-01-11 ENCOUNTER — TELEPHONE (OUTPATIENT)
Dept: FAMILY MEDICINE | Facility: CLINIC | Age: 73
End: 2018-01-11

## 2018-01-11 NOTE — TELEPHONE ENCOUNTER
1/11/2018    Call Regarding Preventive Health Screening Colonoscopy    Attempt 1    Message on voicemail     Comments:           Outreach   AT

## 2018-03-08 ENCOUNTER — OFFICE VISIT (OUTPATIENT)
Dept: DERMATOLOGY | Facility: CLINIC | Age: 73
End: 2018-03-08
Payer: COMMERCIAL

## 2018-03-08 VITALS — DIASTOLIC BLOOD PRESSURE: 69 MMHG | HEART RATE: 64 BPM | OXYGEN SATURATION: 99 % | SYSTOLIC BLOOD PRESSURE: 112 MMHG

## 2018-03-08 DIAGNOSIS — L82.1 SK (SEBORRHEIC KERATOSIS): ICD-10-CM

## 2018-03-08 DIAGNOSIS — L81.4 LENTIGO: Primary | ICD-10-CM

## 2018-03-08 DIAGNOSIS — C44.219 BASAL CELL CARCINOMA OF LEFT EAR: ICD-10-CM

## 2018-03-08 PROCEDURE — 17311 MOHS 1 STAGE H/N/HF/G: CPT | Performed by: DERMATOLOGY

## 2018-03-08 PROCEDURE — 99203 OFFICE O/P NEW LOW 30 MIN: CPT | Mod: 25 | Performed by: DERMATOLOGY

## 2018-03-08 NOTE — NURSING NOTE
Surgical Office Location:  [facility]  [address] [place] [id number]

## 2018-03-08 NOTE — NURSING NOTE
"Chief Complaint   Patient presents with     Derm Problem     mohs       Initial /69  Pulse 64  SpO2 99% Estimated body mass index is 22.07 kg/(m^2) as calculated from the following:    Height as of 10/2/17: 1.715 m (5' 7.5\").    Weight as of 10/2/17: 64.9 kg (143 lb).  Medication Reconciliation: complete    "

## 2018-03-08 NOTE — PATIENT INSTRUCTIONS
Open Wound Care     for ______________        ? No strenuous activity for 48 hours    ? Take Tylenol as needed for discomfort.                                                .         ? Do not drink alcoholic beverages for 48 hours.    ? Keep the pressure bandage in place for 24 hours. If the bandage becomes blood tinged or loose, reinforce it with gauze and tape.        (Refer to the reverse side of this page for management of bleeding).    ? Remove bandage in 24 hours and begin wound care as follows:     1. Clean area with tap water using a Q tip or gauze pad, (shower / bathe normally)  2. Dry wound with Q tip or gauze pad  3. Apply Aquaphor, Vaseline, Polysporin or Bacitracin Ointment with a Q tip    Do NOT use Neosporin Ointment *  4. Cover the wound with a band-aid or nonstick gauze pad and paper tape.  5. Repeat wound care once a day until wound is completely healed.    It is an old wives tale that a wound heals better when it is exposed to air and allowed to dry out. The wound will heal faster with a better cosmetic result if it is kept moist with ointment and covered with a bandage.  Do not let the wound dry out.      Supplies Needed:                Qtips or gauze pads                Polysporin or Bacitracin Ointment                Bandaids or nonstick gauze pads and paper tape    Wound care kits and brown paper tape are available for purchase at   the pharmacy.    BLEEDIN. Use tightly rolled up gauze or cloth to apply direct pressure over the bandage for 20   minutes.  2. Reapply pressure for an additional 20 minutes if necessary  3. Call the office or go to the nearest emergency room if pressure fails to stop the bleeding.  4. Use additional gauze and tape to maintain pressure once the bleeding has stopped.  5. Begin wound care 24 hours after surgery as directed.                  WOUND HEALING    1. One week after surgery a pink / red halo will form around the outside of the wound.   This is new  skin.  2. The center of the wound will appear yellowish white and produce some drainage.  3. The pink halo will slowly migrate in toward the center of the wound until the wound is covered with new shiny pink skin.  4. There will be no more drainage when the wound is completely healed.  5. It will take six months to one year for the redness to fade.  6. The scar may be itchy, tight and sensitive to extreme temperatures for a year after the surgery.  7. Massaging the area several times a day for several minutes after the wound is completely healed will help the scar soften and normalize faster. Begin massage only after healing is complete.      In case of emergency call: Dr Young: [phone]     [facility]: [phone]    [facility]: [phone]

## 2018-03-08 NOTE — LETTER
3/8/2018         RE: Jasen Rosario  [id number]  [place] [facility] [address]        Dear Colleague,    Thank you for referring your patient, Jasen Rosario, to the [facility]. Please see a copy of my visit note below.    Jasen Rosario , a 72 year old year old male patient, I was asked to see by Dr. Boyle for basal cell carcinoma on left ear.  Patient states this has been present for sometime.  Patient reports the following symptoms:  growing .  Patient reports the following previous treatments none.  Patient reports the following modifying factors none.  Associated symptoms: none.  Patient has no other skin complaints today.  Remainder of the HPI, Meds, PMH, Allergies, FH, and SH was reviewed in chart.      Past Medical History:   Diagnosis Date     Anxiety state, unspecified 1991     Basal cell carcinoma      Colon polyp      ED (erectile dysfunction) 2005     Hyperlipidemia LDL goal <130 2001     Hypoglycemia, unspecified 1991     Infectious mononucleosis 1969     Lumbar stenosis     TCO - Dr Irene - with neuritis     Lymphoma (H)     SLL - B Cell - Dr Barrios     Major depressive disorder, single episode in full remission (H) 1991     OA (osteoarthritis)     Severe Lt Hip     Prostate cancer (H) 5/13    Dr Smyth -  - [place] 3+3 - intermediate progression risk - pT2cN0     Seasonal allergies        Past Surgical History:   Procedure Laterality Date     ARTHROPLASTY HIP Left 6/21/2017    Left BETTY - Dr MELISSA Madrigal     ARTHROPLASTY KNEE Left 10/2/2017    LEFT TOTAL KNEE ARTHROPLASTY - Dr MELISSA Madrigal     DAVINCI PROSTATECTOMY, LYMPHADENECTOMY N/A 4/13/2015    RALP - Dr Smyth     DENTAL SURGERY  1985    wisdom teeth     HC COLONOSCOPY THRU STOMA, DIAGNOSTIC  12/04, 12/14    colon polyp - serrated adenoma 5 mm - due 3 yrs     SURGICAL HISTORY OF -   1980    BACK SURGERY     SURGICAL HISTORY OF -  Left 1987    ANKLE FX ORIF - Lt     SURGICAL HISTORY OF -  Left 1972     LT KNEE MCL TEAR AND MENISCUS      SURGICAL HISTORY OF -   1960    APPY     SURGICAL HISTORY OF -  Left 2001, 2008    LT SHOULDER ARTHROSCOPY     SURGICAL HISTORY OF -  Right 01/2007    Rt Shoulder Surgery - Dr Ferrer        Family History   Problem Relation Age of Onset     Alcohol/Drug Father      CANCER Mother      ? 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 No     Social History Narrative       Outpatient Encounter Prescriptions as of 3/8/2018   Medication Sig Dispense Refill     Omega-3 Fatty Acids (FISH OIL PO) Take 2 tablets by mouth daily        GLUCOSAMINE-CHONDROITIN PO Take 2 tablets by mouth daily       Multiple Vitamin (DAILY MULTIVITAMIN PO) Take 2 tablets by mouth daily        [DISCONTINUED] HYDROcodone-acetaminophen (NORCO) 5-325 MG per tablet Take 1 tablet by mouth every 3 hours as needed for moderate to severe pain 50 tablet 0     [DISCONTINUED] senna-docusate (SENOKOT-S;PERICOLACE) 8.6-50 MG per tablet Take 1-2 tablets by mouth 2 times daily 50 tablet 0     No facility-administered encounter medications on file as of 3/8/2018.              Review Of Systems  Skin: As above  Eyes: negative  Ears/Nose/Throat: negative  Respiratory: No shortness of breath, dyspnea on  exertion, cough, or hemoptysis  Cardiovascular: negative  Gastrointestinal: negative  Genitourinary: negative  Musculoskeletal: negative  Neurologic: negative  Psychiatric: negative  Hematologic/Lymphatic/Immunologic: negative  Endocrine: negative      O:   NAD, WDWN, Alert & Oriented, Mood & Affect wnl, Vitals stable   Here today alone   /69  Pulse 64  SpO2 99%   General appearance ferny ii   Vitals stable   Alert, oriented and in no acute distress     Stuck on papules and brown macules on trunk and ext   L inf conchal bowl/tragus 6mm red scaly papule         The remainder of expanded problem focused exam was unremarkable; the following areas were examined:  scalp/hair, conjunctiva/lids, face, neck, lips, chest, digits/nails, RUE, LUE.      Eyes: Conjunctivae/lids:Normal     ENT: Lips, buccal mucosa, tongue: normal    MSK:Normal    Cardiovascular: peripheral edema     Pulm: Breathing Normal    Lymph Nodes: No Head and Neck Lymphadenopathy     Neuro/Psych: Orientation:Normal; Mood/Affect:Normal      A/P:  1. Seborrheic keratosis, letnigo  2. Basal cell carcinoma L ear  Pathophysiology discussed with pateint   BENIGN LESIONS DISCUSSED WITH PATIENT:  I discussed the specifics of tumor, prognosis, and genetics of benign lesions.  I explained that treatment of these lesions would be purely cosmetic and not medically neccessary.  I discussed with patient different removal options including excision, cautery and /or laser.      Nature and genetics of benign skin lesions dicussed with patient.  Signs and Symptoms of skin cancer discussed with patient.  Patient encouraged to perform monthly skin exams.  UV precautions reviewed with patient.  Skin care regimen reviewed with patient: Eliminate harsh soaps, i.e. Dial, zest, irsih spring; Mild soaps such as Cetaphil or Dove sensitive skin, avoid hot or cold showers, aggressive use of emollients including vanicream, cetaphil or cerave discussed with patient.    Risks of  non-melanoma skin cancer discussed with patient   Return to clinic 6 months    PROCEDURE NOTE  L ear basal cell carcinoma   MOHS:   Location    After PGACAC discussed with patient, decision for Mohs surgery was made. Indication for Mohs was Location. Patient confirmed the site with Dr. Young.  After anesthesia with LEC, the tumor was excised using standard Mohs technique in 1 stages(s).  CLEAR MARGINS OBTAINED and Final defect size was 1\ cm.       REPAIR SECOND INTENT: We discussed the options for wound management in full with the patient including risks/benefits/possible outcomes. Decision made to allow the wound to heal by second intention. EBL minimal; complications none; wound care routine.  The patient was discharged in good condition and will return in one month or prn for wound evaluation.          Again, thank you for allowing me to participate in the care of your patient.        Sincerely,        Abe Young MD

## 2018-03-08 NOTE — MR AVS SNAPSHOT
After Visit Summary   3/8/2018    Jasen Rosario    MRN: [id number]           Patient Information     Date Of Birth          1945        Visit Information        Provider Department      3/8/2018 7:00 AM Abe Young MD [facility]        Today's Diagnoses     Lentigo    -  1    Basal cell carcinoma of left ear        SK (seborrheic keratosis)          Care Instructions    Open Wound Care     for ______________        ? No strenuous activity for 48 hours    ? Take Tylenol as needed for discomfort.                                                .         ? Do not drink alcoholic beverages for 48 hours.    ? Keep the pressure bandage in place for 24 hours. If the bandage becomes blood tinged or loose, reinforce it with gauze and tape.        (Refer to the reverse side of this page for management of bleeding).    ? Remove bandage in 24 hours and begin wound care as follows:     1. Clean area with tap water using a Q tip or gauze pad, (shower / bathe normally)  2. Dry wound with Q tip or gauze pad  3. Apply Aquaphor, Vaseline, Polysporin or Bacitracin Ointment with a Q tip    Do NOT use Neosporin Ointment *  4. Cover the wound with a band-aid or nonstick gauze pad and paper tape.  5. Repeat wound care once a day until wound is completely healed.    It is an old wives tale that a wound heals better when it is exposed to air and allowed to dry out. The wound will heal faster with a better cosmetic result if it is kept moist with ointment and covered with a bandage.  Do not let the wound dry out.      Supplies Needed:                Qtips or gauze pads                Polysporin or Bacitracin Ointment                Bandaids or nonstick gauze pads and paper tape    Wound care kits and brown paper tape are available for purchase at   the pharmacy.    BLEEDIN. Use tightly rolled up gauze or cloth to apply direct pressure over the bandage for 20    minutes.  2. Reapply pressure for an additional 20 minutes if necessary  3. Call the office or go to the nearest emergency room if pressure fails to stop the bleeding.  4. Use additional gauze and tape to maintain pressure once the bleeding has stopped.  5. Begin wound care 24 hours after surgery as directed.                  WOUND HEALING    1. One week after surgery a pink / red halo will form around the outside of the wound.   This is new skin.  2. The center of the wound will appear yellowish white and produce some drainage.  3. The pink halo will slowly migrate in toward the center of the wound until the wound is covered with new shiny pink skin.  4. There will be no more drainage when the wound is completely healed.  5. It will take six months to one year for the redness to fade.  6. The scar may be itchy, tight and sensitive to extreme temperatures for a year after the surgery.  7. Massaging the area several times a day for several minutes after the wound is completely healed will help the scar soften and normalize faster. Begin massage only after healing is complete.      In case of emergency call: Dr Young: [phone]     [facility]: [phone]    [facility]: [phone]            Follow-ups after your visit        Your next 10 appointments already scheduled     Mar 16, 2018   Procedure with Anabella Yoo MD   [facility] ([facility])    [address] [place] [id number] [phone]           [facility] is located at [address] [place]              Who to contact     If you have questions or need follow up information about today's clinic visit or your schedule please contact [facility] directly at [phone].  Normal or non-critical lab and imaging results will be communicated to you by MyChart, letter or phone within 4 business days after the clinic has received the results.  If you do not hear from us within 7 days, please contact the clinic through [employer] or phone. If you have a critical or abnormal lab result, we will notify you by phone as soon as possible.  Submit refill requests through [employer] or call your pharmacy and they will forward the refill request to us. Please allow 3 business days for your refill to be completed.          Additional Information About Your Visit        [employer] Information     [employer] gives you secure access to your electronic health record. If you see a primary care provider, you can also send messages to your care team and make appointments. If you have questions, please call your primary care clinic.  If you do not have a primary care provider, please call [phone] and they will assist you.        Care EveryWhere ID     This is your Care EveryWhere ID. This could be used by other organizations to access your [facility] medical records  [id number]        Your Vitals Were     Pulse Pulse Oximetry                64 99%           Blood Pressure from Last 3 Encounters:   03/08/18 112/69   10/05/17 104/67   09/20/17 106/56    Weight from Last 3 Encounters:   10/02/17 64.9 kg (143 lb)   09/20/17 64.4 kg (142 lb)   06/21/17 63 kg (139 lb)              Today, you had the following     No orders found for display       Primary Care Provider Office Phone # Fax #    [facility] [phone] [address] [address]        Equal Access to Services     SAIDA [facility] Hospital[facility] AH: Hadii aad ku hadasho Soomaali, waaxda luqadaha, qaybta kaalmada adeegyada, lindy jenkins . So [facility] [phone].    ATENCIÓN: Si habla español, tiene a dumont disposición servicios gratuitos de asistencia lingüística. Llame al [phone].    We comply with applicable federal civil rights laws and [place] laws. We do not discriminate on the basis of race, color, national origin, age, disability, sex, sexual orientation, or gender  identity.            Thank you!     Thank you for choosing [facility]  for your care. Our goal is always to provide you with excellent care. Hearing back from our patients is one way we can continue to improve our services. Please take a few minutes to complete the written survey that you may receive in the mail after your visit with us. Thank you!             Your Updated Medication List - Protect others around you: Learn how to safely use, store and throw away your medicines at www.disposemymeds.org.          This list is accurate as of 3/8/18  7:27 AM.  Always use your most recent med list.                   Brand Name Dispense Instructions for use Diagnosis    DAILY MULTIVITAMIN PO      Take 2 tablets by mouth daily        FISH OIL PO      Take 2 tablets by mouth daily        GLUCOSAMINE-CHONDROITIN PO      Take 2 tablets by mouth daily

## 2018-03-08 NOTE — PROGRESS NOTES
Jasen Rosario , a 72 year old year old male patient, I was asked to see by Dr. Boyle for basal cell carcinoma on left ear.  Patient states this has been present for sometime.  Patient reports the following symptoms:  growing .  Patient reports the following previous treatments none.  Patient reports the following modifying factors none.  Associated symptoms: none.  Patient has no other skin complaints today.  Remainder of the HPI, Meds, PMH, Allergies, FH, and SH was reviewed in chart.      Past Medical History:   Diagnosis Date     Anxiety state, unspecified 1991     Basal cell carcinoma      Colon polyp      ED (erectile dysfunction) 2005     Hyperlipidemia LDL goal <130 2001     Hypoglycemia, unspecified 1991     Infectious mononucleosis 1969     Lumbar stenosis     TCO - Dr Irene - with neuritis     Lymphoma (H)     SLL - B Cell - Dr Barrios     Major depressive disorder, single episode in full remission (H) 1991     OA (osteoarthritis)     Severe Lt Hip     Prostate cancer (H) 5/13    Dr Smyth - [facility] - [place] 3+3 - intermediate progression risk - pT2cN0     Seasonal allergies        Past Surgical History:   Procedure Laterality Date     ARTHROPLASTY HIP Left 6/21/2017    Left BETTY - Dr MELISSA Madrigal     ARTHROPLASTY KNEE Left 10/2/2017    LEFT TOTAL KNEE ARTHROPLASTY - Dr MELISSA Madrigal     DAVINC PROSTATECTOMY, LYMPHADENECTOMY N/A 4/13/2015    RALP - Dr Smyth     DENTAL SURGERY  1985    wisdom teeth     HC COLONOSCOPY THRU STOMA, DIAGNOSTIC  12/04, 12/14    colon polyp - serrated adenoma 5 mm - due 3 yrs     SURGICAL HISTORY OF -   1980    BACK SURGERY     SURGICAL HISTORY OF -  Left 1987    ANKLE FX ORIF - Lt     SURGICAL HISTORY OF -  Left 1972    LT KNEE MCL TEAR AND MENISCUS      SURGICAL HISTORY OF -   1960    APPY     SURGICAL HISTORY OF -  Left 2001, 2008    LT SHOULDER ARTHROSCOPY     SURGICAL HISTORY OF -  Right 01/2007    Rt Shoulder Surgery - Dr Ferrer        Family History   Problem  Relation Age of Onset     Alcohol/Drug Father      CANCER Mother      ? 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 No     Social History Narrative       Outpatient Encounter Prescriptions as of 3/8/2018   Medication Sig Dispense Refill     Omega-3 Fatty Acids (FISH OIL PO) Take 2 tablets by mouth daily        GLUCOSAMINE-CHONDROITIN PO Take 2 tablets by mouth daily       Multiple Vitamin (DAILY MULTIVITAMIN PO) Take 2 tablets by mouth daily        [DISCONTINUED] HYDROcodone-acetaminophen (NORCO) 5-325 MG per tablet Take 1 tablet by mouth every 3 hours as needed for moderate to severe pain 50 tablet 0     [DISCONTINUED] senna-docusate (SENOKOT-S;PERICOLACE) 8.6-50 MG per tablet Take 1-2 tablets by mouth 2 times daily 50 tablet 0     No facility-administered encounter medications on file as of 3/8/2018.              Review Of Systems  Skin: As above  Eyes: negative  Ears/Nose/Throat: negative  Respiratory: No shortness of breath, dyspnea on exertion, cough, or hemoptysis  Cardiovascular: negative  Gastrointestinal: negative  Genitourinary: negative  Musculoskeletal: negative  Neurologic: negative  Psychiatric: negative  Hematologic/Lymphatic/Immunologic: negative  Endocrine: negative      O:    NAD, WDWN, Alert & Oriented, Mood & Affect wnl, Vitals stable   Here today alone   /69  Pulse 64  SpO2 99%   General appearance ferny ii   Vitals stable   Alert, oriented and in no acute distress     Stuck on papules and brown macules on trunk and ext   L inf conchal bowl/tragus 6mm red scaly papule         The remainder of expanded problem focused exam was unremarkable; the following areas were examined:  scalp/hair, conjunctiva/lids, face, neck, lips, chest, digits/nails, RUE, LUE.      Eyes: Conjunctivae/lids:Normal     ENT: Lips, buccal mucosa, tongue: normal    MSK:Normal    Cardiovascular: peripheral edema     Pulm: Breathing Normal    Lymph Nodes: No Head and Neck Lymphadenopathy     Neuro/Psych: Orientation:Normal; Mood/Affect:Normal      A/P:  1. Seborrheic keratosis, letnigo  2. Basal cell carcinoma L ear  Pathophysiology discussed with pateint   BENIGN LESIONS DISCUSSED WITH PATIENT:  I discussed the specifics of tumor, prognosis, and genetics of benign lesions.  I explained that treatment of these lesions would be purely cosmetic and not medically neccessary.  I discussed with patient different removal options including excision, cautery and /or laser.      Nature and genetics of benign skin lesions dicussed with patient.  Signs and Symptoms of skin cancer discussed with patient.  Patient encouraged to perform monthly skin exams.  UV precautions reviewed with patient.  Skin care regimen reviewed with patient: Eliminate harsh soaps, i.e. Dial, zest, irsih spring; Mild soaps such as Cetaphil or Dove sensitive skin, avoid hot or cold showers, aggressive use of emollients including vanicream, cetaphil or cerave discussed with patient.    Risks of non-melanoma skin cancer discussed with patient   Return to clinic 6 months    PROCEDURE NOTE  L ear basal cell carcinoma   MOHS:   Location    After PGACAC discussed with patient, decision for Mohs surgery was made. Indication for Mohs was Location. Patient  confirmed the site with Dr. Young.  After anesthesia with LEC, the tumor was excised using standard Mohs technique in 1 stages(s).  CLEAR MARGINS OBTAINED and Final defect size was 1\ cm.       REPAIR SECOND INTENT: We discussed the options for wound management in full with the patient including risks/benefits/possible outcomes. Decision made to allow the wound to heal by second intention. EBL minimal; complications none; wound care routine.  The patient was discharged in good condition and will return in one month or prn for wound evaluation.

## 2018-04-10 ENCOUNTER — TELEPHONE (OUTPATIENT)
Dept: FAMILY MEDICINE | Facility: CLINIC | Age: 73
End: 2018-04-10

## 2018-04-10 NOTE — TELEPHONE ENCOUNTER
Patient requesting a copy of his immunization records. Please mail to his home.  [phone] (home)   Thank you  Jada Zavala

## 2018-04-18 ENCOUNTER — HOSPITAL ENCOUNTER (OUTPATIENT)
Facility: CLINIC | Age: 73
Discharge: HOME | End: 2018-04-18
Attending: INTERNAL MEDICINE | Admitting: INTERNAL MEDICINE
Payer: MEDICARE

## 2018-04-18 VITALS
OXYGEN SATURATION: 98 % | DIASTOLIC BLOOD PRESSURE: 67 MMHG | HEIGHT: 68 IN | SYSTOLIC BLOOD PRESSURE: 99 MMHG | RESPIRATION RATE: 16 BRPM | WEIGHT: 143 LBS | BODY MASS INDEX: 21.67 KG/M2

## 2018-04-18 LAB — COLONOSCOPY: NORMAL

## 2018-04-18 PROCEDURE — 25000128 H RX IP 250 OP 636: Performed by: INTERNAL MEDICINE

## 2018-04-18 PROCEDURE — G0500 MOD SEDAT ENDO SERVICE >5YRS: HCPCS | Performed by: INTERNAL MEDICINE

## 2018-04-18 PROCEDURE — G0121 COLON CA SCRN NOT HI RSK IND: HCPCS | Performed by: INTERNAL MEDICINE

## 2018-04-18 PROCEDURE — 45378 DIAGNOSTIC COLONOSCOPY: CPT | Performed by: INTERNAL MEDICINE

## 2018-04-18 RX ORDER — ONDANSETRON 2 MG/ML
4 INJECTION INTRAMUSCULAR; INTRAVENOUS EVERY 6 HOURS PRN
Status: DISCONTINUED | OUTPATIENT
Start: 2018-04-18 | End: 2018-04-18 | Stop reason: HOSPADM

## 2018-04-18 RX ORDER — NALOXONE HYDROCHLORIDE 0.4 MG/ML
.1-.4 INJECTION, SOLUTION INTRAMUSCULAR; INTRAVENOUS; SUBCUTANEOUS
Status: DISCONTINUED | OUTPATIENT
Start: 2018-04-18 | End: 2018-04-18 | Stop reason: HOSPADM

## 2018-04-18 RX ORDER — FLUMAZENIL 0.1 MG/ML
0.2 INJECTION, SOLUTION INTRAVENOUS
Status: DISCONTINUED | OUTPATIENT
Start: 2018-04-18 | End: 2018-04-18 | Stop reason: HOSPADM

## 2018-04-18 RX ORDER — ONDANSETRON 4 MG/1
4 TABLET, ORALLY DISINTEGRATING ORAL EVERY 6 HOURS PRN
Status: DISCONTINUED | OUTPATIENT
Start: 2018-04-18 | End: 2018-04-18 | Stop reason: HOSPADM

## 2018-04-18 RX ORDER — FENTANYL CITRATE 50 UG/ML
INJECTION, SOLUTION INTRAMUSCULAR; INTRAVENOUS PRN
Status: DISCONTINUED | OUTPATIENT
Start: 2018-04-18 | End: 2018-04-18 | Stop reason: HOSPADM

## 2018-04-18 RX ORDER — ONDANSETRON 2 MG/ML
4 INJECTION INTRAMUSCULAR; INTRAVENOUS
Status: COMPLETED | OUTPATIENT
Start: 2018-04-18 | End: 2018-04-18

## 2018-04-18 RX ORDER — LIDOCAINE 40 MG/G
CREAM TOPICAL
Status: DISCONTINUED | OUTPATIENT
Start: 2018-04-18 | End: 2018-04-18 | Stop reason: HOSPADM

## 2018-04-18 NOTE — OR NURSING
Around 10:20am patient's heart rate decreased to 40 bpm. Upon changing the electrodes, it was noticed patient was diaphoretic. Doctor then ordered Atropine to be given and NS 0.9% free flow to be started. Blood pressure also went down. After medication was given, pulse went up to normal limits and BP started to increase. Patient was monitored until it was safe to go to Recovery room. MD was present until move to Recovery Room.

## 2018-04-18 NOTE — LETTER
February 23, 2018      Jasen Rosario  [address]  [facility] [address]        Thank you for choosing [facility]. You are scheduled for the following service.     Date:  3/16/2018  Friday             Procedure:  COLONOSCOPY  Doctor:        Dr. Yoo   Arrival Time:  9:00 AM  *check in at Emergency/Endoscopy desk*  Procedure Time:  9:30 AM    Location:   [facility]        Endoscopy Department, First Floor (Enter through ER Doors) *        [address] [place], [place] [address]      [phone] or [phone] () to reschedule      MIRALAX -GATORADE  PREP  Colonoscopy is the most accurate test to detect colon polyps and colon cancer; and the only test where polyps can be removed. During this procedure, a doctor examines the lining of your large intestine and rectum through a flexible tube.     Transportation  Arrange for a ride for the day of your procedure with a responsible adult.  A taxi ride is not an option unless you are accompanied by a responsible adult. If you fail to arrange transportation with a responsible adult, your procedure will be cancelled and rescheduled.    Purchase the  following supplies at your local pharmacy:  - 2 (two) bisacodyl tablets: each tablet contains 5 mg.  (Dulcolax  laxative NOT Dulcolax  stool softener)   - 1 (one) 8.3 oz bottle of Polyethylene Glycol (PEG) 3350 Powder   (MiraLAX , Smooth LAX , ClearLAX  or equivalent)  - 64 oz Gatorade    Regular Gatorade, Gatorade G2 , Powerade , Powerade Zero  or Pedialyte  is acceptable. Red colored flavors are not allowed; all other colors (yellow, green, orange, purple and blue) are okay. It is also okay to buy two 2.12 oz packets of powdered Gatorade that can be mixed with water to a total volume of 64 oz of liquid.  - 1 (one) 10 oz bottle of Magnesium Citrate (Red colored flavors are not allowed)  It is also okay for you to use a 0.5 oz package of powdered  magnesium citrate (17 g) mixed with 10 oz of water.    PREPARATION FOR COLONOSCOPY    7 days before:    Discontinue fiber supplements and medications containing iron. This includes Metamucil  and Fibercon ; and multivitamins with iron.  3 days before:    Begin a low-fiber diet. A low-fiber diet helps making the cleanout more effective.     Examples of a low-fiber diet include (but are not limited to): white bread, white rice, pasta, crackers, fish, chicken, eggs, ground beef, creamy peanut butter, cooked/steamed/boiled vegetables, canned fruit, bananas, melons, milk, plain yogurt cheese, salad dressing and other condiments.     The following are not allowed on a low-fiber diet: seeds, nuts, popcorn, bran, whole wheat, corn, quinoa, raw fruits and vegetables, berries and dried fruit, beans and lentils.    For additional details on low-fiber diet, please refer to the table on the last page.  2 days before:    Continue the low-fiber diet.     Drink at least 8 glasses of water throughout the day.     Stop eating solid foods at 11:45 pm.  1 day before:    In the morning: begin a clear liquid diet (liquids you can see through).     Examples of a clear liquid diet include: water, clear broth or bouillon, Gatorade, Pedialyte or Powerade, carbonated and non-carbonated soft drinks (Sprite , 7-Up , ginger ale), strained fruit juices without pulp (apple, white grape, white cranberry), Jell-O  and popsicles.     The following are not allowed on a clear liquid diet: red liquids, alcoholic beverages, coffee, dairy products (milk, creamer, and yogurt), protein shakes, creamy broths, juice with pulp and chewing tobacco.    At noon: take 2 (two) bisacodyl tablets     At 4 (and no later than 6pm): start drinking the Miralax-Gatorade preparation (8.3 oz of Miralax mixed with 64 oz of Gatorade in a large pitcher). Drink 1(one) 8 oz glass every 15 minutes thereafter, until the mixture is gone.    COLON CLEANSING TIPS: drink adequate  amounts of fluids before and after your colon cleansing to prevent dehydration. Stay near a toilet because you will have diarrhea. Even if you are sitting on the toilet, continue to drink the cleansing solution every 15 minutes. If you feel nauseous or vomit, rinse your mouth with water, take a 15 to 30-minute-break and then continue drinking the solution. You will be uncomfortable until the stool has flushed from your colon (in about 2 to 4 hours). You may feel chilled.              Day of your procedure  You may take all of your morning medications including blood pressure medications, blood thinners (if you have not been instructed to stop these by our office), methadone, anti-seizure medications with sips of water 3 hours prior to your procedure or earlier. Do not take insulin or vitamins prior to your procedure. Continue the clear liquid diet.   4 hours prior: drink 10 oz of magnesium citrate. It may be easier to drink it with a straw.    STOP consuming all liquids after that.     Do not take anything by mouth during this time.     Allow extra time to travel to your procedure as you may need to stop and use a restroom along the way.  You are ready for the procedure, if you followed all instructions and your stool is no longer formed, but clear or yellow liquid. If you are unsure whether your colon is clean, please call our office at [phone] before you leave for your appointment.  Bring the following to your procedure:  - Insurance Card/Photo ID.   - List of current medications including over-the-counter medications and supplements.   - Your rescue inhaler if you currently use one to control asthma.      Canceling or rescheduling your appointment:   If you must cancel or reschedule your appointment, please call [phone] as soon as possible.      COLONOSCOPY PRE-PROCEDURE CHECKLIST  If you have diabetes, ask your regular doctor for diet and medication restrictions.  If you take an anticoagulant or  anti-platelet medication (such as Coumadin , Lovenox , Pradaxa , Xarelto , Eliquis , etc.), please call your primary doctor for advice on holding this medication.  If you take aspirin you may continue to do so.  If you are or may be pregnant, please discuss the risks and benefits of this procedure with your doctor.          What happens during a colonoscopy?    Plan to spend up to two hours, starting at registration time, at the endoscopy center the day of your procedure. The colonoscopy takes an average of 15 to 30 minutes. Recovery time is about 30 minutes.    Before the exam:    You will change into a gown.    Your medical history and medication list will be reviewed with you, unless that has been done over the phone prior to the procedure.     A nurse will insert an intravenous (IV) line into your hand or arm.    The doctor will meet with you and will give you a consent form to sign.    During the exam:     Medicine will be given through the IV line to help you relax.     Your heart rate and oxygen levels will be monitored. If your blood pressure is low, you may be given fluids through the IV line.     The doctor will insert a flexible hollow tube, called a colonoscope, into your rectum. The scope will be advanced slowly through the large intestine (colon).    You may have a feeling of fullness or pressure.     If an abnormal tissue or a polyp is found, the doctor may remove it through the endoscope for closer examination, or biopsy. Tissue removal is painless    After the exam:           Any tissue samples removed during the exam will be sent to a lab for evaluation. It may take 5-7 working days for you to be notified of the results.     A nurse will provide you with complete discharge instructions before you leave the endoscopy center. Be sure to ask the nurse for specific instructions if you take blood thinners such as Aspirin, Coumadin or Plavix.     The doctor will prepare a full report for you and for the  physician who referred you for the procedure.     Your doctor will talk with you about the initial results of your exam.      Medication given during the exam will prohibit you from driving for the rest of the day.     Following the exam, you may resume your normal diet. Your first meal should be light, no greasy foods. Avoid alcohol until the next day.     You may resume your regular activities the day after the procedure.     LOW-FIBER DIET    Foods RECOMMENDED Foods to AVOID   Breads, Cereal, Rice and Pasta:   White bread, rolls, biscuits, croissant and grzegorz toast.   Waffles, Albanian toast and pancakes.   White rice, noodles, pasta, macaroni and peeled cooked potatoes.   Plain crackers and saltines.   Cooked cereals: farina, cream of rice.   Cold cereals: Puffed Rice , Rice Krispies , Corn Flakes  and Special K    Breads, Cereal, Rice and Pasta:   Breads or rolls with nuts, seeds or fruit.   Whole wheat, pumpernickel, rye breads and cornbread.   Potatoes with skin, brown or wild rice, and kasha (buckwheat).     Vegetables:   Tender cooked and canned vegetables without seeds: carrots, asparagus tips, green or wax beans, pumpkin, spinach, lima beans. Vegetables:   Raw or steamed vegetables.   Vegetables with seeds.   Sauerkraut.   Winter squash, peas, broccoli, Brussel sprouts, cabbage, onions, cauliflower, baked beans, peas and corn.   Fruits:   Strained fruit juice.   Canned fruit, except pineapple.   Ripe bananas and melon. Fruits:   Prunes and prune juice.   Raw fruits.   Dried fruits: figs, dates and raisins.   Milk/Dairy:   Milk: plain or flavored.   Yogurt, custard and ice cream.   Cheese and cottage cheese Milk/Dairy:     Meat and other proteins:   ground, well-cooked tender beef, lamb, ham, veal, pork, fish, poultry and organ meats.   Eggs.   Peanut butter without nuts. Meat and other proteins:   Tough, fibrous meats with gristle.   Dry beans, peas and lentils.   Peanut butter with nuts.   Tofu.   Fats,  Snack, Sweets, Condiments and Beverages:   Margarine, butter, oils, mayonnaise, sour cream and salad dressing, plain gravy.   Sugar, hard candy, clear jelly, honey and syrup.   Spices, cooked herbs, bouillon, broth and soups made with allowed vegetable, ketchup and mustard.   Coffee, tea and carbonated drinks.   Plain cakes, cookies and pretzels.   Gelatin, plain puddings, custard, ice cream, sherbet and popsicles. Fats, Snack, Sweets, Condiments and Beverages:   Nuts, seeds and coconut.   Jam, marmalade and preserves.   Pickles, olives, relish and horseradish.   All desserts containing nuts, seeds, dried fruit and coconut; or made from whole grains or bran.   Candy made with nuts or seeds.   Popcorn.                     DIRECTIONS TO THE ENDOSCOPY DEPARTMENT     From the north ([facility])  Take [address] South, exit on [address] [address]. Get into the left hand [place], turn left (east), go one-half mile to [address] and turn left. Go north to the first stoplight, take a right on [address] and follow it to the Emergency entrance.    From the south ([facility] [place] [facility])  Take [address]N to the [address]E split and exit on [address] [address]. On [address] , turn left (west) to [address]. Turn right (north) on [address]. Go north to the first stoplight, take a right on [address] and follow it to the Emergency entrance.    From the east via 35E ([facility])  Take [address] south to [address] [address] exit. Turn right on [address] . Go west to [address]. Turn right (north) on [address]. Go to the first stoplight, take a right and follow on [address] to the Emergency entrance.    From the east via [address] ([facility])  Take [address] to [address]. Turn left (south) on [address] to [address]. Turn left (east) on [address] and follow it to the Emergency entrance.    From the west via [address] ([place], [place])  Take [address] east to  [address]. Turn right (south) on [address] to [facility]. Turn left (east) on [facility] and follow it to the Emergency entrance.

## 2018-04-18 NOTE — PROCEDURES
PRE-PROCEDURE H&P    CHIEF COMPLAINT / REASON FOR PROCEDURE:  surveillance    PERTINENT HISTORY :    Past Medical History:   Diagnosis Date     Anxiety state, unspecified 1991     Basal cell carcinoma     Left Ear - [facility] Dr [facility]     Colon polyp      ED (erectile dysfunction) 2005     Hyperlipidemia LDL goal <130 2001     Hypoglycemia, unspecified 1991     Infectious mononucleosis 1969     Lumbar stenosis     TCO - Dr Irene - with neuritis     Lymphoma (H)     SLL - B Cell - Dr Barrios     Major depressive disorder, single episode in full remission (H) 1991     OA (osteoarthritis)     Severe Lt Hip     Prostate cancer (H) 5/13    Dr Smyth - [facility] [place] 3+3 - intermediate progression risk - [id number]     Seasonal allergies       Past Surgical History:   Procedure Laterality Date     APPENDECTOMY       ARTHROPLASTY HIP Left 6/21/2017    Left BETTY - Dr MELISSA Madrigal     ARTHROPLASTY KNEE Left 10/2/2017    LEFT TOTAL KNEE ARTHROPLASTY - Dr MELISSA Madrigal     BACK SURGERY       COLONOSCOPY  12/04, 12/14    colon polyp - serrated adenoma 5 mm - due 3 yrs     COLONOSCOPY  03/16/2018    Dr. Yoo FR     DAVINCVENANCIO PROSTATECTOMY, LYMPHADENECTOMY N/A 4/13/2015    RALP - Dr Smtyh     DENTAL SURGERY  1985    wisdom teeth     HEAD & NECK SURGERY      wisdom teeth removed     SURGICAL HISTORY OF -   1980    BACK SURGERY     SURGICAL HISTORY OF -  Left 1987    ANKLE FX ORIF - Lt     SURGICAL HISTORY OF -  Left 1972    LT KNEE MCL TEAR AND MENISCUS      SURGICAL HISTORY OF -   1960    APPY     SURGICAL HISTORY OF -  Left 2001, 2008    LT SHOULDER ARTHROSCOPY     SURGICAL HISTORY OF -  Right 01/2007    Rt Shoulder Surgery - Dr Ferrer         Bleeding tendencies:  No    Relevant Family History:  NONE     Relevant Social History:  NONE      A relevant review of systems was performed and was negative    Current symptoms include: none    ALLERGIES/SENSITIVITIES:   Allergies   Allergen Reactions     Seasonal Allergies        CURRENT  MEDICATIONS:   Prior to Admission Medications   Prescriptions Last Dose Informant Patient Reported? Taking?   GLUCOSAMINE-CHONDROITIN PO 4/15/2018 at Unknown time Self Yes Yes   Sig: Take 2 tablets by mouth daily   Multiple Vitamin (DAILY MULTIVITAMIN PO) 4/15/2018 at Unknown time Self Yes Yes   Sig: Take 2 tablets by mouth daily    Omega-3 Fatty Acids (FISH OIL PO) 4/15/2018 at Unknown time Self Yes Yes   Sig: Take 2 tablets by mouth daily       Facility-Administered Medications: None        PRE-SEDATION ASSESSMENT:    Lung Exam:  normal  Heart Exam:  normal  Airway Exam: normal  Previous reaction to anesthesia/sedation:   No  Sedation plan based on assessment: Moderate (conscious) sedation  ASA Classification:  2 - Mild systemic disease    Comments: patient has had nausea/vomiting after prior colonoscopies    IMPRESSION:  History of small polyp 2014    PLAN:  colonoscopy     Dominique Morrison MD  [facility]  Office: [phone]

## 2018-05-08 ENCOUNTER — OFFICE VISIT (OUTPATIENT)
Dept: FAMILY MEDICINE | Facility: CLINIC | Age: 73
End: 2018-05-08
Payer: COMMERCIAL

## 2018-05-08 VITALS
HEART RATE: 62 BPM | HEIGHT: 68 IN | OXYGEN SATURATION: 99 % | SYSTOLIC BLOOD PRESSURE: 116 MMHG | TEMPERATURE: 97.5 F | BODY MASS INDEX: 22.28 KG/M2 | DIASTOLIC BLOOD PRESSURE: 72 MMHG | WEIGHT: 147 LBS

## 2018-05-08 DIAGNOSIS — Z12.11 SCREEN FOR COLON CANCER: ICD-10-CM

## 2018-05-08 DIAGNOSIS — C61 PROSTATE CANCER (H): ICD-10-CM

## 2018-05-08 DIAGNOSIS — Z51.81 MEDICATION MONITORING ENCOUNTER: ICD-10-CM

## 2018-05-08 DIAGNOSIS — E78.5 HYPERLIPIDEMIA LDL GOAL <130: ICD-10-CM

## 2018-05-08 DIAGNOSIS — M48.061 SPINAL STENOSIS OF LUMBAR REGION, UNSPECIFIED WHETHER NEUROGENIC CLAUDICATION PRESENT: ICD-10-CM

## 2018-05-08 DIAGNOSIS — N52.9 ERECTILE DYSFUNCTION, UNSPECIFIED ERECTILE DYSFUNCTION TYPE: ICD-10-CM

## 2018-05-08 DIAGNOSIS — M15.0 PRIMARY OSTEOARTHRITIS INVOLVING MULTIPLE JOINTS: ICD-10-CM

## 2018-05-08 DIAGNOSIS — Z00.00 MEDICARE ANNUAL WELLNESS VISIT, SUBSEQUENT: Primary | ICD-10-CM

## 2018-05-08 DIAGNOSIS — C85.90 LYMPHOMA, UNSPECIFIED BODY REGION, UNSPECIFIED LYMPHOMA TYPE (H): ICD-10-CM

## 2018-05-08 DIAGNOSIS — F41.1 ANXIETY STATE: ICD-10-CM

## 2018-05-08 DIAGNOSIS — Z91.09 ENVIRONMENTAL ALLERGIES: ICD-10-CM

## 2018-05-08 DIAGNOSIS — F32.5 MAJOR DEPRESSIVE DISORDER, SINGLE EPISODE IN FULL REMISSION (H): ICD-10-CM

## 2018-05-08 DIAGNOSIS — Z00.00 ENCOUNTER FOR ROUTINE ADULT HEALTH EXAMINATION WITHOUT ABNORMAL FINDINGS: ICD-10-CM

## 2018-05-08 DIAGNOSIS — R53.83 OTHER FATIGUE: ICD-10-CM

## 2018-05-08 DIAGNOSIS — K63.5 POLYP OF COLON, UNSPECIFIED PART OF COLON, UNSPECIFIED TYPE: ICD-10-CM

## 2018-05-08 LAB
ALBUMIN SERPL-MCNC: 4 G/DL (ref 3.4–5)
ALBUMIN UR-MCNC: NEGATIVE MG/DL
ALP SERPL-CCNC: 85 U/L (ref 40–150)
ALT SERPL W P-5'-P-CCNC: 24 U/L (ref 0–70)
ANION GAP SERPL CALCULATED.3IONS-SCNC: 9 MMOL/L (ref 3–14)
APPEARANCE UR: CLEAR
AST SERPL W P-5'-P-CCNC: 19 U/L (ref 0–45)
BILIRUB SERPL-MCNC: 1 MG/DL (ref 0.2–1.3)
BILIRUB UR QL STRIP: NEGATIVE
BUN SERPL-MCNC: 17 MG/DL (ref 7–30)
CALCIUM SERPL-MCNC: 8.7 MG/DL (ref 8.5–10.1)
CHLORIDE SERPL-SCNC: 106 MMOL/L (ref 94–109)
CHOLEST SERPL-MCNC: 205 MG/DL
CK SERPL-CCNC: 175 U/L (ref 30–300)
CO2 SERPL-SCNC: 24 MMOL/L (ref 20–32)
COLOR UR AUTO: YELLOW
CREAT SERPL-MCNC: 0.95 MG/DL (ref 0.66–1.25)
CREAT UR-MCNC: 28 MG/DL
ERYTHROCYTE [DISTWIDTH] IN BLOOD BY AUTOMATED COUNT: 13.2 % (ref 10–15)
GFR SERPL CREATININE-BSD FRML MDRD: 78 ML/MIN/1.7M2
GLUCOSE SERPL-MCNC: 84 MG/DL (ref 70–99)
GLUCOSE UR STRIP-MCNC: NEGATIVE MG/DL
HCT VFR BLD AUTO: 45 % (ref 40–53)
HDLC SERPL-MCNC: 75 MG/DL
HGB BLD-MCNC: 15.1 G/DL (ref 13.3–17.7)
HGB UR QL STRIP: NEGATIVE
KETONES UR STRIP-MCNC: NEGATIVE MG/DL
LDLC SERPL CALC-MCNC: 119 MG/DL
LEUKOCYTE ESTERASE UR QL STRIP: NEGATIVE
MCH RBC QN AUTO: 30.4 PG (ref 26.5–33)
MCHC RBC AUTO-ENTMCNC: 33.6 G/DL (ref 31.5–36.5)
MCV RBC AUTO: 91 FL (ref 78–100)
MICROALBUMIN UR-MCNC: <5 MG/L
MICROALBUMIN/CREAT UR: NORMAL MG/G CR (ref 0–17)
NITRATE UR QL: NEGATIVE
NONHDLC SERPL-MCNC: 130 MG/DL
PH UR STRIP: 5.5 PH (ref 5–7)
PLATELET # BLD AUTO: 192 10E9/L (ref 150–450)
POTASSIUM SERPL-SCNC: 4.3 MMOL/L (ref 3.4–5.3)
PROT SERPL-MCNC: 6.8 G/DL (ref 6.8–8.8)
PSA SERPL-MCNC: <0.01 UG/L (ref 0–4)
RBC # BLD AUTO: 4.96 10E12/L (ref 4.4–5.9)
SODIUM SERPL-SCNC: 139 MMOL/L (ref 133–144)
SOURCE: NORMAL
SP GR UR STRIP: <=1.005 (ref 1–1.03)
TRIGL SERPL-MCNC: 57 MG/DL
TSH SERPL DL<=0.005 MIU/L-ACNC: 1.76 MU/L (ref 0.4–4)
UROBILINOGEN UR STRIP-ACNC: 0.2 EU/DL (ref 0.2–1)
WBC # BLD AUTO: 7.6 10E9/L (ref 4–11)

## 2018-05-08 PROCEDURE — 85027 COMPLETE CBC AUTOMATED: CPT | Performed by: FAMILY MEDICINE

## 2018-05-08 PROCEDURE — G0439 PPPS, SUBSEQ VISIT: HCPCS | Performed by: FAMILY MEDICINE

## 2018-05-08 PROCEDURE — 84153 ASSAY OF PSA TOTAL: CPT | Performed by: FAMILY MEDICINE

## 2018-05-08 PROCEDURE — 80061 LIPID PANEL: CPT | Performed by: FAMILY MEDICINE

## 2018-05-08 PROCEDURE — 82550 ASSAY OF CK (CPK): CPT | Performed by: FAMILY MEDICINE

## 2018-05-08 PROCEDURE — 84443 ASSAY THYROID STIM HORMONE: CPT | Performed by: FAMILY MEDICINE

## 2018-05-08 PROCEDURE — 36415 COLL VENOUS BLD VENIPUNCTURE: CPT | Performed by: FAMILY MEDICINE

## 2018-05-08 PROCEDURE — 81003 URINALYSIS AUTO W/O SCOPE: CPT | Performed by: FAMILY MEDICINE

## 2018-05-08 PROCEDURE — 80053 COMPREHEN METABOLIC PANEL: CPT | Performed by: FAMILY MEDICINE

## 2018-05-08 PROCEDURE — 82043 UR ALBUMIN QUANTITATIVE: CPT | Performed by: FAMILY MEDICINE

## 2018-05-08 ASSESSMENT — PATIENT HEALTH QUESTIONNAIRE - PHQ9: 5. POOR APPETITE OR OVEREATING: NOT AT ALL

## 2018-05-08 ASSESSMENT — ANXIETY QUESTIONNAIRES
5. BEING SO RESTLESS THAT IT IS HARD TO SIT STILL: MORE THAN HALF THE DAYS
1. FEELING NERVOUS, ANXIOUS, OR ON EDGE: NOT AT ALL
3. WORRYING TOO MUCH ABOUT DIFFERENT THINGS: NOT AT ALL
GAD7 TOTAL SCORE: 3
7. FEELING AFRAID AS IF SOMETHING AWFUL MIGHT HAPPEN: NOT AT ALL
2. NOT BEING ABLE TO STOP OR CONTROL WORRYING: NOT AT ALL
6. BECOMING EASILY ANNOYED OR IRRITABLE: SEVERAL DAYS
IF YOU CHECKED OFF ANY PROBLEMS ON THIS QUESTIONNAIRE, HOW DIFFICULT HAVE THESE PROBLEMS MADE IT FOR YOU TO DO YOUR WORK, TAKE CARE OF THINGS AT HOME, OR GET ALONG WITH OTHER PEOPLE: NOT DIFFICULT AT ALL

## 2018-05-08 NOTE — LETTER
My Depression Action Plan  Name: Jasen Rosario   Date of Birth 1945  Date: 5/8/2018    My doctor: Archie Meadows   My clinic: [address] [id number] [phone]          GREEN    ZONE   Good Control    What it looks like:     Things are going generally well. You have normal up s and down s. You may even feel depressed from time to time, but bad moods usually last less than a day.   What you need to do:  1. Continue to care for yourself (see self care plan)  2. Check your depression survival kit and update it as needed  3. Follow your physician s recommendations including any medication.  4. Do not stop taking medication unless you consult with your physician first.           YELLOW         ZONE Getting Worse    What it looks like:     Depression is starting to interfere with your life.     It may be hard to get out of bed; you may be starting to isolate yourself from others.    Symptoms of depression are starting to last most all day and this has happened for several days.     You may have suicidal thoughts but they are not constant.   What you need to do:     1. Call your care team, your response to treatment will improve if you keep your care team informed of your progress. Yellow periods are signs an adjustment may need to be made.     2. Continue your self-care, even if you have to fake it!    3. Talk to someone in your support network    4. Open up your depression survival kit           RED    ZONE Medical Alert - Get Help    What it looks like:     Depression is seriously interfering with your life.     You may experience these or other symptoms: You can t get out of bed most days, can t work or engage in other necessary activities, you have trouble taking care of basic hygiene, or basic responsibilities, thoughts of suicide or death that will not go away, self-injurious behavior.     What you need to do:  1. Call your care team  and request a same-day appointment. If they are not available (weekends or after hours) call your local crisis line, emergency room or 911.            Depression Self Care Plan / Survival Kit    Self-Care for Depression  Here s the deal. Your body and mind are really not as separate as most people think.  What you do and think affects how you feel and how you feel influences what you do and think. This means if you do things that people who feel good do, it will help you feel better.  Sometimes this is all it takes.  There is also a place for medication and therapy depending on how severe your depression is, so be sure to consult with your medical provider and/ or Behavioral Health Consultant if your symptoms are worsening or not improving.     In order to better manage my stress, I will:    Exercise  Get some form of exercise, every day. This will help reduce pain and release endorphins, the  feel good  chemicals in your brain. This is almost as good as taking antidepressants!  This is not the same as joining a gym and then never going! (they count on that by the way ) It can be as simple as just going for a walk or doing some gardening, anything that will get you moving.      Hygiene   Maintain good hygiene (Get out of bed in the morning, Make your bed, Brush your teeth, Take a shower, and Get dressed like you were going to work, even if you are unemployed).  If your clothes don't fit try to get ones that do.    Diet  I will strive to eat foods that are good for me, drink plenty of water, and avoid excessive sugar, caffeine, alcohol, and other mood-altering substances.  Some foods that are helpful in depression are: complex carbohydrates, B vitamins, flaxseed, fish or fish oil, fresh fruits and vegetables.    Psychotherapy  I agree to participate in Individual Therapy (if recommended).    Medication  If prescribed medications, I agree to take them.  Missing doses can result in serious side effects.  I understand  that drinking alcohol, or other illicit drug use, may cause potential side effects.  I will not stop my medication abruptly without first discussing it with my provider.    Staying Connected With Others  I will stay in touch with my friends, family members, and my primary care provider/team.    Use your imagination  Be creative.  We all have a creative side; it doesn t matter if it s oil painting, sand castles, or mud pies! This will also kick up the endorphins.    Witness Beauty  (AKA stop and smell the roses) Take a look outside, even in mid-winter. Notice colors, textures. Watch the squirrels and birds.     Service to others  Be of service to others.  There is always someone else in need.  By helping others we can  get out of ourselves  and remember the really important things.  This also provides opportunities for practicing all the other parts of the program.    Humor  Laugh and be silly!  Adjust your TV habits for less news and crime-drama and more comedy.    Control your stress  Try breathing deep, massage therapy, biofeedback, and meditation. Find time to relax each day.     My support system    Clinic Contact:  Phone number:    Contact 1:  Phone number:    Contact 2:  Phone number:    Christianity/:  Phone number:    Therapist:  Phone number:    Local crisis center:    Phone number:    Other community support:  Phone number:

## 2018-05-08 NOTE — MR AVS SNAPSHOT
After Visit Summary   5/8/2018    Jasen Rosario    MRN: [id number]           Patient Information     Date Of Birth          1945        Visit Information        Provider Department      5/8/2018 9:00 AM Archie Meadows MD [facility] [place]        Today's Diagnoses     Medicare annual wellness visit, subsequent    -  1    Encounter for routine adult health examination without abnormal findings        Prostate cancer (H)        Lymphoma, unspecified body region, unspecified lymphoma type (H)        Major depressive disorder, single episode in full remission (H)        Anxiety state        Primary osteoarthritis involving multiple joints        Spinal stenosis of lumbar region, unspecified whether neurogenic claudication present        Hyperlipidemia LDL goal <130        Environmental allergies        Other fatigue        Erectile dysfunction, unspecified erectile dysfunction type        Polyp of colon, unspecified part of colon, unspecified type        Screen for colon cancer        Medication monitoring encounter          Care Instructions    Ibuprofen dose: 800 mg every 8 hours   Tylenol dose: 1000 mg every 8 hours   Check with insurance if/where Shingrix vaccine (shingles) is covered     [facility]                        To reach your care team during and after hours:   [phone]  To reach our pharmacy:        [phone]    Clinic Hours                        Our clinic hours are:    Monday   7:30 am to 7:00 pm                  Tuesday through Friday 7:30 am to 5:00 pm                             Saturday   8:00 am to 12:00 pm      Sunday   Closed      Pharmacy Hours                        Our pharmacy hours are:    Monday   8:30 am to 7:00 pm       Tuesday to Friday  8:30 am to 6:00 pm                       Saturday    9:00 am to 1:00 pm              Sunday    Closed              There is also information available at our web site:  [email or web address]    If your  provider ordered any lab tests and you do not receive the results within 10 business days, please call the clinic.    If you need a medication refill please contact your pharmacy.  Please allow 2-3 business days for your refill to be completed.    Our clinic offers telephone visits and e visits.  Please ask one of your team members to explain more.      Use Vyyknt (secure email communication and access to your chart) to send your primary care provider a message or make an appointment. Ask someone on your Team how to sign up for G[employer] Mobile.  Immunizations                      Immunization History   Administered Date(s) Administered     HEPA 06/28/2006, 12/28/2006     HepB 01/01/1995, 02/01/1995, 06/01/1995     Pneumo Conj 13-V (2010&after) 12/01/2015     Pneumococcal 23 valent 06/28/2006, 04/03/2013     TD (ADULT, 7+) 06/10/2001, 12/10/2010     TDAP Vaccine (Boostrix) 04/03/2013     Zoster vaccine, live 04/03/2013        Health Maintenance                         Health Maintenance Due   Topic Date Due     FALL RISK ASSESSMENT  08/04/2017     Cholesterol Lab - yearly  08/04/2017     Wellness Visit with your Primary Provider - yearly  08/04/2017     Colon Cancer Screening - FIT Test - yearly  08/08/2017     Discuss Advance Directive Planning  04/02/2018         Preventive Health Recommendations:       Male Ages 65 and over    Yearly exam:             See your health care provider every year in order to  o   Review health changes.   o   Discuss preventive care.    o   Review your medicines if your doctor has prescribed any.    Talk with your health care provider about whether you should have a test to screen for prostate cancer (PSA).    Every 3 years, have a diabetes test (fasting glucose). If you are at risk for diabetes, you should have this test more often.    Every 5 years, have a cholesterol test. Have this test more often if you are at risk for high cholesterol or heart disease.     Every 10 years, have a  colonoscopy. Or, have a yearly FIT test (stool test). These exams will check for colon cancer.    Talk to with your health care provider about screening for Abdominal Aortic Aneurysm if you have a family history of AAA or have a history of smoking.  Shots:     Get a flu shot each year.     Get a tetanus shot every 10 years.     Talk to your doctor about your pneumonia vaccines. There are now two you should receive - Pneumovax (PPSV 23) and Prevnar (PCV 13).    Talk to your doctor about a shingles vaccine.     Talk to your doctor about the hepatitis B vaccine.  Nutrition:     Eat at least 5 servings of fruits and vegetables each day.     Eat whole-grain bread, whole-wheat pasta and brown rice instead of white grains and rice.     Talk to your doctor about Calcium and Vitamin D.   Lifestyle    Exercise for at least 150 minutes a week (30 minutes a day, 5 days a week). This will help you control your weight and prevent disease.     Limit alcohol to one drink per day.     No smoking.     Wear sunscreen to prevent skin cancer.     See your dentist every six months for an exam and cleaning.     See your eye doctor every 1 to 2 years to screen for conditions such as glaucoma, macular degeneration and cataracts.              Follow-ups after your visit        Additional Services     ONC/HEME ADULT REFERRAL       Your provider has referred you to: [facility] [place]: Cancer Care/Hematology (All Cancer Related Services) - [place] [phone]   [email or web address]    Dr Barrios/Libra    Please be aware that coverage of these services is subject to the terms and limitations of your health insurance plan.  Call member services at your health plan with any benefit or coverage questions.      Please bring the following with you to your appointment:    (1) Any X-Rays, CTs or MRIs which have been performed.  Contact the facility where they were done to arrange for  prior to your  "scheduled appointment.   (2) List of current medications  (3) This referral request   (4) Any documents/labs given to you for this referral                  Follow-up notes from your care team     Return in about 1 year (around 5/8/2019), or if symptoms worsen or fail to improve, for Physical Exam.      Future tests that were ordered for you today     Open Future Orders        Priority Expected Expires Ordered    Fecal colorectal cancer screen (FIT) Routine 5/29/2018 7/31/2018 5/8/2018            Who to contact     If you have questions or need follow up information about today's clinic visit or your schedule please contact [facility] directly at [phone].  Normal or non-critical lab and imaging results will be communicated to you by Cartavihart, letter or phone within 4 business days after the clinic has received the results. If you do not hear from us within 7 days, please contact the clinic through Tackkt or phone. If you have a critical or abnormal lab result, we will notify you by phone as soon as possible.  Submit refill requests through [employer] or call your pharmacy and they will forward the refill request to us. Please allow 3 business days for your refill to be completed.          Additional Information About Your Visit        CartaviharBlueStacks Information     [employer] gives you secure access to your electronic health record. If you see a primary care provider, you can also send messages to your care team and make appointments. If you have questions, please call your primary care clinic.  If you do not have a primary care provider, please call [phone] and they will assist you.        Care EveryWhere ID     This is your Care EveryWhere ID. This could be used by other organizations to access your [facility] medical records  [id number]        Your Vitals Were     Pulse Temperature Height Pulse Oximetry BMI (Body Mass Index)       62 97.5  F (36.4  C) (Oral) 5' 7.5\" (1.715 m) 99% 22.68 kg/m2        " Blood Pressure from Last 3 Encounters:   05/08/18 116/72   04/18/18 99/67   03/08/18 112/69    Weight from Last 3 Encounters:   05/08/18 147 lb (66.7 kg)   04/18/18 143 lb (64.9 kg)   10/02/17 143 lb (64.9 kg)              We Performed the Following     *UA reflex to Microscopic and Culture ([place] and [facility] (except [place] and [place])     Albumin Random Urine Quantitative with Creat Ratio     CBC with platelets     CK total     Comprehensive metabolic panel     DEPRESSION ACTION PLAN (DAP)     Lipid panel reflex to direct LDL Fasting     Magnesium RBC     ONC/HEME ADULT REFERRAL     PSA, tumor marker     TSH with free T4 reflex        Primary Care Provider Office Phone # Fax #    Archie Meadows [phone] [address] [facility] [address]        Equal Access to Services     DEEPTHI MEDINA : Hadii aad ku hadasho Soomaali, waaxda luqadaha, qaybta kaalmada adedionicioyajoshua, lindy jenkins . So [facility] [phone].    ATENCIÓN: Si habla español, tiene a dumont disposición servicios gratuitos de asistencia lingüística. [facility] [phone].    We comply with applicable federal civil rights laws and [place] laws. We do not discriminate on the basis of race, color, national origin, age, disability, sex, sexual orientation, or gender identity.            Thank you!     Thank you for choosing [facility]  for your care. Our goal is always to provide you with excellent care. Hearing back from our patients is one way we can continue to improve our services. Please take a few minutes to complete the written survey that you may receive in the mail after your visit with us. Thank you!             Your Updated Medication List - Protect others around you: Learn how to safely use, store and throw away your medicines at www.disposemymeds.org.          This list is accurate as of 5/8/18  9:58 AM.  Always use your most recent med list.                   Brand  Name Dispense Instructions for use Diagnosis    DAILY MULTIVITAMIN PO      Take 2 tablets by mouth daily        FISH OIL PO      Take 2 tablets by mouth daily        GLUCOSAMINE-CHONDROITIN PO      Take 2 tablets by mouth daily

## 2018-05-08 NOTE — NURSING NOTE
"Chief Complaint   Patient presents with     Wellness Visit       Initial /72  Pulse 62  Temp 97.5  F (36.4  C) (Oral)  Ht 5' 7.5\" (1.715 m)  Wt 147 lb (66.7 kg)  SpO2 99%  BMI 22.68 kg/m2 Estimated body mass index is 22.68 kg/(m^2) as calculated from the following:    Height as of this encounter: 5' 7.5\" (1.715 m).    Weight as of this encounter: 147 lb (66.7 kg)..  BP completed using cuff size: regular  Myla Phillips MA  "

## 2018-05-08 NOTE — LETTER
[facility]  [address] [place] [address], [place] [address]                  [phone]   May 9, 2018    Jasen oRsario  [id number] [facility] [address]      Dear Jasen,    Here is a summary of your recent test results:    Labs are overall quite good.     We advise:     Continue current cares.   Balanced low cholesterol diet.   Regular exercise.     For additional lab test information, labtestsonline.org is an excellent reference.     Let us know if you have any questions or concerns.     Your test results are enclosed.      Please contact me if you have any questions.    In addition, here is a list of due or overdue Health Maintenance reminders.    Health Maintenance Due   Topic Date Due     FALL RISK ASSESSMENT  08/04/2017     Colon Cancer Screening - FIT Test - yearly  08/08/2017     Depression Assessment - every 6 months  12/14/2017       Please call us at [phone] (or use Cuffed and Wanted) to address the above recommendations.            Thank you very much for trusting [facility]..     Healthy regards,        Archie Meadows M.D.        Results for orders placed or performed in visit on 05/08/18   Comprehensive metabolic panel   Result Value Ref Range    Sodium 139 133 - 144 mmol/L    Potassium 4.3 3.4 - 5.3 mmol/L    Chloride 106 94 - 109 mmol/L    Carbon Dioxide 24 20 - 32 mmol/L    Anion Gap 9 3 - 14 mmol/L    Glucose 84 70 - 99 mg/dL    Urea Nitrogen 17 7 - 30 mg/dL    Creatinine 0.95 0.66 - 1.25 mg/dL    GFR Estimate 78 >60 mL/min/1.7m2    GFR Estimate If Black >90 >60 mL/min/1.7m2    Calcium 8.7 8.5 - 10.1 mg/dL    Bilirubin Total 1.0 0.2 - 1.3 mg/dL    Albumin 4.0 3.4 - 5.0 g/dL    Protein Total 6.8 6.8 - 8.8 g/dL    Alkaline Phosphatase 85 40 - 150 U/L    ALT 24 0 - 70 U/L    AST 19 0 - 45 U/L   Lipid panel reflex to direct LDL Fasting   Result Value Ref Range    Cholesterol 205 (H) <200 mg/dL    Triglycerides 57 <150 mg/dL    HDL  Cholesterol 75 >39 mg/dL    LDL Cholesterol Calculated 119 (H) <100 mg/dL    Non HDL Cholesterol 130 (H) <130 mg/dL   CK total   Result Value Ref Range    CK Total 175 30 - 300 U/L   CBC with platelets   Result Value Ref Range    WBC 7.6 4.0 - 11.0 10e9/L    RBC Count 4.96 4.4 - 5.9 10e12/L    Hemoglobin 15.1 13.3 - 17.7 g/dL    Hematocrit 45.0 40.0 - 53.0 %    MCV 91 78 - 100 fl    MCH 30.4 26.5 - 33.0 pg    MCHC 33.6 31.5 - 36.5 g/dL    RDW 13.2 10.0 - 15.0 %    Platelet Count 192 150 - 450 10e9/L   TSH with free T4 reflex   Result Value Ref Range    TSH 1.76 0.40 - 4.00 mU/L   Albumin Random Urine Quantitative with Creat Ratio   Result Value Ref Range    Creatinine Urine 28 mg/dL    Albumin Urine mg/L <5 mg/L    Albumin Urine mg/g Cr Unable to calculate due to low value 0 - 17 mg/g Cr   *UA reflex to Microscopic and Culture ([place] and [facility] [place] (except [place] and [place])   Result Value Ref Range    Color Urine Yellow     Appearance Urine Clear     Glucose Urine Negative NEG^Negative mg/dL    Bilirubin Urine Negative NEG^Negative    Ketones Urine Negative NEG^Negative mg/dL    Specific Gravity Urine <=1.005 1.003 - 1.035    Blood Urine Negative NEG^Negative    pH Urine 5.5 5.0 - 7.0 pH    Protein Albumin Urine Negative NEG^Negative mg/dL    Urobilinogen Urine 0.2 0.2 - 1.0 EU/dL    Nitrite Urine Negative NEG^Negative    Leukocyte Esterase Urine Negative NEG^Negative    Source Midstream Urine    PSA, tumor marker   Result Value Ref Range    PSA <0.01 0 - 4 ug/L

## 2018-05-08 NOTE — PATIENT INSTRUCTIONS
Ibuprofen dose: 800 mg every 8 hours   Tylenol dose: 1000 mg every 8 hours   Check with insurance if/where Shingrix vaccine (shingles) is covered     [facility]                        To reach your care team during and after hours:   [phone]  To reach our pharmacy:        [phone]    Clinic Hours                        Our clinic hours are:    Monday   7:30 am to 7:00 pm                  Tuesday through Friday 7:30 am to 5:00 pm                             Saturday   8:00 am to 12:00 pm      Sunday   Closed      Pharmacy Hours                        Our pharmacy hours are:    Monday   8:30 am to 7:00 pm       Tuesday to Friday  8:30 am to 6:00 pm                       Saturday    9:00 am to 1:00 pm              Sunday    Closed              There is also information available at our web site:  [email or web address]    If your provider ordered any lab tests and you do not receive the results within 10 business days, please call the clinic.    If you need a medication refill please contact your pharmacy.  Please allow 2-3 business days for your refill to be completed.    Our clinic offers telephone visits and e visits.  Please ask one of your team members to explain more.      Use Ablynxhart (secure email communication and access to your chart) to send your primary care provider a message or make an appointment. Ask someone on your Team how to sign up for Dexmo.  Immunizations                      Immunization History   Administered Date(s) Administered     HEPA 06/28/2006, 12/28/2006     HepB 01/01/1995, 02/01/1995, 06/01/1995     Pneumo Conj 13-V (2010&after) 12/01/2015     Pneumococcal 23 valent 06/28/2006, 04/03/2013     TD (ADULT, 7+) 06/10/2001, 12/10/2010     TDAP Vaccine (Boostrix) 04/03/2013     Zoster vaccine, live 04/03/2013        Health Maintenance                         Health Maintenance Due   Topic Date Due     FALL RISK ASSESSMENT  08/04/2017     Cholesterol Lab - yearly   08/04/2017     Wellness Visit with your Primary Provider - yearly  08/04/2017     Colon Cancer Screening - FIT Test - yearly  08/08/2017     Discuss Advance Directive Planning  04/02/2018         Preventive Health Recommendations:       Male Ages 65 and over    Yearly exam:             See your health care provider every year in order to  o   Review health changes.   o   Discuss preventive care.    o   Review your medicines if your doctor has prescribed any.    Talk with your health care provider about whether you should have a test to screen for prostate cancer (PSA).    Every 3 years, have a diabetes test (fasting glucose). If you are at risk for diabetes, you should have this test more often.    Every 5 years, have a cholesterol test. Have this test more often if you are at risk for high cholesterol or heart disease.     Every 10 years, have a colonoscopy. Or, have a yearly FIT test (stool test). These exams will check for colon cancer.    Talk to with your health care provider about screening for Abdominal Aortic Aneurysm if you have a family history of AAA or have a history of smoking.  Shots:     Get a flu shot each year.     Get a tetanus shot every 10 years.     Talk to your doctor about your pneumonia vaccines. There are now two you should receive - Pneumovax (PPSV 23) and Prevnar (PCV 13).    Talk to your doctor about a shingles vaccine.     Talk to your doctor about the hepatitis B vaccine.  Nutrition:     Eat at least 5 servings of fruits and vegetables each day.     Eat whole-grain bread, whole-wheat pasta and brown rice instead of white grains and rice.     Talk to your doctor about Calcium and Vitamin D.   Lifestyle    Exercise for at least 150 minutes a week (30 minutes a day, 5 days a week). This will help you control your weight and prevent disease.     Limit alcohol to one drink per day.     No smoking.     Wear sunscreen to prevent skin cancer.     See your dentist every six months for an exam  and cleaning.     See your eye doctor every 1 to 2 years to screen for conditions such as glaucoma, macular degeneration and cataracts.

## 2018-05-08 NOTE — PROGRESS NOTES
SUBJECTIVE:   Jasen Rosario is a 72 year old male who presents for Preventive Visit.    Are you in the first 12 months of your Medicare Part B coverage?  No    Healthy Habits:    Do you get at least three servings of calcium containing foods daily (dairy, green leafy vegetables, etc.)? no, taking calcium and/or vitamin D supplement: no    Amount of exercise or daily activities, outside of work: tons    Problems taking medications regularly No    Medication side effects: No    Have you had an eye exam in the past two years? no    Do you see a dentist twice per year? yes  Do you have sleep apnea, excessive snoring or daytime drowsiness?yes-fatigue      Ability to successfully perform activities of daily living: Yes, no assistance needed    Home safety:  none identified     Hearing impairment: No    Fall risk:  Fallen 2 or more times in the past year?: No  Any fall with injury in the past year?: No      COGNITIVE SCREEN  1) Repeat 3 items (Banana, Sunrise, Chair)    2) Clock draw: NORMAL  3) 3 item recall: Recalls 3 objects  Results: 3 items recalled: COGNITIVE IMPAIRMENT LESS LIKELY    Mini-CogTM Copyright S Yeyo. Licensed by the author for use in [facility]; reprinted with permission (juan@[email or web address]). All rights reserved.        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 He relates his understanding is it is very mild.     Hx of prostate cancer -- stable    Lab Results   Component Value Date    PSA <0.01 05/08/2018    PSA  06/14/2017     <0.01  Assay Method:  Chemiluminescence using Siemens Vista analyzer   Results confirmed by repeat test      PSA 1.82 12/08/2014     Seasonal allergies -- stable, has not experienced any symptoms so far this year     Depression/anxiety -- WALDO-7 of 3 and PSQ-9 of 4     Hx of BCC of skin -- On left ear. He underwent MOHS  surgery in 3/2018 with Dr Young.     Reviewed and updated as needed this visit by clinical staff  Tobacco  Allergies  Meds  Med Hx  Surg Hx  Fam Hx  Soc Hx        Reviewed and updated as needed this visit by Provider        Social History   Substance Use Topics     Smoking status: Former Smoker     Packs/day: 2.00     Years: 7.00     Types: Cigarettes     Quit date: 1/1/1965     Smokeless tobacco: Never Used     Alcohol use 3.0 - 3.6 oz/week     5 - 6 Standard drinks or equivalent per week      Comment: ocassionally       If you drink alcohol do you typically have >3 drinks per day or >7 drinks per week? No                        Today's PHQ-2 Score:   PHQ-2 ( 1999 [employer]) 5/8/2018 8/4/2016   Q1: Little interest or pleasure in doing things 0 0   Q2: Feeling down, depressed or hopeless 0 0   PHQ-2 Score 0 0       Do you feel safe in your environment - Yes    Do you have a Health Care Directive?: Yes: Advance Directive has been received and scanned.    Current providers sharing in care for this patient include:   Patient Care Team:  Archie Meadows MD as PCP - General  Archie Meadows MD as Referring Physician (Family Practice)  Sheri Hemphill RN as Registered Nurse (Urology)    The following health maintenance items are reviewed in Epic and correct as of today:  Health Maintenance   Topic Date Due     INFLUENZA VACCINE (Season Ended) 09/01/2018     PHQ-9 Q6 MONTHS  11/08/2018     FALL RISK ASSESSMENT  05/08/2019     LIPID MONITORING Q1 YEAR  05/08/2019     PSA Q1 YR  05/08/2019     DEPRESSION ACTION PLAN Q1 YR  05/08/2019     WELLNESS VISIT Q1 YR  05/08/2019     FIT Q1 YR  05/09/2019     TETANUS Q10 YR  04/03/2023     COLONOSCOPY Q5 YR  04/18/2023     ADVANCE DIRECTIVE PLANNING Q5 YRS  05/08/2023     PNEUMO 5YR BOOST DUE AFTER AGE 65 (NO IB MSG)  Completed     PNEUMOCOCCAL  Completed     AORTIC ANEURYSM SCREENING (SYSTEM ASSIGNED)  Completed     HEPATITIS C SCREENING  Completed       Health  Maintenance     Colonoscopy:  Q 5 years (last 4/18/2018), due 4/2023    FIT:  Yearly (last 8/2016), due              PSA:  Yearly (last 6/2017), due   DEXA:  n/a    There are no preventive care reminders to display for this patient.    Current Problem List    Patient Active Problem List   Diagnosis     Anxiety state     Major depressive disorder, single episode in full remission (H)     Hyperlipidemia LDL goal <130     ED (erectile dysfunction)     Seasonal allergies     OA (osteoarthritis)     Advanced directives, counseling/discussion     Prostate cancer (H)     Colon polyp     Lymphoma (H)     Lumbar stenosis     S/P total hip arthroplasty     Environmental allergies     S/P total knee arthroplasty       Past Medical History    Past Medical History:   Diagnosis Date     Anxiety state, unspecified 1991     Basal cell carcinoma     Left Ear - Moh's Dr Young     Colon polyp      ED (erectile dysfunction) 2005     Hyperlipidemia LDL goal <130 2001     Hypoglycemia, unspecified 1991     Infectious mononucleosis 1969     Lumbar stenosis     TCO - Dr Irene - with neuritis     Lymphoma (H)     SLL - B Cell - Dr Barrios     Major depressive disorder, single episode in full remission (H) 1991     OA (osteoarthritis)     Severe Lt Hip     Prostate cancer (H) 5/13    Dr Smyth -  - Darian 3+3 - intermediate progression risk - pT2cN0     Seasonal allergies        Past Surgical History    Past Surgical History:   Procedure Laterality Date     ARTHROPLASTY HIP Left 6/21/2017    Left BETTY - Dr MELISSA Madrigal     ARTHROPLASTY KNEE Left 10/2/2017    LEFT TOTAL KNEE ARTHROPLASTY - Dr MELISSA Madrigal     COLONOSCOPY  12/04, 12/14    colon polyp - serrated adenoma 5 mm - due 3 yrs     COLONOSCOPY  03/16/2018    Dr. Naila BELLE - normal - due 5 yrs - will need MAC     COLONOSCOPY N/A 4/18/2018    Dr. Naila BELLE - normal - due 5 yrs - will need MAC     DAVINCI PROSTATECTOMY, LYMPHADENECTOMY N/A 4/13/2015    RALP - Dr Smyth     DENTAL SURGERY   1985    wisdom teeth     SURGICAL HISTORY OF -   1980    BACK SURGERY     SURGICAL HISTORY OF -  Left 1987    ANKLE FX ORIF - Lt     SURGICAL HISTORY OF -  Left 1972    LT KNEE MCL TEAR AND MENISCUS      SURGICAL HISTORY OF -   1960    APPY     SURGICAL HISTORY OF -  Left 2001, 2008    LT SHOULDER ARTHROSCOPY     SURGICAL HISTORY OF -  Right 01/2007    Rt Shoulder Surgery - Dr Ferrer       Current Medications    Current Outpatient Prescriptions   Medication Sig Dispense Refill     GLUCOSAMINE-CHONDROITIN PO Take 2 tablets by mouth daily       Multiple Vitamin (DAILY MULTIVITAMIN PO) Take 2 tablets by mouth daily        Omega-3 Fatty Acids (FISH OIL PO) Take 2 tablets by mouth daily          Allergies    Allergies   Allergen Reactions     Seasonal Allergies        Immunizations    Immunization History   Administered Date(s) Administered     HEPA 06/28/2006, 12/28/2006     HepB 01/01/1995, 02/01/1995, 06/01/1995     Pneumo Conj 13-V (2010&after) 12/01/2015     Pneumococcal 23 valent 06/28/2006, 04/03/2013     TD (ADULT, 7+) 06/10/2001, 12/10/2010     TDAP Vaccine (Boostrix) 04/03/2013     Zoster vaccine, live 04/03/2013       Family History    Family History   Problem Relation Age of Onset     Alcohol/Drug Father      CANCER Mother      ? 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 No     Social History Narrative       ROS:  Constitutional, HEENT, cardiovascular, pulmonary, GI, , musculoskeletal, neuro, skin, endocrine and psych systems are negative, except as otherwise noted.    This document serves as a record of the services and decisions personally performed and made by Archie Meadows MD. It was created on their behalf by Sandra Dutta, a trained medical scribe. The creation of this document is based the provider's statements to the medical scribe.  Sandra Dutta May 8, 2018 9:31 AM      OBJECTIVE:   /72  Pulse 62  Temp 97.5  F (36.4  C) (Oral)  Ht 5' 7.5\" (1.715 m)  Wt 147 lb (66.7 kg)  SpO2 99%  BMI 22.68 kg/m2 Estimated body mass index is 22.68 kg/(m^2) as calculated from the following:    Height as of this encounter: 5' 7.5\" (1.715 m).    Weight as of this encounter: 147 lb (66.7 kg).  EXAM:   GENERAL: healthy, alert and no distress  EYES: Eyes grossly normal to inspection, PERRL and conjunctivae and sclerae normal  HENT: ear canals and TM's normal, nose and mouth without ulcers or lesions  NECK: no adenopathy, no asymmetry, masses, or scars and thyroid normal to palpation  RESP: lungs clear to auscultation - no rales, rhonchi or wheezes  CV: regular rate and rhythm, normal S1 S2, no S3 or S4, no murmur, click or rub, no peripheral edema and peripheral pulses strong  ABDOMEN: soft, nontender, no hepatosplenomegaly, no masses and bowel sounds normal   (male): normal male genitalia without lesions or urethral discharge, no hernia  RECTAL: normal sphincter tone, no rectal masses, prostate normal size, smooth, nontender without nodules or masses  MS: no gross musculoskeletal defects noted, no edema  SKIN: no suspicious lesions or rashes  NEURO: Normal strength and tone, mentation intact and speech " normal  PSYCH: mentation appears normal, affect normal/bright    ASSESSMENT / PLAN:       ICD-10-CM    1. Medicare annual wellness visit, subsequent Z00.00    2. Encounter for routine adult health examination without abnormal findings Z00.00 Comprehensive metabolic panel     Lipid panel reflex to direct LDL Fasting     CK total     CBC with platelets     TSH with free T4 reflex     Albumin Random Urine Quantitative with Creat Ratio     *UA reflex to Microscopic and Culture ([place] [facility] (except [place] and [employer])     PSA, tumor marker     Fecal colorectal cancer screen (FIT)   3. Prostate cancer (H) C61 PSA, tumor marker     ONC/HEME ADULT REFERRAL   4. Lymphoma, unspecified body region, unspecified lymphoma type (H) C85.90 Comprehensive metabolic panel     CBC with platelets     ONC/HEME ADULT REFERRAL   5. Major depressive disorder, single episode in full remission (H) F32.5 TSH with free T4 reflex   6. Anxiety state F41.1 TSH with free T4 reflex   7. Primary osteoarthritis involving multiple joints M15.0    8. Spinal stenosis of lumbar region, unspecified whether neurogenic claudication present M48.061    9. Hyperlipidemia LDL goal <130 E78.5 Comprehensive metabolic panel     Lipid panel reflex to direct LDL Fasting     CK total   10. Environmental allergies Z91.09    11. Other fatigue R53.83 Comprehensive metabolic panel     CBC with platelets     TSH with free T4 reflex     Magnesium RBC     CANCELED: Magnesium RBC   12. Erectile dysfunction, unspecified erectile dysfunction type N52.9    13. Polyp of colon, unspecified part of colon, unspecified type K63.5 Fecal colorectal cancer screen (FIT)   14. Screen for colon cancer Z12.11 Fecal colorectal cancer screen (FIT)   15. Medication monitoring encounter Z51.81 Comprehensive metabolic panel     Lipid panel reflex to direct LDL Fasting     CK total     CBC with platelets     TSH with free T4 reflex     Albumin Random Urine Quantitative with  "Creat Ratio     *UA reflex to Microscopic and Culture ([place] and [place] [facility] (except [place] and [place])     PSA, tumor marker       Discussed treatment/modality options, including risk and benefits, he desires advised alcohol consumption 1oz per day or less, advised aspirin 81 mg po daily, advised 1 multivitamin per day, advised calcium 5026-5579 mg/d and Vitamin D 800-1200 IU/d, advised dentist every 6 months, advised opthalmologist every 1-2 years, further health care maintenance, further lab(s), WALDO 7, completed and reviewed, PHQ-9, Depression Action Plan, completed and reviewed and observation. All diagnosis above reviewed and noted above, otherwise stable.  See [employer] orders for further details.  Follow up as needed.    There are no preventive care reminders to display for this patient.    End of Life Planning:  Patient currently has an advanced directive: Yes.  Practitioner is supportive of decision.    COUNSELING:  Reviewed preventive health counseling, as reflected in patient instructions       Regular exercise       Healthy diet/nutrition       Colon cancer screening       Prostate cancer screening        Estimated body mass index is 22.68 kg/(m^2) as calculated from the following:    Height as of this encounter: 5' 7.5\" (1.715 m).    Weight as of this encounter: 147 lb (66.7 kg).       reports that he quit smoking about 53 years ago. His smoking use included Cigarettes. He has a 14.00 pack-year smoking history. He has never used smokeless tobacco.      Appropriate preventive services were discussed with this patient, including applicable screening as appropriate for cardiovascular disease, diabetes, osteopenia/osteoporosis, and glaucoma.  As appropriate for age/gender, discussed screening for colorectal cancer, prostate cancer, breast cancer, and cervical cancer. Checklist reviewing preventive services available has been given to the patient.    Reviewed patients plan of care and provided an " AVS. The Basic Care Plan (routine screening as documented in Health Maintenance) for Jasen meets the Care Plan requirement. This Care Plan has been established and reviewed with the Patient.    Counseling Resources:  ATP IV Guidelines  Pooled Cohorts Equation Calculator  Breast Cancer Risk Calculator  FRAX Risk Assessment  ICSI Preventive Guidelines  Dietary Guidelines for Americans, 2010  USDA's MyPlate  ASA Prophylaxis  Lung CA Screening    The information in this document, created by the medical scribe for me, accurately reflects the services I personally performed and the decisions made by me. I have reviewed and approved this document for accuracy prior to leaving the patient care area.  May 8, 2018 9:58 AM           Archie Meadows MD  [facility]

## 2018-05-09 PROCEDURE — G0328 FECAL BLOOD SCRN IMMUNOASSAY: HCPCS | Performed by: FAMILY MEDICINE

## 2018-05-09 ASSESSMENT — PATIENT HEALTH QUESTIONNAIRE - PHQ9: SUM OF ALL RESPONSES TO PHQ QUESTIONS 1-9: 4

## 2018-05-09 ASSESSMENT — ANXIETY QUESTIONNAIRES: GAD7 TOTAL SCORE: 3

## 2018-05-11 DIAGNOSIS — Z00.00 ENCOUNTER FOR ROUTINE ADULT HEALTH EXAMINATION WITHOUT ABNORMAL FINDINGS: ICD-10-CM

## 2018-05-11 DIAGNOSIS — K63.5 POLYP OF COLON, UNSPECIFIED PART OF COLON, UNSPECIFIED TYPE: ICD-10-CM

## 2018-05-11 DIAGNOSIS — Z12.11 SCREEN FOR COLON CANCER: ICD-10-CM

## 2018-05-11 LAB — HEMOCCULT STL QL IA: NEGATIVE

## 2018-05-16 DIAGNOSIS — R53.83 OTHER FATIGUE: ICD-10-CM

## 2018-05-16 PROCEDURE — 36415 COLL VENOUS BLD VENIPUNCTURE: CPT | Performed by: FAMILY MEDICINE

## 2018-05-16 PROCEDURE — 99000 SPECIMEN HANDLING OFFICE-LAB: CPT | Performed by: FAMILY MEDICINE

## 2018-05-16 PROCEDURE — 83735 ASSAY OF MAGNESIUM: CPT | Mod: 90 | Performed by: FAMILY MEDICINE

## 2018-05-18 LAB — MAGNESIUM RBC-SCNC: 2.3 MMOL/L (ref 1.5–3.1)

## 2018-06-13 ENCOUNTER — CARE COORDINATION (OUTPATIENT)
Dept: ONCOLOGY | Facility: CLINIC | Age: 73
End: 2018-06-13

## 2018-06-13 ENCOUNTER — TRANSFERRED RECORDS (OUTPATIENT)
Dept: HEALTH INFORMATION MANAGEMENT | Facility: CLINIC | Age: 73
End: 2018-06-13

## 2018-06-13 NOTE — PROGRESS NOTES
Phoned patient today for Pre-visit welcome call.  Patient is not available but left a message on home number to confirm appointment and to call back with further questions regarding appt or location.    Scheduled for:  Date:  6/14/18  Time: 10:45 am  with :  Marcus    Patient requested:  Please check in at [phone]  [facility].  Please Arrive 15 minutes before your appointment time.    Please Bring:  Insurance Card(s)  Photo ID      Patient is [facility] patient and all records, pathology reports, surgical reports are in EMR.  MD will be able to preview chart prior to visit.   The clinic is open M-F 8-4:30pm and I am here the same hours.   If you have any questions, please don't hesitate to call and ask for the Patient Care Coordinator @ [phone] option 4.    Chantelle Kern RN, BSN

## 2018-06-14 ENCOUNTER — ONCOLOGY VISIT (OUTPATIENT)
Dept: ONCOLOGY | Facility: CLINIC | Age: 73
End: 2018-06-14
Attending: INTERNAL MEDICINE
Payer: COMMERCIAL

## 2018-06-14 VITALS
HEART RATE: 69 BPM | SYSTOLIC BLOOD PRESSURE: 116 MMHG | WEIGHT: 145.6 LBS | HEIGHT: 68 IN | OXYGEN SATURATION: 97 % | BODY MASS INDEX: 22.07 KG/M2 | TEMPERATURE: 97.2 F | RESPIRATION RATE: 16 BRPM | DIASTOLIC BLOOD PRESSURE: 71 MMHG

## 2018-06-14 DIAGNOSIS — C61 PROSTATE CANCER (H): ICD-10-CM

## 2018-06-14 DIAGNOSIS — C83.00 SMALL B-CELL LYMPHOMA, UNSPECIFIED BODY REGION (H): Primary | ICD-10-CM

## 2018-06-14 PROCEDURE — 99203 OFFICE O/P NEW LOW 30 MIN: CPT | Performed by: INTERNAL MEDICINE

## 2018-06-14 PROCEDURE — G0463 HOSPITAL OUTPT CLINIC VISIT: HCPCS

## 2018-06-14 ASSESSMENT — PAIN SCALES - GENERAL: PAINLEVEL: NO PAIN (0)

## 2018-06-14 NOTE — MR AVS SNAPSHOT
After Visit Summary   6/14/2018    Jasen Rosario    MRN: [id number]           Patient Information     Date Of Birth          1945        Visit Information        Provider Department      6/14/2018 10:45 AM Nicole Velazquez MD [facility]        Today's Diagnoses     Small B-cell lymphoma, unspecified body region (H)    -  1    Prostate cancer (H)          Care Instructions    Return to clinic in 1 year with labs, a day prior; okay to do labs at PCP's office if desired          Follow-ups after your visit        Your next 10 appointments already scheduled     Jun 13, 2019 10:15 AM CDT   Return Visit with Nicole Velazquez MD   [facility] ([facility])    [facility]  [address] [employer] [address]  [address] [id number] [phone]              Future tests that were ordered for you today     Open Future Orders        Priority Expected Expires Ordered    CBC with platelets differential Routine 6/14/2019 6/14/2019 6/14/2018    Comprehensive metabolic panel Routine 6/14/2019 6/14/2019 6/14/2018    Lactate Dehydrogenase Routine 6/14/2019 6/14/2019 6/14/2018    PSA tumor marker Routine 6/14/2019 6/14/2019 6/14/2018            Who to contact     If you have questions or need follow up information about today's clinic visit or your schedule please contact [facility] directly at [phone].  Normal or non-critical lab and imaging results will be communicated to you by MyChart, letter or phone within 4 business days after the clinic has received the results. If you do not hear from us within 7 days, please contact the clinic through MyChart or phone. If you have a critical or abnormal lab result, we will notify you by phone as soon as possible.  Submit refill requests through [email or web address] or call your pharmacy and they will forward the refill request to us. Please allow 3 business  "days for your refill to be completed.          Additional Information About Your Visit        MyChart Information     [employer] gives you secure access to your electronic health record. If you see a primary care provider, you can also send messages to your care team and make appointments. If you have questions, please call your primary care clinic.  If you do not have a primary care provider, please call [phone] and they will assist you.        Care EveryWhere ID     This is your Care EveryWhere ID. This could be used by other organizations to access your [facility] medical records  [id number]        Your Vitals Were     Pulse Temperature Respirations Height Pulse Oximetry BMI (Body Mass Index)    69 97.2  F (36.2  C) (Tympanic) 16 1.715 m (5' 7.5\") 97% 22.47 kg/m2       Blood Pressure from Last 3 Encounters:   06/14/18 116/71   05/08/18 116/72   04/18/18 99/67    Weight from Last 3 Encounters:   06/14/18 66 kg (145 lb 9.6 oz)   05/08/18 66.7 kg (147 lb)   04/18/18 64.9 kg (143 lb)               Primary Care Provider Office Phone # Fax #    Archie Meadows [phone]       [address] [place] [id number]        Equal Access to Services     DEEPTHI MEDINA AH: Hadii christiano ku hadasho Soomaali, waaxda luqadaha, qaybta kaalmada adeegyada, waxay idiin haytangela jenkins . So [facility] [phone].    ATENCIÓN: Si habla español, tiene a dumont disposición servicios gratuitos de asistencia lingüística. [facility] [phone].    We comply with applicable federal civil rights laws and [place] laws. We do not discriminate on the basis of race, color, national origin, age, disability, sex, sexual orientation, or gender identity.            Thank you!     Thank you for choosing [facility]  for your care. Our goal is always to provide you with excellent care. Hearing back from our patients is one way we can continue to improve our services. Please take a few minutes to complete " the written survey that you may receive in the mail after your visit with us. Thank you!             Your Updated Medication List - Protect others around you: Learn how to safely use, store and throw away your medicines at www.disposemymeds.org.          This list is accurate as of 6/14/18 11:22 AM.  Always use your most recent med list.                   Brand Name Dispense Instructions for use Diagnosis    DAILY MULTIVITAMIN PO      Take 2 tablets by mouth daily        FISH OIL PO      Take 2 tablets by mouth daily        GLUCOSAMINE-CHONDROITIN PO      Take 2 tablets by mouth daily

## 2018-06-14 NOTE — NURSING NOTE
"Oncology Rooming Note    June 14, 2018 10:39 AM   Jasen Rosario is a 72 year old male who presents for:    Chief Complaint   Patient presents with     Oncology Clinic Visit     New Consult     Initial Vitals: Resp 16  Ht 1.715 m (5' 7.5\")  Wt 66 kg (145 lb 9.6 oz)  BMI 22.47 kg/m2 Estimated body mass index is 22.47 kg/(m^2) as calculated from the following:    Height as of this encounter: 1.715 m (5' 7.5\").    Weight as of this encounter: 66 kg (145 lb 9.6 oz). Body surface area is 1.77 meters squared.  No Pain (0) Comment: Data Unavailable   No LMP for male patient.  Allergies reviewed: Yes  Medications reviewed: Yes    Medications: Medication refills not needed today.  Pharmacy name entered into EPIC:    [facility] [place]  [facility] [id number] [facility] [id number] [address] [facility]    Clinical concerns: Follow-up     8 minutes for nursing intake (face to face time)     DISCHARGE PLAN:  Next appointments: See patient instruction section  Departure Mode: Ambulatory  Accompanied by: self  0 minutes for nursing discharge (face to face time)   Shaunna Martin                "

## 2018-06-14 NOTE — LETTER
6/14/2018         RE: Jasen Rosario  [address]  [place] [address]        Dear Colleague,    Thank you for referring your patient, Jasen Rosario, to the [facility]. Please see a copy of my visit note below.    [facility]    Hematology/Oncology New Patient Note      Today's Date: 06/14/18    Reason for Consult: SLL      HISTORY OF PRESENT ILLNESS: Jasen Rosario is a 72 year old male who presents with SLL and history of prostate cancer.       He had prostate biopsy performed in 2013, which showed adenocarcinoma.  In addition, he had an abnormal collection of lymphocytes identified on the biopsy.  Immunostains of material obtained from the right apex of the prostate were positive for a monoclonal population of lymphocytes expressing CD20.  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  He was seen by Dr. Davide Matamoros at the [facility].  PET-CT showed no evidence of lymphoma.  Bone marrow aspirate and biopsy was ~40% cellular, and ~10% involvement involvement with a monoclonal B-cell population compatible with SLL.  Trisomy of chromosome 10 and 12 is found in 13% of cells.  These findings were typical of SLL.  CBC at the time was normal.  Serum IgG was slightly low at 560.    He has history of prostate cancer s/p robotic radical prostatectomy on 4/13/15 by Dr. Smyth.  Final pathology was low volume [place] 6, pT2cN0.  Initial PSA was 5.8.  It became undetectable after surgery.  He is getting PSA's followed yearly.    He was recommended to have yearly follow-up with Dr. Matamoros, but he was lost to follow-up in the hematology clinic, and has been following with his PCP both the the prostate cancer and lymphoma.      He says that he feels the same.  He says that he is pretty healthy and stays active.  He likes to golf, play pickleball.  He denies fever/chills, unintentional weight loss.        REVIEW OF  SYSTEMS:   14 point ROS was reviewed and is negative other than as noted above in HPI.       HOME MEDICATIONS:  Current Outpatient Prescriptions   Medication Sig Dispense Refill     GLUCOSAMINE-CHONDROITIN PO Take 2 tablets by mouth daily       Multiple Vitamin (DAILY MULTIVITAMIN PO) Take 2 tablets by mouth daily        Omega-3 Fatty Acids (FISH OIL PO) Take 2 tablets by mouth daily            ALLERGIES:  Allergies   Allergen Reactions     Seasonal Allergies          PAST MEDICAL HISTORY:  Past Medical History:   Diagnosis Date     Anxiety state, unspecified 1991     Basal cell carcinoma     Left Ear - Moh's Dr Young     Colon polyp      ED (erectile dysfunction) 2005     Hyperlipidemia LDL goal <130 2001     Hypoglycemia, unspecified 1991     Infectious mononucleosis 1969     Lumbar stenosis     TCO - Dr Irene - with neuritis     Lymphoma (H)     SLL - B Cell - Dr Barrios     Major depressive disorder, single episode in full remission (H) 1991     OA (osteoarthritis)     Severe Lt Hip     Prostate cancer (H) 5/13    Dr Smyth - [facility] [place] 3+3 - intermediate progression risk - pT2cN0     Seasonal allergies          PAST SURGICAL HISTORY:  Past Surgical History:   Procedure Laterality Date     ARTHROPLASTY HIP Left 6/21/2017    Left BETTY - Dr MELISSA Madrigal     ARTHROPLASTY KNEE Left 10/2/2017    LEFT TOTAL KNEE ARTHROPLASTY - Dr MELISSA Madrigal     COLONOSCOPY  12/04, 12/14    colon polyp - serrated adenoma 5 mm - due 3 yrs     COLONOSCOPY  03/16/2018    Dr. Naila BELLE - normal - due 5 yrs - will need MAC     COLONOSCOPY N/A 4/18/2018    Dr. Naila BELLE - normal - due 5 yrs - will need MAC     DAVINCI PROSTATECTOMY, LYMPHADENECTOMY N/A 4/13/2015    RALP - Dr Smyth     DENTAL SURGERY  1985    wisdom teeth     SURGICAL HISTORY OF -   1980    BACK SURGERY     SURGICAL HISTORY OF -  Left 1987    ANKLE FX ORIF - Lt     SURGICAL HISTORY OF -  Left 1972    LT KNEE MCL TEAR AND MENISCUS      SURGICAL HISTORY OF -   1960    APPY  "    SURGICAL HISTORY OF -  Left ,     LT SHOULDER ARTHROSCOPY     SURGICAL HISTORY OF -  Right 2007    Rt Shoulder Surgery - Dr Ferrer         SOCIAL HISTORY:  Social History     Social History     Marital status:      Spouse name: Danielle     Number of children: 3     Years of education: 18     Occupational History     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 No     Social History Narrative   He smoked 2 packs a day from age [age], but no longer smokes.  He drinks 5-6 drinks a week.  He used to be a .  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  He is  with 3 children.      FAMILY HISTORY:  Family History   Problem Relation Age of Onset     Alcohol/Drug Father      CANCER Mother      ? abdominal     Breast Cancer Sister      age 42     Colon Cancer No family hx of          PHYSICAL EXAM:  Vital signs:  /71  Pulse 69  Temp 97.2  F (36.2  C) (Tympanic)  Resp 16  Ht 1.715 m (5' 7.5\")  Wt 66 kg (145 lb 9.6 oz)  SpO2 97%  BMI 22.47 kg/m2   ECO  GENERAL/CONSTITUTIONAL: No acute distress.  EYES: No scleral icterus.  LYMPH: No anterior cervical, posterior cervical, or supraclavicular adenopathy.   RESPIRATORY: Clear to auscultation bilaterally. No crackles or wheezing.   CARDIOVASCULAR: Regular rate and rhythm without murmurs, gallops, or rubs.  GASTROINTESTINAL: No " tenderness. The patient has normal bowel sounds. No guarding.  No distention.  MUSCULOSKELETAL: Warm and well-perfused, no cyanosis, clubbing, or edema.  NEUROLOGIC: Alert, oriented, answers questions appropriately.  INTEGUMENTARY: No jaundice.  GAIT: Steady, does not use assistive device      LABS:  CBC RESULTS:   Recent Labs   Lab Test  05/08/18   0922   WBC  7.6   RBC  4.96   HGB  15.1   HCT  45.0   MCV  91   MCH  30.4   MCHC  33.6   RDW  13.2   PLT  192       Recent Labs   Lab Test  05/08/18   0922  10/05/17   0618  10/03/17   0655   NA  139   --   142   POTASSIUM  4.3   --   4.4   CHLORIDE  106   --   109   CO2  24   --   28   ANIONGAP  9   --   5   GLC  84   --   91   BUN  17   --   10   CR  0.95  1.03  1.10   KALPESH  8.7   --   8.2*         ASSESSMENT/PLAN:  Jasen Rosario is a 72 year old male with:    1) SLL: He is asymptomatic without B-symptoms.  CBC is normal.  PET scan done on 6/13/15 showed no evidence of disease.  No intervention needed at this time, and would observe and monitor labs intermittently.  -CBC, CMP, LDH in 1 year  -RTC in 1 year    2) History of prostate cancer: s/p robotic radical prostatectomy on 4/13/15 by Dr. Smyth.  Final pathology was low volume [place] 6, pT2cN0.  Initial PSA was 5.8.  It became undetectable after surgery.  He is getting PSA's followed yearly.  -PSA in 1 year      I spent a total of 30 minutes with the patient, with over >50% of the time in counseling and/or coordination of care.       Nicole Velazquez MD  Hematology/Oncology  [facility]      Again, thank you for allowing me to participate in the care of your patient.        Sincerely,        Nicole Velazquez MD

## 2018-06-14 NOTE — PROGRESS NOTES
[facility]    Hematology/Oncology New Patient Note      Today's Date: 06/14/18    Reason for Consult: SLL      HISTORY OF PRESENT ILLNESS: Jasen Rosario is a 72 year old male who presents with SLL and history of prostate cancer.       He had prostate biopsy performed in 2013, which showed adenocarcinoma.  In addition, he had an abnormal collection of lymphocytes identified on the biopsy.  Immunostains of material obtained from the right apex of the prostate were positive for a monoclonal population of lymphocytes expressing CD20.  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  He was seen by Dr. Davide Matamoros at the [facility].  PET-CT showed no evidence of lymphoma.  Bone marrow aspirate and biopsy was ~40% cellular, and ~10% involvement involvement with a monoclonal B-cell population compatible with SLL.  Trisomy of chromosome 10 and 12 is found in 13% of cells.  These findings were typical of SLL.  CBC at the time was normal.  Serum IgG was slightly low at 560.    He has history of prostate cancer s/p robotic radical prostatectomy on 4/13/15 by Dr. Smyth.  Final pathology was low volume [place] 6, pT2cN0.  Initial PSA was 5.8.  It became undetectable after surgery.  He is getting PSA's followed yearly.    He was recommended to have yearly follow-up with Dr. Matamoros, but he was lost to follow-up in the hematology clinic, and has been following with his PCP both the the prostate cancer and lymphoma.      He says that he feels the same.  He says that he is pretty healthy and stays active.  He likes to golf, play pickleball.  He denies fever/chills, unintentional weight loss.        REVIEW OF SYSTEMS:   14 point ROS was reviewed and is negative other than as noted above in HPI.       HOME MEDICATIONS:  Current Outpatient Prescriptions   Medication Sig Dispense Refill     GLUCOSAMINE-CHONDROITIN PO Take 2 tablets by mouth daily       Multiple Vitamin (DAILY  MULTIVITAMIN PO) Take 2 tablets by mouth daily        Omega-3 Fatty Acids (FISH OIL PO) Take 2 tablets by mouth daily            ALLERGIES:  Allergies   Allergen Reactions     Seasonal Allergies          PAST MEDICAL HISTORY:  Past Medical History:   Diagnosis Date     Anxiety state, unspecified 1991     Basal cell carcinoma     Left Ear - Moh's Dr Young     Colon polyp      ED (erectile dysfunction) 2005     Hyperlipidemia LDL goal <130 2001     Hypoglycemia, unspecified 1991     Infectious mononucleosis 1969     Lumbar stenosis     TCO - Dr Irene - with neuritis     Lymphoma (H)     SLL - B Cell - Dr Barrios     Major depressive disorder, single episode in full remission (H) 1991     OA (osteoarthritis)     Severe Lt Hip     Prostate cancer (H) 5/13    Dr Smyth - [facility] 3+3 - intermediate progression risk - pT2cN0     Seasonal allergies          PAST SURGICAL HISTORY:  Past Surgical History:   Procedure Laterality Date     ARTHROPLASTY HIP Left 6/21/2017    Left BETTY - Dr MELISSA Madrigal     ARTHROPLASTY KNEE Left 10/2/2017    LEFT TOTAL KNEE ARTHROPLASTY - Dr MELISSA Madrigal     COLONOSCOPY  12/04, 12/14    colon polyp - serrated adenoma 5 mm - due 3 yrs     COLONOSCOPY  03/16/2018    Dr. Naila BELLE - normal - due 5 yrs - will need MAC     COLONOSCOPY N/A 4/18/2018    Dr. Naila BELLE - normal - due 5 yrs - will need MAC     DAVINCI PROSTATECTOMY, LYMPHADENECTOMY N/A 4/13/2015    RALP - Dr Smyth     DENTAL SURGERY  1985    wisdom teeth     SURGICAL HISTORY OF -   1980    BACK SURGERY     SURGICAL HISTORY OF -  Left 1987    ANKLE FX ORIF - Lt     SURGICAL HISTORY OF -  Left 1972    LT KNEE MCL TEAR AND MENISCUS      SURGICAL HISTORY OF -   1960    APPY     SURGICAL HISTORY OF -  Left 2001, 2008    LT SHOULDER ARTHROSCOPY     SURGICAL HISTORY OF -  Right 01/2007    Rt Shoulder Surgery - Dr Ferrer         SOCIAL HISTORY:  Social History     Social History     Marital status:      Spouse name: Danielle      "Number of children: 3     Years of education: 18     Occupational History     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 No     Social History Narrative   He smoked 2 packs a day from age [age], but no longer smokes.  He drinks 5-6 drinks a week.  He used to be a .  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  He is  with 3 children.      FAMILY HISTORY:  Family History   Problem Relation Age of Onset     Alcohol/Drug Father      CANCER Mother      ? abdominal     Breast Cancer Sister      age 42     Colon Cancer No family hx of          PHYSICAL EXAM:  Vital signs:  /71  Pulse 69  Temp 97.2  F (36.2  C) (Tympanic)  Resp 16  Ht 1.715 m (5' 7.5\")  Wt 66 kg (145 lb 9.6 oz)  SpO2 97%  BMI 22.47 kg/m2   ECO  GENERAL/CONSTITUTIONAL: No acute distress.  EYES: No scleral icterus.  LYMPH: No anterior cervical, posterior cervical, or supraclavicular adenopathy.   RESPIRATORY: Clear to auscultation bilaterally. No crackles or wheezing.   CARDIOVASCULAR: Regular rate and rhythm without murmurs, gallops, or rubs.  GASTROINTESTINAL: No tenderness. The patient has normal bowel sounds. No guarding.  No distention.  MUSCULOSKELETAL: Warm and well-perfused, no cyanosis, clubbing, or edema.  NEUROLOGIC: Alert, oriented, answers questions appropriately.  INTEGUMENTARY: No jaundice.  GAIT: Steady, does not " use assistive device      LABS:  CBC RESULTS:   Recent Labs   Lab Test  05/08/18   0922   WBC  7.6   RBC  4.96   HGB  15.1   HCT  45.0   MCV  91   MCH  30.4   MCHC  33.6   RDW  13.2   PLT  192       Recent Labs   Lab Test  05/08/18   0922  10/05/17   0618  10/03/17   0655   NA  139   --   142   POTASSIUM  4.3   --   4.4   CHLORIDE  106   --   109   CO2  24   --   28   ANIONGAP  9   --   5   GLC  84   --   91   BUN  17   --   10   CR  0.95  1.03  1.10   KALPESH  8.7   --   8.2*         ASSESSMENT/PLAN:  Jasen Rosario is a 72 year old male with:    1) SLL: He is asymptomatic without B-symptoms.  CBC is normal.  PET scan done on 6/13/15 showed no evidence of disease.  No intervention needed at this time, and would observe and monitor labs intermittently.  -CBC, CMP, LDH in 1 year  -RTC in 1 year    2) History of prostate cancer: s/p robotic radical prostatectomy on 4/13/15 by Dr. Smyth.  Final pathology was low volume [place] 6, pT2cN0.  Initial PSA was 5.8.  It became undetectable after surgery.  He is getting PSA's followed yearly.  -PSA in 1 year      I spent a total of 30 minutes with the patient, with over >50% of the time in counseling and/or coordination of care.       Nicole Velazquez MD  Hematology/Oncology  [facility]

## 2018-10-29 NOTE — PATIENT INSTRUCTIONS
Before Your Surgery      Call your surgeon if there is any change in your health. This includes signs of a cold or flu (such as a sore throat, runny nose, cough, rash or fever).    Do not smoke, drink alcohol or take over the counter medicine (unless your surgeon or primary care doctor tells you to) for the 24 hours before and after surgery.    If you take prescribed drugs: Follow your doctor s orders about which medicines to take and which to stop until after surgery.    Eating and drinking prior to surgery: follow the instructions from your surgeon    Take a shower or bath the night before surgery. Use the soap your surgeon gave you to gently clean your skin. If you do not have soap from your surgeon, use your regular soap. Do not shave or scrub the surgery site.  Wear clean pajamas and have clean sheets on your bed.     Hold any aspirin one week prior to surgery, ibuprofen/naproxen for 2-3 days, consider Tylenol as an alternative

## 2018-10-29 NOTE — PROGRESS NOTES
[address] [address]  [phone]  Dept: [phone]    PRE-OP EVALUATION:  Today's date: 10/30/2018    Jasen Rosario (: 1945) presents for pre-operative evaluation assessment as requested by Dr. Galdamez.  He requires evaluation and anesthesia risk assessment prior to undergoing surgery/procedure for treatment of Right Hip pain .    Proposed Surgery/ Procedure: Right Hip Replacement  Date of Surgery/ Procedure: 2018  Time of Surgery/ Procedure: 7:00 AM  Hospital/Surgical Facility: [facility]  Fax number for surgical facility: [phone]  Primary Physician: Archie Meadows  Type of Anesthesia Anticipated: General    Patient has a Health Care Directive or Living Will:  YES on file    1. NO - Do you have a history of heart attack, stroke, stent, bypass or surgery on an artery in the head, neck, heart or legs?  2. NO - Do you ever have any pain or discomfort in your chest?  3. NO - Do you have a history of  Heart Failure?  4. NO - Are you troubled by shortness of breath when: walking on the level, up a slight hill or at night?  5. NO - Do you currently have a cold, bronchitis or other respiratory infection?  6. NO - Do you have a cough, shortness of breath or wheezing?  7. NO - Do you sometimes get pains in the calves of your legs when you walk?  8. NO - Do you or anyone in your family have previous history of blood clots?  9. NO - Do you or does anyone in your family have a serious bleeding problem such as prolonged bleeding following surgeries or cuts?  10. NO - Have you ever had problems with anemia or been told to take iron pills?  11. NO - Have you had any abnormal blood loss such as black, tarry or bloody stools, or abnormal vaginal bleeding?  12. NO - Have you ever had a blood transfusion?  13. NO - Have you or any of your relatives ever had problems with anesthesia?  14. NO - Do you have sleep  apnea, excessive snoring or daytime drowsiness?  15. NO - Do you have any prosthetic heart valves?  16. YES - Do you have prosthetic joints? Left knee and hip    HPI:     HPI related to upcoming procedure: Jasen is undergoing a right hip replacement for right hip pain.     See problem list for active medical problems.  Problems all longstanding and stable, except as noted/documented.  See ROS for pertinent symptoms related to these conditions.                                                                                                                                                          .                                                                                                                                                 .  MEDICAL HISTORY:     Patient Active Problem List    Diagnosis Date Noted     Small B-cell lymphoma (H) 06/14/2018     Priority: High     h/o Prostate cancer (H) - prostatectomy - 2015      Priority: High     Dr Smyth -  - Darian 3+3       Hyperlipidemia LDL goal <130      Priority: High     Major depressive disorder, single episode in full remission (H)      Priority: High     S/P total knee arthroplasty 10/02/2017     Priority: Medium     Environmental allergies 09/20/2017     Priority: Medium     S/P total hip arthroplasty 06/21/2017     Priority: Medium     Lumbar stenosis      Priority: Medium     [facility] - Dr Irene - with neuritis       Colon polyp      Priority: Medium     ED (erectile dysfunction)      Priority: Medium     Seasonal allergies      Priority: Medium     OA (osteoarthritis)      Priority: Medium     Left knee synvisc- Dr Balderas       Anxiety state 11/30/2004     Priority: Medium     Problem list name updated by automated process. Provider to review       Advanced directives, counseling/discussion 04/02/2013     Priority: Low      Past Medical History:   Diagnosis Date     Anxiety state, unspecified 1991     Basal cell carcinoma     Left Ear - Moh's   Hector     Colon polyp      ED (erectile dysfunction) 2005     Hyperlipidemia LDL goal <130 2001     Hypoglycemia, unspecified 1991     Infectious mononucleosis 1969     Lumbar stenosis     [facility] - Dr Irene - with neuritis     Lymphoma (H)     SLL - B Cell - Dr Barrios     Major depressive disorder, single episode in full remission (H) 1991     OA (osteoarthritis)     Severe Lt Hip     Prostate cancer (H) 5/13    Dr Smyth - [facility] - [facility] 3+3 - intermediate progression risk - pT2cN0     Seasonal allergies      Past Surgical History:   Procedure Laterality Date     APPENDECTOMY  1960    APPY     ARTHROPLASTY HIP Left 6/21/2017    Left BETTY - Dr MELISSA Madrigal     ARTHROPLASTY KNEE Left 10/2/2017    LEFT TOTAL KNEE ARTHROPLASTY - Dr MELISSA Madrigal     COLONOSCOPY  12/04, 12/14    colon polyp - serrated adenoma 5 mm - due 3 yrs     COLONOSCOPY  03/16/2018    Dr. Naila BELLE - normal - due 5 yrs - will need MAC     COLONOSCOPY N/A 4/18/2018    Dr. Naila BELLE - normal - due 5 yrs - will need MAC     DAVINCI PROSTATECTOMY, LYMPHADENECTOMY N/A 4/13/2015    RALP - Dr Smyth     DENTAL SURGERY  1985    wisdom teeth     SURGICAL HISTORY OF -   1980    BACK SURGERY     SURGICAL HISTORY OF -  Left 1987    ANKLE FX ORIF - Lt     SURGICAL HISTORY OF -  Left 1972    LT KNEE MCL TEAR AND MENISCUS      SURGICAL HISTORY OF -  Left 2001, 2008    LT SHOULDER ARTHROSCOPY     SURGICAL HISTORY OF -  Right 01/2007    Rt Shoulder Surgery - Dr Ferrer     Current Outpatient Prescriptions   Medication Sig Dispense Refill     GLUCOSAMINE-CHONDROITIN PO Take 2 tablets by mouth daily       Multiple Vitamin (DAILY MULTIVITAMIN PO) Take 2 tablets by mouth daily        Omega-3 Fatty Acids (FISH OIL PO) Take 2 tablets by mouth daily        OTC products: None, except as noted above    Allergies   Allergen Reactions     Seasonal Allergies       Latex Allergy: NO    Social History   Substance Use Topics     Smoking status: Former Smoker     Packs/day: 2.00      "Years: 7.00     Types: Cigarettes     Quit date: 1/1/1965     Smokeless tobacco: Never Used     Alcohol use 3.0 - 3.6 oz/week     5 - 6 Standard drinks or equivalent per week      Comment: ocassionally     History   Drug Use No       REVIEW OF SYSTEMS:   Constitutional, HEENT, cardiovascular, pulmonary, GI, , musculoskeletal, neuro, skin, endocrine and psych systems are negative, except as otherwise noted.    This document serves as a record of the services and decisions personally performed and made by Eliel Diaz MD. It was created on his behalf by Cralos Jackson, a trained medical scribe. The creation of this document is based the provider's statements to the medical scribe.  Scribe Carlos Jackson 10:19 AM, October 30, 2018    EXAM:   /70  Pulse 69  Temp 98  F (36.7  C) (Oral)  Ht 5' 7.5\" (1.715 m)  Wt 145 lb (65.8 kg)  SpO2 98%  BMI 22.38 kg/m2    GENERAL APPEARANCE: healthy, alert and no distress     EYES: EOMI,  PERRL     HENT: ear canals and TM's normal and nose and mouth without ulcers or lesions     NECK: no adenopathy, no asymmetry, masses, or scars and thyroid normal to palpation     RESP: lungs clear to auscultation - no rales, rhonchi or wheezes     CV: regular rates and rhythm, normal S1 S2, no S3 or S4 and no murmur, click or rub     ABDOMEN:  soft, nontender, no HSM or masses and bowel sounds normal     MS: extremities normal- no gross deformities noted, no evidence of inflammation in joints, FROM in all extremities.     SKIN: no suspicious lesions or rashes     NEURO: Normal strength and tone, sensory exam grossly normal, mentation intact and speech normal     PSYCH: mentation appears normal. and affect normal/bright     LYMPHATICS: No cervical adenopathy    DIAGNOSTICS:   EKG: sinus bradycardia, normal axis, normal intervals, no acute ST/T changes c/w ischemia, no LVH by voltage criteria, unchanged from previous tracings    Recent Labs   Lab Test  05/08/18   0922  10/05/17   0618  10/04/17   0620  " 10/03/17   0655   HGB  15.1   --   12.5*  13.2*   PLT  192  143*   --   142*   NA  139   --    --   142   POTASSIUM  4.3   --    --   4.4   CR  0.95  1.03   --   1.10        IMPRESSION:   Reason for surgery/procedure: Jasen is undergoing a right hip replacement for right hip pain.     The proposed surgical procedure is considered INTERMEDIATE risk.    REVISED CARDIAC RISK INDEX  The patient has the following serious cardiovascular risks for perioperative complications such as (MI, PE, VFib and 3  AV Block):  No serious cardiac risks  INTERPRETATION: 0 risks: Class I (very low risk - 0.4% complication rate)    The patient has the following additional risks for perioperative complications:        ICD-10-CM    1. Preop general physical exam Z01.818 EKG 12-lead complete w/read - Clinics   2. Primary osteoarthritis involving multiple joints M15.0        RECOMMENDATIONS:       --Patient is on no chronic medications    APPROVAL GIVEN to proceed with proposed procedure, without further diagnostic evaluation     The information in this document, created by the medical scribe for me, accurately reflects the services I personally performed and the decisions made by me. I have reviewed and approved this document for accuracy prior to leaving the patient care area.  10:23 AM, 10/30/18    Signed Electronically by: Eliel Diaz MD    Copy of this evaluation report is provided to requesting physician.    [facility] Preop Guidelines    Revised Cardiac Risk Index

## 2018-10-30 ENCOUNTER — OFFICE VISIT (OUTPATIENT)
Dept: FAMILY MEDICINE | Facility: CLINIC | Age: 73
End: 2018-10-30
Payer: COMMERCIAL

## 2018-10-30 VITALS
WEIGHT: 145 LBS | HEIGHT: 68 IN | SYSTOLIC BLOOD PRESSURE: 100 MMHG | BODY MASS INDEX: 21.98 KG/M2 | OXYGEN SATURATION: 98 % | DIASTOLIC BLOOD PRESSURE: 70 MMHG | TEMPERATURE: 98 F | HEART RATE: 69 BPM

## 2018-10-30 DIAGNOSIS — M15.0 PRIMARY OSTEOARTHRITIS INVOLVING MULTIPLE JOINTS: ICD-10-CM

## 2018-10-30 DIAGNOSIS — Z01.818 PREOP GENERAL PHYSICAL EXAM: Primary | ICD-10-CM

## 2018-10-30 PROCEDURE — 93000 ELECTROCARDIOGRAM COMPLETE: CPT | Performed by: FAMILY MEDICINE

## 2018-10-30 PROCEDURE — 99214 OFFICE O/P EST MOD 30 MIN: CPT | Performed by: FAMILY MEDICINE

## 2018-10-30 ASSESSMENT — ANXIETY QUESTIONNAIRES
2. NOT BEING ABLE TO STOP OR CONTROL WORRYING: SEVERAL DAYS
1. FEELING NERVOUS, ANXIOUS, OR ON EDGE: SEVERAL DAYS
7. FEELING AFRAID AS IF SOMETHING AWFUL MIGHT HAPPEN: NOT AT ALL
6. BECOMING EASILY ANNOYED OR IRRITABLE: SEVERAL DAYS
5. BEING SO RESTLESS THAT IT IS HARD TO SIT STILL: SEVERAL DAYS
IF YOU CHECKED OFF ANY PROBLEMS ON THIS QUESTIONNAIRE, HOW DIFFICULT HAVE THESE PROBLEMS MADE IT FOR YOU TO DO YOUR WORK, TAKE CARE OF THINGS AT HOME, OR GET ALONG WITH OTHER PEOPLE: NOT DIFFICULT AT ALL
GAD7 TOTAL SCORE: 6
3. WORRYING TOO MUCH ABOUT DIFFERENT THINGS: SEVERAL DAYS

## 2018-10-30 ASSESSMENT — PATIENT HEALTH QUESTIONNAIRE - PHQ9
SUM OF ALL RESPONSES TO PHQ QUESTIONS 1-9: 1
5. POOR APPETITE OR OVEREATING: SEVERAL DAYS

## 2018-10-30 NOTE — MR AVS SNAPSHOT
After Visit Summary   10/30/2018    Jasen Rosario    MRN: [id number]           Patient Information     Date Of Birth          1945        Visit Information        Provider Department      10/30/2018 9:40 AM Eliel Diaz MD [facility]        Today's Diagnoses     Preop general physical exam    -  1    Primary osteoarthritis involving multiple joints          Care Instructions      Before Your Surgery      Call your surgeon if there is any change in your health. This includes signs of a cold or flu (such as a sore throat, runny nose, cough, rash or fever).    Do not smoke, drink alcohol or take over the counter medicine (unless your surgeon or primary care doctor tells you to) for the 24 hours before and after surgery.    If you take prescribed drugs: Follow your doctor s orders about which medicines to take and which to stop until after surgery.    Eating and drinking prior to surgery: follow the instructions from your surgeon    Take a shower or bath the night before surgery. Use the soap your surgeon gave you to gently clean your skin. If you do not have soap from your surgeon, use your regular soap. Do not shave or scrub the surgery site.  Wear clean pajamas and have clean sheets on your bed.     Hold any aspirin one week prior to surgery, ibuprofen/naproxen for 2-3 days, consider Tylenol as an alternative          Follow-ups after your visit        Follow-up notes from your care team     Return in about 9 months (around 7/30/2019) for Wellness Exam and fasting labs.      Your next 10 appointments already scheduled     Jun 13, 2019 12:45 PM CDT   Return Visit with Nicole Velazquez MD   [facility] ([facility])    [facility]  [address]  [address]  [facility] [address]   [phone]              Who to contact     If you have questions or need follow up information about today's clinic visit or  "your schedule please contact [facility] directly at [phone].  Normal or non-critical lab and imaging results will be communicated to you by MyChart, letter or phone within 4 business days after the clinic has received the results. If you do not hear from us within 7 days, please contact the clinic through [employer] or phone. If you have a critical or abnormal lab result, we will notify you by phone as soon as possible.  Submit refill requests through [employer] or call your pharmacy and they will forward the refill request to us. Please allow 3 business days for your refill to be completed.          Additional Information About Your Visit        [email or web address] Learning Information     [employer] gives you secure access to your electronic health record. If you see a primary care provider, you can also send messages to your care team and make appointments. If you have questions, please call your primary care clinic.  If you do not have a primary care provider, please call [phone] and they will assist you.        Care EveryWhere ID     This is your Care EveryWhere ID. This could be used by other organizations to access your [place] medical records  [id number]        Your Vitals Were     Pulse Temperature Height Pulse Oximetry BMI (Body Mass Index)       69 98  F (36.7  C) (Oral) 5' 7.5\" (1.715 m) 98% 22.38 kg/m2        Blood Pressure from Last 3 Encounters:   10/30/18 100/70   06/14/18 116/71   05/08/18 116/72    Weight from Last 3 Encounters:   10/30/18 145 lb (65.8 kg)   06/14/18 145 lb 9.6 oz (66 kg)   05/08/18 147 lb (66.7 kg)              We Performed the Following     EKG 12-lead complete w/read - Clinics        Primary Care Provider Office Phone # Fax #    Archie Meadows [phone]       [facility] [address]        Equal Access to Services     DEEPTHI MEDINA AH: Yumiko Singh, wakarinda luqadaha, qaybta kaallindy blanco " lakia cadet. So [facility] [phone].    ATENCIÓN: Si habla елена, tiene a dumont disposición servicios gratuitos de asistencia lingüística. Yunier al [phone].    We comply with applicable federal civil rights laws and [place] laws. We do not discriminate on the basis of race, color, national origin, age, disability, sex, sexual orientation, or gender identity.            Thank you!     Thank you for choosing [facility]  for your care. Our goal is always to provide you with excellent care. Hearing back from our patients is one way we can continue to improve our services. Please take a few minutes to complete the written survey that you may receive in the mail after your visit with us. Thank you!             Your Updated Medication List - Protect others around you: Learn how to safely use, store and throw away your medicines at www.disposemymeds.org.          This list is accurate as of 10/30/18 10:27 AM.  Always use your most recent med list.                   Brand Name Dispense Instructions for use Diagnosis    DAILY MULTIVITAMIN PO      Take 2 tablets by mouth daily        FISH OIL PO      Take 2 tablets by mouth daily        GLUCOSAMINE-CHONDROITIN PO      Take 2 tablets by mouth daily

## 2018-10-31 ASSESSMENT — ANXIETY QUESTIONNAIRES: GAD7 TOTAL SCORE: 6

## 2019-04-17 ENCOUNTER — OFFICE VISIT (OUTPATIENT)
Dept: DERMATOLOGY | Facility: CLINIC | Age: 74
End: 2019-04-17
Payer: COMMERCIAL

## 2019-04-17 VITALS — SYSTOLIC BLOOD PRESSURE: 105 MMHG | HEART RATE: 64 BPM | DIASTOLIC BLOOD PRESSURE: 67 MMHG | OXYGEN SATURATION: 97 %

## 2019-04-17 DIAGNOSIS — L81.4 LENTIGINES: ICD-10-CM

## 2019-04-17 DIAGNOSIS — L57.0 ACTINIC KERATOSES: ICD-10-CM

## 2019-04-17 DIAGNOSIS — L57.8 SOLAR ELASTOSIS: ICD-10-CM

## 2019-04-17 DIAGNOSIS — L82.1 SEBORRHEIC KERATOSES: ICD-10-CM

## 2019-04-17 DIAGNOSIS — D18.01 CHERRY ANGIOMA: ICD-10-CM

## 2019-04-17 DIAGNOSIS — L82.0 INFLAMED SEBORRHEIC KERATOSIS: ICD-10-CM

## 2019-04-17 DIAGNOSIS — D22.9 MULTIPLE NEVI: ICD-10-CM

## 2019-04-17 DIAGNOSIS — Z85.828 HISTORY OF BASAL CELL CARCINOMA (BCC): ICD-10-CM

## 2019-04-17 DIAGNOSIS — D48.5 NEOPLASM OF UNCERTAIN BEHAVIOR OF SKIN: Primary | ICD-10-CM

## 2019-04-17 PROCEDURE — 11102 TANGNTL BX SKIN SINGLE LES: CPT | Mod: 59 | Performed by: PHYSICIAN ASSISTANT

## 2019-04-17 PROCEDURE — 17000 DESTRUCT PREMALG LESION: CPT | Mod: 59 | Performed by: PHYSICIAN ASSISTANT

## 2019-04-17 PROCEDURE — 17003 DESTRUCT PREMALG LES 2-14: CPT | Mod: 59 | Performed by: PHYSICIAN ASSISTANT

## 2019-04-17 PROCEDURE — 88305 TISSUE EXAM BY PATHOLOGIST: CPT | Mod: TC | Performed by: PHYSICIAN ASSISTANT

## 2019-04-17 PROCEDURE — 99214 OFFICE O/P EST MOD 30 MIN: CPT | Mod: 25 | Performed by: PHYSICIAN ASSISTANT

## 2019-04-17 PROCEDURE — 17110 DESTRUCTION B9 LES UP TO 14: CPT | Performed by: PHYSICIAN ASSISTANT

## 2019-04-17 NOTE — LETTER
4/17/2019         RE: Jasen Rosario  [address]  [place] [address]        Dear Colleague,    Thank you for referring your patient, Jasen Rosario, to the [facility]. Please see a copy of my visit note below.    HPI:  Jasen Rosario is a 73 year old male patient here today for scaly spots on face .  Patient states this has been present for a while.  Patient reports the following symptoms: none .  Patient reports the following previous treatments: none.  Patient reports the following modifying factors: none.  Associated symptoms: none.  Patient has no other skin complaints today.  Remainder of the HPI, Meds, PMH, Allergies, FH, and SH was reviewed in chart.    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 B cell lymphoma  Past Medical History:   Diagnosis Date     Anxiety state, unspecified 1991     Basal cell carcinoma     Left Ear - Moh's Dr Young     Colon polyp      ED (erectile dysfunction) 2005     Hyperlipidemia LDL goal <130 2001     Hypoglycemia, unspecified 1991     Infectious mononucleosis 1969     Lumbar stenosis     TCO - Dr Irene - with neuritis     Lymphoma (H)     SLL - B Cell - Dr Barrios     Major depressive disorder, single episode in full remission (H) 1991     OA (osteoarthritis)     Severe Lt Hip     Prostate cancer (H) 5/13    Dr Smyth -  - [place] 3+3 - intermediate progression risk - [id number]     Seasonal allergies        Past Surgical History:   Procedure Laterality Date     APPENDECTOMY  1960    APPY     ARTHROPLASTY HIP Left 6/21/2017    Left BETTY - Dr MELISSA Madrigal     ARTHROPLASTY KNEE Left 10/2/2017    LEFT TOTAL KNEE ARTHROPLASTY - Dr MELISSA Madrigal     COLONOSCOPY  12/04, 12/14    colon polyp - serrated adenoma 5 mm - due 3 yrs     COLONOSCOPY  03/16/2018    Dr. Naila BELLE - normal - due 5 yrs - will need MAC     COLONOSCOPY N/A 4/18/2018    Dr. Naila BELLE - normal - due 5 yrs - will need MAC     DAVINCI PROSTATECTOMY, LYMPHADENECTOMY N/A  2015    RALEMILY - Dr Smyth     DENTAL SURGERY  1985    wisdom teeth     SURGICAL HISTORY OF -       BACK SURGERY     SURGICAL HISTORY OF -  Left     ANKLE FX ORIF - Lt     SURGICAL HISTORY OF -  Left     LT KNEE MCL TEAR AND MENISCUS      SURGICAL HISTORY OF -  Left ,     LT SHOULDER ARTHROSCOPY     SURGICAL HISTORY OF -  Right 2007    Rt Shoulder Surgery - Dr Ferrer     SURGICAL HISTORY OF -  Right 2018    Rt BETTY - Dr Galdamez        Family History   Problem Relation Age of Onset     Alcohol/Drug Father      Cancer Mother         ? abdominal     Breast Cancer Sister         age 42     Colon Cancer No family hx of        Social History     Socioeconomic History     Marital status:      Spouse name: Danielle     Number of children: 3     Years of education: 18     Highest education level: Not on file   Occupational History     Occupation: teacher [employer]     Employer: [employer]     Comment: retired    Social Needs     Financial resource strain: Not on file     Food insecurity:     Worry: Not on file     Inability: Not on file     Transportation needs:     Medical: Not on file     Non-medical: Not on file   Tobacco Use     Smoking status: Former Smoker     Packs/day: 2.00     Years: 7.00     Pack years: 14.00     Types: Cigarettes     Last attempt to quit: 1965     Years since quittin.3     Smokeless tobacco: Never Used   Substance and Sexual Activity     Alcohol use: Yes     Alcohol/week: 3.0 - 3.6 oz     Types: 5 - 6 Standard drinks or equivalent per week     Comment: ocassionally     Drug use: No     Sexual activity: Yes     Partners: Female   Lifestyle     Physical activity:     Days per week: Not on file     Minutes per session: Not on file     Stress: Not on file   Relationships     Social connections:     Talks on phone: Not on file     Gets together: Not on file     Attends Yazidism service: Not on file     Active member of club or organization: Not on  file     Attends meetings of clubs or organizations: Not on file     Relationship status: Not on file     Intimate partner violence:     Fear of current or ex partner: Not on file     Emotionally abused: Not on file     Physically abused: Not on file     Forced sexual activity: Not on file   Other Topics Concern      Service Not Asked     Blood Transfusions Not Asked     Caffeine Concern Yes     Comment: 2 cup qd     Occupational Exposure Not Asked     Hobby Hazards Not Asked     Sleep Concern Not Asked     Stress Concern Not Asked     Weight Concern Not Asked     Special Diet Not Asked     Back Care Not Asked     Exercise Yes     Comment: 3+ times per week     Bike Helmet Not Asked     Seat Belt Yes     Self-Exams Not Asked     Parent/sibling w/ CABG, MI or angioplasty before 65F 55M? No   Social History Narrative     Not on file       Outpatient Encounter Medications as of 4/17/2019   Medication Sig Dispense Refill     creatine 400 MG capsule Take by mouth daily       GLUCOSAMINE-CHONDROITIN PO Take 2 tablets by mouth daily       Multiple Vitamin (DAILY MULTIVITAMIN PO) Take 2 tablets by mouth daily        Omega-3 Fatty Acids (FISH OIL PO) Take 2 tablets by mouth daily        No facility-administered encounter medications on file as of 4/17/2019.        Review Of Systems:  Skin: As above  Eyes: negative  Ears/Nose/Throat: negative  Respiratory: No shortness of breath, dyspnea on exertion, cough, or hemoptysis  Cardiovascular: negative  Gastrointestinal: negative  Genitourinary: negative  Musculoskeletal: negative  Neurologic: negative  Psychiatric: negative  Hematologic/Lymphatic/Immunologic: negative  Endocrine: negative      Objective:     Pulse 64   SpO2 97%   Eyes: Conjunctivae/lids: Normal   ENT: Lips:  Normal  MSK: Normal  Cardiovascular: Peripheral edema none  Pulm: Breathing Normal  Neuro/Psych: Orientation: Normal; Mood/Affect: Normal, NAD, WDWN  Pt accompanied by: self  Following areas examined:  Scalp, face, eyelids, lips, neck, chest, abdomen, back, buttocks, and R&L upper and lower extremities.  Ruiz skin type:ii   Findings:  Red smooth well-defined macules on trunk and extremities.  Brown, stuck-on scaly appearing papules on trunk and extremities.  Well circumscribed macules with symmetric color distribution on trunk and extremities 2-3mm.  Galicia WD smooth macules on face, neck, trunk, and extremities.  Wd dark brown smooth macule on right posterior shoulder 0.2cm  Pink scaly macule/s x 5 on face  Rhytides, hypo/hyperpigmentation, and atrophy of face  Pink smooth patch on left ear  Inflamed brown, stuck-on scaly appearing papules on right dorsal hand  Assessment and Plan:     1) Cherry angiomas, Seborrheic keratoses, Benign nevi, Lentigines     I discussed the specifics of tumor, prognosis, and genetics of benign lesions.  I explained that treatment of these lesions would be purely cosmetic and not medically neccessary.  I discussed with patient different removal options including excision, cryotherapy, cautery and /or laser.  Lesion may recur and/or may not completely resolve. May need additional treatment.     2) Neoplasm of uncertain behavior right posterior shoulder 0.2cm  isk vs benign nevus  TANGENTIAL BIOPSY:  After consent, anesthesia with LEC and prep, tangential biopsy performed.  No complications and routine wound care.  May grow back and will get a scar. Based on lesion type may need to completely remove lesion. Patient will be notified in 7-10 days of results. Wound care directions given.    3) actinic keratosis x 5 and solar elastosis  LN2 for 5 seconds x 2. Discussed AE include hypopigmentation (white spot) and recurrence. Follow up in 2-3 months to recheck lesions. There is a 0.025%-20% chance that AKs can develop into a SCC.   Treatment options include LN2 vs PDT vs Efudex. Pt elected LN2  4) history of bcc on left ear  No evidence of recurrence  Signs and Symptoms of non-melanoma  skin cancer and ABCDEs of melanoma reviewed with patient. Patient encouraged to perform monthly self skin exams and educated on how to perform them. UV precautions reviewed with patient. Patient was asked about new or changing moles/lesions on body.   Wear a sunscreen with at least SPF 30 on your face, ears, neck and V of the chest daily. Wear sunscreen on other areas of the body if those areas are exposed to the sun throughout the day. Sunscreens can contain physical and/or chemical blockers. Physical blockers are less likely to clog pores, these include zinc oxide and titanium dioxide. Reapply every two hour and after swimming. Sunscreen examples include Neutrogena, CeraVe, Blue Lizard, Elta MD and many others.    5) ISK x 1 on right dorsal hand  Disc benign etiology  LN2: Treated with LN2 for 5s for 1-2 cycles. Warned risks of blistering, pain, pigment change, scarring, and incomplete resolution.  Advised patient to return if lesions do not completely resolve within 2-3 months.  Wound care sheet given.    Proper skin care from [facility]:    -Eliminate harsh soaps as they strip the natural oils from the skin, often resulting in dry itchy skin ( i.e. Dial, Zest, Sofia Spring)  -Use mild soaps such as Cetaphil or Dove Sensitive Skin in the shower. You do not need to use soap on arms, legs, and trunk every time you shower unless visibly soiled.   -Avoid hot or cold showers.  -After showering, lightly dry off and apply moisturizing within 2-3 minutes. This will help trap moisture in the skin.   -Aggressive use of a moisturizer at least 1-2 times a day to the entire body (including -Vanicream, Cetaphil, Aquaphor or Cerave) and moisturize hands after every washing.  -We recommend using moisturizers that come in a tub that needs to be scooped out, not a pump. This has more of an oil base. It will hold moisture in your skin much better than a water base moisturizer. The above recommended are non-pore  clogging.               Follow up in 2-3 months for ak recheck. Yearly FBE.      Again, thank you for allowing me to participate in the care of your patient.        Sincerely,        Clara Templeton PA-C

## 2019-04-17 NOTE — PATIENT INSTRUCTIONS
Proper skin care from [facility]:    -Eliminate harsh soaps as they strip the natural oils from the skin, often resulting in dry itchy skin ( i.e. Dial, Zest, Sofia Spring)  -Use mild soaps such as Cetaphil or Dove Sensitive Skin in the shower. You do not need to use soap on arms, legs, and trunk every time you shower unless visibly soiled.   -Avoid hot or cold showers.  -After showering, lightly dry off and apply moisturizing within 2-3 minutes. This will help trap moisture in the skin.   -Aggressive use of a moisturizer at least 1-2 times a day to the entire body (including -Vanicream, Cetaphil, Aquaphor or Cerave) and moisturize hands after every washing.  -We recommend using moisturizers that come in a tub that needs to be scooped out, not a pump. This has more of an oil base. It will hold moisture in your skin much better than a water base moisturizer. The above recommended are non-pore clogging.           Wear a sunscreen with at least SPF 30 on your face, ears, neck and V of the chest daily. Wear sunscreen on other areas of the body if those areas are exposed to the sun throughout the day. Sunscreens can contain physical and/or chemical blockers. Physical blockers are less likely to clog pores, these include zinc oxide and titanium dioxide. Reapply every two hour and after swimming. Sunscreen examples include Neutrogena, CeraVe, Blue Lizard, Elta MD and many others.    UV radiation  UVA radiation remains constant throughout the day and throughout the year. It is a longer wavelength than UVB and therefore penetrates deeper into the skin leading to immediate and delayed tanning, photoaging, and skin cancer. 70-80% of UVA and UVB radiation occurs between the hours of 10am-2pm.  UVB radiation  UVB radiation causes the most harmful effects and is more significant during the summer months. However, snow and ice can reflect UVB radiation leading to skin damage during the winter months as well. UVB  radiation is responsible for tanning, burning, inflammation, delayed erythema (pinkness), pigmentation (brown spots), and skin cancer.     WOUND CARE INSTRUCTIONS  FOR CRYOSURGERY        This area treated with liquid nitrogen will form a blister. You do not need to bandage the area until after the blister forms and breaks (which may be a few days).  When the blister breaks, begin daily dressing changes as follows:    1) Clean and dry the area with tap water using clean Q-tip or sterile gauze pad.    2) Apply Polysporin ointment or Bacitracin ointment over entire wound.  Do NOT use Neosporin ointment.    3) Cover the wound with a band-aid or sterile non-stick gauze pad and micropore paper tape.      REPEAT THESE INSTRUCTIONS AT LEAST ONCE A DAY UNTIL THE WOUND HAS COMPLETELY HEALED.        It is an old wives tale that a wound heals better when it is exposed to air and allowed to dry out. The wound will heal faster with a better cosmetic result if it is kept moist with ointment and covered with a bandage.  Do not let the wound dry out.      Supplies Needed:     *Cotton tipped applicators (Q-tips)   *Polysporin ointment or Bacitracin ointment (NOT NEOSPORIN)   *Band-aids, or non stick gauze pads and micropore paper tape    PATIENT INFORMATION    During the healing process you will notice a number of changes. All wounds develop a small halo of redness surrounding the wound.  This means healing is occurring. Severe itching with extensive redness usually indicates sensitivity to the ointment or bandage tape used to dress the wound.  You should call our office if this develops.      Swelling and/or discoloration around your surgical site is common, particularly when performed around the eye.    All wounds normally drain.  The larger the wound the more drainage there will be.  After 7-10 days, you will notice the wound beginning to shrink and new skin will begin to grow.  The wound is healed when you can see skin has formed  over the entire area.  A healed wound has a healthy, shiny look to the surface and is red to dark pink in color to normalize.  Wounds may take approximately 4-6 weeks to heal.  Larger wounds may take 6-8 weeks.  After the wound is healed you may discontinue dressing changes.    You may experience a sensation of tightness as your wound heals. This is normal and will gradually subside.    Your healed wound may be sensitive to temperature changes. This sensitivity improves with time, but if you re having a lot of discomfort, try to avoid temperature extremes.    Patients frequently experience itching after their wound appears to have healed because of the continue healing under the skin.  Plain Vaseline will help relieve the itching.                   Wound Care Instructions     FOR SUPERFICIAL WOUNDS     [facility] [phone]    [facility] [phone]          AFTER 24 HOURS YOU SHOULD REMOVE THE BANDAGE AND BEGIN DAILY DRESSING CHANGES AS FOLLOWS:     1) Remove Dressing.     2) Clean and dry the area with tap water using a Q-tip or sterile gauze pad.     3) Apply Vaseline, Aquaphor, Polysporin ointment or Bacitracin ointment over entire wound.  Do NOT use Neosporin ointment.     4) Cover the wound with a band-aid, or a sterile non-stick gauze pad and micropore paper tape      REPEAT THESE INSTRUCTIONS AT LEAST ONCE A DAY UNTIL THE WOUND HAS COMPLETELY HEALED.    It is an old wives tale that a wound heals better when it is exposed to air and allowed to dry out. The wound will heal faster with a better cosmetic result if it is kept moist with ointment and covered with a bandage.    **Do not let the wound dry out.**      Supplies Needed:      *Cotton tipped applicators (Q-tips)    *Polysporin Ointment or Bacitracin Ointment (NOT NEOSPORIN)    *Band-aids or non-stick gauze pads and micropore paper tape.      PATIENT INFORMATION:    During the healing process you will notice a number of  changes. All wounds develop a small halo of redness surrounding the wound.  This means healing is occurring. Severe itching with extensive redness usually indicates sensitivity to the ointment or bandage tape used to dress the wound.  You should call our office if this develops.      Swelling  and/or discoloration around your surgical site is common, particularly when performed around the eye.    All wounds normally drain.  The larger the wound the more drainage there will be.  After 7-10 days, you will notice the wound beginning to shrink and new skin will begin to grow.  The wound is healed when you can see skin has formed over the entire area.  A healed wound has a healthy, shiny look to the surface and is red to dark pink in color to normalize.  Wounds may take approximately 4-6 weeks to heal.  Larger wounds may take 6-8 weeks.  After the wound is healed you may discontinue dressing changes.    You may experience a sensation of tightness as your wound heals. This is normal and will gradually subside.    Your healed wound may be sensitive to temperature changes. This sensitivity improves with time, but if you re having a lot of discomfort, try to avoid temperature extremes.    Patients frequently experience itching after their wound appears to have healed because of the continue healing under the skin.  Plain Vaseline will help relieve the itching.        POSSIBLE COMPLICATIONS    BLEEDIN. Leave the bandage in place.  2. Use tightly rolled up gauze or a cloth to apply direct pressure over the bandage for 30  minutes.  3. Reapply pressure for an additional 30 minutes if necessary  4. Use additional gauze and tape to maintain pressure once the bleeding has stopped.

## 2019-04-17 NOTE — PROGRESS NOTES
HPI:  Jasen Rosario is a 73 year old male patient here today for scaly spots on face .  Patient states this has been present for a while.  Patient reports the following symptoms: none .  Patient reports the following previous treatments: none.  Patient reports the following modifying factors: none.  Associated symptoms: none.  Patient has no other skin complaints today.  Remainder of the HPI, Meds, PMH, Allergies, FH, and SH was reviewed in chart.    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 B cell lymphoma  Past Medical History:   Diagnosis Date     Anxiety state, unspecified 1991     Basal cell carcinoma     Left Ear - Moh's Dr Young     Colon polyp      ED (erectile dysfunction) 2005     Hyperlipidemia LDL goal <130 2001     Hypoglycemia, unspecified 1991     Infectious mononucleosis 1969     Lumbar stenosis     TCO - Dr Irene - with neuritis     Lymphoma (H)     SLL - B Cell - Dr Barrios     Major depressive disorder, single episode in full remission (H) 1991     OA (osteoarthritis)     Severe Lt Hip     Prostate cancer (H) 5/13    Dr Smyth - [facility] - Darian 3+3 - intermediate progression risk - [id number]     Seasonal allergies        Past Surgical History:   Procedure Laterality Date     APPENDECTOMY  1960    APPY     ARTHROPLASTY HIP Left 6/21/2017    Left BETTY - Dr MELISSA Madrigal     ARTHROPLASTY KNEE Left 10/2/2017    LEFT TOTAL KNEE ARTHROPLASTY - Dr MELISSA Madrigal     COLONOSCOPY  12/04, 12/14    colon polyp - serrated adenoma 5 mm - due 3 yrs     COLONOSCOPY  03/16/2018    Dr. Naila BELLE - normal - due 5 yrs - will need MAC     COLONOSCOPY N/A 4/18/2018    Dr. Naila BELLE - normal - due 5 yrs - will need MAC     DAVINCI PROSTATECTOMY, LYMPHADENECTOMY N/A 4/13/2015    RALP - Dr Smyth     DENTAL SURGERY  1985    wisdom teeth     SURGICAL HISTORY OF -   1980    BACK SURGERY     SURGICAL HISTORY OF -  Left 1987    ANKLE FX ORIF - Lt     SURGICAL HISTORY OF -  Left 1972    LT KNEE MCL TEAR AND MENISCUS      SURGICAL HISTORY OF  -  Left ,     LT SHOULDER ARTHROSCOPY     SURGICAL HISTORY OF -  Right 2007    Rt Shoulder Surgery - Dr Ferrer     SURGICAL HISTORY OF -  Right 2018    Rt BETTY - Dr Galdamez        Family History   Problem Relation Age of Onset     Alcohol/Drug Father      Cancer Mother         ? abdominal     Breast Cancer Sister         age 42     Colon Cancer No family hx of        Social History     Socioeconomic History     Marital status:      Spouse name: Danielle     Number of children: 3     Years of education: 18     Highest education level: Not on file   Occupational History     Occupation: teacher [employer]     Employer: [employer]     Comment: retired    Social Needs     Financial resource strain: Not on file     Food insecurity:     Worry: Not on file     Inability: Not on file     Transportation needs:     Medical: Not on file     Non-medical: Not on file   Tobacco Use     Smoking status: Former Smoker     Packs/day: 2.00     Years: 7.00     Pack years: 14.00     Types: Cigarettes     Last attempt to quit: 1965     Years since quittin.3     Smokeless tobacco: Never Used   Substance and Sexual Activity     Alcohol use: Yes     Alcohol/week: 3.0 - 3.6 oz     Types: 5 - 6 Standard drinks or equivalent per week     Comment: ocassionally     Drug use: No     Sexual activity: Yes     Partners: Female   Lifestyle     Physical activity:     Days per week: Not on file     Minutes per session: Not on file     Stress: Not on file   Relationships     Social connections:     Talks on phone: Not on file     Gets together: Not on file     Attends Jainism service: Not on file     Active member of club or organization: Not on file     Attends meetings of clubs or organizations: Not on file     Relationship status: Not on file     Intimate partner violence:     Fear of current or ex partner: Not on file     Emotionally abused: Not on file     Physically abused: Not on file     Forced sexual  activity: Not on file   Other Topics Concern      Service Not Asked     Blood Transfusions Not Asked     Caffeine Concern Yes     Comment: 2 cup qd     Occupational Exposure Not Asked     Hobby Hazards Not Asked     Sleep Concern Not Asked     Stress Concern Not Asked     Weight Concern Not Asked     Special Diet Not Asked     Back Care Not Asked     Exercise Yes     Comment: 3+ times per week     Bike Helmet Not Asked     Seat Belt Yes     Self-Exams Not Asked     Parent/sibling w/ CABG, MI or angioplasty before 65F 55M? No   Social History Narrative     Not on file       Outpatient Encounter Medications as of 4/17/2019   Medication Sig Dispense Refill     creatine 400 MG capsule Take by mouth daily       GLUCOSAMINE-CHONDROITIN PO Take 2 tablets by mouth daily       Multiple Vitamin (DAILY MULTIVITAMIN PO) Take 2 tablets by mouth daily        Omega-3 Fatty Acids (FISH OIL PO) Take 2 tablets by mouth daily        No facility-administered encounter medications on file as of 4/17/2019.        Review Of Systems:  Skin: As above  Eyes: negative  Ears/Nose/Throat: negative  Respiratory: No shortness of breath, dyspnea on exertion, cough, or hemoptysis  Cardiovascular: negative  Gastrointestinal: negative  Genitourinary: negative  Musculoskeletal: negative  Neurologic: negative  Psychiatric: negative  Hematologic/Lymphatic/Immunologic: negative  Endocrine: negative      Objective:     Pulse 64   SpO2 97%   Eyes: Conjunctivae/lids: Normal   ENT: Lips:  Normal  MSK: Normal  Cardiovascular: Peripheral edema none  Pulm: Breathing Normal  Neuro/Psych: Orientation: Normal; Mood/Affect: Normal, NAD, WDWN  Pt accompanied by: self  Following areas examined: Scalp, face, eyelids, lips, neck, chest, abdomen, back, buttocks, and R&L upper and lower extremities.  Ruiz skin type:ii   Findings:  Red smooth well-defined macules on trunk and extremities.  Brown, stuck-on scaly appearing papules on trunk and  extremities.  Well circumscribed macules with symmetric color distribution on trunk and extremities 2-3mm.  Galicia WD smooth macules on face, neck, trunk, and extremities.  Wd dark brown smooth macule on right posterior shoulder 0.2cm  Pink scaly macule/s x 5 on face  Rhytides, hypo/hyperpigmentation, and atrophy of face  Pink smooth patch on left ear  Inflamed brown, stuck-on scaly appearing papules on right dorsal hand  Assessment and Plan:     1) Cherry angiomas, Seborrheic keratoses, Benign nevi, Lentigines     I discussed the specifics of tumor, prognosis, and genetics of benign lesions.  I explained that treatment of these lesions would be purely cosmetic and not medically neccessary.  I discussed with patient different removal options including excision, cryotherapy, cautery and /or laser.  Lesion may recur and/or may not completely resolve. May need additional treatment.     2) Neoplasm of uncertain behavior right posterior shoulder 0.2cm  isk vs benign nevus  TANGENTIAL BIOPSY:  After consent, anesthesia with LEC and prep, tangential biopsy performed.  No complications and routine wound care.  May grow back and will get a scar. Based on lesion type may need to completely remove lesion. Patient will be notified in 7-10 days of results. Wound care directions given.    3) actinic keratosis x 5 and solar elastosis  LN2 for 5 seconds x 2. Discussed AE include hypopigmentation (white spot) and recurrence. Follow up in 2-3 months to recheck lesions. There is a 0.025%-20% chance that AKs can develop into a SCC.   Treatment options include LN2 vs PDT vs Efudex. Pt elected LN2  4) history of bcc on left ear  No evidence of recurrence  Signs and Symptoms of non-melanoma skin cancer and ABCDEs of melanoma reviewed with patient. Patient encouraged to perform monthly self skin exams and educated on how to perform them. UV precautions reviewed with patient. Patient was asked about new or changing moles/lesions on body.    Wear a sunscreen with at least SPF 30 on your face, ears, neck and V of the chest daily. Wear sunscreen on other areas of the body if those areas are exposed to the sun throughout the day. Sunscreens can contain physical and/or chemical blockers. Physical blockers are less likely to clog pores, these include zinc oxide and titanium dioxide. Reapply every two hour and after swimming. Sunscreen examples include Neutrogena, CeraVe, Blue Lizard, Elta MD and many others.    5) ISK x 1 on right dorsal hand  Disc benign etiology  LN2: Treated with LN2 for 5s for 1-2 cycles. Warned risks of blistering, pain, pigment change, scarring, and incomplete resolution.  Advised patient to return if lesions do not completely resolve within 2-3 months.  Wound care sheet given.    Proper skin care from [facility]:    -Eliminate harsh soaps as they strip the natural oils from the skin, often resulting in dry itchy skin ( i.e. Dial, Zest, [place])  -Use mild soaps such as Cetaphil or Dove Sensitive Skin in the shower. You do not need to use soap on arms, legs, and trunk every time you shower unless visibly soiled.   -Avoid hot or cold showers.  -After showering, lightly dry off and apply moisturizing within 2-3 minutes. This will help trap moisture in the skin.   -Aggressive use of a moisturizer at least 1-2 times a day to the entire body (including -Vanicream, Cetaphil, Aquaphor or Cerave) and moisturize hands after every washing.  -We recommend using moisturizers that come in a tub that needs to be scooped out, not a pump. This has more of an oil base. It will hold moisture in your skin much better than a water base moisturizer. The above recommended are non-pore clogging.               Follow up in 2-3 months for ak recheck. Yearly FBE.

## 2019-04-19 LAB — COPATH REPORT: NORMAL

## 2019-06-03 ENCOUNTER — HOSPITAL ENCOUNTER (OUTPATIENT)
Facility: CLINIC | Age: 74
Setting detail: SPECIMEN
Discharge: HOME | End: 2019-06-03
Attending: UROLOGY | Admitting: UROLOGY
Payer: COMMERCIAL

## 2019-06-03 DIAGNOSIS — C61 PROSTATE CANCER (H): ICD-10-CM

## 2019-06-03 DIAGNOSIS — C83.00 SMALL B-CELL LYMPHOMA, UNSPECIFIED BODY REGION (H): ICD-10-CM

## 2019-06-03 DIAGNOSIS — C61 MALIGNANT NEOPLASM OF PROSTATE (H): ICD-10-CM

## 2019-06-03 LAB
ALBUMIN SERPL-MCNC: 3.9 G/DL (ref 3.4–5)
ALP SERPL-CCNC: 92 U/L (ref 40–150)
ALT SERPL W P-5'-P-CCNC: 29 U/L (ref 0–70)
ANION GAP SERPL CALCULATED.3IONS-SCNC: 9 MMOL/L (ref 3–14)
AST SERPL W P-5'-P-CCNC: 25 U/L (ref 0–45)
BASOPHILS # BLD AUTO: 0 10E9/L (ref 0–0.2)
BASOPHILS NFR BLD AUTO: 0.5 %
BILIRUB SERPL-MCNC: 0.9 MG/DL (ref 0.2–1.3)
BUN SERPL-MCNC: 14 MG/DL (ref 7–30)
CALCIUM SERPL-MCNC: 8.5 MG/DL (ref 8.5–10.1)
CHLORIDE SERPL-SCNC: 108 MMOL/L (ref 94–109)
CO2 SERPL-SCNC: 26 MMOL/L (ref 20–32)
CREAT SERPL-MCNC: 1.35 MG/DL (ref 0.66–1.25)
DIFFERENTIAL METHOD BLD: NORMAL
EOSINOPHIL # BLD AUTO: 0.2 10E9/L (ref 0–0.7)
EOSINOPHIL NFR BLD AUTO: 3 %
ERYTHROCYTE [DISTWIDTH] IN BLOOD BY AUTOMATED COUNT: 13.2 % (ref 10–15)
GFR SERPL CREATININE-BSD FRML MDRD: 51 ML/MIN/{1.73_M2}
GLUCOSE SERPL-MCNC: 108 MG/DL (ref 70–99)
HCT VFR BLD AUTO: 45.1 % (ref 40–53)
HGB BLD-MCNC: 15.5 G/DL (ref 13.3–17.7)
LDH SERPL L TO P-CCNC: 183 U/L (ref 85–227)
LYMPHOCYTES # BLD AUTO: 1.8 10E9/L (ref 0.8–5.3)
LYMPHOCYTES NFR BLD AUTO: 31.3 %
MCH RBC QN AUTO: 30.6 PG (ref 26.5–33)
MCHC RBC AUTO-ENTMCNC: 34.4 G/DL (ref 31.5–36.5)
MCV RBC AUTO: 89 FL (ref 78–100)
MONOCYTES # BLD AUTO: 0.5 10E9/L (ref 0–1.3)
MONOCYTES NFR BLD AUTO: 9.5 %
NEUTROPHILS # BLD AUTO: 3.2 10E9/L (ref 1.6–8.3)
NEUTROPHILS NFR BLD AUTO: 55.7 %
PLATELET # BLD AUTO: 191 10E9/L (ref 150–450)
POTASSIUM SERPL-SCNC: 3.8 MMOL/L (ref 3.4–5.3)
PROT SERPL-MCNC: 6.6 G/DL (ref 6.8–8.8)
RBC # BLD AUTO: 5.06 10E12/L (ref 4.4–5.9)
SODIUM SERPL-SCNC: 143 MMOL/L (ref 133–144)
WBC # BLD AUTO: 5.7 10E9/L (ref 4–11)

## 2019-06-03 PROCEDURE — 83615 LACTATE (LD) (LDH) ENZYME: CPT | Performed by: INTERNAL MEDICINE

## 2019-06-03 PROCEDURE — 36415 COLL VENOUS BLD VENIPUNCTURE: CPT | Performed by: INTERNAL MEDICINE

## 2019-06-03 PROCEDURE — 85025 COMPLETE CBC W/AUTO DIFF WBC: CPT | Performed by: INTERNAL MEDICINE

## 2019-06-03 PROCEDURE — 80053 COMPREHEN METABOLIC PANEL: CPT | Performed by: INTERNAL MEDICINE

## 2019-06-10 ENCOUNTER — OFFICE VISIT (OUTPATIENT)
Dept: FAMILY MEDICINE | Facility: CLINIC | Age: 74
End: 2019-06-10
Payer: COMMERCIAL

## 2019-06-10 VITALS
OXYGEN SATURATION: 99 % | BODY MASS INDEX: 22.28 KG/M2 | DIASTOLIC BLOOD PRESSURE: 60 MMHG | SYSTOLIC BLOOD PRESSURE: 102 MMHG | HEIGHT: 68 IN | HEART RATE: 53 BPM | WEIGHT: 147 LBS | TEMPERATURE: 98.1 F

## 2019-06-10 DIAGNOSIS — E78.5 HYPERLIPIDEMIA LDL GOAL <130: ICD-10-CM

## 2019-06-10 DIAGNOSIS — Z96.652 STATUS POST TOTAL LEFT KNEE REPLACEMENT: ICD-10-CM

## 2019-06-10 DIAGNOSIS — Z00.00 ENCOUNTER FOR ROUTINE ADULT HEALTH EXAMINATION WITHOUT ABNORMAL FINDINGS: Primary | ICD-10-CM

## 2019-06-10 DIAGNOSIS — Z91.09 ENVIRONMENTAL ALLERGIES: ICD-10-CM

## 2019-06-10 DIAGNOSIS — M15.0 PRIMARY OSTEOARTHRITIS INVOLVING MULTIPLE JOINTS: ICD-10-CM

## 2019-06-10 DIAGNOSIS — Z96.643 HISTORY OF TOTAL HIP ARTHROPLASTY, BILATERAL: ICD-10-CM

## 2019-06-10 DIAGNOSIS — C83.00 SMALL B-CELL LYMPHOMA, UNSPECIFIED BODY REGION (H): ICD-10-CM

## 2019-06-10 DIAGNOSIS — M48.061 SPINAL STENOSIS OF LUMBAR REGION, UNSPECIFIED WHETHER NEUROGENIC CLAUDICATION PRESENT: ICD-10-CM

## 2019-06-10 DIAGNOSIS — F41.1 ANXIETY STATE: ICD-10-CM

## 2019-06-10 DIAGNOSIS — Z12.11 SCREEN FOR COLON CANCER: ICD-10-CM

## 2019-06-10 DIAGNOSIS — C85.90 LYMPHOMA, UNSPECIFIED BODY REGION, UNSPECIFIED LYMPHOMA TYPE (H): ICD-10-CM

## 2019-06-10 DIAGNOSIS — F32.5 MAJOR DEPRESSIVE DISORDER, SINGLE EPISODE IN FULL REMISSION (H): ICD-10-CM

## 2019-06-10 DIAGNOSIS — Z51.81 MEDICATION MONITORING ENCOUNTER: ICD-10-CM

## 2019-06-10 DIAGNOSIS — K63.5 POLYP OF COLON, UNSPECIFIED PART OF COLON, UNSPECIFIED TYPE: ICD-10-CM

## 2019-06-10 DIAGNOSIS — C61 PROSTATE CANCER (H): ICD-10-CM

## 2019-06-10 DIAGNOSIS — Z00.00 ENCOUNTER FOR MEDICARE ANNUAL WELLNESS EXAM: ICD-10-CM

## 2019-06-10 LAB
ALBUMIN UR-MCNC: NEGATIVE MG/DL
APPEARANCE UR: CLEAR
BILIRUB UR QL STRIP: NEGATIVE
COLOR UR AUTO: YELLOW
ERYTHROCYTE [DISTWIDTH] IN BLOOD BY AUTOMATED COUNT: 13 % (ref 10–15)
GLUCOSE UR STRIP-MCNC: NEGATIVE MG/DL
HBA1C MFR BLD: 5.3 % (ref 0–5.6)
HCT VFR BLD AUTO: 43.7 % (ref 40–53)
HGB BLD-MCNC: 15.3 G/DL (ref 13.3–17.7)
HGB UR QL STRIP: NEGATIVE
KETONES UR STRIP-MCNC: NEGATIVE MG/DL
LEUKOCYTE ESTERASE UR QL STRIP: NEGATIVE
MCH RBC QN AUTO: 31 PG (ref 26.5–33)
MCHC RBC AUTO-ENTMCNC: 35 G/DL (ref 31.5–36.5)
MCV RBC AUTO: 89 FL (ref 78–100)
NITRATE UR QL: NEGATIVE
PH UR STRIP: 5.5 PH (ref 5–7)
PLATELET # BLD AUTO: 183 10E9/L (ref 150–450)
RBC # BLD AUTO: 4.93 10E12/L (ref 4.4–5.9)
SOURCE: NORMAL
SP GR UR STRIP: <=1.005 (ref 1–1.03)
UROBILINOGEN UR STRIP-ACNC: 0.2 EU/DL (ref 0.2–1)
WBC # BLD AUTO: 6.8 10E9/L (ref 4–11)

## 2019-06-10 PROCEDURE — 81003 URINALYSIS AUTO W/O SCOPE: CPT | Performed by: FAMILY MEDICINE

## 2019-06-10 PROCEDURE — 85027 COMPLETE CBC AUTOMATED: CPT | Performed by: FAMILY MEDICINE

## 2019-06-10 PROCEDURE — 80053 COMPREHEN METABOLIC PANEL: CPT | Performed by: FAMILY MEDICINE

## 2019-06-10 PROCEDURE — 83036 HEMOGLOBIN GLYCOSYLATED A1C: CPT | Performed by: FAMILY MEDICINE

## 2019-06-10 PROCEDURE — 82550 ASSAY OF CK (CPK): CPT | Performed by: FAMILY MEDICINE

## 2019-06-10 PROCEDURE — 82043 UR ALBUMIN QUANTITATIVE: CPT | Performed by: FAMILY MEDICINE

## 2019-06-10 PROCEDURE — 82274 ASSAY TEST FOR BLOOD FECAL: CPT | Performed by: FAMILY MEDICINE

## 2019-06-10 PROCEDURE — 80061 LIPID PANEL: CPT | Performed by: FAMILY MEDICINE

## 2019-06-10 PROCEDURE — 36415 COLL VENOUS BLD VENIPUNCTURE: CPT | Performed by: FAMILY MEDICINE

## 2019-06-10 PROCEDURE — G0439 PPPS, SUBSEQ VISIT: HCPCS | Performed by: FAMILY MEDICINE

## 2019-06-10 PROCEDURE — 84443 ASSAY THYROID STIM HORMONE: CPT | Performed by: FAMILY MEDICINE

## 2019-06-10 ASSESSMENT — MIFFLIN-ST. JEOR: SCORE: 1378.35

## 2019-06-10 ASSESSMENT — ACTIVITIES OF DAILY LIVING (ADL): CURRENT_FUNCTION: NO ASSISTANCE NEEDED

## 2019-06-10 NOTE — PATIENT INSTRUCTIONS
Recommend Shingrix vaccine for shingles. Check with insurance to see where the Shingrix vaccine is the cheapest. Follow up 2-6 months after receiving the first shot for the second shot.    [facility] [place]                        To reach your care team during and after hours:   [phone]  To reach our pharmacy:        [phone]    Clinic Hours                        Our clinic hours are:    Monday   7:30 am to 7:00 pm                  Tuesday through Friday 7:30 am to 5:00 pm                             Saturday   8:00 am to 12:00 pm      Sunday   Closed      Pharmacy Hours                        Our pharmacy hours are:    Monday   8:30 am to 7:00 pm       Tuesday to Friday  8:30 am to 6:00 pm                       Saturday    9:00 am to 1:00 pm              Sunday    Closed              There is also information available at our web site:  [email or web address]    If your provider ordered any lab tests and you do not receive the results within 10 business days, please call the clinic.    If you need a medication refill please contact your pharmacy.  Please allow 2-3 business days for your refill to be completed.    Our clinic offers telephone visits and e visits.  Please ask one of your team members to explain more.      Use [employer] (secure email communication and access to your chart) to send your primary care provider a message or make an appointment. Ask someone on your Team how to sign up for [facility].  Immunizations                      Immunization History   Administered Date(s) Administered     HEPA 06/28/2006, 12/28/2006     HepB 01/01/1995, 02/01/1995, 06/01/1995     Pneumo Conj 13-V (2010&after) 12/01/2015     Pneumococcal 23 valent 06/28/2006, 04/03/2013     TD (ADULT, 7+) 06/10/2001, 12/10/2010     TDAP Vaccine (Boostrix) 04/03/2013     Zoster vaccine, live 04/03/2013        Health Maintenance                         Health Maintenance Due   Topic Date Due     Zoster (Shingles) Vaccine (2  of 3) 05/29/2013     Depression Assessment  04/30/2019     Annual Wellness Visit  05/08/2019     FIT Test  05/09/2019       Patient Education   Personalized Prevention Plan  You are due for the preventive services outlined below.  Your care team is available to assist you in scheduling these services.  If you have already completed any of these items, please share that information with your care team to update in your medical record.  Health Maintenance Due   Topic Date Due     Zoster (Shingles) Vaccine (2 of 3) 05/29/2013     Depression Assessment  04/30/2019     Annual Wellness Visit  05/08/2019     FIT Test  05/09/2019     Preventive Health Recommendations  See your health care provider every year to    Review health changes.     Discuss preventive care.      Review your medicines if your doctor has prescribed any.    Talk with your health care provider about whether you should have a test to screen for prostate cancer (PSA).    Every 3 years, have a diabetes test (fasting glucose). If you are at risk for diabetes, you should have this test more often.    Every 5 years, have a cholesterol test. Have this test more often if you are at risk for high cholesterol or heart disease.     Every 10 years, have a colonoscopy. Or, have a yearly FIT test (stool test). These exams will check for colon cancer.    Talk to with your health care provider about screening for Abdominal Aortic Aneurysm if you have a family history of AAA or have a history of smoking.    Shots:     Get a flu shot each year.     Get a tetanus shot every 10 years.     Talk to your doctor about your pneumonia vaccines. There are now two you should receive - Pneumovax (PPSV 23) and Prevnar (PCV 13).    Talk to your pharmacist about a shingles vaccine.     Talk to your doctor about the hepatitis B vaccine.    Nutrition:     Eat at least 5 servings of fruits and vegetables each day.     Eat whole-grain bread, whole-wheat pasta and brown rice instead of  white grains and rice.     Get adequate Calcium and Vitamin D.     Lifestyle    Exercise for at least 150 minutes a week (30 minutes a day, 5 days a week). This will help you control your weight and prevent disease.     Limit alcohol to one drink per day.     No smoking.     Wear sunscreen to prevent skin cancer.     See your dentist every six months for an exam and cleaning.     See your eye doctor every 1 to 2 years to screen for conditions such as glaucoma, macular degeneration and cataracts.    Personalized Prevention Plan  You are due for the preventive services outlined below.  Your care team is available to assist you in scheduling these services.  If you have already completed any of these items, please share that information with your care team to update in your medical record.  Health Maintenance Due   Topic Date Due     Zoster (Shingles) Vaccine (2 of 3) 05/29/2013     Depression Assessment  04/30/2019     Annual Wellness Visit  05/08/2019     FIT Test  05/09/2019        Patient Education   Personalized Prevention Plan  You are due for the preventive services outlined below.  Your care team is available to assist you in scheduling these services.  If you have already completed any of these items, please share that information with your care team to update in your medical record.  Health Maintenance Due   Topic Date Due     Zoster (Shingles) Vaccine (2 of 3) 05/29/2013     Depression Assessment  04/30/2019     Annual Wellness Visit  05/08/2019     FIT Test  05/09/2019

## 2019-06-10 NOTE — PROGRESS NOTES
"SUBJECTIVE:   Jasen Rosario is a 73 year old male who presents for Preventive Visit.    Are you in the first 12 months of your Medicare coverage?  No    Healthy Habits:    In general, how would you rate your overall health?  Very good    Frequency of exercise:  6-7 days/week    Duration of exercise:  Greater than 60 minutes    Do you usually eat at least 4 servings of fruit and vegetables a day, include whole grains    & fiber and avoid regularly eating high fat or \"junk\" foods?  No    Taking medications regularly:  Yes    Barriers to taking medications:  None    Medication side effects:  None    Ability to successfully perform activities of daily living:  No assistance needed    Home Safety:  No safety concerns identified    Hearing Impairment:  No hearing concerns    In the past 6 months, have you been bothered by leaking of urine?  No    In general, how would you rate your overall mental or emotional health?  Good      PHQ-2 Total Score:    Additional concerns today:  No    Prostate Cancer: Stable. Prostatectomy in 2015. Patient consults with Dr. Smith for further prostate cancer management.     PSA   Date Value Ref Range Status   06/03/2019 <0.01 0 - 4 ug/L Final     Comment:     Assay Method:  Chemiluminescence using Siemens Vista analyzer     Depression/Anxiety: Patient had a PHQ9 score of 2 and a GAD7 score of 5 today. Patient's depression/anxiety is well controlled. Patient is not currently prescribed any medication for depression/anxiety management.    Basal Cell Carcinoma: Stable. Patient consults with Dr. Toscano of dermatology for further BCC management.     Patient reports he has had some low back pain lately. Patient states he is extremely active with pickle ball and yard work and doesn't know if that is the cause.     Do you feel safe in your environment? Yes    Do you have a Health Care Directive? Yes: Advance Directive has been received and scanned.    Fall risk  Fallen 2 or more times in the " past year?: No  Any fall with injury in the past year?: No    Cognitive Screening   1) Repeat 3 items (Leader, Season, Table)    2) Clock draw: NORMAL  3) 3 item recall: Recalls 3 objects  Results: NORMAL clock, 1-2 items recalled: COGNITIVE IMPAIRMENT LESS LIKELY    Mini-CogTM Copyright RYDER [employer]. Licensed by the author for use in [facility]; reprinted with permission (juan@[facility]). All rights reserved.      Do you have sleep apnea, excessive snoring or daytime drowsiness?: no    Reviewed and updated as needed this visit by clinical staff  Tobacco  Allergies  Meds  Med Hx  Surg Hx  Fam Hx  Soc Hx      Reviewed and updated as needed this visit by Provider        Social History     Tobacco Use     Smoking status: Former Smoker     Packs/day: 2.00     Years: 7.00     Pack years: 14.00     Types: Cigarettes     Last attempt to quit: 1965     Years since quittin.4     Smokeless tobacco: Never Used   Substance Use Topics     Alcohol use: Yes     Alcohol/week: 3.0 - 3.6 oz     Types: 5 - 6 Standard drinks or equivalent per week     Comment: ocassionally     If you drink alcohol do you typically have >3 drinks per day or >7 drinks per week? No    Alcohol Use 6/10/2019   Prescreen: >3 drinks/day or >7 drinks/week? No     Current providers sharing in care for this patient include:   Patient Care Team:  Archie Meadows MD as PCP - General  Archie Meadows MD as Assigned PCP  Archie Meadows MD as Referring Physician (Family Practice)  Sheri Hemphill, RN as Registered Nurse (Urology)  Chantelle Kern, RN as Registered Nurse  Eliel Diaz MD as Assigned PCP    The following health maintenance items are reviewed in Epic and correct as of today:  Health Maintenance   Topic Date Due     ZOSTER IMMUNIZATION (2 of 3) 2013     PHQ-9  2019     MEDICARE ANNUAL WELLNESS VISIT  2019     FIT  2019     INFLUENZA VACCINE (Season Ended) 2019     PSA  2020     DTAP/TDAP/TD  IMMUNIZATION (4 - Td) 04/03/2023     LIPID  05/08/2023     ADVANCED DIRECTIVE PLANNING  06/10/2024     HEPATITIS C SCREENING  Completed     DEPRESSION ACTION PLAN  Completed     AORTIC ANEURYSM SCREENING (SYSTEM ASSIGNED)  Completed     IPV IMMUNIZATION  Aged Out     MENINGITIS IMMUNIZATION  Aged Out     Health Maintenance     Colonoscopy:  Last 4/18/2018, Due 4/2023   FIT:  Last 5/9/2018, Due              PSA:  Last 6/3/2019, Due 6/2020   DEXA:  N/A    Health Maintenance Due   Topic Date Due     ZOSTER IMMUNIZATION (2 of 3) 05/29/2013     PHQ-9  04/30/2019     MEDICARE ANNUAL WELLNESS VISIT  05/08/2019     FIT  05/09/2019       Current Problem List    Patient Active Problem List   Diagnosis     Anxiety state     Major depressive disorder, single episode in full remission (H)     Hyperlipidemia LDL goal <130     ED (erectile dysfunction)     Seasonal allergies     OA (osteoarthritis)     Advanced directives, counseling/discussion     h/o Prostate cancer (H) - prostatectomy - 2015     Colon polyp     Lumbar stenosis     S/P total hip arthroplasty     Environmental allergies     S/P total knee arthroplasty     Small B-cell lymphoma (H)       Past Medical History    Past Medical History:   Diagnosis Date     Anxiety state, unspecified 1991     Basal cell carcinoma     Left Ear - Moh[facility] Dr Young     Colon polyp      ED (erectile dysfunction) 2005     Hyperlipidemia LDL goal <130 2001     Hypoglycemia, unspecified 1991     Infectious mononucleosis 1969     Lumbar stenosis     TCO - Dr Irene - with neuritis     Lymphoma (H)     SLL - B Cell - Dr Barrios/Marcus     Major depressive disorder, single episode in full remission (H) 1991     OA (osteoarthritis)     Severe Lt Hip     Prostate cancer (H) 5/13    Dr Smyth -  - [place] 3+3 - intermediate progression risk - pT2cN0     Seasonal allergies        Past Surgical History    Past Surgical History:   Procedure Laterality Date     APPENDECTOMY  1960    APPY      ARTHROPLASTY HIP Left 6/21/2017    Left BETTY - Dr MELISSA Madrigal     ARTHROPLASTY KNEE Left 10/2/2017    LEFT TOTAL KNEE ARTHROPLASTY - Dr MELISSA Madrigal     COLONOSCOPY  12/04, 12/14    colon polyp - serrated adenoma 5 mm - due 3 yrs     COLONOSCOPY  03/16/2018    Dr. Naila BELLE - normal - due 5 yrs - will need MAC     COLONOSCOPY N/A 4/18/2018    Dr. Naila BELLE - normal - due 5 yrs - will need MAC     DAVINCI PROSTATECTOMY, LYMPHADENECTOMY N/A 4/13/2015    RALP - Dr Smyth     DENTAL SURGERY  1985    wisdom teeth     SURGICAL HISTORY OF -   1980    BACK SURGERY     SURGICAL HISTORY OF -  Left 1987    ANKLE FX ORIF - Lt     SURGICAL HISTORY OF -  Left 1972    LT KNEE MCL TEAR AND MENISCUS      SURGICAL HISTORY OF -  Left 2001, 2008    LT SHOULDER ARTHROSCOPY     SURGICAL HISTORY OF -  Right 01/2007    Rt Shoulder Surgery - Dr Ferrer     SURGICAL HISTORY OF -  Right 11/2018    Rt BETTY - Dr Galdamez       Current Medications    Current Outpatient Medications   Medication Sig Dispense Refill     creatine 400 MG capsule Take by mouth daily       GLUCOSAMINE-CHONDROITIN PO Take 2 tablets by mouth daily       Multiple Vitamin (DAILY MULTIVITAMIN PO) Take 2 tablets by mouth daily        Omega-3 Fatty Acids (FISH OIL PO) Take 2 tablets by mouth daily          Allergies    Allergies   Allergen Reactions     Seasonal Allergies        Immunizations    Immunization History   Administered Date(s) Administered     HEPA 06/28/2006, 12/28/2006     HepB 01/01/1995, 02/01/1995, 06/01/1995     Pneumo Conj 13-V (2010&after) 12/01/2015     Pneumococcal 23 valent 06/28/2006, 04/03/2013     TD (ADULT, 7+) 06/10/2001, 12/10/2010     TDAP Vaccine (Boostrix) 04/03/2013     Zoster vaccine, live 04/03/2013       Family History    Family History   Problem Relation Age of Onset     Alcohol/Drug Father      Cancer Mother         ? abdominal     Breast Cancer Sister         age 42     Colon Cancer No family hx of        Social History    Social History      Socioeconomic History     Marital status:      Spouse name: Danielle     Number of children: 3     Years of education: 18     Highest education level: Not on file   Occupational History     Occupation: teacher [employer]      Employer: [id number]     Comment: retired 2008   Social Needs     Financial resource strain: Not on file     Food insecurity:     Worry: Not on file     Inability: Not on file     Transportation needs:     Medical: Not on file     Non-medical: Not on file   Tobacco Use     Smoking status: Former Smoker     Packs/day: 2.00     Years: 7.00     Pack years: 14.00     Types: Cigarettes     Last attempt to quit: 1965     Years since quittin.4     Smokeless tobacco: Never Used   Substance and Sexual Activity     Alcohol use: Yes     Alcohol/week: 3.0 - 3.6 oz     Types: 5 - 6 Standard drinks or equivalent per week     Comment: ocassionally     Drug use: No     Sexual activity: Yes     Partners: Female   Lifestyle     Physical activity:     Days per week: Not on file     Minutes per session: Not on file     Stress: Not on file   Relationships     Social connections:     Talks on phone: Not on file     Gets together: Not on file     Attends Faith service: Not on file     Active member of club or organization: Not on file     Attends meetings of clubs or organizations: Not on file     Relationship status: Not on file     Intimate partner violence:     Fear of current or ex partner: Not on file     Emotionally abused: Not on file     Physically abused: Not on file     Forced sexual activity: Not on file   Other Topics Concern      Service Not Asked     Blood Transfusions Not Asked     Caffeine Concern Yes     Comment: 2 cup qd     Occupational Exposure Not Asked     Hobby Hazards Not Asked     Sleep Concern Not Asked     Stress Concern Not Asked     Weight Concern Not Asked     Special Diet Not Asked     Back Care Not Asked     Exercise Yes     Comment: 3+ times per week      "Bike Helmet Not Asked     Seat Belt Yes     Self-Exams Not Asked     Parent/sibling w/ CABG, MI or angioplasty before 65F 55M? No   Social History Narrative     Not on file       Review of Systems  Constitutional, HEENT, cardiovascular, pulmonary, GI, , musculoskeletal, neuro, skin, endocrine and psych systems are negative, except as otherwise noted.    OBJECTIVE:   /60   Pulse 53   Temp 98.1  F (36.7  C) (Oral)   Ht 1.715 m (5' 7.5\")   Wt 66.7 kg (147 lb)   SpO2 99%   BMI 22.68 kg/m   Estimated body mass index is 22.68 kg/m  as calculated from the following:    Height as of this encounter: 1.715 m (5' 7.5\").    Weight as of this encounter: 66.7 kg (147 lb).  Physical Exam  GENERAL: healthy, alert and no distress  EYES: Eyes grossly normal to inspection  HENT:ear canals and TM's normal upon viewing with otoscope, nose and mouth without ulcers or lesions upon viewing with otoscope  NECK: no adenopathy, no asymmetry, masses, or scars and thyroid normal to palpation  RESP: lungs clear to auscultation - no rales, rhonchi or wheezes  CV: regular rate and rhythm, normal S1 S2, no S3 or S4, no murmur, click or rub, no peripheral edema and peripheral pulses strong  ABDOMEN: soft, nontender, no hepatosplenomegaly, no masses and bowel sounds normal   (male)/RECTAL: Declined  MS: no gross musculoskeletal defects noted, no edema  SKIN: no suspicious lesions or rashes  NEURO: Normal strength and tone, mentation intact and speech normal  PSYCH: mentation appears normal, affect normal/bright  BACK: no CVA tenderness, no paralumbar tenderness    Diagnostic Test Results:  Results for orders placed or performed in visit on 06/10/19 (from the past 24 hour(s))   CBC with platelets   Result Value Ref Range    WBC 6.8 4.0 - 11.0 10e9/L    RBC Count 4.93 4.4 - 5.9 10e12/L    Hemoglobin 15.3 13.3 - 17.7 g/dL    Hematocrit 43.7 40.0 - 53.0 %    MCV 89 78 - 100 fl    MCH 31.0 26.5 - 33.0 pg    MCHC 35.0 31.5 - 36.5 g/dL    " RDW 13.0 10.0 - 15.0 %    Platelet Count 183 150 - 450 10e9/L   UA reflex to Microscopic and Culture   Result Value Ref Range    Color Urine Yellow     Appearance Urine Clear     Glucose Urine Negative NEG^Negative mg/dL    Bilirubin Urine Negative NEG^Negative    Ketones Urine Negative NEG^Negative mg/dL    Specific Gravity Urine <=1.005 1.003 - 1.035    Blood Urine Negative NEG^Negative    pH Urine 5.5 5.0 - 7.0 pH    Protein Albumin Urine Negative NEG^Negative mg/dL    Urobilinogen Urine 0.2 0.2 - 1.0 EU/dL    Nitrite Urine Negative NEG^Negative    Leukocyte Esterase Urine Negative NEG^Negative    Source Midstream Urine    Hemoglobin A1c   Result Value Ref Range    Hemoglobin A1C 5.3 0 - 5.6 %        Labs Pending    ASSESSMENT / PLAN:       ICD-10-CM    1. Encounter for routine adult health examination without abnormal findings Z00.00 Comprehensive metabolic panel     Lipid panel reflex to direct LDL Fasting     CK total     CBC with platelets     TSH with free T4 reflex     UA reflex to Microscopic and Culture     Albumin Random Urine Quantitative with Creat Ratio     Fecal colorectal cancer screen FIT     Hemoglobin A1c   2. Encounter for Medicare annual wellness exam Z00.00    3. Prostate cancer (H) C61    4. Small B-cell lymphoma, unspecified body region (H) C83.00 Comprehensive metabolic panel     CBC with platelets   5. Lymphoma, unspecified body region, unspecified lymphoma type (H) C85.90 Comprehensive metabolic panel     CBC with platelets   6. Major depressive disorder, single episode in full remission (H) F32.5 TSH with free T4 reflex   7. Anxiety state F41.1 TSH with free T4 reflex   8. Spinal stenosis of lumbar region, unspecified whether neurogenic claudication present M48.061    9. Hyperlipidemia LDL goal <130 E78.5 Comprehensive metabolic panel     Lipid panel reflex to direct LDL Fasting     CK total   10. Primary osteoarthritis involving multiple joints M15.0    11. Status post total left knee  replacement Z96.652    12. History of total hip arthroplasty, bilateral Z96.643    13. Environmental allergies Z91.09    14. Polyp of colon, unspecified part of colon, unspecified type K63.5 Fecal colorectal cancer screen FIT   15. Screen for colon cancer Z12.11 Fecal colorectal cancer screen FIT   16. Medication monitoring encounter Z51.81 Comprehensive metabolic panel     Lipid panel reflex to direct LDL Fasting     CK total     CBC with platelets     TSH with free T4 reflex     UA reflex to Microscopic and Culture     Albumin Random Urine Quantitative with Creat Ratio     Hemoglobin A1c     Discussed treatment/modality options, including risk and benefits, he desires advised 1 multivitamin per day, advised dentist every 6 months, advised diet and exercise and advised opthalmologist every 1-2 years. All diagnosis above reviewed and noted above, otherwise stable.  See Outfittery orders for further details.      1) Patient consults with Dr. Smith for further prostate cancer management. Recommend continued follow up.     2) Patient consults with Dr. Toscano of dermatology for further BCC management. Recommend continued follow up.     3) Recommend Heat/Ice/Stretching and 1000 mg Tylenol every 8 hours as needed for low back pain management. Recommend follow up with Dr. Galdamez for further evaluation of low back pain and possible referral to orthotics.     4) Recommend Shingrix vaccine.      5) Follow up in 1 year for complete physical exam.    Health Maintenance Due   Topic Date Due     ZOSTER IMMUNIZATION (2 of 3) 05/29/2013     PHQ-9  04/30/2019     MEDICARE ANNUAL WELLNESS VISIT  05/08/2019     FIT  05/09/2019       End of Life Planning:  Patient currently has an advanced directive: Yes.  Practitioner is supportive of decision.    COUNSELING:  Reviewed preventive health counseling, as reflected in patient instructions    Estimated body mass index is 22.68 kg/m  as calculated from the following:    Height as of this  "encounter: 1.715 m (5' 7.5\").    Weight as of this encounter: 66.7 kg (147 lb).     reports that he quit smoking about 54 years ago. His smoking use included cigarettes. He has a 14.00 pack-year smoking history. He has never used smokeless tobacco.    Appropriate preventive services were discussed with this patient, including applicable screening as appropriate for cardiovascular disease, diabetes, osteopenia/osteoporosis, and glaucoma.  As appropriate for age/gender, discussed screening for colorectal cancer, prostate cancer, breast cancer, and cervical cancer. Checklist reviewing preventive services available has been given to the patient.    Reviewed patients plan of care and provided an AVS. The Basic Care Plan (routine screening as documented in Health Maintenance) for Jasen meets the Care Plan requirement. This Care Plan has been established and reviewed with the Patient.    Counseling Resources:  ATP IV Guidelines  Pooled Cohorts Equation Calculator  Breast Cancer Risk Calculator  FRAX Risk Assessment  ICSI Preventive Guidelines  Dietary Guidelines for Americans, 2010  Cegal's MyPlate  ASA Prophylaxis  Lung CA Screening    This document serves as a record of the services and decisions personally performed and made by Archie Meadows MD. It was created on his behalf by Champ Auguste, a trained medical scribe. The creation of this document is based on the provider's statements to the medical scribe.  Champ Auguste Meenu 10, 2019 10:06 AM     The information in this document, created by the medical scribe for me, accurately reflects the services I personally performed and the decisions made by me. I have reviewed and approved this document for accuracy prior to leaving the patient care area.  Meenu 10, 2019          Archie Meadows MD, [address]  [id number] [phone] Fax  "

## 2019-06-10 NOTE — LETTER
[facility]  [address] [place] [address], [place] [address]                  [phone]   June 12, 2019    Jasen Rosario  [address] [facility] [address]      Dear Jasen,    Here is a summary of your recent test results:    Labs are overall quite good, except minimally decreased kidney function (improved)     We advise:     Continue current cares.   Balanced low cholesterol diet.   Regular exercise.       For additional lab test information, labtestsonline.org is an excellent reference.     Let us know if you have any questions or concerns.     Your test results are enclosed.      Please contact me if you have any questions.    In addition, here is a list of due or overdue Health Maintenance reminders.    Health Maintenance Due   Topic Date Due     Zoster (Shingles) Vaccine (2 of 3) 05/29/2013     FIT Test  05/09/2019       Please call us at [phone] (or use [employer]) to address the above recommendations.            Thank you very much for trusting [facility]..     Healthy regards,        Archie Meadows M.D.        Results for orders placed or performed in visit on 06/10/19   Comprehensive metabolic panel   Result Value Ref Range    Sodium 140 133 - 144 mmol/L    Potassium 4.4 3.4 - 5.3 mmol/L    Chloride 106 94 - 109 mmol/L    Carbon Dioxide 26 20 - 32 mmol/L    Anion Gap 8 3 - 14 mmol/L    Glucose 89 70 - 99 mg/dL    Urea Nitrogen 16 7 - 30 mg/dL    Creatinine 1.26 (H) 0.66 - 1.25 mg/dL    GFR Estimate 56 (L) >60 mL/min/[1.73_m2]    GFR Estimate If Black 65 >60 mL/min/[1.73_m2]    Calcium 8.6 8.5 - 10.1 mg/dL    Bilirubin Total 0.8 0.2 - 1.3 mg/dL    Albumin 3.9 3.4 - 5.0 g/dL    Protein Total 6.3 (L) 6.8 - 8.8 g/dL    Alkaline Phosphatase 86 40 - 150 U/L    ALT 28 0 - 70 U/L    AST 27 0 - 45 U/L   Lipid panel reflex to direct LDL Fasting   Result Value Ref Range    Cholesterol 185 <200 mg/dL    Triglycerides 64 <150 mg/dL    HDL  Cholesterol 73 >39 mg/dL    LDL Cholesterol Calculated 99 <100 mg/dL    Non HDL Cholesterol 112 <130 mg/dL   CK total   Result Value Ref Range    CK Total 235 30 - 300 U/L   CBC with platelets   Result Value Ref Range    WBC 6.8 4.0 - 11.0 10e9/L    RBC Count 4.93 4.4 - 5.9 10e12/L    Hemoglobin 15.3 13.3 - 17.7 g/dL    Hematocrit 43.7 40.0 - 53.0 %    MCV 89 78 - 100 fl    MCH 31.0 26.5 - 33.0 pg    MCHC 35.0 31.5 - 36.5 g/dL    RDW 13.0 10.0 - 15.0 %    Platelet Count 183 150 - 450 10e9/L   TSH with free T4 reflex   Result Value Ref Range    TSH 2.84 0.40 - 4.00 mU/L   UA reflex to Microscopic and Culture   Result Value Ref Range    Color Urine Yellow     Appearance Urine Clear     Glucose Urine Negative NEG^Negative mg/dL    Bilirubin Urine Negative NEG^Negative    Ketones Urine Negative NEG^Negative mg/dL    Specific Gravity Urine <=1.005 1.003 - 1.035    Blood Urine Negative NEG^Negative    pH Urine 5.5 5.0 - 7.0 pH    Protein Albumin Urine Negative NEG^Negative mg/dL    Urobilinogen Urine 0.2 0.2 - 1.0 EU/dL    Nitrite Urine Negative NEG^Negative    Leukocyte Esterase Urine Negative NEG^Negative    Source Midstream Urine    Albumin Random Urine Quantitative with Creat Ratio   Result Value Ref Range    Creatinine Urine 49 mg/dL    Albumin Urine mg/L <5 mg/L    Albumin Urine mg/g Cr Unable to calculate due to low value 0 - 17 mg/g Cr   Hemoglobin A1c   Result Value Ref Range    Hemoglobin A1C 5.3 0 - 5.6 %

## 2019-06-11 PROBLEM — C83.00: Status: ACTIVE | Noted: 2018-06-14

## 2019-06-11 LAB
ALBUMIN SERPL-MCNC: 3.9 G/DL (ref 3.4–5)
ALP SERPL-CCNC: 86 U/L (ref 40–150)
ALT SERPL W P-5'-P-CCNC: 28 U/L (ref 0–70)
ANION GAP SERPL CALCULATED.3IONS-SCNC: 8 MMOL/L (ref 3–14)
AST SERPL W P-5'-P-CCNC: 27 U/L (ref 0–45)
BILIRUB SERPL-MCNC: 0.8 MG/DL (ref 0.2–1.3)
BUN SERPL-MCNC: 16 MG/DL (ref 7–30)
CALCIUM SERPL-MCNC: 8.6 MG/DL (ref 8.5–10.1)
CHLORIDE SERPL-SCNC: 106 MMOL/L (ref 94–109)
CHOLEST SERPL-MCNC: 185 MG/DL
CK SERPL-CCNC: 235 U/L (ref 30–300)
CO2 SERPL-SCNC: 26 MMOL/L (ref 20–32)
CREAT SERPL-MCNC: 1.26 MG/DL (ref 0.66–1.25)
CREAT UR-MCNC: 49 MG/DL
GFR SERPL CREATININE-BSD FRML MDRD: 56 ML/MIN/{1.73_M2}
GLUCOSE SERPL-MCNC: 89 MG/DL (ref 70–99)
HDLC SERPL-MCNC: 73 MG/DL
LDLC SERPL CALC-MCNC: 99 MG/DL
MICROALBUMIN UR-MCNC: <5 MG/L
MICROALBUMIN/CREAT UR: NORMAL MG/G CR (ref 0–17)
NONHDLC SERPL-MCNC: 112 MG/DL
POTASSIUM SERPL-SCNC: 4.4 MMOL/L (ref 3.4–5.3)
PROT SERPL-MCNC: 6.3 G/DL (ref 6.8–8.8)
SODIUM SERPL-SCNC: 140 MMOL/L (ref 133–144)
TRIGL SERPL-MCNC: 64 MG/DL
TSH SERPL DL<=0.005 MIU/L-ACNC: 2.84 MU/L (ref 0.4–4)

## 2019-06-11 ASSESSMENT — ANXIETY QUESTIONNAIRES
6. BECOMING EASILY ANNOYED OR IRRITABLE: SEVERAL DAYS
3. WORRYING TOO MUCH ABOUT DIFFERENT THINGS: SEVERAL DAYS
7. FEELING AFRAID AS IF SOMETHING AWFUL MIGHT HAPPEN: NOT AT ALL
GAD7 TOTAL SCORE: 5
2. NOT BEING ABLE TO STOP OR CONTROL WORRYING: NOT AT ALL
5. BEING SO RESTLESS THAT IT IS HARD TO SIT STILL: SEVERAL DAYS
1. FEELING NERVOUS, ANXIOUS, OR ON EDGE: NOT AT ALL
IF YOU CHECKED OFF ANY PROBLEMS ON THIS QUESTIONNAIRE, HOW DIFFICULT HAVE THESE PROBLEMS MADE IT FOR YOU TO DO YOUR WORK, TAKE CARE OF THINGS AT HOME, OR GET ALONG WITH OTHER PEOPLE: NOT DIFFICULT AT ALL

## 2019-06-11 ASSESSMENT — PATIENT HEALTH QUESTIONNAIRE - PHQ9
SUM OF ALL RESPONSES TO PHQ QUESTIONS 1-9: 2
5. POOR APPETITE OR OVEREATING: MORE THAN HALF THE DAYS

## 2019-06-12 ASSESSMENT — ANXIETY QUESTIONNAIRES: GAD7 TOTAL SCORE: 5

## 2019-06-13 ENCOUNTER — ONCOLOGY VISIT (OUTPATIENT)
Dept: ONCOLOGY | Facility: CLINIC | Age: 74
End: 2019-06-13
Attending: INTERNAL MEDICINE
Payer: COMMERCIAL

## 2019-06-13 VITALS
BODY MASS INDEX: 22.2 KG/M2 | RESPIRATION RATE: 16 BRPM | DIASTOLIC BLOOD PRESSURE: 67 MMHG | TEMPERATURE: 97 F | WEIGHT: 146.5 LBS | HEART RATE: 67 BPM | OXYGEN SATURATION: 98 % | SYSTOLIC BLOOD PRESSURE: 105 MMHG | HEIGHT: 68 IN

## 2019-06-13 DIAGNOSIS — K63.5 POLYP OF COLON, UNSPECIFIED PART OF COLON, UNSPECIFIED TYPE: ICD-10-CM

## 2019-06-13 DIAGNOSIS — Z12.11 SCREEN FOR COLON CANCER: ICD-10-CM

## 2019-06-13 DIAGNOSIS — C83.00 SMALL B-CELL LYMPHOMA, UNSPECIFIED BODY REGION (H): Primary | ICD-10-CM

## 2019-06-13 DIAGNOSIS — Z00.00 ENCOUNTER FOR ROUTINE ADULT HEALTH EXAMINATION WITHOUT ABNORMAL FINDINGS: ICD-10-CM

## 2019-06-13 LAB — HEMOCCULT STL QL IA: NEGATIVE

## 2019-06-13 PROCEDURE — 99213 OFFICE O/P EST LOW 20 MIN: CPT | Performed by: INTERNAL MEDICINE

## 2019-06-13 PROCEDURE — G0463 HOSPITAL OUTPT CLINIC VISIT: HCPCS

## 2019-06-13 ASSESSMENT — PAIN SCALES - GENERAL: PAINLEVEL: NO PAIN (0)

## 2019-06-13 ASSESSMENT — MIFFLIN-ST. JEOR: SCORE: 1376.08

## 2019-06-13 NOTE — NURSING NOTE
"Oncology Rooming Note    June 13, 2019 12:52 PM   Jasen Rosario is a 73 year old male who presents for:    Chief Complaint   Patient presents with     Oncology Clinic Visit     follow up     Initial Vitals: /67   Pulse 67   Temp 97  F (36.1  C) (Tympanic)   Resp 16   Ht 1.715 m (5' 7.5\")   Wt 66.5 kg (146 lb 8 oz)   SpO2 98%   BMI 22.61 kg/m   Estimated body mass index is 22.61 kg/m  as calculated from the following:    Height as of this encounter: 1.715 m (5' 7.5\").    Weight as of this encounter: 66.5 kg (146 lb 8 oz). Body surface area is 1.78 meters squared.  No Pain (0) Comment: Data Unavailable   No LMP for male patient.  Allergies reviewed: Yes  Medications reviewed: Yes    Medications: Medication refills not needed today.  Pharmacy name entered into EPIC:    [facility] [id number] [place] [facility] [place] [address] [address] PH[facility]ACY [facility] [address] [facility] [address] [place] [address] [address] [email or web address]    Clinical concerns: follow up       Angie Paniagua [facility]              "

## 2019-06-13 NOTE — LETTER
6/13/2019         RE: Jasen Rosario  [address]  [facility] [id number]        Dear Colleague,    Thank you for referring your patient, Jasen Rosario, to the [facility]. Please see a copy of my visit note below.    [facility]    Hematology/Oncology Established Patient Note      Today's Date: 06/13/19    Reason for Follow-up: SLL      HISTORY OF PRESENT ILLNESS: Jasen Rosario is a 73 year old male who presents with SLL and history of prostate cancer.       He had prostate biopsy performed in 2013, which showed adenocarcinoma.  In addition, he had an abnormal collection of lymphocytes identified on the biopsy.  Immunostains of material obtained from the right apex of the prostate were positive for a monoclonal population of lymphocytes expressing CD20.  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  He was seen by Dr. Davide Matamoros at the [facility].  PET-CT showed no evidence of lymphoma.  Bone marrow aspirate and biopsy was ~40% cellular, and ~10% involvement involvement with a monoclonal B-cell population compatible with SLL.  Trisomy of chromosome 10 and 12 is found in 13% of cells.  These findings were typical of SLL.  CBC at the time was normal.  Serum IgG was slightly low at 560.    He has history of prostate cancer s/p robotic radical prostatectomy on 4/13/15 by Dr. Smyth.  Final pathology was low volume [place] 6, pT2cN0.  Initial PSA was 5.8.  It became undetectable after surgery.  He is getting PSA's followed yearly.    He was recommended to have yearly follow-up with Dr. Matamoros, but he was lost to follow-up in the hematology clinic, and has been following with his PCP both the the prostate cancer and lymphoma.        INTERIM HISTORY: Patient is here for his annual follow-up today.  He says that he is feeling quite well.  He notes that had right hip replacement surgery in November 2018.  He has  recovered fine.  He denies any new complaints today.        REVIEW OF SYSTEMS:   14 point ROS was reviewed and is negative other than as noted above in HPI.       HOME MEDICATIONS:  Current Outpatient Medications   Medication Sig Dispense Refill     creatine 400 MG capsule Take by mouth daily       GLUCOSAMINE-CHONDROITIN PO Take 2 tablets by mouth daily       Multiple Vitamin (DAILY MULTIVITAMIN PO) Take 2 tablets by mouth daily        Omega-3 Fatty Acids (FISH OIL PO) Take 2 tablets by mouth daily            ALLERGIES:  Allergies   Allergen Reactions     Seasonal Allergies          PAST MEDICAL HISTORY:  Past Medical History:   Diagnosis Date     Anxiety state, unspecified 1991     Basal cell carcinoma     Left Ear - Moh's Dr Young     Colon polyp      ED (erectile dysfunction) 2005     Hyperlipidemia LDL goal <130 2001     Hypoglycemia, unspecified 1991     Infectious mononucleosis 1969     Lumbar stenosis     TCO - Dr Irene - with neuritis     Lymphoma (H)     SLL - B Cell - Dr Barrios/Marcus     Major depressive disorder, single episode in full remission (H) 1991     OA (osteoarthritis)     Severe Lt Hip     Prostate cancer (H) 5/13    Dr Smyth -  - [place] 3+3 - intermediate progression risk - pT2cN0     Seasonal allergies          PAST SURGICAL HISTORY:  Past Surgical History:   Procedure Laterality Date     APPENDECTOMY  1960    APPY     ARTHROPLASTY HIP Left 6/21/2017    Left BETTY - Dr MELISSA Madrigal     ARTHROPLASTY KNEE Left 10/2/2017    LEFT TOTAL KNEE ARTHROPLASTY - Dr MELISSA Madrigal     COLONOSCOPY  12/04, 12/14    colon polyp - serrated adenoma 5 mm - due 3 yrs     COLONOSCOPY  03/16/2018    Dr. Naila BELLE - normal - due 5 yrs - will need MAC     COLONOSCOPY N/A 4/18/2018    Dr. Naila BELLE - normal - due 5 yrs - will need MAC     DAVINCI PROSTATECTOMY, LYMPHADENECTOMY N/A 4/13/2015    RALP - Dr Smyth     DENTAL SURGERY  1985    wisdom teeth     SURGICAL HISTORY OF -   1980    BACK SURGERY     SURGICAL  HISTORY OF -  Left     ANKLE FX ORIF - Lt     SURGICAL HISTORY OF -  Left     LT KNEE MCL TEAR AND MENISCUS      SURGICAL HISTORY OF -  Left ,     LT SHOULDER ARTHROSCOPY     SURGICAL HISTORY OF -  Right 2007    Rt Shoulder Surgery - Dr Ferrer     SURGICAL HISTORY OF -  Right 2018    Rt BETTY - Dr Galdamez         SOCIAL HISTORY:  Social History     Socioeconomic History     Marital status:      Spouse name: Danielle     Number of children: 3     Years of education: 18     Highest education level: Not on file   Occupational History     Occupation: teacher [employer]     Employer: [email or web address]     Comment: retired    Social Needs     Financial resource strain: Not on file     Food insecurity:     Worry: Not on file     Inability: Not on file     Transportation needs:     Medical: Not on file     Non-medical: Not on file   Tobacco Use     Smoking status: Former Smoker     Packs/day: 2.00     Years: 7.00     Pack years: 14.00     Types: Cigarettes     Last attempt to quit: 1965     Years since quittin.4     Smokeless tobacco: Never Used   Substance and Sexual Activity     Alcohol use: Yes     Alcohol/week: 3.0 - 3.6 oz     Types: 5 - 6 Standard drinks or equivalent per week     Comment: ocassionally     Drug use: No     Sexual activity: Yes     Partners: Female   Lifestyle     Physical activity:     Days per week: Not on file     Minutes per session: Not on file     Stress: Not on file   Relationships     Social connections:     Talks on phone: Not on file     Gets together: Not on file     Attends Sikh service: Not on file     Active member of club or organization: Not on file     Attends meetings of clubs or organizations: Not on file     Relationship status: Not on file     Intimate partner violence:     Fear of current or ex partner: Not on file     Emotionally abused: Not on file     Physically abused: Not on file     Forced sexual activity: Not on file   Other  "Topics Concern      Service Not Asked     Blood Transfusions Not Asked     Caffeine Concern Yes     Comment: 2 cup qd     Occupational Exposure Not Asked     Hobby Hazards Not Asked     Sleep Concern Not Asked     Stress Concern Not Asked     Weight Concern Not Asked     Special Diet Not Asked     Back Care Not Asked     Exercise Yes     Comment: 3+ times per week     Bike Helmet Not Asked     Seat Belt Yes     Self-Exams Not Asked     Parent/sibling w/ CABG, MI or angioplasty before 65F 55M? No   Social History Narrative     Not on file   He smoked 2 packs a day from age [age], but no longer smokes.  He drinks 5-6 drinks a week.  He used to be a .  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  He is  with 3 children.      FAMILY HISTORY:  Family History   Problem Relation Age of Onset     Alcohol/Drug Father      Cancer Mother         ? abdominal     Breast Cancer Sister         age 42     Colon Cancer No family hx of          PHYSICAL EXAM:  Vital signs:  /67   Pulse 67   Temp 97  F (36.1  C) (Tympanic)   Resp 16   Ht 1.715 m (5' 7.5\")   Wt 66.5 kg (146 lb 8 oz)   SpO2 98%   BMI 22.61 kg/m      ECO  GENERAL/CONSTITUTIONAL: No acute distress.  EYES: No scleral icterus.  LYMPH: No anterior cervical, posterior cervical, or supraclavicular adenopathy.   RESPIRATORY: Clear to auscultation bilaterally. No crackles or wheezing.   CARDIOVASCULAR: Regular rate and rhythm without murmurs, gallops, or rubs.  GASTROINTESTINAL: No tenderness. The patient has normal bowel sounds. No guarding.  No distention.  MUSCULOSKELETAL: Warm and well-perfused, no cyanosis, clubbing, or edema.  NEUROLOGIC: Alert, oriented, answers questions appropriately.  INTEGUMENTARY: No jaundice.  GAIT: Steady, does not use assistive device      LABS:  CBC RESULTS:   Recent Labs   Lab Test 06/10/19  1030   WBC 6.8   RBC 4.93   HGB 15.3   HCT 43.7 "   MCV 89   MCH 31.0   MCHC 35.0   RDW 13.0        Recent Labs   Lab Test 06/10/19  1030 06/03/19  0759    143   POTASSIUM 4.4 3.8   CHLORIDE 106 108   CO2 26 26   ANIONGAP 8 9   GLC 89 108*   BUN 16 14   CR 1.26* 1.35*   KALPESH 8.6 8.5     Lab Results   Component Value Date    AST 27 06/10/2019     Lab Results   Component Value Date    ALT 28 06/10/2019     Lab Results   Component Value Date    BILICONJ 0.0 12/10/2010      Lab Results   Component Value Date    BILITOTAL 0.8 06/10/2019     Lab Results   Component Value Date    ALBUMIN 3.9 06/10/2019     Lab Results   Component Value Date    PROTTOTAL 6.3 06/10/2019      Lab Results   Component Value Date    ALKPHOS 86 06/10/2019     PSA <0.01          ASSESSMENT/PLAN:  Jasen Rosario is a 73. year old male with:    1) SLL: He is asymptomatic without B-symptoms.  CBC is normal.  PET scan done on 6/13/15 showed no evidence of disease.  No intervention needed at this time, and would observe and monitor labs intermittently.  CBC and LDH on 6/3/19 are normal.  He can continue getting annual CBC's.  We discussed continuing surveillance here or with his PCP.  Since he is doing so well, he will continue his annual physicals and CBC check with his PCP, and he is welcome to return to heme/onc clinic as needed if further issues arise.      2) History of prostate cancer: s/p robotic radical prostatectomy on 4/13/15 by Dr. Smyth.  Final pathology was low volume [place] 6, pT2cN0.  Initial PSA was 5.8.  It became undetectable after surgery.  He is getting PSA's followed yearly by his PCP.      I spent a total of 15 minutes with the patient, with over >50% of the time in counseling and/or coordination of care.       Nicole Velazquez MD  Hematology/Oncology  [facility]      Again, thank you for allowing me to participate in the care of your patient.        Sincerely,        Nicole Velazquez MD

## 2019-06-24 ENCOUNTER — HOSPITAL ENCOUNTER (OUTPATIENT)
Dept: GENERAL RADIOLOGY | Facility: CLINIC | Age: 74
Discharge: HOME | End: 2019-06-24
Attending: ORTHOPAEDIC SURGERY | Admitting: ORTHOPAEDIC SURGERY
Payer: COMMERCIAL

## 2019-06-24 DIAGNOSIS — M79.604 PAIN IN BOTH LOWER EXTREMITIES: ICD-10-CM

## 2019-06-24 DIAGNOSIS — M79.605 PAIN IN BOTH LOWER EXTREMITIES: ICD-10-CM

## 2019-06-24 PROCEDURE — 77073 BONE LENGTH STUDIES: CPT | Mod: 52

## 2019-07-03 ENCOUNTER — HOSPITAL ENCOUNTER (OUTPATIENT)
Dept: GENERAL RADIOLOGY | Facility: CLINIC | Age: 74
Discharge: HOME | End: 2019-07-03
Admitting: FAMILY MEDICINE
Payer: COMMERCIAL

## 2019-07-03 DIAGNOSIS — Z96.641 STATUS POST RIGHT HIP REPLACEMENT: ICD-10-CM

## 2019-07-03 PROCEDURE — 77073 BONE LENGTH STUDIES: CPT

## 2019-09-27 ENCOUNTER — HEALTH MAINTENANCE LETTER (OUTPATIENT)
Age: 74
End: 2019-09-27

## 2019-11-06 ENCOUNTER — OFFICE VISIT (OUTPATIENT)
Dept: FAMILY MEDICINE | Facility: CLINIC | Age: 74
End: 2019-11-06
Payer: COMMERCIAL

## 2019-11-06 ENCOUNTER — NURSE TRIAGE (OUTPATIENT)
Dept: FAMILY MEDICINE | Facility: CLINIC | Age: 74
End: 2019-11-06

## 2019-11-06 VITALS
TEMPERATURE: 97.6 F | SYSTOLIC BLOOD PRESSURE: 108 MMHG | HEIGHT: 68 IN | OXYGEN SATURATION: 99 % | BODY MASS INDEX: 22.43 KG/M2 | WEIGHT: 148 LBS | DIASTOLIC BLOOD PRESSURE: 64 MMHG | HEART RATE: 59 BPM

## 2019-11-06 DIAGNOSIS — H10.33 ACUTE CONJUNCTIVITIS OF BOTH EYES, UNSPECIFIED ACUTE CONJUNCTIVITIS TYPE: ICD-10-CM

## 2019-11-06 DIAGNOSIS — R05.9 COUGH: Primary | ICD-10-CM

## 2019-11-06 PROCEDURE — 99213 OFFICE O/P EST LOW 20 MIN: CPT | Performed by: NURSE PRACTITIONER

## 2019-11-06 RX ORDER — AZITHROMYCIN 250 MG/1
TABLET, FILM COATED ORAL
Qty: 6 TABLET | Refills: 0 | Status: SHIPPED | OUTPATIENT
Start: 2019-11-06 | End: 2020-08-26

## 2019-11-06 RX ORDER — POLYMYXIN B SULFATE AND TRIMETHOPRIM 1; 10000 MG/ML; [USP'U]/ML
1-2 SOLUTION OPHTHALMIC EVERY 4 HOURS
Qty: 1 BOTTLE | Refills: 0 | Status: SHIPPED | OUTPATIENT
Start: 2019-11-06 | End: 2019-11-13

## 2019-11-06 RX ORDER — BENZONATATE 100 MG/1
100 CAPSULE ORAL 3 TIMES DAILY PRN
Qty: 30 CAPSULE | Refills: 0 | Status: SHIPPED | OUTPATIENT
Start: 2019-11-06 | End: 2020-08-26

## 2019-11-06 ASSESSMENT — MIFFLIN-ST. JEOR: SCORE: 1377.88

## 2019-11-06 NOTE — TELEPHONE ENCOUNTER
"  Called #   Telephone Information:   Mobile [phone]     Pt stated he had a cold earlier in October, Pt improved but is now worse. Pt stated he has been coughing, sore throat, and nasal congestion. Pt has tried Drinking lots of water and going to a steam room which has improved the nasal congestion. Pt is leaving on a plane on Saturday and would like to get this under control before leaving. Pt advised to be seen to treat illness. Patient stated an understanding and agreed with plan.  Next 5 appointments (look out 90 days)    Nov 06, 2019  1:00 PM CST  SHORT with MEAGAN Boyle CNP  [facility] [address] [place] [id number] [phone]                Additional Information    Nasal discharge present > 10 days    Negative: Fever present > 3 days (72 hours)    Negative: Fever returns after gone for over 24 hours and symptoms worse or not improved    Negative: Sinus pain (not just congestion) and fever    Negative: Earache    Negative: Fever > 103 F (39.4 C)    Negative: Fever > 101 F (38.3 C) and over 60 years of age    Negative: Fever > 100.0 F (37.8 C) and has diabetes mellitus or a weak immune system (e.g., HIV positive, cancer chemotherapy, organ transplant, splenectomy, chronic steroids)    Negative: Fever > 100.0 F (37.8 C) and bedridden (e.g., nursing home patient, stroke, chronic illness, recovering from surgery)    Negative: Patient sounds very sick or weak to the triager    Negative: Runny nose is caused by pollen or other allergies    Negative: Cough is the main symptom    Negative: Sore throat is the main symptom    Negative: Severe difficulty breathing (e.g., struggling for each breath, speaks in single words)    Negative: Very weak (can't stand)    Negative: Sounds like a life-threatening emergency to the triager    Answer Assessment - Initial Assessment Questions  1. ONSET: \"When did the nasal discharge start?\"     " "  A couple of weeks ago    2. AMOUNT: \"How much discharge is there?\"        Varies    3. COUGH: \"Do you have a cough?\" If yes, ask: \"Describe the color of your sputum\" (clear, white, yellow, green)      Yellow    4. RESPIRATORY DISTRESS: \"Describe your breathing.\"       Coughing often, hoarse     5. FEVER: \"Do you have a fever?\" If so, ask: \"What is your temperature, how was it measured, and when did it start?\"      No fever    6. SEVERITY: \"Overall, how bad are you feeling right now?\" (e.g., doesn't interfere with normal activities, staying home from school/work, staying in bed)       Overall not feeling well, eyes are now red and having goop in the morning.    7. OTHER SYMPTOMS: \"Do you have any other symptoms?\" (e.g., sore throat, earache, wheezing, vomiting)      Sore throat    8. PREGNANCY: \"Is there any chance you are pregnant?\" \"When was your last menstrual period?\"      No    Protocols used: COMMON COLD-A-OH    David Little RN   [facility] [facility]    "

## 2019-11-06 NOTE — PATIENT INSTRUCTIONS
Patient Education     Acute Bronchitis  Your healthcare provider has told you that you have acute bronchitis. Bronchitis is infection or inflammation of the bronchial tubes (airways in the lungs). Normally, air moves easily in and out of the airways. Bronchitis narrows the airways, making it harder for air to flow in and out of the lungs. This causes symptoms such as shortness of breath, coughing up yellow or green mucus, and wheezing. Bronchitis can be acute or chronic. Acute means the condition comes on quickly and goes away in a short time, usually within 3 to 10 days. Chronic means a condition lasts a long time and often comes back.    What causes acute bronchitis?  Acute bronchitis almost always starts as a viral respiratory infection, such as a cold or the flu. Certain factors make it more likely for a cold or flu to turn into bronchitis. These include being very young, being elderly, having a heart or lung problem, or having a weak immune system. Cigarette smoking also makes bronchitis more likely.  When bronchitis develops, the airways become swollen. The airways may also become infected with bacteria. This is known as a secondary infection.  Diagnosing acute bronchitis  Your healthcare provider will examine you and ask about your symptoms and health history. You may also have a sputum culture to test the fluid in your lungs. Chest X-rays may be done to look for infection in the lungs.  Treating acute bronchitis  Bronchitis usually clears up as the cold or flu goes away. You can help feel better faster by doing the following:    Take medicine as directed. You may be told to take ibuprofen or other over-the-counter medicines. These help relieve inflammation in your bronchial tubes. Your healthcare provider may prescribe an inhaler to help open up the bronchial tubes. Most of the time, acute bronchitis is caused by a viral infection. Antibiotics are usually not prescribed for viral infections.    Drink  plenty of fluids, such as water, juice, or warm soup. Fluids loosen mucus so that you can cough it up. This helps you breathe more easily. Fluids also prevent dehydration.    Make sure you get plenty of rest.    Do not smoke. Do not allow anyone else to smoke in your home.  Recovery and follow-up  Follow up with your doctor as you are told. You will likely feel better in a week or two. But a dry cough can linger beyond that time. Let your doctor know if you still have symptoms (other than a dry cough) after 2 weeks, or if you re prone to getting bronchial infections. Take steps to protect yourself from future infections. These steps include stopping smoking and avoiding tobacco smoke, washing your hands often, and getting a yearly flu shot.  When to call your healthcare provider  Call the healthcare provider if you have any of the following:    Fever of 100.4 F (38.0 C) or higher, or as advised    Symptoms that get worse, or new symptoms    Trouble breathing    Symptoms that don t start to improve within a week, or within 3 days of taking antibiotics   Date Last Reviewed: 12/1/2016 2000-2018 [employer]. [address] [place] [address], [place] [address]. All rights reserved. This information is not intended as a substitute for professional medical care. Always follow your healthcare professional's instructions.

## 2019-11-06 NOTE — TELEPHONE ENCOUNTER
Patient calling re: cold symptoms, he stated he has been having a cold symptoms for a month now. He was better and now back to not feeling well. Coughing a lot, congested and now he has goop in his eyes. Advised patient to make an appt, he stated he would like to speak to triage. Please advise  [phone] (home)   Thank you  Jada Zavala

## 2019-11-06 NOTE — PROGRESS NOTES
Subjective   Jasen Rosario is a 74 year old male who presents to clinic today for the following health issues:    HPI   Acute Illness   Acute illness concerns: Sinus pressure  Onset: started 10/04/2019 - got better - got worse again 8 days ago    Fever: no    Chills/Sweats: YES- chills 3 days ago - no longer    Headache (location?): YES- pressure    Sinus Pressure:YES    Conjunctivitis:  YES- matter - 2 days  - crusted shut at night - cloudy vision last night    Ear Pain: no    Rhinorrhea: YES- unsure of color    Congestion: YES- head, chest    Sore Throat: no     Cough: YES-productive - swallows -- some out today - yellow/beige    Wheeze: no    Decreased Appetite: no    Nausea: no    Vomiting: no    Diarrhea:  no    Dysuria/Freq.: no    Fatigue/Achiness: YES- skin was sensitive-feeling better, more tired    Sick/Strep Exposure: YES- grandchild     Therapies Tried and outcome: Nyquil - moderate relief-able to sleep    Past Medical History:   Diagnosis Date     Anxiety state, unspecified 1991     Basal cell carcinoma     Left Ear - Moh's Dr Young     Colon polyp      ED (erectile dysfunction) 2005     Hyperlipidemia LDL goal <130 2001     Hypoglycemia, unspecified 1991     Infectious mononucleosis 1969     Lumbar stenosis     TCO - Dr Irene - with neuritis     Lymphoma (H)     SLL - B Cell - Dr Barrios/Marcus     Major depressive disorder, single episode in full remission (H) 1991     OA (osteoarthritis)     Severe Lt Hip     Prostate cancer (H) 5/13    Dr Smyth -  - [place] 3+3 - intermediate progression risk - pT2cN0     Seasonal allergies        Past Surgical History:   Procedure Laterality Date     APPENDECTOMY  1960    APPY     ARTHROPLASTY HIP Left 6/21/2017    Left BETTY - Dr MELISSA Madrigal     ARTHROPLASTY KNEE Left 10/2/2017    LEFT TOTAL KNEE ARTHROPLASTY - Dr MELISSA Madrigal     COLONOSCOPY  12/04, 12/14    colon polyp - serrated adenoma 5 mm - due 3 yrs     COLONOSCOPY  03/16/2018    Dr. Yoo [facility] - normal  "- due 5 yrs - will need MAC     COLONOSCOPY N/A 2018    Dr. Yoo [facility] - normal - due 5 yrs - will need MAC     DAVINCI PROSTATECTOMY, LYMPHADENECTOMY N/A 2015    AMANP - Dr Smyth     DENTAL SURGERY      wisdom teeth     SURGICAL HISTORY OF -       BACK SURGERY     SURGICAL HISTORY OF -  Left     ANKLE FX ORIF - Lt     SURGICAL HISTORY OF -  Left     LT KNEE MCL TEAR AND MENISCUS      SURGICAL HISTORY OF -  Left ,     LT SHOULDER ARTHROSCOPY     SURGICAL HISTORY OF -  Right 2007    Rt Shoulder Surgery - Dr Ferrer     SURGICAL HISTORY OF -  Right 2018    Rt BETTY - Dr Galdamez       Family History   Problem Relation Age of Onset     Alcohol/Drug Father      Cancer Mother         ? abdominal     Breast Cancer Sister         age 42     Colon Cancer No family hx of        Social History     Tobacco Use     Smoking status: Former Smoker     Packs/day: 2.00     Years: 7.00     Pack years: 14.00     Types: Cigarettes     Last attempt to quit: 1965     Years since quittin.8     Smokeless tobacco: Never Used   Substance Use Topics     Alcohol use: Yes     Alcohol/week: 5.0 - 6.0 standard drinks     Types: 5 - 6 Standard drinks or equivalent per week     Comment: ocassionally     Reviewed and updated as needed this visit by provider:  Tobacco  Allergies  Meds  Problems  Med Hx  Surg Hx  Fam Hx         Review of Systems   Constitutional, HEENT, cardiovascular, pulmonary, GI, , musculoskeletal, neuro, skin, endocrine and psych systems are negative, except as otherwise noted in the HPI.          Objective   /64 (BP Location: Left arm, Patient Position: Chair, Cuff Size: Adult Regular)   Pulse 59   Temp 97.6  F (36.4  C) (Oral)   Ht 1.715 m (5' 7.5\")   Wt 67.1 kg (148 lb)   SpO2 99%   BMI 22.84 kg/m   Body mass index is 22.84 kg/m .  Physical Exam   GENERAL: healthy, alert, well nourished, well hydrated, no distress  EYES: Eyes grossly normal to " inspection,.extraocular movements - intact, and PERRL, bilateral mildy erythematous conjunctival injection, scant amount of green exudate left lower eyelid  HENT: ear canals- normal; TMs- normal; Nose- normal; Mouth- no ulcers, no lesions  NECK: no tenderness, no adenopathy, no asymmetry, no masses, no stiffness; thyroid- normal to palpation  RESP: lungs clear to auscultation - no rales, no rhonchi, no wheezes-rare loose cough  CV: regular rates and rhythm, normal S1 S2, no S3 or S4 and no murmur, no click or rub -  ABDOMEN: soft, no tenderness, no  hepatosplenomegaly, no masses, normal bowel sounds  SKIN: no suspicious lesions, no rashes        Assessment & Plan   Jasen was seen today for cough and sinus problem.    Diagnoses and all orders for this visit:    Cough   Antibiotics and tessalon as needed.   Based on exam chest xray not necessary at this time.   Do encourage close follow up if symptoms persist or worsen.    -     azithromycin (ZITHROMAX) 250 MG tablet; Two tablets first day, then one tablet daily for four days  -     benzonatate (TESSALON) 100 MG capsule; Take 1 capsule (100 mg) by mouth 3 times daily as needed for cough    Acute conjunctivitis of both eyes, unspecified acute conjunctivitis type  Eye drops as prescribed.   Contagious for 24 hours.   Encourage good hand washing and limit touching eye.   Be seen if persists or worsens.     -     trimethoprim-polymyxin b (POLYTRIM) 47374-0.1 UNIT/ML-% ophthalmic solution; Place 1-2 drops into both eyes every 4 hours for 7 days    See Patient Instructions    Return if symptoms worsen or fail to improve.     Muriel Garcia, JESENIA-[address] [address]  [email or web address]   Office: [phone]

## 2019-12-18 ENCOUNTER — TELEPHONE (OUTPATIENT)
Dept: FAMILY MEDICINE | Facility: CLINIC | Age: 74
End: 2019-12-18

## 2019-12-18 DIAGNOSIS — J34.89 SINUS PRESSURE: Primary | ICD-10-CM

## 2019-12-18 NOTE — TELEPHONE ENCOUNTER
Reason for call:  Patient reporting a symptom    Symptom or request: Cough    Duration (how long have symptoms been present): since Oct    Have you been treated for this before? Yes    Additional comments: Pt still has a cough since the last time he was seen.  He is wondering if he could get another round of antibiotics. He stated his eye is better  Hy Irene Simpson for Pharmacy    Phone Number patient can be reached at:  Home number on file [phone] (home)    Best Time:      Can we leave a detailed message on this number:  YES    Call taken on 12/18/2019 at 12:50 PM by Josette Castañeda

## 2019-12-18 NOTE — TELEPHONE ENCOUNTER
Pt called and states it has been 2 hrs and still has not heard back     Pt told we have message     Call [phone]

## 2019-12-19 RX ORDER — AZITHROMYCIN 250 MG/1
TABLET, FILM COATED ORAL
Qty: 6 TABLET | Refills: 0 | Status: SHIPPED | OUTPATIENT
Start: 2019-12-19 | End: 2019-12-24

## 2019-12-19 NOTE — TELEPHONE ENCOUNTER
Message handled by Nurse Triage with [facility] - provider name: MD TEOFILO - repeat z-pack. If no improvement then advise OV.  Rx sent    Called patient @ # below -   Advised of notes below - Patient stated an understanding and agreed with plan.    Clara Ortiz RN  [facility]

## 2019-12-19 NOTE — TELEPHONE ENCOUNTER
[facility] Muriel Garcia is out of clinic today.    JOSEPH Higgins, RN, PHN  [facility]  Office: [phone]  Fax: [phone]

## 2019-12-19 NOTE — TELEPHONE ENCOUNTER
LOV: 11/06/2019 for cough, given z-pack    Called patient @ # below -     Patient stated that his symptoms did improve after the last OV (not to 100% better, but improved), then started worsening 1.5 weeks ago.     Acute Illness   Acute illness concerns: Cough  Onset: 1.5 weeks    Fever: no    Chills/Sweats: no    Headache (location?): no    Sinus Pressure:YES    Conjunctivitis:  no    Ear Pain: no    Rhinorrhea: YES- unsure of color    Congestion: YES- head & chest    Sore Throat: YES     Cough: YES-productive (unsure of color)    Wheeze: YES    Decreased Appetite: YES    Nausea: no    Vomiting: no    Diarrhea:  no    Dysuria/Freq.: no    Fatigue/Achiness: no    Sick/Strep Exposure: no     Therapies Tried and outcome: Z-Pack - did improve symptoms, then returned     DENIES: CP, SOB, Difficulty Breathing, Dizziness, Numbness/Tingling, HA, Vision/Hearing Changes, N/V, Palpitations    Pharmacy: [facility] - [place], [place]    Routing to PCP for further review/recommendations/orders.      Clara Ortiz RN  [facility]

## 2020-07-23 NOTE — PROGRESS NOTES
Admission medication history interview status for the 10/2/2017  admission is complete. See EPIC admission navigator for prior to admission medications     Medication history source reliability:Good    Medication history interview source(s):Patient    Medication history resources (including written lists, pill bottles, clinic record):None    Primary pharmacy[facility]    Additional medication history information not noted on PTA med list :None    Time spent in this activity: 45 minutes    Prior to Admission medications    Medication Sig Last Dose Taking? Auth Provider   Omega-3 Fatty Acids (FISH OIL PO) Take 2 tablets by mouth daily  9/22/2017 at am Yes Reported, Patient   GLUCOSAMINE-CHONDROITIN PO Take 2 tablets by mouth daily 9/22/2017 at am Yes Reported, Patient   Multiple Vitamin (DAILY MULTIVITAMIN PO) Take 2 tablets by mouth daily  9/22/2017 at am Yes Reported, Patient          [facility]

## 2020-08-26 ENCOUNTER — OFFICE VISIT (OUTPATIENT)
Dept: FAMILY MEDICINE | Facility: CLINIC | Age: 75
End: 2020-08-26
Payer: COMMERCIAL

## 2020-08-26 VITALS
HEART RATE: 60 BPM | TEMPERATURE: 97.6 F | OXYGEN SATURATION: 99 % | SYSTOLIC BLOOD PRESSURE: 110 MMHG | WEIGHT: 146 LBS | DIASTOLIC BLOOD PRESSURE: 70 MMHG | BODY MASS INDEX: 22.53 KG/M2

## 2020-08-26 DIAGNOSIS — M15.0 PRIMARY OSTEOARTHRITIS INVOLVING MULTIPLE JOINTS: ICD-10-CM

## 2020-08-26 DIAGNOSIS — Z12.11 SCREEN FOR COLON CANCER: ICD-10-CM

## 2020-08-26 DIAGNOSIS — Z51.81 MEDICATION MONITORING ENCOUNTER: ICD-10-CM

## 2020-08-26 DIAGNOSIS — R79.9 ABNORMAL FINDING OF BLOOD CHEMISTRY, UNSPECIFIED: ICD-10-CM

## 2020-08-26 DIAGNOSIS — C85.90 LYMPHOMA, UNSPECIFIED BODY REGION, UNSPECIFIED LYMPHOMA TYPE (H): ICD-10-CM

## 2020-08-26 DIAGNOSIS — C61 PROSTATE CANCER (H): ICD-10-CM

## 2020-08-26 DIAGNOSIS — C83.00 SMALL B-CELL LYMPHOMA, UNSPECIFIED BODY REGION (H): ICD-10-CM

## 2020-08-26 DIAGNOSIS — F32.5 MAJOR DEPRESSIVE DISORDER, SINGLE EPISODE IN FULL REMISSION (H): ICD-10-CM

## 2020-08-26 DIAGNOSIS — Z00.00 ENCOUNTER FOR ROUTINE ADULT HEALTH EXAMINATION WITHOUT ABNORMAL FINDINGS: Primary | ICD-10-CM

## 2020-08-26 DIAGNOSIS — F41.1 ANXIETY STATE: ICD-10-CM

## 2020-08-26 DIAGNOSIS — Z91.09 ENVIRONMENTAL ALLERGIES: ICD-10-CM

## 2020-08-26 DIAGNOSIS — Z00.00 ENCOUNTER FOR MEDICARE ANNUAL WELLNESS EXAM: ICD-10-CM

## 2020-08-26 DIAGNOSIS — K63.5 POLYP OF COLON, UNSPECIFIED PART OF COLON, UNSPECIFIED TYPE: ICD-10-CM

## 2020-08-26 DIAGNOSIS — E78.5 HYPERLIPIDEMIA LDL GOAL <130: ICD-10-CM

## 2020-08-26 DIAGNOSIS — M48.061 SPINAL STENOSIS OF LUMBAR REGION, UNSPECIFIED WHETHER NEUROGENIC CLAUDICATION PRESENT: ICD-10-CM

## 2020-08-26 PROCEDURE — 99397 PER PM REEVAL EST PAT 65+ YR: CPT | Performed by: FAMILY MEDICINE

## 2020-08-26 ASSESSMENT — ACTIVITIES OF DAILY LIVING (ADL): CURRENT_FUNCTION: NO ASSISTANCE NEEDED

## 2020-08-26 NOTE — PROGRESS NOTES
"[facility]    Jasen Rosario is a 75 year old male who presents for Preventive Visit.    Are you in the first 12 months of your Medicare coverage?  No    Healthy Habits:    In general, how would you rate your overall health?  Very good    Frequency of exercise:  4-5 days/week    Duration of exercise:  Greater than 60 minutes    Do you usually eat at least 4 servings of fruit and vegetables a day, include whole grains    & fiber and avoid regularly eating high fat or \"junk\" foods?  No    Taking medications regularly:  Yes    Barriers to taking medications:  None    Medication side effects:  None    Ability to successfully perform activities of daily living:  No assistance needed    Home Safety:  No safety concerns identified    Hearing Impairment:  Difficulty following a conversation in a noisy restaurant or crowded room    In the past 6 months, have you been bothered by leaking of urine?  No    In general, how would you rate your overall mental or emotional health?  Good      PHQ-2 Total Score:    Additional concerns today:  Yes (skin check on left side of abdomen)    Do you feel safe in your environment? Yes    Have you ever done Advance Care Planning? (For example, a Health Directive, POLST, or a discussion with a medical provider or your loved ones about your wishes): Yes, advance care planning is on file.    Fall risk 0    Fallen 2 or more times in the past year?: No  Any fall with injury in the past year?: No    Cognitive Screening   1) Repeat 3 items (Leader, Season, Table)    2) Clock draw: NORMAL  3) 3 item recall: Recalls 3 objects  Results: 3 items recalled: COGNITIVE IMPAIRMENT LESS LIKELY    Mini-CogTM Copyright RYDER Orona. Licensed by the author for use in [facility]; reprinted with permission ([email or web address]). All rights reserved.      Do you have sleep apnea, excessive snoring or daytime drowsiness?: no    Reviewed and updated as needed this visit by " clinical staff  Tobacco  Allergies  Meds  Problems  Med Hx  Surg Hx  Fam Hx         Reviewed and updated as needed this visit by Provider        Social History     Tobacco Use     Smoking status: Former Smoker     Packs/day: 2.00     Years: 7.00     Pack years: 14.00     Types: Cigarettes     Last attempt to quit: 1965     Years since quittin.6     Smokeless tobacco: Never Used   Substance Use Topics     Alcohol use: Yes     Alcohol/week: 5.0 - 6.0 standard drinks     Types: 5 - 6 Standard drinks or equivalent per week     Comment: ocassionally     If you drink alcohol do you typically have >3 drinks per day or >7 drinks per week? No    Alcohol Use 2020   Prescreen: >3 drinks/day or >7 drinks/week? No     Current providers sharing in care for this patient include:     Patient Care Team:  Archie Meadows MD as PCP - General  Archie Meadows MD as Referring Physician (Family Practice)  Sheri Hemphill, RN as Registered Nurse (Urology)  Chantelle Kern, RN as Registered Nurse  Muriel Garcia APRN CNP as Assigned PCP    The following health maintenance items are reviewed in Epic and correct as of today:  Health Maintenance   Topic Date Due     ZOSTER IMMUNIZATION (2 of 3) 2013     PHQ-9  12/10/2019     PSA  2020     MEDICARE ANNUAL WELLNESS VISIT  06/10/2020     LIPID  06/10/2020     FALL RISK ASSESSMENT  06/10/2020     INFLUENZA VACCINE (1) 2020     DTAP/TDAP/TD IMMUNIZATION (4 - Td) 2023     COLORECTAL CANCER SCREENING  2023     ADVANCE CARE PLANNING  06/10/2024     HEPATITIS C SCREENING  Completed     DEPRESSION ACTION PLAN  Completed     PNEUMOCOCCAL IMMUNIZATION 65+ HIGH/HIGHEST RISK  Completed     AORTIC ANEURYSM SCREENING (SYSTEM ASSIGNED)  Completed     IPV IMMUNIZATION  Aged Out     MENINGITIS IMMUNIZATION  Aged Out     HEPATITIS B IMMUNIZATION  Aged Out     Small B Cell Lymphoma - Dr Velazquez [facility]    Prostate Cancer    PSA   Date Value Ref Range Status    06/03/2019 <0.01 0 - 4 ug/L Final     Comment:     Assay Method:  Chemiluminescence using Siemens Vista analyzer     Depression/Anxiety - lower than normal - politics and COVID    Lipids    Recent Labs   Lab Test 06/10/19  1030 05/08/18  0922  06/15/15  0905 04/09/14  1025   CHOL 185 205*   < > 189 217*   HDL 73 75   < > 76 71   LDL 99 119*   < > 101 135*   TRIG 64 57   < > 58 57   CHOLHDLRATIO  --   --   --  2.5 3.1    < > = values in this interval not displayed.     Environmental Allergies - doing well    Health Maintenance     Colonoscopy:  Consider 3/2023   FIT:  Given, ordered              PSA:  ordered   DEXA:  NA    Health Maintenance Due   Topic Date Due     ZOSTER IMMUNIZATION (2 of 3) 05/29/2013     PHQ-9  12/10/2019     PSA  06/03/2020     MEDICARE ANNUAL WELLNESS VISIT  06/10/2020     LIPID  06/10/2020     FALL RISK ASSESSMENT  06/10/2020     INFLUENZA VACCINE (1) 09/01/2020       Current Problem List    Patient Active Problem List   Diagnosis     Anxiety state     Major depressive disorder, single episode in full remission (H)     Hyperlipidemia LDL goal <130     ED (erectile dysfunction)     Seasonal allergies     OA (osteoarthritis)     Advanced directives, counseling/discussion     h/o Prostate cancer (H) - prostatectomy - 2015     Colon polyp     Lumbar stenosis     S/P total hip arthroplasty     Environmental allergies     S/P total knee arthroplasty     Small B-cell lymphoma (H)       Past Medical History    Past Medical History:   Diagnosis Date     Anxiety state, unspecified 1991     Basal cell carcinoma     Left Ear - [facility] Dr [facility]     Colon polyp      ED (erectile dysfunction) 2005     Hyperlipidemia LDL goal <130 2001     Hypoglycemia, unspecified 1991     Infectious mononucleosis 1969     Lumbar stenosis     TCO - Dr Irene - with neuritis     Lymphoma (H)     SLL - B Cell - Dr Barrios/Marcus     Major depressive disorder, single episode in full remission (H) 1991     OA (osteoarthritis)      Severe Lt Hip     Prostate cancer (H) 5/13    Dr Smyth - [facility] [place] 3+3 - intermediate progression risk - pT2cN0     Seasonal allergies        Past Surgical History    Past Surgical History:   Procedure Laterality Date     APPENDECTOMY  1960    APPY     ARTHROPLASTY HIP Left 6/21/2017    Left BETTY - Dr MELISSA Madrigal     ARTHROPLASTY KNEE Left 10/2/2017    LEFT TOTAL KNEE ARTHROPLASTY - Dr MELISSA Madrigal     COLONOSCOPY  12/04, 12/14    colon polyp - serrated adenoma 5 mm - due 3 yrs     COLONOSCOPY  03/16/2018    Dr. Naila BELLE - normal - due 5 yrs - will need MAC     COLONOSCOPY N/A 4/18/2018    Dr. Naila BELLE - normal - due 5 yrs - will need MAC     DAVINCI PROSTATECTOMY, LYMPHADENECTOMY N/A 4/13/2015    RALP - Dr Smyth     DENTAL SURGERY  1985    wisdom teeth     SURGICAL HISTORY OF -   1980    BACK SURGERY     SURGICAL HISTORY OF -  Left 1987    ANKLE FX ORIF - Lt     SURGICAL HISTORY OF -  Left 1972    LT KNEE MCL TEAR AND MENISCUS      SURGICAL HISTORY OF -  Left 2001, 2008    LT SHOULDER ARTHROSCOPY     SURGICAL HISTORY OF -  Right 01/2007    Rt Shoulder Surgery - Dr Ferrer     SURGICAL HISTORY OF -  Right 11/2018    Rt BETTY - Dr Galdamez       Current Medications    Current Outpatient Medications   Medication Sig Dispense Refill     creatine 400 MG capsule Take by mouth daily       GLUCOSAMINE-CHONDROITIN PO Take 2 tablets by mouth daily       Multiple Vitamin (DAILY MULTIVITAMIN PO) Take 2 tablets by mouth daily        Omega-3 Fatty Acids (FISH OIL PO) Take 2 tablets by mouth daily          Allergies    Allergies   Allergen Reactions     Seasonal Allergies        Immunizations    Immunization History   Administered Date(s) Administered     HEPA 06/28/2006, 12/28/2006     HepB 01/01/1995, 02/01/1995, 06/01/1995     Pneumo Conj 13-V (2010&after) 12/01/2015     Pneumococcal 23 valent 06/28/2006, 04/03/2013     TD (ADULT, 7+) 06/10/2001, 12/10/2010     TDAP Vaccine (Boostrix) 04/03/2013     Zoster vaccine,  live 2013       Family History    Family History   Problem Relation Age of Onset     Alcohol/Drug Father      Cancer Mother         ? abdominal     Breast Cancer Sister         age 42     Colon Cancer No family hx of        Social History    Social History     Socioeconomic History     Marital status:      Spouse name: Danielle     Number of children: 3     Years of education: 18     Highest education level: Not on file   Occupational History     Occupation: teacher [employer]     Employer: [employer]     Comment: retired    Social Needs     Financial resource strain: Not on file     Food insecurity     Worry: Not on file     Inability: Not on file     Transportation needs     Medical: Not on file     Non-medical: Not on file   Tobacco Use     Smoking status: Former Smoker     Packs/day: 2.00     Years: 7.00     Pack years: 14.00     Types: Cigarettes     Last attempt to quit: 1965     Years since quittin.6     Smokeless tobacco: Never Used   Substance and Sexual Activity     Alcohol use: Yes     Alcohol/week: 5.0 - 6.0 standard drinks     Types: 5 - 6 Standard drinks or equivalent per week     Comment: ocassionally     Drug use: No     Sexual activity: Yes     Partners: Female   Lifestyle     Physical activity     Days per week: Not on file     Minutes per session: Not on file     Stress: Not on file   Relationships     Social connections     Talks on phone: Not on file     Gets together: Not on file     Attends Mu-ism service: Not on file     Active member of club or organization: Not on file     Attends meetings of clubs or organizations: Not on file     Relationship status: Not on file     Intimate partner violence     Fear of current or ex partner: Not on file     Emotionally abused: Not on file     Physically abused: Not on file     Forced sexual activity: Not on file   Other Topics Concern      Service Not Asked     Blood Transfusions Not Asked     Caffeine Concern Yes      "Comment: 2 cup qd     Occupational Exposure Not Asked     Hobby Hazards Not Asked     Sleep Concern Not Asked     Stress Concern Not Asked     Weight Concern Not Asked     Special Diet Not Asked     Back Care Not Asked     Exercise Yes     Comment: 3+ times per week     Bike Helmet Not Asked     Seat Belt Yes     Self-Exams Not Asked     Parent/sibling w/ CABG, MI or angioplasty before 65F 55M? No   Social History Narrative     Not on file       ROS    CONSTITUTIONAL: NEGATIVE for fever, chills, change in weight  INTEGUMENTARY/SKIN: NEGATIVE for worrisome rashes, moles or lesions  EYES: NEGATIVE for vision changes or irritation  ENT/MOUTH: NEGATIVE for ear, mouth and throat problems  RESP: NEGATIVE for significant cough or SOB  BREAST: NEGATIVE for masses, tenderness or discharge  CV: NEGATIVE for chest pain, palpitations or peripheral edema  GI: NEGATIVE for nausea, abdominal pain, heartburn, or change in bowel habits  : NEGATIVE for frequency, dysuria, or hematuria  MUSCULOSKELETAL: NEGATIVE for significant arthralgias or myalgia  NEURO: NEGATIVE for weakness, dizziness or paresthesias  ENDOCRINE: NEGATIVE for temperature intolerance, skin/hair changes  HEME: NEGATIVE for bleeding problems  PSYCHIATRIC: NEGATIVE for changes in mood or affect    OBJECTIVE    /70   Pulse 60   Temp 97.6  F (36.4  C)   Wt 66.2 kg (146 lb)   SpO2 99%   BMI 22.53 kg/m   Estimated body mass index is 22.53 kg/m  as calculated from the following:    Height as of 11/6/19: 1.715 m (5' 7.5\").    Weight as of this encounter: 66.2 kg (146 lb).  EXAM:   GENERAL: healthy, alert and no distress  EYES: Eyes grossly normal to inspection, PERRL and conjunctivae and sclerae normal  HENT: ear canals and TM's normal, nose and mouth without ulcers or lesions  NECK: no adenopathy, no asymmetry, masses, or scars and thyroid normal to palpation  RESP: lungs clear to auscultation - no rales, rhonchi or wheezes  CV: regular rate and rhythm, " normal S1 S2, no S3 or S4, no murmur, click or rub, no peripheral edema and peripheral pulses strong  ABDOMEN: soft, nontender, no hepatosplenomegaly, no masses and bowel sounds normal   (male): declines  RECTAL: declines  MS: no gross musculoskeletal defects noted, no edema  NEURO: Normal strength and tone, mentation intact and speech normal  PSYCH: mentation appears normal, affect normal/bright  LYMPH: no cervical, supraclavicular, axillary, or inguinal adenopathy    DIAGNOSTICS/PROCEDURES    Pending    ASSESSMENT      ICD-10-CM    1. Encounter for routine adult health examination without abnormal findings  Z00.00 Comprehensive metabolic panel     Lipid panel reflex to direct LDL Fasting     CK total     CBC with platelets     TSH with free T4 reflex     UA reflex to Microscopic and Culture     Albumin Random Urine Quantitative with Creat Ratio     Fecal colorectal cancer screen FIT     Hemoglobin A1c     PSA, tumor marker     Lactate Dehydrogenase   2. Encounter for Medicare annual wellness exam  Z00.00 Comprehensive metabolic panel     Lipid panel reflex to direct LDL Fasting     CK total     CBC with platelets     TSH with free T4 reflex     UA reflex to Microscopic and Culture     Albumin Random Urine Quantitative with Creat Ratio     Fecal colorectal cancer screen FIT     Hemoglobin A1c     PSA, tumor marker     Lactate Dehydrogenase   3. Prostate cancer (H)  C61 PSA, tumor marker   4. Small B-cell lymphoma, unspecified body region (H)  C83.00 Comprehensive metabolic panel     CBC with platelets   5. Lymphoma, unspecified body region, unspecified lymphoma type (H)  C85.90 Comprehensive metabolic panel     CBC with platelets   6. Major depressive disorder, single episode in full remission (H)  F32.5 TSH with free T4 reflex   7. Anxiety state  F41.1 TSH with free T4 reflex   8. Spinal stenosis of lumbar region, unspecified whether neurogenic claudication present  M48.061    9. Primary osteoarthritis involving  multiple joints  M89.49    10. Hyperlipidemia LDL goal <130  E78.5 Comprehensive metabolic panel     Lipid panel reflex to direct LDL Fasting     CK total   11. Environmental allergies  Z91.09    12. Polyp of colon, unspecified part of colon, unspecified type  K63.5 Fecal colorectal cancer screen FIT   13. Screen for colon cancer  Z12.11 Fecal colorectal cancer screen FIT   14. Medication monitoring encounter  Z51.81 Comprehensive metabolic panel     Lipid panel reflex to direct LDL Fasting     CK total     CBC with platelets     TSH with free T4 reflex     UA reflex to Microscopic and Culture     Albumin Random Urine Quantitative with Creat Ratio     Hemoglobin A1c     PSA, tumor marker     Lactate Dehydrogenase   15. Abnormal finding of blood chemistry, unspecified   R79.9 Hemoglobin A1c       PLAN    Discussed treatment/modality options, including risk and benefits, he desires:    advised alcohol consumption 1oz per day or less, advised aspirin 81 mg po daily, advised 1 multivitamin per day, advised calcium 6454-4126 mg/d and Vitamin D 800-1200 IU/d, advised dentist every 6 months, advised diet and exercise, advised opthalmologist every 1-2 years, advised self testicular exam q month, further health care maintenance, further lab(s), immunization(s) and observation    All diagnosis above reviewed and noted above, otherwise stable.      See [facility] orders for further details.      1) labs fasting    2) consider Shingrix    3) low cholesterol diet and regular exercise    Return in about 1 year (around 8/26/2021), or if symptoms worsen or fail to improve, for Annual Wellness Visit, Complete Physical.    Health Maintenance Due   Topic Date Due     ZOSTER IMMUNIZATION (2 of 3) 05/29/2013     PHQ-9  12/10/2019     PSA  06/03/2020     MEDICARE ANNUAL WELLNESS VISIT  06/10/2020     LIPID  06/10/2020     FALL RISK ASSESSMENT  06/10/2020     INFLUENZA VACCINE (1) 09/01/2020       End of Life Planning:  Patient currently  "has an advanced directive: Yes.  Practitioner is supportive of decision.    COUNSELING    Reviewed preventive health counseling, as reflected in patient instructions    Estimated body mass index is 22.53 kg/m  as calculated from the following:    Height as of 11/6/19: 1.715 m (5' 7.5\").    Weight as of this encounter: 66.2 kg (146 lb).         reports that he quit smoking about 55 years ago. His smoking use included cigarettes. He has a 14.00 pack-year smoking history. He has never used smokeless tobacco.      Appropriate preventive services were discussed with this patient, including applicable screening as appropriate for cardiovascular disease, diabetes, osteopenia/osteoporosis, and glaucoma.  As appropriate for age/gender, discussed screening for colorectal cancer, prostate cancer, breast cancer, and cervical cancer. Checklist reviewing preventive services available has been given to the patient.    Reviewed patients plan of care and provided an AVS. The Intermediate Care Plan ( asthma action plan, low back pain action plan, and migraine action plan) for Jasen meets the Care Plan requirement. This Care Plan has been established and reviewed with the Patient.         Archie Meadows MD, FAAFP     [facility]  [address] [place] [id number]  [email or web address]   Office: [phone]  Fax: [phone]  Pager: [phone]       "

## 2020-08-26 NOTE — PATIENT INSTRUCTIONS
Patient Education   Personalized Prevention Plan  You are due for the preventive services outlined below.  Your care team is available to assist you in scheduling these services.  If you have already completed any of these items, please share that information with your care team to update in your medical record.  Health Maintenance Due   Topic Date Due     Zoster (Shingles) Vaccine (2 of 3) 05/29/2013     Depression Assessment  12/10/2019     Prostate Test  06/03/2020     Annual Wellness Visit  06/10/2020     Cholesterol Lab  06/10/2020     FALL RISK ASSESSMENT  06/10/2020     Flu Vaccine (1) 09/01/2020     Preventive Health Recommendations  See your health care provider every year to    Review health changes.     Discuss preventive care.      Review your medicines if your doctor has prescribed any.    Talk with your health care provider about whether you should have a test to screen for prostate cancer (PSA).    Every 3 years, have a diabetes test (fasting glucose). If you are at risk for diabetes, you should have this test more often.    Every 5 years, have a cholesterol test. Have this test more often if you are at risk for high cholesterol or heart disease.     Every 10 years, have a colonoscopy. Or, have a yearly FIT test (stool test). These exams will check for colon cancer.    Talk to with your health care provider about screening for Abdominal Aortic Aneurysm if you have a family history of AAA or have a history of smoking.    Shots:     Get a flu shot each year.     Get a tetanus shot every 10 years.     Talk to your doctor about your pneumonia vaccines. There are now two you should receive - Pneumovax (PPSV 23) and Prevnar (PCV 13).    Talk to your pharmacist about a shingles vaccine.     Talk to your doctor about the hepatitis B vaccine.    Nutrition:     Eat at least 5 servings of fruits and vegetables each day.     Eat whole-grain bread, whole-wheat pasta and brown rice instead of white grains and  rice.     Get adequate Calcium and Vitamin D.     Lifestyle    Exercise for at least 150 minutes a week (30 minutes a day, 5 days a week). This will help you control your weight and prevent disease.     Limit alcohol to one drink per day.     No smoking.     Wear sunscreen to prevent skin cancer.     See your dentist every six months for an exam and cleaning.     See your eye doctor every 1 to 2 years to screen for conditions such as glaucoma, macular degeneration and cataracts.    Personalized Prevention Plan  You are due for the preventive services outlined below.  Your care team is available to assist you in scheduling these services.  If you have already completed any of these items, please share that information with your care team to update in your medical record.  Health Maintenance   Topic Date Due     ZOSTER IMMUNIZATION (2 of 3) 05/29/2013     PHQ-9  12/10/2019     PSA  06/03/2020     MEDICARE ANNUAL WELLNESS VISIT  06/10/2020     LIPID  06/10/2020     FALL RISK ASSESSMENT  06/10/2020     INFLUENZA VACCINE (1) 09/01/2020     DTAP/TDAP/TD IMMUNIZATION (4 - Td) 04/03/2023     COLORECTAL CANCER SCREENING  04/18/2023     ADVANCE CARE PLANNING  06/10/2024     HEPATITIS C SCREENING  Completed     DEPRESSION ACTION PLAN  Completed     PNEUMOCOCCAL IMMUNIZATION 65+ HIGH/HIGHEST RISK  Completed     AORTIC ANEURYSM SCREENING (SYSTEM ASSIGNED)  Completed     IPV IMMUNIZATION  Aged Out     MENINGITIS IMMUNIZATION  Aged Out     HEPATITIS B IMMUNIZATION  Aged Out

## 2020-09-01 DIAGNOSIS — C83.00 SMALL B-CELL LYMPHOMA, UNSPECIFIED BODY REGION (H): ICD-10-CM

## 2020-09-01 DIAGNOSIS — F32.5 MAJOR DEPRESSIVE DISORDER, SINGLE EPISODE IN FULL REMISSION (H): ICD-10-CM

## 2020-09-01 DIAGNOSIS — E78.5 HYPERLIPIDEMIA LDL GOAL <130: ICD-10-CM

## 2020-09-01 DIAGNOSIS — F41.1 ANXIETY STATE: ICD-10-CM

## 2020-09-01 DIAGNOSIS — R79.9 ABNORMAL FINDING OF BLOOD CHEMISTRY, UNSPECIFIED: ICD-10-CM

## 2020-09-01 DIAGNOSIS — Z00.00 ENCOUNTER FOR MEDICARE ANNUAL WELLNESS EXAM: ICD-10-CM

## 2020-09-01 DIAGNOSIS — C61 PROSTATE CANCER (H): ICD-10-CM

## 2020-09-01 DIAGNOSIS — Z00.00 ENCOUNTER FOR ROUTINE ADULT HEALTH EXAMINATION WITHOUT ABNORMAL FINDINGS: ICD-10-CM

## 2020-09-01 DIAGNOSIS — C85.90 LYMPHOMA, UNSPECIFIED BODY REGION, UNSPECIFIED LYMPHOMA TYPE (H): ICD-10-CM

## 2020-09-01 DIAGNOSIS — Z51.81 MEDICATION MONITORING ENCOUNTER: ICD-10-CM

## 2020-09-01 LAB
ALBUMIN SERPL-MCNC: 3.8 G/DL (ref 3.4–5)
ALBUMIN UR-MCNC: NEGATIVE MG/DL
ALP SERPL-CCNC: 87 U/L (ref 40–150)
ALT SERPL W P-5'-P-CCNC: 26 U/L (ref 0–70)
ANION GAP SERPL CALCULATED.3IONS-SCNC: 5 MMOL/L (ref 3–14)
APPEARANCE UR: CLEAR
AST SERPL W P-5'-P-CCNC: 23 U/L (ref 0–45)
BILIRUB SERPL-MCNC: 0.5 MG/DL (ref 0.2–1.3)
BILIRUB UR QL STRIP: NEGATIVE
BUN SERPL-MCNC: 19 MG/DL (ref 7–30)
CALCIUM SERPL-MCNC: 8.8 MG/DL (ref 8.5–10.1)
CHLORIDE SERPL-SCNC: 106 MMOL/L (ref 94–109)
CHOLEST SERPL-MCNC: 199 MG/DL
CK SERPL-CCNC: 168 U/L (ref 30–300)
CO2 SERPL-SCNC: 28 MMOL/L (ref 20–32)
COLOR UR AUTO: YELLOW
CREAT SERPL-MCNC: 1.42 MG/DL (ref 0.66–1.25)
CREAT UR-MCNC: 216 MG/DL
ERYTHROCYTE [DISTWIDTH] IN BLOOD BY AUTOMATED COUNT: 12.3 % (ref 10–15)
GFR SERPL CREATININE-BSD FRML MDRD: 48 ML/MIN/{1.73_M2}
GLUCOSE SERPL-MCNC: 97 MG/DL (ref 70–99)
GLUCOSE UR STRIP-MCNC: NEGATIVE MG/DL
HBA1C MFR BLD: 5.4 % (ref 0–5.6)
HCT VFR BLD AUTO: 46.5 % (ref 40–53)
HDLC SERPL-MCNC: 77 MG/DL
HGB BLD-MCNC: 16.1 G/DL (ref 13.3–17.7)
HGB UR QL STRIP: NEGATIVE
KETONES UR STRIP-MCNC: NEGATIVE MG/DL
LDH SERPL L TO P-CCNC: 169 U/L (ref 85–227)
LDLC SERPL CALC-MCNC: 110 MG/DL
LEUKOCYTE ESTERASE UR QL STRIP: NEGATIVE
MCH RBC QN AUTO: 31 PG (ref 26.5–33)
MCHC RBC AUTO-ENTMCNC: 34.6 G/DL (ref 31.5–36.5)
MCV RBC AUTO: 90 FL (ref 78–100)
MICROALBUMIN UR-MCNC: 9 MG/L
MICROALBUMIN/CREAT UR: 3.97 MG/G CR (ref 0–17)
NITRATE UR QL: NEGATIVE
NONHDLC SERPL-MCNC: 122 MG/DL
PH UR STRIP: 7 PH (ref 5–7)
PLATELET # BLD AUTO: 175 10E9/L (ref 150–450)
POTASSIUM SERPL-SCNC: 4.2 MMOL/L (ref 3.4–5.3)
PROT SERPL-MCNC: 6.6 G/DL (ref 6.8–8.8)
PSA SERPL-MCNC: <0.01 UG/L (ref 0–4)
RBC # BLD AUTO: 5.19 10E12/L (ref 4.4–5.9)
SODIUM SERPL-SCNC: 139 MMOL/L (ref 133–144)
SOURCE: NORMAL
SP GR UR STRIP: 1.02 (ref 1–1.03)
TRIGL SERPL-MCNC: 59 MG/DL
TSH SERPL DL<=0.005 MIU/L-ACNC: 2.95 MU/L (ref 0.4–4)
UROBILINOGEN UR STRIP-ACNC: 0.2 EU/DL (ref 0.2–1)
WBC # BLD AUTO: 7.5 10E9/L (ref 4–11)

## 2020-09-01 PROCEDURE — 82043 UR ALBUMIN QUANTITATIVE: CPT | Performed by: FAMILY MEDICINE

## 2020-09-01 PROCEDURE — 80053 COMPREHEN METABOLIC PANEL: CPT | Performed by: FAMILY MEDICINE

## 2020-09-01 PROCEDURE — 84153 ASSAY OF PSA TOTAL: CPT | Performed by: FAMILY MEDICINE

## 2020-09-01 PROCEDURE — 83036 HEMOGLOBIN GLYCOSYLATED A1C: CPT | Performed by: FAMILY MEDICINE

## 2020-09-01 PROCEDURE — 82550 ASSAY OF CK (CPK): CPT | Performed by: FAMILY MEDICINE

## 2020-09-01 PROCEDURE — 81003 URINALYSIS AUTO W/O SCOPE: CPT | Performed by: FAMILY MEDICINE

## 2020-09-01 PROCEDURE — 36415 COLL VENOUS BLD VENIPUNCTURE: CPT | Performed by: FAMILY MEDICINE

## 2020-09-01 PROCEDURE — 83615 LACTATE (LD) (LDH) ENZYME: CPT | Performed by: FAMILY MEDICINE

## 2020-09-01 PROCEDURE — 85027 COMPLETE CBC AUTOMATED: CPT | Performed by: FAMILY MEDICINE

## 2020-09-01 PROCEDURE — 84443 ASSAY THYROID STIM HORMONE: CPT | Performed by: FAMILY MEDICINE

## 2020-09-01 PROCEDURE — 80061 LIPID PANEL: CPT | Performed by: FAMILY MEDICINE

## 2020-10-22 ENCOUNTER — MYC MEDICAL ADVICE (OUTPATIENT)
Dept: FAMILY MEDICINE | Facility: CLINIC | Age: 75
End: 2020-10-22

## 2020-10-22 DIAGNOSIS — R79.9 ABNORMAL FINDING OF BLOOD CHEMISTRY, UNSPECIFIED: Primary | ICD-10-CM

## 2020-10-23 NOTE — TELEPHONE ENCOUNTER
Routing to PCP for further review/recommendations/orders.  Please advise on patient's lab tests for SLL, small B-cell lymphoma      Clara Ortiz RN  [facility]

## 2020-12-14 DIAGNOSIS — R79.9 ABNORMAL FINDING OF BLOOD CHEMISTRY, UNSPECIFIED: ICD-10-CM

## 2020-12-14 LAB
ANION GAP SERPL CALCULATED.3IONS-SCNC: 7 MMOL/L (ref 3–14)
BUN SERPL-MCNC: 14 MG/DL (ref 7–30)
CALCIUM SERPL-MCNC: 8.7 MG/DL (ref 8.5–10.1)
CHLORIDE SERPL-SCNC: 108 MMOL/L (ref 94–109)
CO2 SERPL-SCNC: 24 MMOL/L (ref 20–32)
CREAT SERPL-MCNC: 1.22 MG/DL (ref 0.66–1.25)
GFR SERPL CREATININE-BSD FRML MDRD: 58 ML/MIN/{1.73_M2}
GLUCOSE SERPL-MCNC: 94 MG/DL (ref 70–99)
POTASSIUM SERPL-SCNC: 3.8 MMOL/L (ref 3.4–5.3)
SODIUM SERPL-SCNC: 139 MMOL/L (ref 133–144)

## 2020-12-14 PROCEDURE — 80048 BASIC METABOLIC PNL TOTAL CA: CPT | Performed by: FAMILY MEDICINE

## 2020-12-14 PROCEDURE — 36415 COLL VENOUS BLD VENIPUNCTURE: CPT | Performed by: FAMILY MEDICINE

## 2020-12-15 DIAGNOSIS — Z12.11 SCREEN FOR COLON CANCER: ICD-10-CM

## 2020-12-15 PROCEDURE — 82274 ASSAY TEST FOR BLOOD FECAL: CPT | Performed by: FAMILY MEDICINE

## 2020-12-17 LAB — HEMOCCULT STL QL IA: NEGATIVE

## 2021-01-09 ENCOUNTER — HEALTH MAINTENANCE LETTER (OUTPATIENT)
Age: 76
End: 2021-01-09

## 2021-04-27 ENCOUNTER — OFFICE VISIT (OUTPATIENT)
Dept: DERMATOLOGY | Facility: CLINIC | Age: 76
End: 2021-04-27
Payer: COMMERCIAL

## 2021-04-27 VITALS — HEART RATE: 60 BPM | OXYGEN SATURATION: 100 % | SYSTOLIC BLOOD PRESSURE: 106 MMHG | DIASTOLIC BLOOD PRESSURE: 61 MMHG

## 2021-04-27 DIAGNOSIS — D18.01 ANGIOMA OF SKIN: ICD-10-CM

## 2021-04-27 DIAGNOSIS — L81.4 LENTIGO: ICD-10-CM

## 2021-04-27 DIAGNOSIS — L57.0 ACTINIC KERATOSIS: Primary | ICD-10-CM

## 2021-04-27 DIAGNOSIS — L82.1 SEBORRHEIC KERATOSIS: ICD-10-CM

## 2021-04-27 DIAGNOSIS — D22.9 NEVUS: ICD-10-CM

## 2021-04-27 PROCEDURE — 99213 OFFICE O/P EST LOW 20 MIN: CPT | Mod: 25 | Performed by: PHYSICIAN ASSISTANT

## 2021-04-27 PROCEDURE — 17003 DESTRUCT PREMALG LES 2-14: CPT | Performed by: PHYSICIAN ASSISTANT

## 2021-04-27 PROCEDURE — 17000 DESTRUCT PREMALG LESION: CPT | Performed by: PHYSICIAN ASSISTANT

## 2021-04-27 NOTE — PROGRESS NOTES
HPI:   Chief complaints: Jasen Rosario is a pleasant 75 year old male who presents for Full skin cancer screening to rule out skin cancer   Last Skin Exam: 2 years ago      1st Baseline: no  Personal HX of Skin Cancer: yes - some sort of skin CA unsure which kind   Personal HX of Malignant Melanoma: no   Family HX of Skin Cancer / Malignant Melanoma: no  Personal HX of Atypical Moles:   no  Risk factors: history of sun exposure and burns; limited sunscreen use  New / Changing lesions: yes scaly spot on the left upper flank  Social History: very active - plays [employer] ball frequently   On review of systems, there are no further skin complaints, patient is feeling otherwise well.  See patient intake sheet.  ROS of the following were done and are negative: Constitutional, Eyes, Ears, Nose,   Mouth, Throat, Cardiovascular, Respiratory, GI, Genitourinary, Musculoskeletal,   Psychiatric, Endocrine, Allergic/Immunologic.    PHYSICAL EXAM:   /61   Pulse 60   SpO2 100%   Skin exam performed as follows: Type 2 skin. Mood appropriate  Alert and Oriented X 3. Well developed, well nourished in no distress.  General appearance: Normal  Head including face: Normal  Eyes: conjunctiva and lids: Normal  Mouth: Lips, teeth, gums: Normal  Neck: Normal  Chest-breast/axillae: Normal  Back: Normal  Spleen and liver: Normal  Cardiovascular: Exam of peripheral vascular system by observation for swelling, varicosities, edema: Normal  Genitalia: groin, buttocks: Normal  Extremities: digits/nails (clubbing): Normal  Eccrine and Apocrine glands: Normal  Right upper extremity: Normal  Left upper extremity: Normal  Right lower extremity: Normal  Left lower extremity: Normal  Skin: Scalp and body hair: See below    Pt deferred exam of breasts, groin, buttocks: No    Other physical findings:  1. Multiple pigmented macules on extremities and trunk  2. Multiple pigmented macules on face, trunk and extremities  3. Multiple vascular papules  on trunk, arms and legs  4. Multiple scattered keratotic plaques  5. Pink gritty papule on the left lateral orbit x 1, left cheek x 1       Except as noted above, no other signs of skin cancer or melanoma.     ASSESSMENT/PLAN:   Benign Full skin cancer screening today. . Patient with history of unknown type of skin CA  Advised on monthly self exams and 1 year  Patient Education: Appropriate brochures given.    1. Multiple benign appearing nevi on arms, legs and trunk. Discussed ABCDEs of melanoma and sunscreen.   2. Multiple lentigos on arms, legs and trunk. Advised benign, no treatment needed.  3. Multiple scattered angiomas. Advised benign, no treatment needed.   4. Seborrheic keratosis on arms, legs and trunk. Advised benign, no treatment needed.  5. Actinic keratosis on the left lateral orbit x 1, left cheek x 1. As precancerous, cryosurgery performed. Advised on blistering and post-op care. Advised if not resolved in 1-2 months to return for evaluation              Follow-up: yearly FSE/PRN sooner    1.) Patient was asked about new and changing moles. YES  2.) Patient received a complete physical skin examination: YES  3.) Patient was counseled to perform a monthly self skin examination: YES  Scribed By: Danna Evangelista MS, PAPABLO

## 2021-04-27 NOTE — LETTER
4/27/2021         RE: Jasen Rosario  [address]  [facility] [id number]        Dear Colleague,    Thank you for referring your patient, Jasen Rosario, to the [facility]. Please see a copy of my visit note below.    HPI:   Chief complaints: Jasen Rosario is a pleasant 75 year old male who presents for Full skin cancer screening to rule out skin cancer   Last Skin Exam: 2 years ago      1st Baseline: no  Personal HX of Skin Cancer: yes - some sort of skin CA unsure which kind   Personal HX of Malignant Melanoma: no   Family HX of Skin Cancer / Malignant Melanoma: no  Personal HX of Atypical Moles:   no  Risk factors: history of sun exposure and burns; limited sunscreen use  New / Changing lesions: yes scaly spot on the left upper flank  Social History: very active - plays pickle ball frequently   On review of systems, there are no further skin complaints, patient is feeling otherwise well.  See patient intake sheet.  ROS of the following were done and are negative: Constitutional, Eyes, Ears, Nose,   Mouth, Throat, Cardiovascular, Respiratory, GI, Genitourinary, Musculoskeletal,   Psychiatric, Endocrine, Allergic/Immunologic.    PHYSICAL EXAM:   /61   Pulse 60   SpO2 100%   Skin exam performed as follows: Type 2 skin. Mood appropriate  Alert and Oriented X 3. Well developed, well nourished in no distress.  General appearance: Normal  Head including face: Normal  Eyes: conjunctiva and lids: Normal  Mouth: Lips, teeth, gums: Normal  Neck: Normal  Chest-breast/axillae: Normal  Back: Normal  Spleen and liver: Normal  Cardiovascular: Exam of peripheral vascular system by observation for swelling, varicosities, edema: Normal  Genitalia: groin, buttocks: Normal  Extremities: digits/nails (clubbing): Normal  Eccrine and Apocrine glands: Normal  Right upper extremity: Normal  Left upper extremity: Normal  Right lower extremity: Normal  Left lower  extremity: Normal  Skin: Scalp and body hair: See below    Pt deferred exam of breasts, groin, buttocks: No    Other physical findings:  1. Multiple pigmented macules on extremities and trunk  2. Multiple pigmented macules on face, trunk and extremities  3. Multiple vascular papules on trunk, arms and legs  4. Multiple scattered keratotic plaques  5. Pink gritty papule on the left lateral orbit x 1, left cheek x 1       Except as noted above, no other signs of skin cancer or melanoma.     ASSESSMENT/PLAN:   Benign Full skin cancer screening today. . Patient with history of unknown type of skin CA  Advised on monthly self exams and 1 year  Patient Education: Appropriate brochures given.    1. Multiple benign appearing nevi on arms, legs and trunk. Discussed ABCDEs of melanoma and sunscreen.   2. Multiple lentigos on arms, legs and trunk. Advised benign, no treatment needed.  3. Multiple scattered angiomas. Advised benign, no treatment needed.   4. Seborrheic keratosis on arms, legs and trunk. Advised benign, no treatment needed.  5. Actinic keratosis on the left lateral orbit x 1, left cheek x 1. As precancerous, cryosurgery performed. Advised on blistering and post-op care. Advised if not resolved in 1-2 months to return for evaluation              Follow-up: yearly FSE/PRN sooner    1.) Patient was asked about new and changing moles. YES  2.) Patient received a complete physical skin examination: YES  3.) Patient was counseled to perform a monthly self skin examination: YES  Scribed By: Danna Evangelista, MS, PAKeithC          Again, thank you for allowing me to participate in the care of your patient.        Sincerely,        Danna Evangelista PA-C

## 2021-08-26 ASSESSMENT — ENCOUNTER SYMPTOMS
PALPITATIONS: 0
JOINT SWELLING: 0
ARTHRALGIAS: 0
HEMATOCHEZIA: 0
HEADACHES: 0
DIARRHEA: 0
CHILLS: 0
HEARTBURN: 0
MYALGIAS: 0
DYSURIA: 0
DIZZINESS: 1
HEMATURIA: 0
FEVER: 0
WEAKNESS: 0
FREQUENCY: 0
CONSTIPATION: 0
PARESTHESIAS: 0
ABDOMINAL PAIN: 0
BREAST MASS: 0
NAUSEA: 0
SORE THROAT: 0
COUGH: 0
EYE PAIN: 0
SHORTNESS OF BREATH: 0

## 2021-08-26 ASSESSMENT — ACTIVITIES OF DAILY LIVING (ADL): CURRENT_FUNCTION: NO ASSISTANCE NEEDED

## 2021-09-02 ENCOUNTER — OFFICE VISIT (OUTPATIENT)
Dept: FAMILY MEDICINE | Facility: CLINIC | Age: 76
End: 2021-09-02
Payer: COMMERCIAL

## 2021-09-02 VITALS
DIASTOLIC BLOOD PRESSURE: 60 MMHG | RESPIRATION RATE: 20 BRPM | SYSTOLIC BLOOD PRESSURE: 108 MMHG | OXYGEN SATURATION: 99 % | HEART RATE: 52 BPM | TEMPERATURE: 98.1 F | HEIGHT: 68 IN | BODY MASS INDEX: 21.9 KG/M2 | WEIGHT: 144.5 LBS

## 2021-09-02 DIAGNOSIS — C61 PROSTATE CANCER (H): ICD-10-CM

## 2021-09-02 DIAGNOSIS — F32.5 MAJOR DEPRESSIVE DISORDER, SINGLE EPISODE IN FULL REMISSION (H): ICD-10-CM

## 2021-09-02 DIAGNOSIS — Z96.643 STATUS POST TOTAL REPLACEMENT OF BOTH HIPS: ICD-10-CM

## 2021-09-02 DIAGNOSIS — E78.5 HYPERLIPIDEMIA LDL GOAL <130: ICD-10-CM

## 2021-09-02 DIAGNOSIS — Z96.652 STATUS POST TOTAL LEFT KNEE REPLACEMENT: ICD-10-CM

## 2021-09-02 DIAGNOSIS — Z00.00 ENCOUNTER FOR MEDICARE ANNUAL WELLNESS EXAM: ICD-10-CM

## 2021-09-02 DIAGNOSIS — Z12.11 SCREEN FOR COLON CANCER: ICD-10-CM

## 2021-09-02 DIAGNOSIS — K63.5 POLYP OF COLON, UNSPECIFIED PART OF COLON, UNSPECIFIED TYPE: ICD-10-CM

## 2021-09-02 DIAGNOSIS — F41.1 ANXIETY STATE: ICD-10-CM

## 2021-09-02 DIAGNOSIS — C83.00 SMALL B-CELL LYMPHOMA, UNSPECIFIED BODY REGION (H): ICD-10-CM

## 2021-09-02 DIAGNOSIS — Z00.00 ROUTINE GENERAL MEDICAL EXAMINATION AT A HEALTH CARE FACILITY: Primary | ICD-10-CM

## 2021-09-02 DIAGNOSIS — M48.061 SPINAL STENOSIS OF LUMBAR REGION, UNSPECIFIED WHETHER NEUROGENIC CLAUDICATION PRESENT: ICD-10-CM

## 2021-09-02 DIAGNOSIS — Z51.81 MEDICATION MONITORING ENCOUNTER: ICD-10-CM

## 2021-09-02 DIAGNOSIS — M15.0 PRIMARY OSTEOARTHRITIS INVOLVING MULTIPLE JOINTS: ICD-10-CM

## 2021-09-02 DIAGNOSIS — Z91.09 ENVIRONMENTAL ALLERGIES: ICD-10-CM

## 2021-09-02 LAB
ALBUMIN UR-MCNC: NEGATIVE MG/DL
APPEARANCE UR: CLEAR
BILIRUB UR QL STRIP: NEGATIVE
COLOR UR AUTO: YELLOW
ERYTHROCYTE [DISTWIDTH] IN BLOOD BY AUTOMATED COUNT: 12.4 % (ref 10–15)
GLUCOSE UR STRIP-MCNC: NEGATIVE MG/DL
HCT VFR BLD AUTO: 45.4 % (ref 35–53)
HGB BLD-MCNC: 15.7 G/DL (ref 11.7–17.7)
HGB UR QL STRIP: NEGATIVE
KETONES UR STRIP-MCNC: NEGATIVE MG/DL
LDH SERPL L TO P-CCNC: 178 U/L (ref 81–234)
LEUKOCYTE ESTERASE UR QL STRIP: NEGATIVE
MCH RBC QN AUTO: 31 PG (ref 26.5–33)
MCHC RBC AUTO-ENTMCNC: 34.6 G/DL (ref 31.5–36.5)
MCV RBC AUTO: 90 FL (ref 78–100)
NITRATE UR QL: NEGATIVE
PH UR STRIP: 6 [PH] (ref 5–7)
PLATELET # BLD AUTO: 183 10E3/UL (ref 150–450)
RBC # BLD AUTO: 5.06 10E6/UL (ref 3.8–5.9)
SP GR UR STRIP: 1.01 (ref 1–1.03)
UROBILINOGEN UR STRIP-ACNC: 0.2 E.U./DL
WBC # BLD AUTO: 6.5 10E3/UL (ref 4–11)

## 2021-09-02 PROCEDURE — 84443 ASSAY THYROID STIM HORMONE: CPT | Performed by: FAMILY MEDICINE

## 2021-09-02 PROCEDURE — 84153 ASSAY OF PSA TOTAL: CPT | Performed by: FAMILY MEDICINE

## 2021-09-02 PROCEDURE — 85027 COMPLETE CBC AUTOMATED: CPT | Performed by: FAMILY MEDICINE

## 2021-09-02 PROCEDURE — 82043 UR ALBUMIN QUANTITATIVE: CPT | Performed by: FAMILY MEDICINE

## 2021-09-02 PROCEDURE — 80053 COMPREHEN METABOLIC PANEL: CPT | Performed by: FAMILY MEDICINE

## 2021-09-02 PROCEDURE — 99397 PER PM REEVAL EST PAT 65+ YR: CPT | Performed by: FAMILY MEDICINE

## 2021-09-02 PROCEDURE — 80061 LIPID PANEL: CPT | Performed by: FAMILY MEDICINE

## 2021-09-02 PROCEDURE — 82550 ASSAY OF CK (CPK): CPT | Performed by: FAMILY MEDICINE

## 2021-09-02 PROCEDURE — 81003 URINALYSIS AUTO W/O SCOPE: CPT | Performed by: FAMILY MEDICINE

## 2021-09-02 PROCEDURE — 36415 COLL VENOUS BLD VENIPUNCTURE: CPT | Performed by: FAMILY MEDICINE

## 2021-09-02 PROCEDURE — 83615 LACTATE (LD) (LDH) ENZYME: CPT | Performed by: FAMILY MEDICINE

## 2021-09-02 ASSESSMENT — ACTIVITIES OF DAILY LIVING (ADL): CURRENT_FUNCTION: NO ASSISTANCE NEEDED

## 2021-09-02 ASSESSMENT — MIFFLIN-ST. JEOR: SCORE: 1352.01

## 2021-09-02 NOTE — PROGRESS NOTES
"[facility]    Jasen Rosario is a 76 year old adult who presents for Preventive Visit.    Patient has been advised of split billing requirements and indicates understanding: Yes   Are you in the first 12 months of your Medicare coverage?  No    Healthy Habits:     In general, how would you rate your overall health?  Good    Frequency of exercise:  4-5 days/week    Duration of exercise:  Greater than 60 minutes    Do you usually eat at least 4 servings of fruit and vegetables a day, include whole grains    & fiber and avoid regularly eating high fat or \"junk\" foods?  No    Taking medications regularly:  Not Applicable    Medication side effects:  Not applicable    Ability to successfully perform activities of daily living:  No assistance needed    Home Safety:  Lack of grab bars in the bathroom    Hearing Impairment:  Difficulty following a conversation in a noisy restaurant or crowded room and need to ask people to speak up or repeat themselves    In the past 6 months, have you been bothered by leaking of urine? Yes    In general, how would you rate your overall mental or emotional health?  Good      PHQ-2 Total Score: 0    Do you feel safe in your environment? Yes    Have you ever done Advance Care Planning? (For example, a Health Directive, POLST, or a discussion with a medical provider or your loved ones about your wishes): Yes, advance care planning is on file.     Fall risk  Fallen 2 or more times in the past year?: No  Any fall with injury in the past year?: No    Cognitive Screening   1) Repeat 3 items (Leader, Season, Table)    2) Clock draw: NORMAL  3) 3 item recall: Recalls 3 objects  Results: 3 items recalled: COGNITIVE IMPAIRMENT LESS LIKELY    Mini-CogTM Copyright RYDER Orona. Licensed by the author for use in [facility]; reprinted with permission ([email or web address]). All rights reserved.      Do you have sleep apnea, excessive snoring or daytime drowsiness?: " no    Reviewed and updated as needed this visit by clinical staff  Tobacco  Allergies  Meds  Problems  Med Hx  Surg Hx  Fam Hx        Reviewed and updated as needed this visit by Provider                Social History     Tobacco Use     Smoking status: Former Smoker     Packs/day: 2.00     Years: 7.00     Pack years: 14.00     Types: Cigarettes     Quit date: 1965     Years since quittin.7     Smokeless tobacco: Never Used   Substance Use Topics     Alcohol use: Yes     Alcohol/week: 5.0 - 6.0 standard drinks     Types: 5 - 6 Standard drinks or equivalent per week     Comment: ocassionally       Alcohol Use 2021   Prescreen: >3 drinks/day or >7 drinks/week? No   Prescreen: >3 drinks/day or >7 drinks/week? -     Current providers sharing in care for this patient include:   Patient Care Team:  Archie Meadows MD as PCP - General  Archie Meadows MD as Referring Physician (Family Practice)  Sheri Hemphill, RN as Registered Nurse (Urology)  Chantelle Kern, RN as Registered Nurse  Archie Meadows MD as Assigned PCP  Danna Evangelista PAKeithC as Assigned Surgical Provider    The following health maintenance items are reviewed in Epic and correct as of today:  Health Maintenance Due   Topic Date Due     DEXA  Never done     ZOSTER IMMUNIZATION (2 of 3) 2013     PHQ-9  12/10/2019     FALL RISK ASSESSMENT  2021     LIPID  2021     INFLUENZA VACCINE (1) Never done     PSA  2021     Prostate Cancer - [facility] - last PSA < 0.01    Small B-cell lymphoma - Dr Velazquez - watch labs CBC/LDH    Depression/Anxiety    PHQ 2018 10/30/2018 2019   PHQ-9 Total Score 4 1 2   Q9: Thoughts of better off dead/self-harm past 2 weeks Several days Not at all Not at all     WALDO-7 SCORE 2018 10/30/2018 2019   Total Score - - -   Total Score 3 6 5     Lipids    Recent Labs   Lab Test 20  0802 06/10/19  1030 06/15/15  0905 14  1025   CHOL 199 185 189 217*   HDL 77 73 76 71   * 99 101  135*   TRIG 59 64 58 57   CHOLHDLRATIO  --   --  2.5 3.1     OA - Bilateral BETTY, Left TKA - no other issues    Lumbar stenosis - daily exercise - controlled    Environmental allergies - come/go    Health Maintenance     Colonoscopy:  Consider 4/2023   FIT:  given              PSA:  pending   DEXA:  NA    Health Maintenance Due   Topic Date Due     DEXA  Never done     ZOSTER IMMUNIZATION (2 of 3) 05/29/2013     PHQ-9  12/10/2019     FALL RISK ASSESSMENT  08/26/2021     LIPID  09/01/2021     INFLUENZA VACCINE (1) Never done     PSA  09/01/2021       Current Problem List    Patient Active Problem List   Diagnosis     Anxiety state     Major depressive disorder, single episode in full remission (H)     Hyperlipidemia LDL goal <130     ED (erectile dysfunction)     Seasonal allergies     OA (osteoarthritis)     Advanced directives, counseling/discussion     h/o Prostate cancer (H) - prostatectomy - 2015     Colon polyp     Lumbar stenosis     S/P total hip arthroplasty     Environmental allergies     S/P total knee arthroplasty     Small B-cell lymphoma (H)       Past Medical History    Past Medical History:   Diagnosis Date     Anxiety state, unspecified 1991     Basal cell carcinoma     Left Ear - Moh's Dr Young     Colon polyp      ED (erectile dysfunction) 2005     Hyperlipidemia LDL goal <130 2001     Hypoglycemia, unspecified 1991     Infectious mononucleosis 1969     Lumbar stenosis     TCO - Dr Irene - with neuritis     Lymphoma (H)     SLL - B Cell - Dr Barrios/Marcus     Major depressive disorder, single episode in full remission (H) 1991     OA (osteoarthritis)     Severe Lt Hip     Prostate cancer (H) 5/13    Dr Smyth -  - Darian 3+3 - intermediate progression risk - pT2cN0     Seasonal allergies        Past Surgical History    Past Surgical History:   Procedure Laterality Date     APPENDECTOMY  1960    APPY     ARTHROPLASTY HIP Left 6/21/2017    Left BETTY - Dr MELISSA Madrigal     ARTHROPLASTY KNEE Left  10/2/2017    LEFT TOTAL KNEE ARTHROPLASTY - Dr MELISSA Madrigal     COLONOSCOPY  12/04, 12/14    colon polyp - serrated adenoma 5 mm - due 3 yrs     COLONOSCOPY  03/16/2018    Dr. Naila BELLE - normal - due 5 yrs - will need MAC     COLONOSCOPY N/A 4/18/2018    Dr. Naila BELLE - normal - due 5 yrs - will need MAC     DAVINCI PROSTATECTOMY, LYMPHADENECTOMY N/A 4/13/2015    RALP - Dr Smyth     DENTAL SURGERY  1985    wisdom teeth     SURGICAL HISTORY OF -   1980    BACK SURGERY     SURGICAL HISTORY OF -  Left 1987    ANKLE FX ORIF - Lt     SURGICAL HISTORY OF -  Left 1972    LT KNEE MCL TEAR AND MENISCUS      SURGICAL HISTORY OF -  Left 2001, 2008    LT SHOULDER ARTHROSCOPY     SURGICAL HISTORY OF -  Right 01/2007    Rt Shoulder Surgery - Dr Ferrer     SURGICAL HISTORY OF -  Right 11/2018    Rt BETTY - Dr Galdamez       Current Medications    Current Outpatient Medications   Medication Sig Dispense Refill     GLUCOSAMINE-CHONDROITIN PO Take 2 tablets by mouth daily       Multiple Vitamin (DAILY MULTIVITAMIN PO) Take 2 tablets by mouth daily        Omega-3 Fatty Acids (FISH OIL PO) Take 2 tablets by mouth daily        creatine 400 MG capsule Take by mouth daily         Allergies    Allergies   Allergen Reactions     Seasonal Allergies        Immunizations    Immunization History   Administered Date(s) Administered     COVID-19,PF,Pfizer 08/03/2021, 08/24/2021     HEPA 06/28/2006, 12/28/2006     HepB 01/01/1995, 02/01/1995, 06/01/1995     Pneumo Conj 13-V (2010&after) 12/01/2015     Pneumococcal 23 valent 06/28/2006, 04/03/2013     TD (ADULT, 7+) 06/10/2001, 12/10/2010     TDAP Vaccine (Boostrix) 04/03/2013     Zoster vaccine, live 04/03/2013       Family History    Family History   Problem Relation Age of Onset     Cancer Mother         ? abdominal     Alcohol/Drug Father      Breast Cancer Sister         age 42     Colon Cancer No family hx of        Social History    Social History     Socioeconomic History     Marital  status:      Spouse name: Danielle     Number of children: 3     Years of education: 18     Highest education level: Not on file   Occupational History     Occupation: teacher [employer]      Employer: [id number]     Comment: retired    Tobacco Use     Smoking status: Former Smoker     Packs/day: 2.00     Years: 7.00     Pack years: 14.00     Types: Cigarettes     Quit date: 1965     Years since quittin.7     Smokeless tobacco: Never Used   Vaping Use     Vaping Use: Never used   Substance and Sexual Activity     Alcohol use: Yes     Alcohol/week: 5.0 - 6.0 standard drinks     Types: 5 - 6 Standard drinks or equivalent per week     Comment: ocassionally     Drug use: No     Sexual activity: Yes     Partners: Female   Other Topics Concern      Service Not Asked     Blood Transfusions Not Asked     Caffeine Concern Yes     Comment: 2 cup qd     Occupational Exposure Not Asked     Hobby Hazards Not Asked     Sleep Concern Not Asked     Stress Concern Not Asked     Weight Concern Not Asked     Special Diet Not Asked     Back Care Not Asked     Exercise Yes     Comment: 3+ times per week     Bike Helmet Not Asked     Seat Belt Yes     Self-Exams Not Asked     Parent/sibling w/ CABG, MI or angioplasty before 65F 55M? No   Social History Narrative     Not on file     Social Determinants of Health     Financial Resource Strain:      Difficulty of Paying Living Expenses:    Food Insecurity:      Worried About Running Out of Food in the Last Year:      Ran Out of Food in the Last Year:    Transportation Needs:      Lack of Transportation (Medical):      Lack of Transportation (Non-Medical):    Physical Activity:      Days of Exercise per Week:      Minutes of Exercise per Session:    Stress:      Feeling of Stress :    Social Connections:      Frequency of Communication with Friends and Family:      Frequency of Social Gatherings with Friends and Family:      Attends Sikhism Services:      Active  "Member of Clubs or Organizations:      Attends Club or Organization Meetings:      Marital Status:    Intimate Partner Violence:      Fear of Current or Ex-Partner:      Emotionally Abused:      Physically Abused:      Sexually Abused:        ROS    CONSTITUTIONAL: NEGATIVE for fever, chills, change in weight  INTEGUMENTARY/SKIN: NEGATIVE for worrisome rashes, moles or lesions  EYES: NEGATIVE for vision changes or irritation  ENT/MOUTH: NEGATIVE for ear, mouth and throat problems  RESP: NEGATIVE for significant cough or SOB  BREAST: NEGATIVE for masses, tenderness or discharge  CV: NEGATIVE for chest pain, palpitations or peripheral edema  GI: NEGATIVE for nausea, abdominal pain, heartburn, or change in bowel habits  : NEGATIVE for frequency, dysuria, or hematuria  MUSCULOSKELETAL: NEGATIVE for significant arthralgias or myalgia  NEURO: NEGATIVE for weakness, dizziness or paresthesias  ENDOCRINE: NEGATIVE for temperature intolerance, skin/hair changes  HEME: NEGATIVE for bleeding problems  PSYCHIATRIC: NEGATIVE for changes in mood or affect    OBJECTIVE    /60   Pulse 52   Temp 98.1  F (36.7  C)   Resp 20   Ht 1.715 m (5' 7.5\")   Wt 65.5 kg (144 lb 8 oz)   SpO2 99%   BMI 22.30 kg/m      EXAM:    GENERAL: healthy, alert and no distress  EYES: Eyes grossly normal to inspection, PERRL and conjunctivae and sclerae normal  HENT: ear canals and TM's normal, nose and mouth without ulcers or lesions  NECK: no adenopathy, no asymmetry, masses, or scars and thyroid normal to palpation  RESP: lungs clear to auscultation - no rales, rhonchi or wheezes  CV: regular rate and rhythm, normal S1 S2, no S3 or S4, no murmur, click or rub, no peripheral edema and peripheral pulses strong  ABDOMEN: soft, nontender, no hepatosplenomegaly, no masses and bowel sounds normal   (male): Pt declines  RECTAL: Pt declines  MS: no gross musculoskeletal defects noted, no edema  SKIN: no suspicious lesions or rashes  NEURO: Normal " strength and tone, mentation intact and speech normal  PSYCH: mentation appears normal, affect normal/bright  LYMPH: no cervical, supraclavicular, axillary, or inguinal adenopathy    DIAGNOSTICS/PROCEDURES    Pending    ASSESSMENT      ICD-10-CM    1. Routine general medical examination at a health care facility  Z00.00 Comprehensive metabolic panel     Lipid panel reflex to direct LDL Fasting     CK total     CBC with platelets     TSH with free T4 reflex     Albumin Random Urine Quantitative with Creat Ratio     Fecal colorectal cancer screen FIT     UA Macro with Reflex to Micro and Culture - lab collect     REVIEW OF HEALTH MAINTENANCE PROTOCOL ORDERS     Lactate Dehydrogenase     PSA, tumor marker     Comprehensive metabolic panel     Lipid panel reflex to direct LDL Fasting     CK total     CBC with platelets     TSH with free T4 reflex     Albumin Random Urine Quantitative with Creat Ratio     UA Macro with Reflex to Micro and Culture - lab collect     Lactate Dehydrogenase     PSA, tumor marker   2. Encounter for Medicare annual wellness exam  Z00.00 Comprehensive metabolic panel     Lipid panel reflex to direct LDL Fasting     CK total     CBC with platelets     TSH with free T4 reflex     Albumin Random Urine Quantitative with Creat Ratio     Fecal colorectal cancer screen FIT     UA Macro with Reflex to Micro and Culture - lab collect     REVIEW OF HEALTH MAINTENANCE PROTOCOL ORDERS     Lactate Dehydrogenase     PSA, tumor marker     Comprehensive metabolic panel     Lipid panel reflex to direct LDL Fasting     CK total     CBC with platelets     TSH with free T4 reflex     Albumin Random Urine Quantitative with Creat Ratio     UA Macro with Reflex to Micro and Culture - lab collect     Lactate Dehydrogenase     PSA, tumor marker   3. Prostate cancer (H)  C61 REVIEW OF HEALTH MAINTENANCE PROTOCOL ORDERS     PSA, tumor marker     PSA, tumor marker   4. Small B-cell lymphoma, unspecified body region (H)   C83.00 Comprehensive metabolic panel     REVIEW OF HEALTH MAINTENANCE PROTOCOL ORDERS     Lactate Dehydrogenase     Comprehensive metabolic panel     Lactate Dehydrogenase   5. Major depressive disorder, single episode in full remission (H)  F32.5 TSH with free T4 reflex     REVIEW OF HEALTH MAINTENANCE PROTOCOL ORDERS     TSH with free T4 reflex   6. Anxiety state  F41.1 TSH with free T4 reflex     REVIEW OF HEALTH MAINTENANCE PROTOCOL ORDERS     TSH with free T4 reflex   7. Hyperlipidemia LDL goal <130  E78.5 Comprehensive metabolic panel     Lipid panel reflex to direct LDL Fasting     REVIEW OF HEALTH MAINTENANCE PROTOCOL ORDERS     Comprehensive metabolic panel     Lipid panel reflex to direct LDL Fasting   8. Primary osteoarthritis involving multiple joints  M89.49 REVIEW OF HEALTH MAINTENANCE PROTOCOL ORDERS   9. Status post total replacement of both hips  Z96.643 REVIEW OF HEALTH MAINTENANCE PROTOCOL ORDERS   10. Spinal stenosis of lumbar region, unspecified whether neurogenic claudication present  M48.061 REVIEW OF HEALTH MAINTENANCE PROTOCOL ORDERS   11. Status post total left knee replacement  Z96.652 REVIEW OF HEALTH MAINTENANCE PROTOCOL ORDERS   12. Environmental allergies  Z91.09 REVIEW OF HEALTH MAINTENANCE PROTOCOL ORDERS   13. Polyp of colon, unspecified part of colon, unspecified type  K63.5 Fecal colorectal cancer screen FIT     REVIEW OF HEALTH MAINTENANCE PROTOCOL ORDERS   14. Screen for colon cancer  Z12.11 Fecal colorectal cancer screen FIT     REVIEW OF HEALTH MAINTENANCE PROTOCOL ORDERS   15. Medication monitoring encounter  Z51.81 Comprehensive metabolic panel     Lipid panel reflex to direct LDL Fasting     CK total     CBC with platelets     TSH with free T4 reflex     Albumin Random Urine Quantitative with Creat Ratio     UA Macro with Reflex to Micro and Culture - lab collect     REVIEW OF HEALTH MAINTENANCE PROTOCOL ORDERS     Lactate Dehydrogenase     PSA, tumor marker      Comprehensive metabolic panel     Lipid panel reflex to direct LDL Fasting     CK total     CBC with platelets     TSH with free T4 reflex     Albumin Random Urine Quantitative with Creat Ratio     UA Macro with Reflex to Micro and Culture - lab collect     Lactate Dehydrogenase     PSA, tumor marker       PLAN    Discussed treatment/modality options, including risk and benefits, he desires:    advised alcohol consumption 1oz per day or less, advised 1 multivitamin per day, advised calcium 3666-8364 mg/d and Vitamin D 800-1200 IU/d, advised dentist every 6 months, advised diet and exercise, advised opthalmologist every 1-2 years, advised self testicular exam q month, further health care maintenance, further lab(s), immunization(s) and observation    Discussed controversies surrounding PSA. Specifically reviewed 2017 USPSTF findings recommending discussion of PSA testing for men ages 55-69.  Reviewed findings of the  Randomized Study of Screening for Prostate Cancer which showed a 30% reduction in advanced stage prostate cancer and a 20% reduction in death rate from prostate cancer in this age group. PSA-based screening may prevent up to 2 deaths and up to 3 cases of metastatic disease per 1,000 men screened over 13 years.    We've elected to do PSA this year after discussing these controversies.    All diagnosis above reviewed and noted above, otherwise stable.      See takokat orders for further details.      1) reviewed OTC supplements    2) labs pending    3) immunizations - consider Flu/Shingrix - COVID #3 4/24/2021    4) stay active    Return in about 1 year (around 9/2/2022) for Annual Wellness Visit, Complete Physical, Medication Recheck Visit, Follow Up Chronic.    Health Maintenance Due   Topic Date Due     DEXA  Never done     ZOSTER IMMUNIZATION (2 of 3) 05/29/2013     PHQ-9  12/10/2019     FALL RISK ASSESSMENT  08/26/2021     LIPID  09/01/2021     INFLUENZA VACCINE (1) Never done     PSA   "09/01/2021       COUNSELING    Reviewed preventive health counseling, as reflected in patient instructions    BP Readings from Last 1 Encounters:   09/02/21 108/60     Estimated body mass index is 22.3 kg/m  as calculated from the following:    Height as of this encounter: 1.715 m (5' 7.5\").    Weight as of this encounter: 65.5 kg (144 lb 8 oz).     reports that he quit smoking about 56 years ago. His smoking use included cigarettes. He has a 14.00 pack-year smoking history. He has never used smokeless tobacco.    Counseling Resources:    ATP IV Guidelines  Pooled Cohorts Equation Calculator  FRAX Risk Assessment  ICSI Preventive Guidelines  Dietary Guidelines for Americans, 2010  [employer]'s MyPlate  ASA Prophylaxis  Lung CA Screening           Archie Meadows MD, FAAFP     [facility]  [address] [place] [id number]  [email or web address]   Office: [phone]  Fax: [phone]  Pager: [phone]       The patient was counseled and encouraged to consider modifying their diet and eating habits. He was provided with information on recommended healthy diet options.  The patient was provided with written information regarding signs of hearing loss.  Information on urinary incontinence and treatment options given to patient.  Information on urinary incontinence and treatment options given to patient.  "

## 2021-09-02 NOTE — LETTER
[facility]  [address] [facility] [place] [facility] [place] [address]  [phone]                    September 13, 2021    Jasen Rosario  [address] [address] [facility] [address]      Dear Jasen,    Here is a summary of your recent test results:  Labs are overall quite good, except lipids have increased     We advise:     Continue current cares.   Balanced low cholesterol diet.   Regular exercise.   Recheck lipids in 3-4 months, consider statin if still elevated (lipid reflex)   Your test results are enclosed.      Please contact me if you have any questions.           Thank you very much for trusting [facility].     Healthy regards,          Archie Meadows M.D.        Results for orders placed or performed in visit on 09/02/21   Comprehensive metabolic panel     Status: Abnormal   Result Value Ref Range    Sodium 137 133 - 144 mmol/L    Potassium 4.3 3.4 - 5.3 mmol/L    Chloride 107 94 - 109 mmol/L    Carbon Dioxide (CO2) 26 20 - 32 mmol/L    Anion Gap 4 3 - 14 mmol/L    Urea Nitrogen 13 7 - 30 mg/dL    Creatinine 1.09 0.52 - 1.25 mg/dL    Calcium 8.6 8.5 - 10.1 mg/dL    Glucose 93 70 - 99 mg/dL    Alkaline Phosphatase 81 40 - 150 U/L    AST 22 0 - 45 U/L    ALT 27 0 - 70 U/L    Protein Total 6.6 (L) 6.8 - 8.8 g/dL    Albumin 3.6 3.4 - 5.0 g/dL    Bilirubin Total 0.7 0.2 - 1.3 mg/dL    GFR Estimate 66 >60 mL/min/1.73m2    Narrative    The sex of this patient cannot be reliably determined based on discrepancies in demographics (legal sex, sex assigned at birth, gender identity).  Both male and female reference ranges are provided where applicable.  Careful evaluation of the patient s results as compared to the gender specific reference intervals is required in this setting.    Lipid panel reflex to direct LDL Fasting     Status: Abnormal   Result Value Ref Range    Cholesterol 222 (H) <200 mg/dL    Triglycerides 59 <150 mg/dL    Direct Measure HDL 72 >=40 mg/dL     LDL Cholesterol Calculated 138 (H) <=100 mg/dL    Non HDL Cholesterol 150 (H) <130 mg/dL    Patient Fasting > 8hrs? Yes     Narrative    The sex of this patient cannot be reliably determined based on discrepancies in demographics (legal sex, sex assigned at birth, gender identity).  Both male and female reference ranges are provided where applicable.  Careful evaluation of the patient s results as compared to the gender specific reference intervals is required in this setting.    CK total     Status: Normal   Result Value Ref Range     30 - 300 U/L    Narrative    The sex of this patient cannot be reliably determined based on discrepancies in demographics (legal sex, sex assigned at birth, gender identity).  Both male and female reference ranges are provided where applicable.  Careful evaluation of the patient s results as compared to the gender specific reference intervals is required in this setting.    CBC with platelets     Status: Normal   Result Value Ref Range    WBC Count 6.5 4.0 - 11.0 10e3/uL    RBC Count 5.06 3.80 - 5.90 10e6/uL    Hemoglobin 15.7 11.7 - 17.7 g/dL    Hematocrit 45.4 35.0 - 53.0 %    MCV 90 78 - 100 fL    MCH 31.0 26.5 - 33.0 pg    MCHC 34.6 31.5 - 36.5 g/dL    RDW 12.4 10.0 - 15.0 %    Platelet Count 183 150 - 450 10e3/uL    Narrative    The sex of this patient cannot be reliably determined based on discrepancies in demographics (legal sex, sex assigned at birth, gender identity).  Both male and female reference ranges are provided where applicable.  Careful evaluation of the patient s results as compared to the gender specific reference intervals is required in this setting.    TSH with free T4 reflex     Status: Normal   Result Value Ref Range    TSH 2.44 0.40 - 4.00 mU/L   Albumin Random Urine Quantitative with Creat Ratio     Status: None   Result Value Ref Range    Creatinine Urine mg/dL 56 mg/dL    Albumin Urine mg/L <5 mg/L    Albumin Urine mg/g Cr      Narrative    The sex  of this patient cannot be reliably determined based on discrepancies in demographics (legal sex, sex assigned at birth, gender identity).  Both male and female reference ranges are provided where applicable.  Careful evaluation of the patient s results as compared to the gender specific reference intervals is required in this setting.    UA Macro with Reflex to Micro and Culture - lab collect     Status: Normal    Specimen: Urine, Midstream   Result Value Ref Range    Color Urine Yellow Colorless, Straw, Light Yellow, Yellow    Appearance Urine Clear Clear    Glucose Urine Negative Negative mg/dL    Bilirubin Urine Negative Negative    Ketones Urine Negative Negative mg/dL    Specific Gravity Urine 1.010 1.003 - 1.035    Blood Urine Negative Negative    pH Urine 6.0 5.0 - 7.0    Protein Albumin Urine Negative Negative mg/dL    Urobilinogen Urine 0.2 0.2, 1.0 E.U./dL    Nitrite Urine Negative Negative    Leukocyte Esterase Urine Negative Negative    Narrative    Microscopic not indicated   Lactate Dehydrogenase     Status: Normal   Result Value Ref Range    Lactate Dehydrogenase 178 81 - 234 U/L    Narrative    The sex of this patient cannot be reliably determined based on discrepancies in demographics (legal sex, sex assigned at birth, gender identity).  Both male and female reference ranges are provided where applicable.  Careful evaluation of the patient s results as compared to the gender specific reference intervals is required in this setting.    PSA, tumor marker     Status: Normal   Result Value Ref Range    PSA Tumor Marker <0.01 0.00 - 4.00 ug/L    Narrative    The sex of this patient cannot be reliably determined based on discrepancies in demographics (legal sex, sex assigned at birth, gender identity).  Both male and female reference ranges are provided where applicable.  Careful evaluation of the patient s results as compared to the gender specific reference intervals is required in this setting.   Assay  Method:  Chemiluminescence using Siemens   Vista analyzer.

## 2021-09-02 NOTE — PATIENT INSTRUCTIONS
Patient Education   Personalized Prevention Plan  You are due for the preventive services outlined below.  Your care team is available to assist you in scheduling these services.  If you have already completed any of these items, please share that information with your care team to update in your medical record.  Health Maintenance Due   Topic Date Due     Osteoporosis Screening  Never done     ANNUAL REVIEW OF HM ORDERS  Never done     COVID-19 Vaccine (1) Never done     Zoster (Shingles) Vaccine (2 of 3) 05/29/2013     Depression Assessment  12/10/2019     FALL RISK ASSESSMENT  08/26/2021     Cholesterol Lab  09/01/2021     Flu Vaccine (1) Never done     Prostate Test  09/01/2021       Understanding USDA MyPlate  The USDA has guidelines to help you make healthy food choices. These are called MyPlate. MyPlate shows the food groups that make up healthy meals using the image of a place setting. Before you eat, think about the healthiest choices for what to put on your plate or in your cup or bowl. To learn more about building a healthy plate, visit www.choosemyplate.gov.    The food groups    Fruits. Any fruit or 100% fruit juice counts as part of the Fruit Group. Fruits may be fresh, canned, frozen, or dried, and may be whole, cut-up, or pureed. Make 1/2 of your plate fruits and vegetables.    Vegetables. Any vegetable or 100% vegetable juice counts as a member of the Vegetable Group. Vegetables may be fresh, frozen, canned, or dried. They can be served raw or cooked and may be whole, cut-up, or mashed. Make 1/2 of your plate fruits and vegetables.    Grains. All foods made from grains are part of the Grains Group. These include wheat, rice, oats, cornmeal, and barley. Grains are often used to make foods such as bread, pasta, oatmeal, cereal, tortillas, and grits. Grains should be no more than 1/4 of your plate. At least half of your grains should be whole grains.    Protein. This group includes meat, poultry,  seafood, beans and peas, eggs, processed soy products (such as tofu), nuts (including nut butters), and seeds. Make protein choices no more than 1/4 of your plate. Meat and poultry choices should be lean or low fat.    Dairy. The Dairy Group includes all fluid milk products and foods made from milk that contain calcium, such as yogurt and cheese. (Foods that have little calcium, such as cream, butter, and cream cheese, are not part of this group.) Most dairy choices should be low-fat or fat-free.    Oils. Oils aren't a food group, but they do contain essential nutrients. However it's important to watch your intake of oils. These are fats that are liquid at room temperature. They include canola, corn, olive, soybean, vegetable, and sunflower oil. Foods that are mainly oil include mayonnaise, certain salad dressings, and soft margarines. You likely already get your daily oil allowance from the foods you eat.  Things to limit  Eating healthy also means limiting these things in your diet:       Salt (sodium). Many processed foods have a lot of sodium. To keep sodium intake down, eat fresh vegetables, meats, poultry, and seafood when possible. Purchase low-sodium, reduced-sodium, or no-salt-added food products at the store. And don't add salt to your meals at home. Instead, season them with herbs and spices such as dill, oregano, cumin, and paprika. Or try adding flavor with lemon or lime zest and juice.    Saturated fat. Saturated fats are most often found in animal products such as beef, pork, and chicken. They are often solid at room temperature, such as butter. To reduce your saturated fat intake, choose leaner cuts of meat and poultry. And try healthier cooking methods such as grilling, broiling, roasting, or baking. For a simple lower-fat swap, use plain nonfat yogurt instead of mayonnaise when making potato salad or macaroni salad.    Added sugars. These are sugars added to foods. They are in foods such as ice  cream, candy, soda, fruit drinks, sports drinks, energy drinks, cookies, pastries, jams, and syrups. Cut down on added sugars by sharing sweet treats with a family member or friend. You can also choose fruit for dessert, and drink water or other unsweetened beverages.     [employer] last reviewed this educational content on 6/1/2020 2000-2021 [employer]. All rights reserved. This information is not intended as a substitute for professional medical care. Always follow your healthcare professional's instructions.          Signs of Hearing Loss      Hearing much better with one ear can be a sign of hearing loss.   Hearing loss is a problem shared by many people. In fact, it is one of the most common health problems, particularly as people age. Most people age 65 and older have some hearing loss. By age 80, almost everyone does. Hearing loss often occurs slowly over the years. So you may not realize your hearing has gotten worse.  Have your hearing checked  Call your healthcare provider if you:    Have to strain to hear normal conversation    Have to watch other people s faces very carefully to follow what they re saying    Need to ask people to repeat what they ve said    Often misunderstand what people are saying    Turn the volume of the television or radio up so high that others complain    Feel that people are mumbling when they re talking to you    Find that the effort to hear leaves you feeling tired and irritated    Notice, when using the phone, that you hear better with one ear than the other  [employer] last reviewed this educational content on 1/1/2020 2000-2021 [employer]. All rights reserved. This information is not intended as a substitute for professional medical care. Always follow your healthcare professional's instructions.          Urinary Incontinence (Male)    Urinary incontinence means not being able to control the release of urine from the bladder.   Causes  Common  causes of urinary incontinence in men include:    Infection    Certain medicines    Aging    Poor pelvic muscle tone    Bladder spasms    Obesity    Trouble urinating and fully emptying the bladder (urinary retention)  Other things that can cause incontinence are:     Nervous system diseases    Diabetes    Sleep apnea    Urinary tract infections    Prostate surgery    Pelvic injury  Constipation and smoking have also been identified as risk factors.   Symptoms    Urge incontinence (overactive bladder). This is a sudden urge to urinate. It occurs even though there may not be much urine in the bladder. The need to urinate often during the night is common. It's due to bladder spasms.    Stress incontinence. This is urine leakage that you can't control. It can occur with sneezing, coughing, and other actions that put stress on the bladder.    Treatment  Treatment depends on what is causing the condition. Bladder infections are treated with antibiotics. Urinary retention is treated with a bladder catheter.   Home care  Follow these guidelines when caring for yourself at home:    Don't have any foods and drinks that may irritate the bladder. This includes:  ? Chocolate  ? Alcohol  ? Caffeine  ? Carbonated drinks  ? Acidic fruits and juices    Limit fluids to 6 to 8 cups a day.    Lose weight if you are overweight. This will reduce your symptoms.    If advised, do regular pelvic muscle-strengthening exercises such as Kegel exercises.    If needed, wear absorbent pads to catch urine. Change the pads often. This is for good hygiene and to prevent skin and bladder infections.    Bathe daily for good hygiene.    If an antibiotic was prescribed to treat a bladder infection, take it until it's finished. Keep taking it even if you are feeling better. This is to make sure your infection has cleared.    If a catheter was left in place, keep bacteria from getting into the collection bag. Don't disconnect the catheter from the  collection bag.    Use a leg band to secure the catheter drainage tube, so it does not pull on the catheter. Drain the collection bag when it becomes full. To do this, use the drain spout at the bottom of the bag. Don't disconnect the bag from the catheter.    Don't pull on or try to remove a catheter. The catheter must be removed by a healthcare provider.    If you smoke, stop. Ask your provider for help if you can't do this on your own.  Follow-up care  Follow up with your healthcare provider, or as advised.  When to get medical advice  Call your healthcare provider right away if any of these occur:    Fever over 100.4 F (38 C), or as directed by your provider    Bladder pain or fullness    Belly swelling, nausea, or vomiting    Back pain    Weakness, dizziness, or fainting    If a catheter was left in place, return if:  ? The catheter falls out  ? The catheter stops draining for 6 hours  ? Your urine gets cloudy or smells bad  [employer] last reviewed this educational content on 1/1/2020 2000-2021 [employer]. All rights reserved. This information is not intended as a substitute for professional medical care. Always follow your healthcare professional's instructions.          Urinary Incontinence, Female (Adult)   Urinary incontinence means loss of bladder control. This problem affects many women, especially as they get older. If you have incontinence, you may be embarrassed to ask for help. But know that this problem can be treated.   Types of Incontinence  There are different types of incontinence. Two of the main types are described here. You can have more than one type.     Stress incontinence. With this type, urine leaks when pressure (stress) is put on the bladder. This may happen when you cough, sneeze, or laugh. Stress incontinence most often occurs because the pelvic floor muscles that support the bladder and urethra are weak. This can happen after pregnancy and vaginal childbirth or a  hysterectomy. It can also be due to excess body weight or hormone changes.    Urge incontinence (also called overactive bladder). With this type, a sudden urge to urinate is felt often. This may happen even though there may not be much urine in the bladder. The need to urinate often during the night is common. Urge incontinence most often occurs because of bladder spasms. This may be due to bladder irritation or infection. Damage to bladder nerves or pelvic muscles, constipation, and certain medicines can also lead to urge incontinence.  Treatment depends on the cause. Further evaluation is needed to find the type you have. This will likely include an exam and certain tests. Based on the results, you and your healthcare provider can then plan treatment. Until a diagnosis is made, the home care tips below can help ease symptoms.   Home care    Do pelvic floor muscle exercises, if they are prescribed. The pelvic floor muscles help support the bladder and urethra. Many women find that their symptoms improve when doing special exercises that strengthen these muscles. To do the exercises, contract the muscles you would use to stop your stream of urine. But do this when you re not urinating. Hold for 10 seconds, then relax. Repeat 10 to 20 times in a row, at least 3 times a day. Your healthcare provider may give you other instructions for how to do the exercises and how often.    Keep a bladder diary. This helps track how often and how much you urinate over a set period of time. Bring this diary with you to your next visit with the provider. The information can help your provider learn more about your bladder problem.    Lose weight, if advised to by your provider. Extra weight puts pressure on the bladder. Your provider can help you create a weight-loss plan that s right for you. This may include exercising more and making certain diet changes.    Don't have foods and drinks that may irritate the bladder. These can  include alcohol and caffeinated drinks.    Quit smoking. Smoking and other tobacco use can lead to a long-term (chronic) cough that strains the pelvic floor muscles. Smoking may also damage the bladder and urethra. Talk with your provider about treatments or methods you can use to quit smoking.    If drinking large amounts of fluid makes you have symptoms, you may be advised to limit your fluid intake. You may also be advised to drink most of your fluids during the day and to limit fluids at night.    If you re worried about urine leakage or accidents, you may wear absorbent pads to catch urine. Change the pads often. This helps reduce discomfort. It may also reduce the risk of skin or bladder infections.    Follow-up care  Follow up with your healthcare provider, or as directed. It may take some to find the right treatment for your problem. But healthy lifestyle changes can be made right away. These include such things as exercising on a regular basis, eating a healthy diet, losing weight (if needed), and quitting smoking. Your treatment plan may include special therapies or medicines. Certain procedures or surgery may also be options. Talk about any questions you have with your provider.   When to seek medical advice  Call the healthcare provider right away if any of these occur:    Fever of 100.4 F (38 C) or higher, or as directed by your provider    Bladder pain or fullness    Belly swelling    Nausea or vomiting    Back pain    Weakness, dizziness, or fainting  [employer] last reviewed this educational content on 1/1/2020 2000-2021 [employer]. All rights reserved. This information is not intended as a substitute for professional medical care. Always follow your healthcare professional's instructions.

## 2021-09-03 ENCOUNTER — MYC MEDICAL ADVICE (OUTPATIENT)
Dept: FAMILY MEDICINE | Facility: CLINIC | Age: 76
End: 2021-09-03

## 2021-09-03 LAB
ALBUMIN SERPL-MCNC: 3.6 G/DL (ref 3.4–5)
ALP SERPL-CCNC: 81 U/L (ref 40–150)
ALT SERPL W P-5'-P-CCNC: 27 U/L (ref 0–70)
ANION GAP SERPL CALCULATED.3IONS-SCNC: 4 MMOL/L (ref 3–14)
AST SERPL W P-5'-P-CCNC: 22 U/L (ref 0–45)
BILIRUB SERPL-MCNC: 0.7 MG/DL (ref 0.2–1.3)
BUN SERPL-MCNC: 13 MG/DL (ref 7–30)
CALCIUM SERPL-MCNC: 8.6 MG/DL (ref 8.5–10.1)
CHLORIDE BLD-SCNC: 107 MMOL/L (ref 94–109)
CHOLEST SERPL-MCNC: 222 MG/DL
CK SERPL-CCNC: 198 U/L (ref 30–300)
CO2 SERPL-SCNC: 26 MMOL/L (ref 20–32)
CREAT SERPL-MCNC: 1.09 MG/DL (ref 0.52–1.25)
FASTING STATUS PATIENT QL REPORTED: YES
GFR SERPL CREATININE-BSD FRML MDRD: 66 ML/MIN/1.73M2
GLUCOSE BLD-MCNC: 93 MG/DL (ref 70–99)
HDLC SERPL-MCNC: 72 MG/DL
LDLC SERPL CALC-MCNC: 138 MG/DL
NONHDLC SERPL-MCNC: 150 MG/DL
POTASSIUM BLD-SCNC: 4.3 MMOL/L (ref 3.4–5.3)
PROT SERPL-MCNC: 6.6 G/DL (ref 6.8–8.8)
PSA SERPL-MCNC: <0.01 UG/L (ref 0–4)
SODIUM SERPL-SCNC: 137 MMOL/L (ref 133–144)
TRIGL SERPL-MCNC: 59 MG/DL
TSH SERPL DL<=0.005 MIU/L-ACNC: 2.44 MU/L (ref 0.4–4)

## 2021-09-04 LAB
CREAT UR-MCNC: 56 MG/DL
MICROALBUMIN UR-MCNC: <5 MG/L
MICROALBUMIN/CREAT UR: NORMAL MG/G{CREAT}

## 2021-10-23 ENCOUNTER — HEALTH MAINTENANCE LETTER (OUTPATIENT)
Age: 76
End: 2021-10-23

## 2022-01-06 ENCOUNTER — DOCUMENTATION ONLY (OUTPATIENT)
Dept: LAB | Facility: CLINIC | Age: 77
End: 2022-01-06
Payer: COMMERCIAL

## 2022-01-06 DIAGNOSIS — E78.5 HYPERLIPIDEMIA LDL GOAL <130: Primary | ICD-10-CM

## 2022-01-14 ENCOUNTER — LAB (OUTPATIENT)
Dept: LAB | Facility: CLINIC | Age: 77
End: 2022-01-14
Payer: COMMERCIAL

## 2022-01-14 DIAGNOSIS — E78.5 HYPERLIPIDEMIA LDL GOAL <130: ICD-10-CM

## 2022-01-14 LAB
CHOLEST SERPL-MCNC: 192 MG/DL
FASTING STATUS PATIENT QL REPORTED: YES
HDLC SERPL-MCNC: 80 MG/DL
LDLC SERPL CALC-MCNC: 102 MG/DL
NONHDLC SERPL-MCNC: 112 MG/DL
TRIGL SERPL-MCNC: 50 MG/DL

## 2022-01-14 PROCEDURE — 36415 COLL VENOUS BLD VENIPUNCTURE: CPT

## 2022-01-14 PROCEDURE — 80061 LIPID PANEL: CPT

## 2022-05-10 ENCOUNTER — OFFICE VISIT (OUTPATIENT)
Dept: FAMILY MEDICINE | Facility: CLINIC | Age: 77
End: 2022-05-10
Payer: COMMERCIAL

## 2022-05-10 VITALS
HEART RATE: 56 BPM | RESPIRATION RATE: 12 BRPM | DIASTOLIC BLOOD PRESSURE: 66 MMHG | SYSTOLIC BLOOD PRESSURE: 126 MMHG | HEIGHT: 68 IN | WEIGHT: 144 LBS | BODY MASS INDEX: 21.82 KG/M2 | TEMPERATURE: 97.1 F | OXYGEN SATURATION: 99 %

## 2022-05-10 DIAGNOSIS — R42 DIZZINESS: Primary | ICD-10-CM

## 2022-05-10 PROCEDURE — 99213 OFFICE O/P EST LOW 20 MIN: CPT | Performed by: FAMILY MEDICINE

## 2022-05-10 PROCEDURE — 93000 ELECTROCARDIOGRAM COMPLETE: CPT | Performed by: FAMILY MEDICINE

## 2022-05-10 NOTE — PROGRESS NOTES
Assessment & Plan     Dizziness: unclear etiology for constant dizziness. No focal neurological deficits on exam. Will order brain MRI to evaluate for any pathological process and also place referral to neurology for further evaluation.  - EKG 12-lead complete w/read - Clinics  - EKG 12-lead complete w/read - Clinics  - MR Brain w/o & w Contrast; Future  - Adult Neurology  Referral; Future                 Return in about 1 month (around 6/10/2022) for follow up if symptoms not improving.    Abdiel Burgess DO  [facility] MELISSA Murphy is a 76 year old who presents for the following health issues     HPI     Dizziness  Onset/Duration: 9 months  Description:   Do you feel faint: no  Does it feel like the surroundings (bed, room) are moving: no  Unsteady/off balance: YES  Have you passed out or fallen: no  Intensity: 5/10  Progression of Symptoms: same and intermittent  Accompanying Signs & Symptoms:  Heart palpitations or chest pain: no  Nausea, vomiting: no  Weakness or lack of coordination in arms or legs: no  Vision or speech changes: no  Numbness or tingling: no  Ringing in ears (Tinnitus): YES  Hearing Loss: no  History:   Head trauma/concussion history: Yes  Previous similar symptoms: no  Recent bleeding history: no  Any new medications (BP?): no  Precipitating factors:   Worse with activity: YES  Worse with head movement: YES  Alleviating factors:   Does staying in a fixed position give relief: YES  Therapies tried and outcome: None    He is feeling dizzy/lightheaded all day, every day -- has been going on since August. Denies any vertigo. Has done Epley maneuver at home without any improvement. He can move his head around without any issue. He is still playing pickle ball but after quick movements, will have to return to his position slowly.       Review of Systems   Constitutional, HEENT, cardiovascular, pulmonary, gi and gu systems are negative, except as  "otherwise noted.      Objective    /66 (Cuff Size: Adult Regular)   Pulse 56   Temp 97.1  F (36.2  C) (Tympanic)   Resp 12   Ht 1.715 m (5' 7.5\")   Wt 65.3 kg (144 lb)   SpO2 99%   BMI 22.22 kg/m    Body mass index is 22.22 kg/m .  Physical Exam   GENERAL: healthy, alert and no distress  EYES: Eyes grossly normal to inspection, PERRL and conjunctivae and sclerae normal  HENT: ear canals and TM's normal, nose and mouth without ulcers or lesions  NECK: no adenopathy, no asymmetry, masses, or scars and thyroid normal to palpation  RESP: lungs clear to auscultation - no rales, rhonchi or wheezes  CV: regular rates and rhythm, normal S1 S2, no S3 or S4 and no murmur, click or rub  NEURO: Normal strength and tone, sensory exam grossly normal, mentation intact, cranial nerves 2-12 intact and Romberg normal          "

## 2022-05-26 ENCOUNTER — HOSPITAL ENCOUNTER (OUTPATIENT)
Dept: MRI IMAGING | Facility: CLINIC | Age: 77
Discharge: HOME | End: 2022-05-26
Attending: FAMILY MEDICINE | Admitting: FAMILY MEDICINE
Payer: COMMERCIAL

## 2022-05-26 DIAGNOSIS — R42 DIZZINESS: ICD-10-CM

## 2022-05-26 PROCEDURE — A9585 GADOBUTROL INJECTION: HCPCS | Performed by: FAMILY MEDICINE

## 2022-05-26 PROCEDURE — 255N000002 HC RX 255 OP 636: Performed by: FAMILY MEDICINE

## 2022-05-26 PROCEDURE — 70553 MRI BRAIN STEM W/O & W/DYE: CPT

## 2022-05-26 RX ORDER — GADOBUTROL 604.72 MG/ML
1 INJECTION INTRAVENOUS ONCE
Status: COMPLETED | OUTPATIENT
Start: 2022-05-26 | End: 2022-05-26

## 2022-05-26 RX ADMIN — GADOBUTROL 7 ML: 604.72 INJECTION INTRAVENOUS at 13:40

## 2022-06-01 ENCOUNTER — MYC MEDICAL ADVICE (OUTPATIENT)
Dept: FAMILY MEDICINE | Facility: CLINIC | Age: 77
End: 2022-06-01
Payer: COMMERCIAL

## 2022-06-01 DIAGNOSIS — D32.9 MENINGIOMA (H): Primary | ICD-10-CM

## 2022-06-07 ENCOUNTER — MEDICAL CORRESPONDENCE (OUTPATIENT)
Dept: HEALTH INFORMATION MANAGEMENT | Facility: CLINIC | Age: 77
End: 2022-06-07
Payer: COMMERCIAL

## 2022-06-08 NOTE — TELEPHONE ENCOUNTER
Please see my chart message below     Please review and advise     Thank you     Lorena Harvey RN, BSN  [facility]

## 2022-06-09 NOTE — TELEPHONE ENCOUNTER
Completed forms faxed back to [facility]  at [phone].   Originals sent to be scanned.       Kelli Geiger

## 2022-06-09 NOTE — TELEPHONE ENCOUNTER
Referral form to [facility] filled out. Please help finish insurance information and fax. Thanks!    Abdiel Burgess,   6/8/2022 11:41 PM

## 2022-06-15 ENCOUNTER — TRANSFERRED RECORDS (OUTPATIENT)
Dept: HEALTH INFORMATION MANAGEMENT | Facility: CLINIC | Age: 77
End: 2022-06-15
Payer: COMMERCIAL

## 2022-07-05 ENCOUNTER — APPOINTMENT (OUTPATIENT)
Dept: CT IMAGING | Facility: CLINIC | Age: 77
End: 2022-07-05
Attending: EMERGENCY MEDICINE
Payer: COMMERCIAL

## 2022-07-05 ENCOUNTER — HOSPITAL ENCOUNTER (EMERGENCY)
Facility: CLINIC | Age: 77
Discharge: HOME | End: 2022-07-05
Attending: EMERGENCY MEDICINE | Admitting: EMERGENCY MEDICINE
Payer: COMMERCIAL

## 2022-07-05 ENCOUNTER — APPOINTMENT (OUTPATIENT)
Dept: MRI IMAGING | Facility: CLINIC | Age: 77
End: 2022-07-05
Payer: COMMERCIAL

## 2022-07-05 VITALS
SYSTOLIC BLOOD PRESSURE: 145 MMHG | OXYGEN SATURATION: 100 % | BODY MASS INDEX: 22.89 KG/M2 | HEART RATE: 44 BPM | WEIGHT: 148.37 LBS | TEMPERATURE: 98 F | DIASTOLIC BLOOD PRESSURE: 79 MMHG | RESPIRATION RATE: 20 BRPM

## 2022-07-05 DIAGNOSIS — R42 DIZZINESS: ICD-10-CM

## 2022-07-05 DIAGNOSIS — R47.01 EXPRESSIVE APHASIA: ICD-10-CM

## 2022-07-05 DIAGNOSIS — R47.81 SLURRED SPEECH: ICD-10-CM

## 2022-07-05 DIAGNOSIS — R00.1 BRADYCARDIA: ICD-10-CM

## 2022-07-05 LAB
ALBUMIN SERPL-MCNC: 3.9 G/DL (ref 3.4–5)
ALP SERPL-CCNC: 83 U/L (ref 40–150)
ALT SERPL W P-5'-P-CCNC: 27 U/L (ref 0–70)
ANION GAP SERPL CALCULATED.3IONS-SCNC: 10 MMOL/L (ref 3–14)
APTT PPP: 22 SECONDS (ref 22–38)
AST SERPL W P-5'-P-CCNC: 31 U/L (ref 0–45)
BASOPHILS # BLD AUTO: 0.1 10E3/UL (ref 0–0.2)
BASOPHILS NFR BLD AUTO: 0 %
BILIRUB SERPL-MCNC: 0.7 MG/DL (ref 0.2–1.3)
BUN SERPL-MCNC: 16 MG/DL (ref 7–30)
CALCIUM SERPL-MCNC: 9 MG/DL (ref 8.5–10.1)
CHLORIDE BLD-SCNC: 109 MMOL/L (ref 94–109)
CO2 SERPL-SCNC: 18 MMOL/L (ref 20–32)
CREAT SERPL-MCNC: 1.08 MG/DL (ref 0.66–1.25)
EOSINOPHIL # BLD AUTO: 0.1 10E3/UL (ref 0–0.7)
EOSINOPHIL NFR BLD AUTO: 1 %
ERYTHROCYTE [DISTWIDTH] IN BLOOD BY AUTOMATED COUNT: 12.1 % (ref 10–15)
GFR SERPL CREATININE-BSD FRML MDRD: 71 ML/MIN/1.73M2
GLUCOSE BLD-MCNC: 137 MG/DL (ref 70–99)
HCT VFR BLD AUTO: 44.6 % (ref 40–53)
HGB BLD-MCNC: 15.7 G/DL (ref 13.3–17.7)
IMM GRANULOCYTES # BLD: 0.1 10E3/UL
IMM GRANULOCYTES NFR BLD: 1 %
INR PPP: 1.02 (ref 0.85–1.15)
LYMPHOCYTES # BLD AUTO: 1.8 10E3/UL (ref 0.8–5.3)
LYMPHOCYTES NFR BLD AUTO: 14 %
MCH RBC QN AUTO: 30.9 PG (ref 26.5–33)
MCHC RBC AUTO-ENTMCNC: 35.2 G/DL (ref 31.5–36.5)
MCV RBC AUTO: 88 FL (ref 78–100)
MONOCYTES # BLD AUTO: 0.9 10E3/UL (ref 0–1.3)
MONOCYTES NFR BLD AUTO: 7 %
NEUTROPHILS # BLD AUTO: 9.7 10E3/UL (ref 1.6–8.3)
NEUTROPHILS NFR BLD AUTO: 77 %
NRBC # BLD AUTO: 0 10E3/UL
NRBC BLD AUTO-RTO: 0 /100
PLATELET # BLD AUTO: 145 10E3/UL (ref 150–450)
POTASSIUM BLD-SCNC: 4.3 MMOL/L (ref 3.4–5.3)
PROT SERPL-MCNC: 6.4 G/DL (ref 6.8–8.8)
RBC # BLD AUTO: 5.08 10E6/UL (ref 4.4–5.9)
SODIUM SERPL-SCNC: 137 MMOL/L (ref 133–144)
TROPONIN I SERPL HS-MCNC: 4 NG/L
WBC # BLD AUTO: 12.6 10E3/UL (ref 4–11)

## 2022-07-05 PROCEDURE — 85004 AUTOMATED DIFF WBC COUNT: CPT | Performed by: EMERGENCY MEDICINE

## 2022-07-05 PROCEDURE — 85730 THROMBOPLASTIN TIME PARTIAL: CPT | Performed by: EMERGENCY MEDICINE

## 2022-07-05 PROCEDURE — 70496 CT ANGIOGRAPHY HEAD: CPT

## 2022-07-05 PROCEDURE — 250N000011 HC RX IP 250 OP 636: Performed by: EMERGENCY MEDICINE

## 2022-07-05 PROCEDURE — 85610 PROTHROMBIN TIME: CPT | Performed by: EMERGENCY MEDICINE

## 2022-07-05 PROCEDURE — 36415 COLL VENOUS BLD VENIPUNCTURE: CPT | Performed by: EMERGENCY MEDICINE

## 2022-07-05 PROCEDURE — 70551 MRI BRAIN STEM W/O DYE: CPT

## 2022-07-05 PROCEDURE — 250N000009 HC RX 250: Performed by: EMERGENCY MEDICINE

## 2022-07-05 PROCEDURE — 99291 CRITICAL CARE FIRST HOUR: CPT | Mod: 25

## 2022-07-05 PROCEDURE — 250N000011 HC RX IP 250 OP 636

## 2022-07-05 PROCEDURE — 80053 COMPREHEN METABOLIC PANEL: CPT | Performed by: EMERGENCY MEDICINE

## 2022-07-05 PROCEDURE — 70450 CT HEAD/BRAIN W/O DYE: CPT | Mod: XS

## 2022-07-05 PROCEDURE — 70498 CT ANGIOGRAPHY NECK: CPT

## 2022-07-05 PROCEDURE — 84484 ASSAY OF TROPONIN QUANT: CPT | Performed by: EMERGENCY MEDICINE

## 2022-07-05 RX ORDER — IOPAMIDOL 755 MG/ML
75 INJECTION, SOLUTION INTRAVASCULAR ONCE
Status: COMPLETED | OUTPATIENT
Start: 2022-07-05 | End: 2022-07-05

## 2022-07-05 RX ORDER — ONDANSETRON 2 MG/ML
INJECTION INTRAMUSCULAR; INTRAVENOUS
Status: COMPLETED
Start: 2022-07-05 | End: 2022-07-05

## 2022-07-05 RX ADMIN — IOPAMIDOL 75 ML: 755 INJECTION, SOLUTION INTRAVENOUS at 17:53

## 2022-07-05 RX ADMIN — ONDANSETRON 4 MG: 2 INJECTION INTRAMUSCULAR; INTRAVENOUS at 18:14

## 2022-07-05 RX ADMIN — SODIUM CHLORIDE 100 ML: 900 INJECTION INTRAVENOUS at 17:53

## 2022-07-05 ASSESSMENT — ENCOUNTER SYMPTOMS
NUMBNESS: 0
SPEECH DIFFICULTY: 1
HEADACHES: 0
DIZZINESS: 1

## 2022-07-05 NOTE — CONSULTS
[facility]    Stroke Consult Note    Reason for Consult: Stroke Code     Chief Complaint: Stroke Symptoms      HPI  Jasen Rosario is a 77 YO M w/PMHx sig for WALDO, HLD, prostate ca s/p prostatectomy, chronic episodic dizziness who presents with acute on chronic exacerbation of dizziness, transient aphasia, N/V.     Pt has had dizziness since 9/2021. He was at [facility] today for eval, after performing caloric stim at 1700 pt developed acute worsening of dizziness as well as expressive aphasia. There was also concerns for slurred speech and facial weakness by EMS/initial providers.    Of note, pt was able to walk to ambulance independently to ambulance transport.     On presentation, vitals unremarkable. Labs with WBC 12.6. NCCT head, CTA H/N with no LVO, gross intracranial abnormalities.     Imaging Findings  CTA H/N: Mild L V4, basilar athero, small bilat fetal PCA's, no LVO  NCCT head: No acute intracranial pathologies such as ICH/IPH    Intravenous Thrombolysis  Not given due to:   - minor/isolated/quickly resolving symptoms    Endovascular Treatment  Not initiated due to absence of proximal vessel occlusion    Impression   CTA H/N, NCCT reassuring against LVO/ICH.     No acute interventions as symptoms transiently improved and no disabling deficits, pt able to independently ambulate.    Likely dizziness/N/V was provoked by caloric testing at balance clinic given timing of symptoms.     Recommendations  - Ordered MRI w/o brain to r/o small cerebellar infarct  - If MRI negative for acute ischemic stroke, pt is safe neurologically to discharge home, if patient has acute ischemic stroke, will need admission for monitoring/stroke workup    Patient Follow-up    - final recommendation pending work-up    Thank you for this consult. We will sign-off unless MRI shows acute ischemic infarct.     The Stroke Staff is Dr. Nell Romo.    Checo Cabrera MD  Vascular Neurology Fellow  To page  "me or covering stroke neurology team member, click here: [facility]   Choose \"On Call\" tab at top, then search dropdown box for \"Neurology Adult\", select location, press Enter, then look for stroke/neuro ICU/telestroke.    ______________________________________________________    Clinically Significant Risk Factors Present on Admission                     Past Medical History   Past Medical History:   Diagnosis Date     Anxiety state, unspecified 1991     Basal cell carcinoma     Left Ear - Moh's Dr Young     Colon polyp      ED (erectile dysfunction) 2005     Hyperlipidemia LDL goal <130 2001     Hypoglycemia, unspecified 1991     Infectious mononucleosis 1969     Lumbar stenosis     TCO - Dr rIene - with neuritis     Lymphoma (H)     SLL - B Cell - Dr Barrios/Marcus     Major depressive disorder, single episode in full remission (H) 1991     OA (osteoarthritis)     Severe Lt Hip     Prostate cancer (H) 5/13    Dr Smyth -  - [place] 3+3 - intermediate progression risk - pT2cN0     Seasonal allergies      Past Surgical History   Past Surgical History:   Procedure Laterality Date     APPENDECTOMY  1960    APPY     ARTHROPLASTY HIP Left 6/21/2017    Left BETTY - Dr MELISSA Madrigal     ARTHROPLASTY KNEE Left 10/2/2017    LEFT TOTAL KNEE ARTHROPLASTY - Dr MELISSA Madrigal     COLONOSCOPY  12/04, 12/14    colon polyp - serrated adenoma 5 mm - due 3 yrs     COLONOSCOPY  03/16/2018    Dr. Naila BELLE - normal - due 5 yrs - will need MAC     COLONOSCOPY N/A 4/18/2018    Dr. Naila BELLE - normal - due 5 yrs - will need MAC     DAVINCI PROSTATECTOMY, LYMPHADENECTOMY N/A 4/13/2015    RALP - Dr Smyth     DENTAL SURGERY  1985    wisdom teeth     SURGICAL HISTORY OF -   1980    BACK SURGERY     SURGICAL HISTORY OF -  Left 1987    ANKLE FX ORIF - Lt     SURGICAL HISTORY OF -  Left 1972    LT KNEE MCL TEAR AND MENISCUS      SURGICAL HISTORY OF -  Left 2001, 2008    LT SHOULDER ARTHROSCOPY     SURGICAL HISTORY OF -  Right 01/2007    Rt Shoulder " Surgery - Dr Ferrer     SURGICAL HISTORY OF -  Right 2018    Rt BETTY - Dr Galdamez     Medications   Home Meds  Prior to Admission medications    Medication Sig Start Date End Date Taking? Authorizing Provider   GLUCOSAMINE-CHONDROITIN PO Take 2 tablets by mouth daily    Reported, Patient   Multiple Vitamin (DAILY MULTIVITAMIN PO) Take 2 tablets by mouth daily     Reported, Patient   Omega-3 Fatty Acids (FISH OIL PO) Take 2 tablets by mouth daily     Reported, Patient       Scheduled Meds      Infusion Meds      PRN Meds      Allergies   Allergies   Allergen Reactions     Seasonal Allergies      Family History   Family History   Problem Relation Age of Onset     Cancer Mother         ? abdominal     Alcohol/Drug Father      Breast Cancer Sister         age 42     Colon Cancer No family hx of      Social History   Social History     Tobacco Use     Smoking status: Former Smoker     Packs/day: 2.00     Years: 7.00     Pack years: 14.00     Types: Cigarettes     Quit date: 1965     Years since quittin.5     Smokeless tobacco: Never Used   Vaping Use     Vaping Use: Never used   Substance Use Topics     Alcohol use: Yes     Alcohol/week: 5.0 - 6.0 standard drinks     Types: 5 - 6 Standard drinks or equivalent per week     Comment: ocassionally     Drug use: No       Review of Systems   The 10 point Review of Systems is negative other than noted in the HPI or here.        PHYSICAL EXAMINATION  Temp:  [98  F (36.7  C)] 98  F (36.7  C)  Pulse:  [46-61] 46  Resp:  [9-25] 9  BP: (101-144)/(65-80) 144/80  SpO2:  [98 %-100 %] 100 %     Gen: Mildly nauseous, dizzy but with improving symptoms  HEENT: NCAT  CV: Well perfused  Lungs: Speaking full sentences    Neuro:  MS: AO to person, place, age, month. Fluent but nasally speech, comprehension/naming intact  CN: PERRL, horizontal eye movements intact, VF full in all 4 quadrants, V1-3 intact to LT bilat  Motor: Antigravity in all 4 limbs   Sensory: LT intact in  all 4 limbs, no signs of sensory neglect with bilat stim  Coordination: FTN, HTS intact bilat    Dysphagia Screen  Per Nursing    Stroke Scales    NIHSS  1a. Level of Consciousness 0-->Alert, keenly responsive   1b. LOC Questions 0-->Answers both questions correctly   1c. LOC Commands 0-->Performs both tasks correctly   2.   Best Gaze 0-->Normal   3.   Visual 0-->No visual loss   4.   Facial Palsy 0-->Normal symmetrical movements   5a. Motor Arm, Left 0-->No drift, limb holds 90 (or 45) degrees for full 10 secs   5b. Motor Arm, Right 0-->No drift, limb holds 90 (or 45) degrees for full 10 secs   6a. Motor Leg, Left 0-->No drift, leg holds 30 degree position for full 5 secs   6b. Motor Leg, right 0-->No drift, leg holds 30 degree position for full 5 secs   7.   Limb Ataxia 0-->Absent   8.   Sensory 0-->Normal, no sensory loss   9.   Best Language 0-->No aphasia, normal   10. Dysarthria 0-->Normal   11. Extinction and Inattention  0-->No abnormality   Total 0 (07/05/22 2046)       Modified Towns Score (Pre-morbid)  0 - No symptoms.    Imaging  I personally reviewed all imaging; relevant findings per HPI.     Lab Results Data   CBC  Recent Labs   Lab 07/05/22  1744   WBC 12.6*   RBC 5.08   HGB 15.7   HCT 44.6   *     Basic Metabolic Panel    Recent Labs   Lab 07/05/22  1744      POTASSIUM 4.3   CHLORIDE 109   CO2 18*   BUN 16   CR 1.08   *   KALPESH 9.0     Liver Panel  Recent Labs   Lab 07/05/22  1744   PROTTOTAL 6.4*   ALBUMIN 3.9   BILITOTAL 0.7   ALKPHOS 83   AST 31   ALT 27     INR    Recent Labs   Lab Test 07/05/22  1744   INR 1.02      Lipid Profile    Recent Labs   Lab Test 01/14/22  0744 09/02/21  0835 09/01/20  0802 08/04/16  0903 06/15/15  0905   CHOL 192 222* 199   < > 189   HDL 80 72 77   < > 76   * 138* 110*   < > 101   TRIG 50 59 59   < > 58   CHOLHDLRATIO  --   --   --   --  2.5    < > = values in this interval not displayed.     A1C    Recent Labs   Lab Test 09/01/20  0802  06/10/19  1030   A1C 5.4 5.3     Troponin I    Recent Labs   Lab 07/05/22  1744   TROPONINIS 4          Stroke Code Data Data   Stroke Code Data  (for stroke code with tele)  Stroke code activated 07/05/22   1745   First stroke provider response         Video start time         Video end time         Last known normal 07/05/22   1700   Time of discovery  (or onset of symptoms)  07/05/22   1705   Head CT read by Stroke Neuro Dr/Provider 07/05/22   1759   Was stroke code de-escalated? Yes 07/05/22 1805           Telestroke Service Details  Type of service telemedicine diagnostic assessment of acute neurological changes   Reason telemedicine is appropriate patient requires assessment with a specialist for diagnosis and treatment of neurological symptoms   Mode of transmission secure interactive audio and video communication per Gume   Originating site (patient location) [facility]    Distant site (provider location) [facility]

## 2022-07-05 NOTE — ED NOTES
Bed: ST03  Expected date:   Expected time:   Means of arrival:   Comments:  MANSOOR  stroke ETA [id number]

## 2022-07-05 NOTE — ED PROVIDER NOTES
History   Chief Complaint:  Stroke Symptoms       The history is provided by the EMS personnel and the patient.      Jasen Rosario is a 76 year old male with a history of anxiety, hyperlipidemia, and prostate cancer, S/P prostatectomy, who presents via EMS with dizziness and aphasia. The patient has been dealing with dizziness since September of 2021. About three weeks ago, he was seen by his PCP for his symptoms and they recommended he go to the [facility] for further evaluation and therapy. Today, he was at the [facility] when they administered caloric stimulation around 1700 using hot water. The staff at the [facility] felt he was not improving as they felt he should and he began to develop worsening dizziness and expressive aphasia. EMS was called and when they arrived they found he was having slurred speech and difficulties forming words and sentences. He also possibly had a slight left-sided facial droop for EMS but this had resolved by the time he came to the ED. His blood sugar was 177 and his pressures were in the 110's. While in the ED, he states he is still feeling dizzy. He denies any headache, numbness, tingling, or weakness to any of his extremities.  He also denies any visual complaints.       Review of Systems   Neurological: Positive for dizziness and speech difficulty. Negative for numbness and headaches.   All other systems reviewed and are negative.      Allergies:  Seasonal Allergies    Medications:  Glucosamine-Chondroitin     Past Medical History:     Anxiety   Major depressive disorder  Hyperlipidemia  Erectile dysfunction  Seasonal allergies  Osteoarthritis  Prostate cancer  Colon polyp  Lumbar stenosis  Small B-cell lymphoma      Past Surgical History:    Appendectomy  Left total hip arthroplasty   Left total knee arthroplasty  Colonoscopy  DaVinci prostatectomy, lymphadenectomy   [place] teeth extraction   Left ankle fracture ORIF  Left knee MCL tear and meniscus repair  Left  shoulder arthroscopy  Right shoulder surgery   Right total hip arthroplasty      Family History:    Mother: cancer  Father: alcohol/drug  Sister: breast cancer     Social History:  The patient presents to the ED alone via EMS  PCP: Dr. Archie Meadows      Physical Exam     Patient Vitals for the past 24 hrs:   BP Temp Temp src Pulse Resp SpO2 Weight   07/05/22 2000 (!) 144/80 -- -- (!) 46 9 100 % --   07/05/22 1915 -- -- -- (!) 46 23 -- --   07/05/22 1900 -- -- -- 51 25 100 % --   07/05/22 1845 -- -- -- 53 14 100 % --   07/05/22 1830 -- -- -- 53 16 100 % --   07/05/22 1815 -- -- -- -- 24 100 % --   07/05/22 1809 -- -- -- -- -- -- 67.3 kg (148 lb 5.9 oz)   07/05/22 1808 101/69 98  F (36.7  C) Tympanic 56 18 100 % --   07/05/22 1758 124/69 -- -- 58 18 99 % --   07/05/22 1745 115/65 -- -- 61 16 98 % --       Physical Exam  Nursing note and vitals reviewed.  Constitutional:  Oriented to person, place, and time. Cooperative.   HENT:   Nose:    Nose normal.   Mouth/Throat:   Mucous membranes are normal.   Eyes:    Conjunctivae normal and EOM are normal.      Pupils are equal, round, and reactive to light.   Neck:    Trachea normal.   Cardiovascular:  Bradycardic rate, regular rhythm, normal heart sounds and normal pulses. No murmur heard.  Pulmonary/Chest:  Effort normal and breath sounds normal.   Abdominal:   Soft. Normal appearance and bowel sounds are normal.      There is no tenderness.      There is no rebound and no CVA tenderness.   Musculoskeletal:  Extremities atraumatic x 4.   Lymphadenopathy:  No cervical adenopathy.   Neurological:   Alert and oriented to person, place, and time. Normal strength.      No cranial nerve deficit or sensory deficit. GCS eye subscore is 4. GCS verbal subscore is 5. GCS motor subscore is 6.  Some stuttering expressive aphasia present, along with some slurred speech.  No facial droop appreciated at this time.  No drift.  Skin:    Skin is intact. No rash noted.   Psychiatric:   Normal  mood and affect.      Emergency Department Course     Imaging:  MR Brain w/o Contrast   Final Result   IMPRESSION:   1.  No evidence for acute infarction.      CTA Head Neck w Contrast   Final Result   IMPRESSION:       HEAD CTA:    1.  No evidence of large vessel occlusion or high-grade stenosis.    2.  Small partially calcified nodule along the right planum sphenoidale measuring approximately 4 mm, likely incidental meningioma.      NECK CTA:   1.  No evidence of large vessel occlusion or high-grade stenosis.      CT Head w/o Contrast   Final Result   IMPRESSION:    1. No CT evidence of acute ischemia or hemorrhage.   2. Small (approximately 4 mm) partially calcified nodule along the right planum sphenoidale, likely incidental meningioma.      Findings were discussed by phone between Dr. Thompson and Dr. Don at 5:59 PM on 07/05/2022.        Report per radiology    Laboratory:  Labs Ordered and Resulted from Time of ED Arrival to Time of ED Departure   COMPREHENSIVE METABOLIC PANEL - Abnormal       Result Value    Sodium 137      Potassium 4.3      Chloride 109      Carbon Dioxide (CO2) 18 (*)     Anion Gap 10      Urea Nitrogen 16      Creatinine 1.08      Calcium 9.0      Glucose 137 (*)     Alkaline Phosphatase 83      AST 31      ALT 27      Protein Total 6.4 (*)     Albumin 3.9      Bilirubin Total 0.7      GFR Estimate 71     CBC WITH PLATELETS AND DIFFERENTIAL - Abnormal    WBC Count 12.6 (*)     RBC Count 5.08      Hemoglobin 15.7      Hematocrit 44.6      MCV 88      MCH 30.9      MCHC 35.2      RDW 12.1      Platelet Count 145 (*)     % Neutrophils 77      % Lymphocytes 14      % Monocytes 7      % Eosinophils 1      % Basophils 0      % Immature Granulocytes 1      NRBCs per 100 WBC 0      Absolute Neutrophils 9.7 (*)     Absolute Lymphocytes 1.8      Absolute Monocytes 0.9      Absolute Eosinophils 0.1      Absolute Basophils 0.1      Absolute Immature Granulocytes 0.1      Absolute NRBCs 0.0      PARTIAL THROMBOPLASTIN TIME - Normal    aPTT 22     INR - Normal    INR 1.02     TROPONIN I - Normal    Troponin I High Sensitivity 4            Emergency Department Course:    Reviewed:  I reviewed nursing notes, vitals and past medical history    Assessments:  1740 I obtained history and examined the patient as noted above.   1741 Tier 1 Code Stroke called.   1745 Patient transferred to CT.   1810 Patient deescalated.   1830 I rechecked the patient and explained findings.   2131 I spoke with the patient to discuss his MRI results and discharge plan.     Consults:  1747 I spoke with Dr. Checo Cabrera, stroke-neurology fellow, to discuss the patient's history, presentation, and treatment plan.   1820 I respoke with Dr. Cabrera to discuss the patient's CT results and treatment plan.   2127 I spoke with Dr. Cabrera, stroke-neurology, to discuss the patient's treatment plan.     Interventions:  1814 Zofran 4 mg IV    Disposition:  The patient was discharged to home.     Impression & Plan   Medical Decision Making:  This is a 76-year-old male brought in by EMS due to concerns that he might be having a stroke.  Based on the history however, I felt an acute stroke was less likely.  He also has had recent MRIs that were unremarkable as well.  Nonetheless, I did call a code stroke, and the patient had the above CT scans obtained.  The stroke neurology fellow, Dr. Cabrera, came and saw the patient right away as well.  His CT scans were unremarkable, and the stroke neurology team recommended proceeding with an MRI of the brain, which we then did.  His MRI also came back unremarkable, which is reassuring.  He also seems to be back to his baseline at this point.  I doubt this was a TIA either, as this was precipitated by caloric testing.  He was at times quite bradycardic as well, and even though he indicates that he has a slow heart rate at baseline and has for years, I suspect that may be contributing to his longstanding issues  with dizziness.  He feels comfortable going home at this point, which I think is reasonable.  I recommended close outpatient follow-up with his primary physician, his neurologist, and I am providing him the contact information for the cardiology clinic.  I also recommended he return here with any concerns or worsening symptoms.    Diagnosis:    ICD-10-CM    1. Expressive aphasia  R47.01    2. Slurred speech  R47.81    3. Dizziness  R42    4. Bradycardia  R00.1          Scribe Disclosure:  I, Katty Curtis, am serving as a scribe at 5:44 PM on 7/5/2022 to document services personally performed by Nader Beard MD based on my observations and the provider's statements to me.              Nader Beard MD  07/05/22 8329

## 2022-07-05 NOTE — ED TRIAGE NOTES
Patient was at a [facility] and [facility] clinic in [place]. @ around 1700 7/5/2022 a test was conducted at the clinic by pouring water in the ear. The patient became dizzy and never fully recovered. Patient proceeded to have some expressive aphasia and slurred.      Triage Assessment     Row Name 07/05/22 0855       Triage Assessment (Adult)    Airway WDL WDL       Respiratory WDL    Respiratory WDL WDL       Skin Circulation/Temperature WDL    Skin Circulation/Temperature WDL WDL       Cardiac WDL    Cardiac WDL WDL       Peripheral/Neurovascular WDL    Peripheral Neurovascular WDL WDL       Cognitive/Neuro/Behavioral WDL    Cognitive/Neuro/Behavioral WDL speech    Speech slurred

## 2022-07-06 ENCOUNTER — PATIENT OUTREACH (OUTPATIENT)
Dept: CARE COORDINATION | Facility: CLINIC | Age: 77
End: 2022-07-06

## 2022-07-06 DIAGNOSIS — Z71.89 OTHER SPECIFIED COUNSELING: ICD-10-CM

## 2022-07-06 NOTE — PROGRESS NOTES
Clinic Care Coordination Contact  [facility]: Post-Discharge Note  SITUATION                                                      Admission:    Admission Date: 07/05/22   Reason for Admission: dizziness and aphasia.  Discharge:   Discharge Date: 07/05/22  Discharge Diagnosis: Expressive aphasia    Slurred speech    Dizziness    Bradycardia    BACKGROUND                                                      Per hospital discharge summary and inpatient provider notes:  76-year-old male brought in by EMS due to concerns that he might be having a stroke.  Based on the history however, I felt an acute stroke was less likely.  He also has had recent MRIs that were unremarkable as well.  Nonetheless, I did call a code stroke, and the patient had the above CT scans obtained.  The stroke neurology fellow, Dr. Cabrera, came and saw the patient right away as well.  His CT scans were unremarkable, and the stroke neurology team recommended proceeding with an MRI of the brain, which we then did.  His MRI also came back unremarkable, which is reassuring.  He also seems to be back to his baseline at this point.  I doubt this was a TIA either, as this was precipitated by caloric testing.  He was at times quite bradycardic as well, and even though he indicates that he has a slow heart rate at baseline and has for years, I suspect that may be contributing to his longstanding issues with dizziness.  He feels comfortable going home at this point, which I think is reasonable.  I recommended close outpatient follow-up with his primary physician, his neurologist, and I am providing him the contact information for the cardiology clinic.  I also recommended he return here with any concerns or worsening symptoms.    ASSESSMENT      Enrollment  Primary Care Care Coordination Status: Declined    Discharge Assessment  How are you doing now that you are home?: Much better  How are your symptoms? (Red Flag symptoms escalate to triage hotline  per guidelines): Improved  Do you feel your condition is stable enough to be safe at home until your provider visit?: Yes  Does the patient have their discharge instructions? : Yes  Does the patient have questions regarding their discharge instructions? : No  Were you started on any new medications or were there changes to any of your previous medications? : No  Does the patient have all of their medications?: Yes  Do you have questions regarding any of your medications? : No  Do you have all of your needed medical supplies or equipment (DME)?  (i.e. oxygen tank, CPAP, cane, etc.): Yes  Discharge follow-up appointment scheduled within 14 calendar days? : No  Is patient agreeable to assistance with scheduling? : No (Will call today- CTA encouraged him to do so.)      PLAN                                                      1 Outpatient Plan:  Schedule an appointment with Archie Meadows MD (Family Medicine)  2 Schedule an appointment with Wilber Sanchez MD (Cardiovascular Disease)          Future Appointments   Date Time Provider Department Center   9/2/2022  2:30 PM Danna Evangelista[facility]         For any urgent concerns, please contact our 24 hour nurse triage line: [phone] ([phone])         Jessica Das MA

## 2022-07-20 ENCOUNTER — TRANSFERRED RECORDS (OUTPATIENT)
Dept: HEALTH INFORMATION MANAGEMENT | Facility: CLINIC | Age: 77
End: 2022-07-20

## 2022-09-02 ENCOUNTER — OFFICE VISIT (OUTPATIENT)
Dept: DERMATOLOGY | Facility: CLINIC | Age: 77
End: 2022-09-02
Payer: COMMERCIAL

## 2022-09-02 DIAGNOSIS — L82.1 SEBORRHEIC KERATOSIS: ICD-10-CM

## 2022-09-02 DIAGNOSIS — D18.01 ANGIOMA OF SKIN: ICD-10-CM

## 2022-09-02 DIAGNOSIS — D22.9 NEVUS: ICD-10-CM

## 2022-09-02 DIAGNOSIS — L81.4 LENTIGO: ICD-10-CM

## 2022-09-02 DIAGNOSIS — D48.5 NEOPLASM OF UNCERTAIN BEHAVIOR OF SKIN: ICD-10-CM

## 2022-09-02 DIAGNOSIS — Z85.828 HISTORY OF SKIN CANCER: ICD-10-CM

## 2022-09-02 DIAGNOSIS — L57.0 ACTINIC KERATOSIS: Primary | ICD-10-CM

## 2022-09-02 PROCEDURE — 17000 DESTRUCT PREMALG LESION: CPT | Mod: 59 | Performed by: PHYSICIAN ASSISTANT

## 2022-09-02 PROCEDURE — 99213 OFFICE O/P EST LOW 20 MIN: CPT | Mod: 25 | Performed by: PHYSICIAN ASSISTANT

## 2022-09-02 PROCEDURE — 88305 TISSUE EXAM BY PATHOLOGIST: CPT | Performed by: DERMATOLOGY

## 2022-09-02 PROCEDURE — 17003 DESTRUCT PREMALG LES 2-14: CPT | Performed by: PHYSICIAN ASSISTANT

## 2022-09-02 PROCEDURE — 11102 TANGNTL BX SKIN SINGLE LES: CPT | Performed by: PHYSICIAN ASSISTANT

## 2022-09-02 NOTE — PROGRESS NOTES
HPI:   Chief complaints: Jasen Rosario is a pleasant 77 year old male who presents for Full skin cancer screening to rule out skin cancer   Last Skin Exam:1 year ago      1st Baseline: no  Personal HX of Skin Cancer: yes - some sort of skin CA unsure which kind   Personal HX of Malignant Melanoma: no   Family HX of Skin Cancer / Malignant Melanoma: no  Personal HX of Atypical Moles:   no  Risk factors: history of sun exposure and burns; limited sunscreen use  New / Changing lesions: none  Social History: very active - plays pickle ball frequently. Rides motorcycles frequently as well  On review of systems, there are no further skin complaints, patient is feeling otherwise well.  See patient intake sheet.  ROS of the following were done and are negative: Constitutional, Eyes, Ears, Nose,   Mouth, Throat, Cardiovascular, Respiratory, GI, Genitourinary, Musculoskeletal,   Psychiatric, Endocrine, Allergic/Immunologic.    PHYSICAL EXAM:   There were no vitals taken for this visit.  Skin exam performed as follows: Type 2 skin. Mood appropriate  Alert and Oriented X 3. Well developed, well nourished in no distress.  General appearance: Normal  Head including face: Normal  Eyes: conjunctiva and lids: Normal  Mouth: Lips, teeth, gums: Normal  Neck: Normal  Chest-breast/axillae: Normal  Back: Normal  Spleen and liver: Normal  Cardiovascular: Exam of peripheral vascular system by observation for swelling, varicosities, edema: Normal  Genitalia: groin, buttocks: Normal  Extremities: digits/nails (clubbing): Normal  Eccrine and Apocrine glands: Normal  Right upper extremity: Normal  Left upper extremity: Normal  Right lower extremity: Normal  Left lower extremity: Normal  Skin: Scalp and body hair: See below    Pt deferred exam of breasts, groin, buttocks: No    Other physical findings:  1. Multiple pigmented macules on extremities and trunk  2. Multiple pigmented macules on face, trunk and extremities  3. Multiple vascular  papules on trunk, arms and legs  4. Multiple scattered keratotic plaques  5. Pink gritty papule on the vertex scalp x 3, left helix x 1, right upper forehead x 1, right temple x 1, left upper cheek x 1  6. 5 mm pink hyperkeratotic papule on the right forearm       Except as noted above, no other signs of skin cancer or melanoma.     ASSESSMENT/PLAN:   Benign Full skin cancer screening today. . Patient with history of unknown type of skin CA  Advised on monthly self exams and 1 year  Patient Education: Appropriate brochures given.    1. Multiple benign appearing nevi on arms, legs and trunk. Discussed ABCDEs of melanoma and sunscreen.   2. Multiple lentigos on arms, legs and trunk. Advised benign, no treatment needed.  3. Multiple scattered angiomas. Advised benign, no treatment needed.   4. Seborrheic keratosis on arms, legs and trunk. Advised benign, no treatment needed.  5. Actinic keratosis on the the vertex scalp x 3, left helix x 1, right upper forehead x 1, right temple x 1, left upper cheek x 1. As precancerous, cryosurgery performed. Advised on blistering and post-op care. Advised if not resolved in 1-2 months to return for evaluation  6. R/o SCC on the right forearm. Shave biopsy performed.  Area cleaned and anesthetized with 1% lidocaine with epinephrine.  Dermablade used to remove the lesion and sent to pathology. Bleeding was cauterized. Pt tolerated procedure well with no complications.                 Follow-up: yearly FSE/PRN sooner    1.) Patient was asked about new and changing moles. YES  2.) Patient received a complete physical skin examination: YES  3.) Patient was counseled to perform a monthly self skin examination: YES  Scribed By: Danna Evangelista, MS, PAPABLO

## 2022-09-02 NOTE — PATIENT INSTRUCTIONS
Wound Care Instructions     FOR SUPERFICIAL WOUNDS     [facility]  [phone]                 AFTER 24 HOURS YOU SHOULD REMOVE THE BANDAGE AND BEGIN DAILY DRESSING CHANGES AS FOLLOWS:     1) Remove Dressing.     2) Clean and dry the area with tap water using a Q-tip or sterile gauze pad.     3) Apply Vaseline, Aquaphor, Polysporin ointment or Bacitracin ointment over entire wound.  Do NOT use Neosporin ointment.     4) Cover the wound with a band-aid, or a sterile non-stick gauze pad and micropore paper tape    REPEAT THESE INSTRUCTIONS AT LEAST ONCE A DAY UNTIL THE WOUND HAS COMPLETELY HEALED.    It is an old wives tale that a wound heals better when it is exposed to air and allowed to dry out. The wound will heal faster with a better cosmetic result if it is kept moist with ointment and covered with a bandage.    **Do not let the wound dry out.**    Supplies Needed:      *Cotton tipped applicators (Q-tips)    *Vaseline, Aquaphor, Polysporin or Bacitracin Ointment (NOT NEOSPORIN)    *Band-aids or non-stick gauze pads and micropore paper tape.      PATIENT INFORMATION:    During the healing process you will notice a number of changes. All wounds develop a small halo of redness surrounding the wound.  This means healing is occurring. Severe itching with extensive redness usually indicates sensitivity to the ointment or bandage tape used to dress the wound.  You should call our office if this develops.      Swelling  and/or discoloration around your surgical site is common, particularly when performed around the eye.    All wounds normally drain.  The larger the wound the more drainage there will be.  After 7-10 days, you will notice the wound beginning to shrink and new skin will begin to grow.  The wound is healed when you can see skin has formed over the entire area.  A healed wound has a healthy, shiny look to the surface and is red to dark pink in color to normalize.  Wounds may  take approximately 4-6 weeks to heal.  Larger wounds may take 6-8 weeks.  After the wound is healed you may discontinue dressing changes.    You may experience a sensation of tightness as your wound heals. This is normal and will gradually subside.    Your healed wound may be sensitive to temperature changes. This sensitivity improves with time, but if you re having a lot of discomfort, try to avoid temperature extremes.    Patients frequently experience itching after their wound appears to have healed because of the continue healing under the skin.  Plain Vaseline will help relieve the itching.      POSSIBLE COMPLICATIONS    BLEEDING:    Leave the bandage in place.  Use tightly rolled up gauze or a cloth to apply direct pressure over the bandage for 30  minutes.  Reapply pressure for an additional 30 minutes if necessary  Use additional gauze and tape to maintain pressure once the bleeding has stopped.

## 2022-09-02 NOTE — LETTER
9/2/2022         RE: Jasen Rosario  [id number]  [place] [facility] [address]        Dear Colleague,    Thank you for referring your patient, Jasen Rosario, to the [facility]. Please see a copy of my visit note below.    HPI:   Chief complaints: Jasen Rosario is a pleasant 77 year old male who presents for Full skin cancer screening to rule out skin cancer   Last Skin Exam:1 year ago      1st Baseline: no  Personal HX of Skin Cancer: yes - some sort of skin CA unsure which kind   Personal HX of Malignant Melanoma: no   Family HX of Skin Cancer / Malignant Melanoma: no  Personal HX of Atypical Moles:   no  Risk factors: history of sun exposure and burns; limited sunscreen use  New / Changing lesions: none  Social History: very active - plays pickle ball frequently. Rides motorcycles frequently as well  On review of systems, there are no further skin complaints, patient is feeling otherwise well.  See patient intake sheet.  ROS of the following were done and are negative: Constitutional, Eyes, Ears, Nose,   Mouth, Throat, Cardiovascular, Respiratory, GI, Genitourinary, Musculoskeletal,   Psychiatric, Endocrine, Allergic/Immunologic.    PHYSICAL EXAM:   There were no vitals taken for this visit.  Skin exam performed as follows: Type 2 skin. Mood appropriate  Alert and Oriented X 3. Well developed, well nourished in no distress.  General appearance: Normal  Head including face: Normal  Eyes: conjunctiva and lids: Normal  Mouth: Lips, teeth, gums: Normal  Neck: Normal  Chest-breast/axillae: Normal  Back: Normal  Spleen and liver: Normal  Cardiovascular: Exam of peripheral vascular system by observation for swelling, varicosities, edema: Normal  Genitalia: groin, buttocks: Normal  Extremities: digits/nails (clubbing): Normal  Eccrine and Apocrine glands: Normal  Right upper extremity: Normal  Left upper extremity: Normal  Right lower extremity: Normal  Left lower  extremity: Normal  Skin: Scalp and body hair: See below    Pt deferred exam of breasts, groin, buttocks: No    Other physical findings:  1. Multiple pigmented macules on extremities and trunk  2. Multiple pigmented macules on face, trunk and extremities  3. Multiple vascular papules on trunk, arms and legs  4. Multiple scattered keratotic plaques  5. Pink gritty papule on the vertex scalp x 3, left helix x 1, right upper forehead x 1, right temple x 1, left upper cheek x 1  6. 5 mm pink hyperkeratotic papule on the right forearm       Except as noted above, no other signs of skin cancer or melanoma.     ASSESSMENT/PLAN:   Benign Full skin cancer screening today. . Patient with history of unknown type of skin CA  Advised on monthly self exams and 1 year  Patient Education: Appropriate brochures given.    1. Multiple benign appearing nevi on arms, legs and trunk. Discussed ABCDEs of melanoma and sunscreen.   2. Multiple lentigos on arms, legs and trunk. Advised benign, no treatment needed.  3. Multiple scattered angiomas. Advised benign, no treatment needed.   4. Seborrheic keratosis on arms, legs and trunk. Advised benign, no treatment needed.  5. Actinic keratosis on the the vertex scalp x 3, left helix x 1, right upper forehead x 1, right temple x 1, left upper cheek x 1. As precancerous, cryosurgery performed. Advised on blistering and post-op care. Advised if not resolved in 1-2 months to return for evaluation  6. R/o SCC on the right forearm. Shave biopsy performed.  Area cleaned and anesthetized with 1% lidocaine with epinephrine.  Dermablade used to remove the lesion and sent to pathology. Bleeding was cauterized. Pt tolerated procedure well with no complications.                 Follow-up: yearly FSE/PRN sooner    1.) Patient was asked about new and changing moles. YES  2.) Patient received a complete physical skin examination: YES  3.) Patient was counseled to perform a monthly self skin examination:  YES  Scribed By: Danna Evangelista, MS, PAKeithC          Again, thank you for allowing me to participate in the care of your patient.        Sincerely,        Danna Evangelista PA-C

## 2022-09-07 ENCOUNTER — TELEPHONE (OUTPATIENT)
Dept: DERMATOLOGY | Facility: CLINIC | Age: 77
End: 2022-09-07

## 2022-09-07 LAB
PATH REPORT.COMMENTS IMP SPEC: NORMAL
PATH REPORT.COMMENTS IMP SPEC: NORMAL
PATH REPORT.FINAL DX SPEC: NORMAL
PATH REPORT.GROSS SPEC: NORMAL
PATH REPORT.MICROSCOPIC SPEC OTHER STN: NORMAL
PATH REPORT.RELEVANT HX SPEC: NORMAL

## 2022-09-07 NOTE — TELEPHONE ENCOUNTER
Called and spoke with pt and gave Danna's message below and booked for Cryo.    Pita HOFFMAN RN  Dermatology   [phone]

## 2022-09-07 NOTE — TELEPHONE ENCOUNTER
----- Message from Danna Evangelista PA-C sent at 9/7/2022  1:47 PM CDT -----  Path for the biopsy on the forearm came back as an actinic keratosis. Recommend cryo for complete resolution.

## 2022-09-27 ENCOUNTER — TELEPHONE (OUTPATIENT)
Dept: DERMATOLOGY | Facility: CLINIC | Age: 77
End: 2022-09-27

## 2022-09-27 NOTE — TELEPHONE ENCOUNTER
[facility]    Phone Message    May a detailed message be left on voicemail: yes     Reason for Call: Other: Pt will be out of town on 10/07/2022 and needs to reschedule for week of 10/10/2022, Writer could not reschedule until March of 2023.  Please call back to reschedule     Action Taken: Other: OX DERM    Travel Screening: Not Applicable

## 2022-10-09 ENCOUNTER — HEALTH MAINTENANCE LETTER (OUTPATIENT)
Age: 77
End: 2022-10-09

## 2022-10-13 ENCOUNTER — OFFICE VISIT (OUTPATIENT)
Dept: FAMILY MEDICINE | Facility: CLINIC | Age: 77
End: 2022-10-13
Payer: COMMERCIAL

## 2022-10-13 VITALS
WEIGHT: 142 LBS | SYSTOLIC BLOOD PRESSURE: 110 MMHG | BODY MASS INDEX: 22.29 KG/M2 | HEIGHT: 67 IN | DIASTOLIC BLOOD PRESSURE: 70 MMHG | HEART RATE: 60 BPM | TEMPERATURE: 96.3 F | OXYGEN SATURATION: 98 %

## 2022-10-13 DIAGNOSIS — Z13.0 SCREENING FOR DEFICIENCY ANEMIA: ICD-10-CM

## 2022-10-13 DIAGNOSIS — F32.5 MAJOR DEPRESSIVE DISORDER, SINGLE EPISODE IN FULL REMISSION (H): ICD-10-CM

## 2022-10-13 DIAGNOSIS — Z00.00 ENCOUNTER FOR MEDICARE ANNUAL WELLNESS EXAM: Primary | ICD-10-CM

## 2022-10-13 DIAGNOSIS — Z13.1 SCREENING FOR DIABETES MELLITUS: ICD-10-CM

## 2022-10-13 DIAGNOSIS — C83.00 SMALL B-CELL LYMPHOMA, UNSPECIFIED BODY REGION (H): ICD-10-CM

## 2022-10-13 DIAGNOSIS — C61 PROSTATE CANCER (H): ICD-10-CM

## 2022-10-13 DIAGNOSIS — Z13.220 SCREENING FOR HYPERLIPIDEMIA: ICD-10-CM

## 2022-10-13 LAB
ALBUMIN SERPL-MCNC: 4 G/DL (ref 3.4–5)
ALP SERPL-CCNC: 81 U/L (ref 40–150)
ALT SERPL W P-5'-P-CCNC: 30 U/L (ref 0–70)
ANION GAP SERPL CALCULATED.3IONS-SCNC: 7 MMOL/L (ref 3–14)
AST SERPL W P-5'-P-CCNC: 28 U/L (ref 0–45)
BASOPHILS # BLD AUTO: 0.1 10E3/UL (ref 0–0.2)
BASOPHILS NFR BLD AUTO: 1 %
BILIRUB SERPL-MCNC: 0.8 MG/DL (ref 0.2–1.3)
BUN SERPL-MCNC: 17 MG/DL (ref 7–30)
CALCIUM SERPL-MCNC: 8.9 MG/DL (ref 8.5–10.1)
CHLORIDE BLD-SCNC: 108 MMOL/L (ref 94–109)
CHOLEST SERPL-MCNC: 211 MG/DL
CO2 SERPL-SCNC: 24 MMOL/L (ref 20–32)
CREAT SERPL-MCNC: 1.18 MG/DL (ref 0.66–1.25)
EOSINOPHIL # BLD AUTO: 0.1 10E3/UL (ref 0–0.7)
EOSINOPHIL NFR BLD AUTO: 2 %
ERYTHROCYTE [DISTWIDTH] IN BLOOD BY AUTOMATED COUNT: 12.5 % (ref 10–15)
FASTING STATUS PATIENT QL REPORTED: YES
GFR SERPL CREATININE-BSD FRML MDRD: 64 ML/MIN/1.73M2
GLUCOSE BLD-MCNC: 98 MG/DL (ref 70–99)
HCT VFR BLD AUTO: 44.2 % (ref 40–53)
HDLC SERPL-MCNC: 79 MG/DL
HGB BLD-MCNC: 15.5 G/DL (ref 13.3–17.7)
IMM GRANULOCYTES # BLD: 0 10E3/UL
IMM GRANULOCYTES NFR BLD: 0 %
LDH SERPL L TO P-CCNC: 194 U/L (ref 85–227)
LDLC SERPL CALC-MCNC: 122 MG/DL
LYMPHOCYTES # BLD AUTO: 1.9 10E3/UL (ref 0.8–5.3)
LYMPHOCYTES NFR BLD AUTO: 30 %
MCH RBC QN AUTO: 31.3 PG (ref 26.5–33)
MCHC RBC AUTO-ENTMCNC: 35.1 G/DL (ref 31.5–36.5)
MCV RBC AUTO: 89 FL (ref 78–100)
MONOCYTES # BLD AUTO: 0.6 10E3/UL (ref 0–1.3)
MONOCYTES NFR BLD AUTO: 10 %
NEUTROPHILS # BLD AUTO: 3.5 10E3/UL (ref 1.6–8.3)
NEUTROPHILS NFR BLD AUTO: 57 %
NONHDLC SERPL-MCNC: 132 MG/DL
PLATELET # BLD AUTO: 180 10E3/UL (ref 150–450)
POTASSIUM BLD-SCNC: 4.8 MMOL/L (ref 3.4–5.3)
PROT SERPL-MCNC: 6.4 G/DL (ref 6.8–8.8)
PSA SERPL-MCNC: <0.01 UG/L (ref 0–4)
RBC # BLD AUTO: 4.96 10E6/UL (ref 4.4–5.9)
SODIUM SERPL-SCNC: 139 MMOL/L (ref 133–144)
TRIGL SERPL-MCNC: 50 MG/DL
WBC # BLD AUTO: 6.1 10E3/UL (ref 4–11)

## 2022-10-13 PROCEDURE — 83615 LACTATE (LD) (LDH) ENZYME: CPT | Performed by: FAMILY MEDICINE

## 2022-10-13 PROCEDURE — 36415 COLL VENOUS BLD VENIPUNCTURE: CPT | Performed by: FAMILY MEDICINE

## 2022-10-13 PROCEDURE — 80061 LIPID PANEL: CPT | Performed by: FAMILY MEDICINE

## 2022-10-13 PROCEDURE — 84153 ASSAY OF PSA TOTAL: CPT | Performed by: FAMILY MEDICINE

## 2022-10-13 PROCEDURE — G0439 PPPS, SUBSEQ VISIT: HCPCS | Performed by: FAMILY MEDICINE

## 2022-10-13 PROCEDURE — 85025 COMPLETE CBC W/AUTO DIFF WBC: CPT | Performed by: FAMILY MEDICINE

## 2022-10-13 PROCEDURE — 80053 COMPREHEN METABOLIC PANEL: CPT | Performed by: FAMILY MEDICINE

## 2022-10-13 RX ORDER — MULTIVITAMIN WITH IRON
100 TABLET ORAL DAILY
COMMUNITY
End: 2024-07-08

## 2022-10-13 RX ORDER — LANOLIN ALCOHOL/MO/W.PET/CERES
1000 CREAM (GRAM) TOPICAL DAILY
COMMUNITY

## 2022-10-13 ASSESSMENT — ENCOUNTER SYMPTOMS
ABDOMINAL PAIN: 0
SORE THROAT: 0
HEMATURIA: 0
CONSTIPATION: 0
HEMATOCHEZIA: 0
JOINT SWELLING: 0
PALPITATIONS: 0
PARESTHESIAS: 0
MYALGIAS: 0
CHILLS: 0
DIZZINESS: 1
HEARTBURN: 0
NAUSEA: 0
DYSURIA: 0
NERVOUS/ANXIOUS: 0
DIARRHEA: 0
WEAKNESS: 0
FEVER: 0
SHORTNESS OF BREATH: 0
COUGH: 0
HEADACHES: 0
ARTHRALGIAS: 0
FREQUENCY: 0
EYE PAIN: 0

## 2022-10-13 ASSESSMENT — PATIENT HEALTH QUESTIONNAIRE - PHQ9
SUM OF ALL RESPONSES TO PHQ QUESTIONS 1-9: 2
10. IF YOU CHECKED OFF ANY PROBLEMS, HOW DIFFICULT HAVE THESE PROBLEMS MADE IT FOR YOU TO DO YOUR WORK, TAKE CARE OF THINGS AT HOME, OR GET ALONG WITH OTHER PEOPLE: NOT DIFFICULT AT ALL
SUM OF ALL RESPONSES TO PHQ QUESTIONS 1-9: 2

## 2022-10-13 ASSESSMENT — ACTIVITIES OF DAILY LIVING (ADL): CURRENT_FUNCTION: NO ASSISTANCE NEEDED

## 2022-10-13 NOTE — PROGRESS NOTES
"SUBJECTIVE:   Jeffrey is a 77 year old who presents for Preventive Visit.    Patient has been advised of split billing requirements and indicates understanding: Yes  Are you in the first 12 months of your Medicare coverage?  No      Healthy Habits:     In general, how would you rate your overall health?  Good    Frequency of exercise:  6-7 days/week    Duration of exercise:  Greater than 60 minutes    Do you usually eat at least 4 servings of fruit and vegetables a day, include whole grains    & fiber and avoid regularly eating high fat or \"junk\" foods?  No    Taking medications regularly:  Yes    Medication side effects:  Not applicable    Ability to successfully perform activities of daily living:  No assistance needed    Home Safety:  Throw rugs in the hallway and lack of grab bars in the bathroom    Hearing Impairment:  Difficulty following a conversation in a noisy restaurant or crowded room, feel that people are mumbling or not speaking clearly, difficult to understand a speaker at a public meeting or Hindu service, need to ask people to speak up or repeat themselves, find that men's voices are easier to understand than woman's and difficulty understanding soft or whispered speech    In the past 6 months, have you been bothered by leaking of urine? Yes    In general, how would you rate your overall mental or emotional health?  Good      PHQ-2 Total Score: 1    Additional concerns today:  No      Do you feel safe in your environment? Yes      Have you ever done Advance Care Planning? (For example, a Health Directive, POLST, or a discussion with a medical provider or your loved ones about your wishes): Yes, patient states has an Advance Care Planning document and will bring a copy to the clinic. Patient states he has brought in this in the past.       Fall risk  Fallen 2 or more times in the past year?: No  Any fall with injury in the past year?: No    Cognitive Screening   1) Repeat 3 items (Leader, Season, " Table)    2) Clock draw: NORMAL  3) 3 item recall: Recalls 3 objects  Results: 3 items recalled: COGNITIVE IMPAIRMENT LESS LIKELY    Mini-CogTM Copyright S Yeyo. Licensed by the author for use in [facility]; reprinted with permission (juan@[facility]). All rights reserved.      Do you have sleep apnea, excessive snoring or daytime drowsiness?: patient states his daughter has mentions something about his sleeping.     Reviewed and updated as needed this visit by clinical staff   Tobacco  Allergies  Meds   Med Hx  Surg Hx  Fam Hx  Soc Hx        Reviewed and updated as needed this visit by Provider                 Social History     Tobacco Use     Smoking status: Former     Packs/day: 2.00     Years: 7.00     Pack years: 14.00     Types: Cigarettes     Quit date: 1965     Years since quittin.8     Smokeless tobacco: Never   Substance Use Topics     Alcohol use: Yes     Alcohol/week: 5.0 - 6.0 standard drinks     Types: 5 - 6 Standard drinks or equivalent per week     Comment: ocassionally     If you drink alcohol do you typically have >3 drinks per day or >7 drinks per week? No    Alcohol Use 10/13/2022   Prescreen: >3 drinks/day or >7 drinks/week? No   Prescreen: >3 drinks/day or >7 drinks/week? -   No flowsheet data found.      Current providers sharing in care for this patient include:   Patient Care Team:  Archie Meadosw MD as PCP - General  Archie Meadows MD as Referring Physician (Family Practice)  Sheri Hemphill, RN as Registered Nurse (Urology)  Archie Meadows MD as Assigned PCP  Danna Evangelista PAPABLO as Assigned Surgical Provider  Babs Puckett MD as MD (Neurology)    The following health maintenance items are reviewed in Epic and correct as of today:  Health Maintenance   Topic Date Due     ZOSTER IMMUNIZATION (1 of 2) 2013     COVID-19 Vaccine (3 - Booster for Pfizer series) 10/19/2021     INFLUENZA VACCINE (1) Never done     MEDICARE ANNUAL WELLNESS VISIT  2022      "PSA  09/02/2022     ANNUAL REVIEW OF HM ORDERS  09/02/2022     LIPID  01/14/2023     DTAP/TDAP/TD IMMUNIZATION (4 - Td or Tdap) 04/03/2023     PHQ-9  04/13/2023     COLORECTAL CANCER SCREENING  04/18/2023     FALL RISK ASSESSMENT  10/13/2023     ADVANCE CARE PLANNING  09/02/2026     HEPATITIS C SCREENING  Completed     DEPRESSION ACTION PLAN  Completed     Pneumococcal Vaccine: 65+ Years  Completed     HEPATITIS B IMMUNIZATION  Completed     IPV IMMUNIZATION  Aged Out     MENINGITIS IMMUNIZATION  Aged Out     Lab work is in process      Review of Systems   Constitutional: Negative for chills and fever.   HENT: Positive for hearing loss. Negative for congestion, ear pain and sore throat.    Eyes: Negative for pain and visual disturbance.   Respiratory: Negative for cough and shortness of breath.    Cardiovascular: Negative for chest pain, palpitations and peripheral edema.   Gastrointestinal: Negative for abdominal pain, constipation, diarrhea, heartburn, hematochezia and nausea.   Genitourinary: Positive for impotence. Negative for dysuria, frequency, genital sores, hematuria, penile discharge and urgency.   Musculoskeletal: Negative for arthralgias, joint swelling and myalgias.   Skin: Negative for rash.   Neurological: Positive for dizziness. Negative for weakness, headaches and paresthesias.   Psychiatric/Behavioral: Negative for mood changes. The patient is not nervous/anxious.        OBJECTIVE:   /70 (BP Location: Right arm, Patient Position: Sitting, Cuff Size: Adult Regular)   Pulse 60   Temp (!) 96.3  F (35.7  C) (Tympanic)   Ht 1.689 m (5' 6.5\")   Wt 64.4 kg (142 lb)   SpO2 98%   BMI 22.58 kg/m   Estimated body mass index is 22.58 kg/m  as calculated from the following:    Height as of this encounter: 1.689 m (5' 6.5\").    Weight as of this encounter: 64.4 kg (142 lb).  Physical Exam  GENERAL: healthy, alert and no distress  EYES: Eyes grossly normal to inspection  NECK: no adenopathy and no " asymmetry, masses, or scars  RESP: lungs clear to auscultation - no rales, rhonchi or wheezes  CV: regular rate and rhythm, normal S1 S2, no S3 or S4, no murmur, click or rub, no peripheral edema and peripheral pulses strong  MS: no gross musculoskeletal defects noted, no edema  SKIN: no suspicious lesions or rashes  PSYCH: mentation appears normal, affect normal/bright    Diagnostic Test Results:  Labs reviewed in Epic    ASSESSMENT / PLAN:   1. Encounter for Medicare annual wellness exam  Health maintenance reviewed and updated. Jeffrey is doing really well. Advised COVID-19 bivalent booster - he's going to think about it. Continue balanced diet, regular activity.    2. Prostate cancer (H): s/p prostatectomy  - PROSTATE SPEC ANTIGEN SCREEN; Future  - PROSTATE SPEC ANTIGEN SCREEN    3. Screening for diabetes mellitus  - Comprehensive metabolic panel (BMP + Alb, Alk Phos, ALT, AST, Total. Bili, TP); Future  - Comprehensive metabolic panel (BMP + Alb, Alk Phos, ALT, AST, Total. Bili, TP)    4. Screening for hyperlipidemia  - Lipid panel reflex to direct LDL Fasting; Future  - Lipid panel reflex to direct LDL Fasting    5. Screening for deficiency anemia  - CBC with platelets and differential; Future  - Lactate Dehydrogenase; Future  - CBC with platelets and differential  - Lactate Dehydrogenase    6. Small B-cell lymphoma, unspecified body region (H)  Previously followed with heme/onc, Dr. Velazquez. Recommended annual CBC  - CBC with platelets and differential; Future  - Lactate Dehydrogenase; Future  - CBC with platelets and differential  - Lactate Dehydrogenase    7. Major depressive disorder, single episode in full remission (H)  No issues.  PHQ 10/30/2018 6/11/2019 10/13/2022   PHQ-9 Total Score 1 2 2   Q9: Thoughts of better off dead/self-harm past 2 weeks Not at all Not at all Not at all           Patient has been advised of split billing requirements and indicates understanding: Yes    COUNSELING:  Reviewed  "preventive health counseling, as reflected in patient instructions    Estimated body mass index is 22.58 kg/m  as calculated from the following:    Height as of this encounter: 1.689 m (5' 6.5\").    Weight as of this encounter: 64.4 kg (142 lb).        He reports that he quit smoking about 57 years ago. His smoking use included cigarettes. He has a 14.00 pack-year smoking history. He has never used smokeless tobacco.      Appropriate preventive services were discussed with this patient, including applicable screening as appropriate for cardiovascular disease, diabetes, osteopenia/osteoporosis, and glaucoma.  As appropriate for age/gender, discussed screening for colorectal cancer, prostate cancer, breast cancer, and cervical cancer. Checklist reviewing preventive services available has been given to the patient.    Reviewed patients plan of care and provided an AVS. The Basic Care Plan (routine screening as documented in Health Maintenance) for Jasen meets the Care Plan requirement. This Care Plan has been established and reviewed with the Patient.    Counseling Resources:  ATP IV Guidelines  Pooled Cohorts Equation Calculator  Breast Cancer Risk Calculator  Breast Cancer: Medication to Reduce Risk  FRAX Risk Assessment  ICSI Preventive Guidelines  Dietary Guidelines for Americans, 2010  USDA's MyPlate  ASA Prophylaxis  Lung CA Screening    Abdiel Burgess DO  [facility]    Identified Health Risks:  Answers for HPI/ROS submitted by the patient on 10/13/2022  If you checked off any problems, how difficult have these problems made it for you to do your work, take care of things at home, or get along with other people?: Not difficult at all  PHQ9 TOTAL SCORE: 2                                      "

## 2022-10-13 NOTE — PATIENT INSTRUCTIONS
Patient Education   Personalized Prevention Plan  You are due for the preventive services outlined below.  Your care team is available to assist you in scheduling these services.  If you have already completed any of these items, please share that information with your care team to update in your medical record.  Health Maintenance Due   Topic Date Due     Zoster (Shingles) Vaccine (1 of 2) 05/29/2013     COVID-19 Vaccine (3 - Booster for Pfizer series) 10/19/2021     Flu Vaccine (1) Never done     Prostate Test  09/02/2022     ANNUAL REVIEW OF HM ORDERS  09/02/2022       Signs of Hearing Loss      Hearing much better with one ear can be a sign of hearing loss.   Hearing loss is a problem shared by many people. In fact, it is one of the most common health problems, particularly as people age. Most people age 65 and older have some hearing loss. By age 80, almost everyone does. Hearing loss often occurs slowly over the years. So you may not realize your hearing has gotten worse.  Have your hearing checked  Call your healthcare provider if you:    Have to strain to hear normal conversation    Have to watch other people s faces very carefully to follow what they re saying    Need to ask people to repeat what they ve said    Often misunderstand what people are saying    Turn the volume of the television or radio up so high that others complain    Feel that people are mumbling when they re talking to you    Find that the effort to hear leaves you feeling tired and irritated    Notice, when using the phone, that you hear better with one ear than the other  [employer]ek[employer] last reviewed this educational content on 1/1/2020 2000-2021 [employer]. All rights reserved. This information is not intended as a substitute for professional medical care. Always follow your healthcare professional's instructions.

## 2022-10-14 ENCOUNTER — OFFICE VISIT (OUTPATIENT)
Dept: DERMATOLOGY | Facility: CLINIC | Age: 77
End: 2022-10-14
Payer: COMMERCIAL

## 2022-10-14 DIAGNOSIS — L57.0 ACTINIC KERATOSIS: Primary | ICD-10-CM

## 2022-10-14 PROCEDURE — 17000 DESTRUCT PREMALG LESION: CPT | Performed by: PHYSICIAN ASSISTANT

## 2022-10-14 PROCEDURE — 99207 PR DROP WITH A PROCEDURE: CPT | Performed by: PHYSICIAN ASSISTANT

## 2022-10-14 NOTE — PROGRESS NOTES
HPI:   Chief complaints: Jasen Rosario is a 77 year old male who presents for treatment of a biopsy proven actinic keratosis on the right forearm        PHYSICAL EXAM:    There were no vitals taken for this visit.  Skin exam performed as follows: Type 2 skin. Mood appropriate  Alert and Oriented X 3. Well developed, well nourished in no distress.  General appearance: Normal  Head including face: Normal  Eyes: conjunctiva and lids: Normal  Mouth: Lips, teeth, gums: Normal  Neck: Normal  Chest-breast/axillae: Normal  Back: Normal  Spleen and liver: Normal  Cardiovascular: Exam of peripheral vascular system by observation for swelling, varicosities, edema: Normal  Genitalia: groin, buttocks: Normal  Extremities: digits/nails (clubbing): Normal  Eccrine and Apocrine glands: Normal  Right upper extremity: Normal  Left upper extremity: Normal  Right lower extremity: Normal  Left lower extremity: Normal  Skin: Scalp and body hair: See below    1. Pink gritty papule on the right forearm    ASSESSMENT/PLAN:     1. Biopsy proven actinic keratosis on right forearm. As precancerous, cryosurgery performed. Advised on blistering and post-op care. Advised if not resolved in 1-2 months to return for evaluation          Follow-up: FSE/PRN sooner  CC:   Scribed By: Danna Evangelista MS, PA-C

## 2022-10-14 NOTE — LETTER
10/14/2022         RE: Jasen Rosario  [address]  [facility] [address]        Dear Colleague,    Thank you for referring your patient, Jasen Rosario, to the [facility]. Please see a copy of my visit note below.    HPI:   Chief complaints: Jasen Rosario is a 77 year old male who presents for treatment of a biopsy proven actinic keratosis on the right forearm        PHYSICAL EXAM:    There were no vitals taken for this visit.  Skin exam performed as follows: Type 2 skin. Mood appropriate  Alert and Oriented X 3. Well developed, well nourished in no distress.  General appearance: Normal  Head including face: Normal  Eyes: conjunctiva and lids: Normal  Mouth: Lips, teeth, gums: Normal  Neck: Normal  Chest-breast/axillae: Normal  Back: Normal  Spleen and liver: Normal  Cardiovascular: Exam of peripheral vascular system by observation for swelling, varicosities, edema: Normal  Genitalia: groin, buttocks: Normal  Extremities: digits/nails (clubbing): Normal  Eccrine and Apocrine glands: Normal  Right upper extremity: Normal  Left upper extremity: Normal  Right lower extremity: Normal  Left lower extremity: Normal  Skin: Scalp and body hair: See below    1. Pink gritty papule on the right forearm    ASSESSMENT/PLAN:     1. Biopsy proven actinic keratosis on right forearm. As precancerous, cryosurgery performed. Advised on blistering and post-op care. Advised if not resolved in 1-2 months to return for evaluation          Follow-up: FSE/PRN sooner  CC:   Scribed By: Danna Evangelista MS, PAKeithC          Again, thank you for allowing me to participate in the care of your patient.        Sincerely,        Danna Evangelista PA-C

## 2022-12-02 ENCOUNTER — LAB (OUTPATIENT)
Dept: URGENT CARE | Facility: URGENT CARE | Age: 77
End: 2022-12-02
Attending: PHYSICIAN ASSISTANT
Payer: COMMERCIAL

## 2022-12-02 ENCOUNTER — VIRTUAL VISIT (OUTPATIENT)
Dept: FAMILY MEDICINE | Facility: CLINIC | Age: 77
End: 2022-12-02
Payer: COMMERCIAL

## 2022-12-02 DIAGNOSIS — J06.9 UPPER RESPIRATORY TRACT INFECTION, UNSPECIFIED TYPE: Primary | ICD-10-CM

## 2022-12-02 LAB
FLUAV AG SPEC QL IA: NEGATIVE
FLUBV AG SPEC QL IA: NEGATIVE

## 2022-12-02 PROCEDURE — 99213 OFFICE O/P EST LOW 20 MIN: CPT | Mod: 95 | Performed by: PHYSICIAN ASSISTANT

## 2022-12-02 PROCEDURE — U0003 INFECTIOUS AGENT DETECTION BY NUCLEIC ACID (DNA OR RNA); SEVERE ACUTE RESPIRATORY SYNDROME CORONAVIRUS 2 (SARS-COV-2) (CORONAVIRUS DISEASE [COVID-19]), AMPLIFIED PROBE TECHNIQUE, MAKING USE OF HIGH THROUGHPUT TECHNOLOGIES AS DESCRIBED BY CMS-2020-01-R: HCPCS | Performed by: PHYSICIAN ASSISTANT

## 2022-12-02 PROCEDURE — 87804 INFLUENZA ASSAY W/OPTIC: CPT | Performed by: PHYSICIAN ASSISTANT

## 2022-12-02 PROCEDURE — U0005 INFEC AGEN DETEC AMPLI PROBE: HCPCS | Performed by: PHYSICIAN ASSISTANT

## 2022-12-02 NOTE — PROGRESS NOTES
Jeffrey is a 77 year old who is being evaluated via a billable telephone visit.      What phone number would you like to be contacted at? [phone]   How would you like to obtain your [facility]? [facility]    Assessment and Plan:     (J06.9) Upper respiratory tract infection, unspecified type  (primary encounter diagnosis)  Comment: cough/congestion and fatigue x 9 days, no fever, no sob, no chest pain  Plan: Symptomatic; Unknown COVID-19 Virus         (Coronavirus) by PCR, Influenza A & B Antigen -        Clinic Collect  Discussed likely viral and symptomatic cares  See me next week for in-person visit if not better, be seen sooner (ED or UC) if worsens       Jessica Cabrera PA-C        Subjective   Jeffrey is a 77 year old presenting for the following health issues:  Cough (Productive ), Headache, and Fatigue      HPI     Jeffrey has been having URI symptoms x 9 days  He reports congestion, cough, headache and facial pain  He is using ibuprofen for symptoms  He denies fever/chills, shortness of breath, chest pain, leg swelling  He had similar symptoms in 2019 and was given a z pack x 2 which helped  He tested for covid x once at home and was negative  He does not think he has had any covid exposure    Review of Systems   See above      Objective    Vitals - Patient Reported  Pain Score: Moderate Pain (4)  Pain Loc: Head      Vitals:  No vitals were obtained today due to virtual visit.    Physical Exam   No exam, phone visit        Phone call duration: 7 minutes

## 2022-12-02 NOTE — PATIENT INSTRUCTIONS
Use mucinex and flonase for congestion and cough    Get plenty of fluids and rest    Test for covid and flu    See me next week if you are not better, be seen sooner (in person) if you worsen

## 2022-12-02 NOTE — RESULT ENCOUNTER NOTE
Dear Jeffrey,     Here are your recent influenza test results which are negative.    Please let us know if you have any questions or concerns.    Regards,  Jessica Cabrera PA-C

## 2022-12-04 LAB — SARS-COV-2 RNA RESP QL NAA+PROBE: NEGATIVE

## 2022-12-06 NOTE — RESULT ENCOUNTER NOTE
Dear Jeffrey,     Here are your recent covid test results which are negative.    Please let us know if you have any questions or concerns.    Regards,  Jessica Cabrera PA-C

## 2022-12-12 ENCOUNTER — TELEPHONE (OUTPATIENT)
Dept: FAMILY MEDICINE | Facility: CLINIC | Age: 77
End: 2022-12-12

## 2022-12-12 NOTE — TELEPHONE ENCOUNTER
Called #   Telephone Information:   Mobile [phone]     Pt stated that he has been coughing for 20 + days - productive - brown/grey, head is congested, headaches, eye pressure, - sinus pressure     Denies: fevers, body aches, SOB    Had a covid test and flu test - both negative     Not been exposed to any illnesses that he is aware of     Pt was coughing a lot on the phone( every few words he was coughing ) with RN     Huddled with MD CLAY - pt can be seen later this week       Appointments in Next Year    Dec 14, 2022 10:30 AM  (Arrive by 10:10 AM)  Provider Visit with MEAGAN Boyle CNP  [facility] ([facility] ) [phone]   Sep 12, 2023  2:00 PM  (Arrive by 1:45 PM)  Return Visit with Danna Evangelista PA-C  [facility] ([facility] ) [phone]          Made an OV for pt to be seen     Reviewed home cares with pt     Patient stated an understanding and agreed with plan.       Lorena Harvey RN, BSN  [place] Triage

## 2022-12-12 NOTE — TELEPHONE ENCOUNTER
Patient would like an appointment to be seen today for his continuous cough. Several providers have approval. Please call the patient back to assist him in scheduling.

## 2022-12-14 ENCOUNTER — OFFICE VISIT (OUTPATIENT)
Dept: FAMILY MEDICINE | Facility: CLINIC | Age: 77
End: 2022-12-14
Payer: COMMERCIAL

## 2022-12-14 ENCOUNTER — ANCILLARY PROCEDURE (OUTPATIENT)
Dept: GENERAL RADIOLOGY | Facility: CLINIC | Age: 77
End: 2022-12-14
Attending: NURSE PRACTITIONER
Payer: COMMERCIAL

## 2022-12-14 VITALS
BODY MASS INDEX: 22.44 KG/M2 | HEART RATE: 66 BPM | WEIGHT: 143 LBS | OXYGEN SATURATION: 100 % | DIASTOLIC BLOOD PRESSURE: 64 MMHG | SYSTOLIC BLOOD PRESSURE: 92 MMHG | HEIGHT: 67 IN | TEMPERATURE: 96.9 F

## 2022-12-14 DIAGNOSIS — R06.2 WHEEZING: ICD-10-CM

## 2022-12-14 DIAGNOSIS — J20.9 ACUTE BRONCHITIS WITH SYMPTOMS GREATER THAN 10 DAYS: Primary | ICD-10-CM

## 2022-12-14 DIAGNOSIS — Z85.46 HISTORY OF PROSTATE CANCER: ICD-10-CM

## 2022-12-14 DIAGNOSIS — R06.02 SHORTNESS OF BREATH: ICD-10-CM

## 2022-12-14 DIAGNOSIS — C83.00 SMALL B-CELL LYMPHOMA, UNSPECIFIED BODY REGION (H): ICD-10-CM

## 2022-12-14 DIAGNOSIS — R05.1 ACUTE COUGH: ICD-10-CM

## 2022-12-14 DIAGNOSIS — J06.9 UPPER RESPIRATORY TRACT INFECTION, UNSPECIFIED TYPE: ICD-10-CM

## 2022-12-14 PROCEDURE — 93000 ELECTROCARDIOGRAM COMPLETE: CPT | Performed by: NURSE PRACTITIONER

## 2022-12-14 PROCEDURE — 71046 X-RAY EXAM CHEST 2 VIEWS: CPT | Mod: TC | Performed by: RADIOLOGY

## 2022-12-14 PROCEDURE — 99213 OFFICE O/P EST LOW 20 MIN: CPT | Performed by: NURSE PRACTITIONER

## 2022-12-14 RX ORDER — AZITHROMYCIN 250 MG/1
TABLET, FILM COATED ORAL
Qty: 6 TABLET | Refills: 0 | Status: SHIPPED | OUTPATIENT
Start: 2022-12-14 | End: 2022-12-19

## 2022-12-14 RX ORDER — ALBUTEROL SULFATE 90 UG/1
2 AEROSOL, METERED RESPIRATORY (INHALATION) EVERY 6 HOURS PRN
Qty: 18 G | Refills: 0 | Status: SHIPPED | OUTPATIENT
Start: 2022-12-14 | End: 2023-07-12

## 2022-12-14 RX ORDER — PREDNISONE 20 MG/1
TABLET ORAL
Qty: 18 TABLET | Refills: 0 | Status: SHIPPED | OUTPATIENT
Start: 2022-12-14 | End: 2022-12-24

## 2022-12-14 NOTE — PROGRESS NOTES
Assessment & Plan     Acute bronchitis with symptoms greater than 10 days  Reassuring exam no higher level of care felt to be needed.   Prolonged cough with abnormalities on ausculation CXR warranted.   CXR awaiting official radiology read.   Steroid taper, Antibiotics; will need to add additional antibiotic if pneumonia noted on CXR.   Albuterol PRN.   Red flag symptoms discussed and if these occur present to the emergency room or call 911.  Jasen verbalizes understanding of plan of care and is in agreement.   - predniSONE (DELTASONE) 20 MG tablet  Dispense: 18 tablet; Refill: 0    Upper respiratory tract infection, unspecified type    - azithromycin (ZITHROMAX) 250 MG tablet  Dispense: 6 tablet; Refill: 0  - predniSONE (DELTASONE) 20 MG tablet  Dispense: 18 tablet; Refill: 0    Wheezing    - XR Chest 2 Views  - albuterol (PROAIR HFA/PROVENTIL HFA/VENTOLIN HFA) 108 (90 Base) MCG/ACT inhaler  Dispense: 18 g; Refill: 0  - azithromycin (ZITHROMAX) 250 MG tablet  Dispense: 6 tablet; Refill: 0  - predniSONE (DELTASONE) 20 MG tablet  Dispense: 18 tablet; Refill: 0    Acute cough    - azithromycin (ZITHROMAX) 250 MG tablet  Dispense: 6 tablet; Refill: 0  - predniSONE (DELTASONE) 20 MG tablet  Dispense: 18 tablet; Refill: 0    Shortness of breath    - EKG 12-lead complete w/read - Clinics  - XR Chest 2 Views  - predniSONE (DELTASONE) 20 MG tablet  Dispense: 18 tablet; Refill: 0    Small B-cell lymphoma, unspecified body region (H)      History of prostate cancer        Return in about 2 weeks (around 12/28/2022) for if symptoms persist or worsening please be seen.      MEAGAN Torres [facility]   Jeffrey is a 77 year old, presenting for the following health issues:  Cough      History of Present Illness       Reason for visit:  21 days ofcoughing, headache, plugged head,conjestion, eyes hurt .......    He eats 0-1 servings of fruits and vegetables daily.He consumes 1  sweetened beverage(s) daily.He exercises with enough effort to increase his heart rate 60 or more minutes per day.  He exercises with enough effort to increase his heart rate 6 days per week.   He is taking medications regularly.     Ananda - 12/12/2022 -  12:16p  Telephone Information:   Mobile [phone]      Pt stated that he has been coughing for 20 + days - productive - brown/grey, head is congested, headaches, eye pressure, - sinus pressure   Denies: fevers, body aches, SOB  Had a covid test and flu test - both negative   Not been exposed to any illnesses that he is aware of    Pt was coughing a lot on the phone( every few words he was coughing ) with RN    Huddled with MD CLAY - pt can be seen later this week             Appointments in Next Year    Dec 14, 2022 10:30 AM  (Arrive by 10:10 AM)  Provider Visit with MEAGAN Boyle CNP  [facility] ([facility] ) [phone]   Sep 12, 2023  2:00 PM  (Arrive by 1:45 PM)  Return Visit with Danna Evangelista PA-C  [facility] ([facility] ) [phone]          Made an OV for pt to be seen   Reviewed home cares with pt   Patient stated an understanding and agreed with plan.  Lorena Harvey RN, BSN  [place] Triage    Since being triaged - HA has gotten better.   Over last 10 days was taking Dayquil - 5 days 1 dose, Nyquil to sleep - 4-5 days -   Day 10 or 11 - [facility] was tested for Covid and influenza - both negative.  Ears pressure and cannot hear very well   Eyes were hurts - is better.   Sinus pain  Coughing so much rib cage and muscles were hurting - not coughing as much. Productive at times-swallows.     Review of Systems   Constitutional, HEENT, cardiovascular, pulmonary, GI, , musculoskeletal, neuro, skin, endocrine and psych systems are negative, except as otherwise noted in the HPI.      Objective    BP 92/64 (BP  "Location: Left arm, Patient Position: Chair, Cuff Size: Adult Regular)   Pulse 66   Temp 96.9  F (36.1  C) (Tympanic)   Ht 1.689 m (5' 6.5\")   Wt 64.9 kg (143 lb)   SpO2 100%   BMI 22.74 kg/m    Body mass index is 22.74 kg/m .  Physical Exam   GENERAL: healthy, alert and no distress  EYES: Eyes grossly normal to inspection, PERRL and conjunctivae and sclerae normal  HENT: ear canals and TM's normal, nose and mouth without ulcers or lesions  NECK: no adenopathy, no asymmetry, masses, or scars and thyroid normal to palpation  RESP: lungs clear to auscultation - no rales, rhonchi; mild intermittent wheeze; hacking cough   CV: regular rate and rhythm, normal S1 S2, no S3 or S4, no murmur, click or rub, no peripheral edema and peripheral pulses strong  ABDOMEN: soft, nontender, no hepatosplenomegaly, no masses and bowel sounds normal  MS: no gross musculoskeletal defects noted, no edema  SKIN: no suspicious lesions or rashes  LYMPH: no cervical, supraclavicular, axillary, or inguinal adenopathy    Results for orders placed or performed in visit on 12/14/22   XR Chest 2 Views     Status: None    Narrative    XR CHEST 2 VIEWS  12/14/2022 11:09 AM       INDICATION: Shortness of breath, wheezing  COMPARISON: 2/6/2016       Impression    IMPRESSION: Stable calcified granuloma right lower lobe. Otherwise  negative chest.    GAUDENCIO PIERCE MD         SYSTEM ID:  [id number]             "

## 2022-12-14 NOTE — RESULT ENCOUNTER NOTE
Dear Jeffrey,    Here is a summary of your recent test results:    Chest Xray without concern.   Steroids taper dosing sent to pharmacy as we discussed.  For additional lab test information, [email or web address] is an excellent reference.    In addition, here is a list of due or overdue Health Maintenance reminders:    Zoster (Shingles) Vaccine(1 of 2) due on 05/29/2013  COVID-19 Vaccine(3 - Booster for Pfizer series) due on 10/19/2021    Please call us at [phone] (or use [facility]) to address the above recommendations if needed.    Thank you for choosing [facility].  It was an honor and a privilege to participate in your care.       Healthy regards,    Muriel Garcia, JESENIA  [facility]

## 2022-12-19 ENCOUNTER — MYC MEDICAL ADVICE (OUTPATIENT)
Dept: FAMILY MEDICINE | Facility: CLINIC | Age: 77
End: 2022-12-19

## 2022-12-23 ENCOUNTER — NURSE TRIAGE (OUTPATIENT)
Dept: FAMILY MEDICINE | Facility: CLINIC | Age: 77
End: 2022-12-23

## 2022-12-23 ENCOUNTER — TELEPHONE (OUTPATIENT)
Dept: FAMILY MEDICINE | Facility: CLINIC | Age: 77
End: 2022-12-23

## 2022-12-23 NOTE — TELEPHONE ENCOUNTER
"Nurse Triage SBAR    Is this a 2nd Level Triage? YES, LICENSED PRACTITIONER REVIEW IS REQUIRED    Situation:  Patient c/o \"swallow\" breathing, nasal and ear congestion, and lightheadedness.     Background: Pt seen 12/14/22, diagnosed with bronchitis, has had some improvement with Z pack and prednisone but still notes symptoms above. Using prednisone and has half a tab left. Using inhaler twice a day.      Assessment: Reports \"shallow\" breathing but is able to take a deep breath without pain or coughing when he tries. Productive cough present, lightheadedness, Afebrile, and fatigued. Pt has no SOB and was able to shovel yesterday, talking and laughing normally on phone with occasional cough.     Protocol Recommended Disposition:   Go To Office Now    Recommendation: Does patient need additional follow up? Encouraged patient to do deep breathing regularly.      Routed to provider    Does the patient meet one of the following criteria for ADS visit consideration? 16+ years old, with an [facility] PCP     TIP  Providers, please consider if this condition is appropriate for management at one of our Acute and Diagnostic Services sites.     If patient is a good candidate, please use dotphrase <dot>triageresponse and select Refer to ADS to document.  Afebrile, fatigued, \"shallow breathing\", cough Also lightheaded and worse than in the past.     Reason for Disposition    MILD difficulty breathing (e.g., minimal/no SOB at rest, SOB with walking, pulse < 100) of new-onset or worse than normal    Additional Information    Negative: SEVERE difficulty breathing (e.g., struggling for each breath, speaks in single words, pulse > 120)    Negative: Breathing stopped and hasn't returned    Negative: Choking on something    Negative: Bluish (or gray) lips or face    Negative: Difficult to awaken or acting confused (e.g., disoriented, slurred speech)    Negative: Passed out (i.e., fainted, collapsed and was not responding)    Negative: " "Wheezing started suddenly after medicine, an allergic food, or bee sting    Negative: Stridor    Negative: Slow, shallow and weak breathing    Negative: Sounds like a life-threatening emergency to the triager    Negative: Chest pain    Negative: Wheezing (high pitched whistling sound) and previous asthma attacks or use of asthma medicines    Negative: Difficulty breathing and within 14 days of COVID-19 Exposure    Negative: Difficulty breathing and only present when coughing    Negative: Difficulty breathing and only from stuffy nose    Negative: Difficulty breathing and only from stuffy nose or runny nose from common cold    Negative: MODERATE difficulty breathing (e.g., speaks in phrases, SOB even at rest, pulse 100-120) of new-onset or worse than normal    Negative: Oxygen level (e.g., pulse oximetry) 90 percent or lower    Negative: Wheezing can be heard across the room    Negative: Drooling or spitting out saliva (because can't swallow)    Negative: Any history of prior \"blood clot\" in leg or lungs    Negative: Illness requiring prolonged bedrest in past month (e.g., immobilization, long hospital stay)    Negative: Hip or leg fracture (broken bone) in past month (or had cast on leg or ankle in past month)    Negative: Major surgery in the past month    Negative: Long-distance travel in past month (e.g., car, bus, train, plane; with trip lasting 6 or more hours)    Negative: Cancer treatment in past six months (or has cancer now)    Negative: Extra heart beats OR irregular heart beating (i.e., \"palpitations\")    Negative: Fever > 103 F (39.4 C)    Negative: Fever > 101 F (38.3 C) and over 60 years of age    Negative: Fever > 100.0 F (37.8 C) and bedridden (e.g., nursing home patient, stroke, chronic illness, recovering from surgery)    Negative: Fever > 100.0 F (37.8 C) and diabetes mellitus or weak immune system (e.g., HIV positive, cancer chemo, splenectomy, organ transplant, chronic steroids)    Negative: " "Periods where breathing stops and then resumes normally and bedridden (e.g., nursing home patient, CVA)    Negative: Pregnant or postpartum (from 0 to 6 weeks after delivery)    Negative: Patient sounds very sick or weak to the triager    Answer Assessment - Initial Assessment Questions  1. RESPIRATORY STATUS: \"Describe your breathing?\" (e.g., wheezing, shortness of breath, unable to speak, severe coughing)       Shallow breathing   2. ONSET: \"When did this breathing problem begin?\"       Recently had bronchitis and has become worse  3. PATTERN \"Does the difficult breathing come and go, or has it been constant since it started?\"       constant  4. SEVERITY: \"How bad is your breathing?\" (e.g., mild, moderate, severe)     - MILD: No SOB at rest, mild SOB with walking, speaks normally in sentences, can lie down, no retractions, pulse < 100.     - MODERATE: SOB at rest, SOB with minimal exertion and prefers to sit, cannot lie down flat, speaks in phrases, mild retractions, audible wheezing, pulse 100-120.     - SEVERE: Very SOB at rest, speaks in single words, struggling to breathe, sitting hunched forward, retractions, pulse > 120       mild  5. RECURRENT SYMPTOM: \"Have you had difficulty breathing before?\" If Yes, ask: \"When was the last time?\" and \"What happened that time?\"      no  6. CARDIAC HISTORY: \"Do you have any history of heart disease?\" (e.g., heart attack, angina, bypass surgery, angioplasty)       None  7. LUNG HISTORY: \"Do you have any history of lung disease?\"  (e.g., pulmonary embolus, asthma, emphysema)      bronchitis   8. CAUSE: \"What do you think is causing the breathing problem?\"       Bronchitis  9. OTHER SYMPTOMS: \"Do you have any other symptoms? (e.g., dizziness, runny nose, cough, chest pain, fever)     Cough, dizziness  10. O2 SATURATION MONITOR:  \"Do you use an oxygen saturation monitor (pulse oximeter) at home?\" If Yes, \"What is your reading (oxygen level) today?\" \"What is your usual oxygen " "saturation reading?\" (e.g., 95%)        no  11. PREGNANCY: \"Is there any chance you are pregnant?\" \"When was your last menstrual period?\"        no  12. TRAVEL: \"Have you traveled out of the country in the last month?\" (e.g., travel history, exposures)        no    Protocols used: BREATHING DIFFICULTY-A-OH      "

## 2022-12-23 NOTE — TELEPHONE ENCOUNTER
"He does have a bronchitis has a normal x-ray and is treated thoroughly with antibiotics steroids and his inhaler.  All symptoms should be improving with at minimum of at least not worsening.    Has chronic dizziness he has had his previously been seen [facility] the [facility] he has also seen neurology.  He also runs low blood pressure so I discussed with him good hydration and getting adequate salt due to low blood pressure.  Being on steroids and albuterol can exacerbate some dizziness.    I am not understanding the \"shallow\" breathing is he saying he cant take a deep full breath in?  If he has any chest pain or shortness of breath difficultly taking in a complete breath fevers change in cough to productive or worsening he should be seen in person.    The nature of bronchitis type cough is that it can linger even after treatments. Intermittent hacking cough that is minimally productive especially in the morning is considered a normal finding.  If it is persisting he can have a visit to do an evaluation this again.    It would be good for him to have a blood pressure cuff at home.  He should stand up from a seated to standing position slowly.  It would also be good for him to have a pulse oximetry so he can check his oxygen's numbers at home.  If he develops any chest pain shortness of breath elevation of heart rate oxygen saturation below 90% or if he feels it is too difficult to get a full breath and he would need higher level of care such as ED to rule out blood clot or other etiology with CT scanning.        Muriel Garcia, FNP-BC      "

## 2022-12-23 NOTE — TELEPHONE ENCOUNTER
Called patient and advised of below. Encouraged patient to get O2 monitor and BP cuff to monitor if O2 or BP is low. Patient reports would like to be seen for reevaluation if not improved by next. Appointment made. For next week, he will call and cancel if improved. Educated patient on red flag symptoms of SOB, chest pain, unable to take a deep breath. Encouraged ED if these symptoms arise.   Jaime Patel RN [facility]

## 2022-12-28 NOTE — TELEPHONE ENCOUNTER
"Patient calling unsure if he should keep appointment tomorrow. He is still very congested in his head and ears. He feels he is improving but day \"36\" of illness. Advised patient to keep appointment. Patient agreed.   Jaime Patel RN [place] Triage    "

## 2022-12-29 ENCOUNTER — OFFICE VISIT (OUTPATIENT)
Dept: FAMILY MEDICINE | Facility: CLINIC | Age: 77
End: 2022-12-29
Payer: COMMERCIAL

## 2022-12-29 VITALS
RESPIRATION RATE: 18 BRPM | HEART RATE: 69 BPM | TEMPERATURE: 96.1 F | BODY MASS INDEX: 22.4 KG/M2 | HEIGHT: 67 IN | WEIGHT: 142.7 LBS | OXYGEN SATURATION: 98 % | DIASTOLIC BLOOD PRESSURE: 64 MMHG | SYSTOLIC BLOOD PRESSURE: 110 MMHG

## 2022-12-29 DIAGNOSIS — J01.90 ACUTE SINUSITIS WITH SYMPTOMS > 10 DAYS: Primary | ICD-10-CM

## 2022-12-29 PROCEDURE — 99214 OFFICE O/P EST MOD 30 MIN: CPT | Performed by: NURSE PRACTITIONER

## 2022-12-29 RX ORDER — DOXYCYCLINE 100 MG/1
100 CAPSULE ORAL 2 TIMES DAILY
Qty: 14 CAPSULE | Refills: 0 | Status: SHIPPED | OUTPATIENT
Start: 2022-12-29 | End: 2023-01-05

## 2022-12-29 ASSESSMENT — ANXIETY QUESTIONNAIRES
GAD7 TOTAL SCORE: 1
3. WORRYING TOO MUCH ABOUT DIFFERENT THINGS: NOT AT ALL
7. FEELING AFRAID AS IF SOMETHING AWFUL MIGHT HAPPEN: NOT AT ALL
IF YOU CHECKED OFF ANY PROBLEMS ON THIS QUESTIONNAIRE, HOW DIFFICULT HAVE THESE PROBLEMS MADE IT FOR YOU TO DO YOUR WORK, TAKE CARE OF THINGS AT HOME, OR GET ALONG WITH OTHER PEOPLE: SOMEWHAT DIFFICULT
4. TROUBLE RELAXING: NOT AT ALL
8. IF YOU CHECKED OFF ANY PROBLEMS, HOW DIFFICULT HAVE THESE MADE IT FOR YOU TO DO YOUR WORK, TAKE CARE OF THINGS AT HOME, OR GET ALONG WITH OTHER PEOPLE?: SOMEWHAT DIFFICULT
1. FEELING NERVOUS, ANXIOUS, OR ON EDGE: NOT AT ALL
2. NOT BEING ABLE TO STOP OR CONTROL WORRYING: NOT AT ALL
GAD7 TOTAL SCORE: 1
6. BECOMING EASILY ANNOYED OR IRRITABLE: SEVERAL DAYS
5. BEING SO RESTLESS THAT IT IS HARD TO SIT STILL: NOT AT ALL
7. FEELING AFRAID AS IF SOMETHING AWFUL MIGHT HAPPEN: NOT AT ALL
GAD7 TOTAL SCORE: 1

## 2022-12-29 ASSESSMENT — PATIENT HEALTH QUESTIONNAIRE - PHQ9
10. IF YOU CHECKED OFF ANY PROBLEMS, HOW DIFFICULT HAVE THESE PROBLEMS MADE IT FOR YOU TO DO YOUR WORK, TAKE CARE OF THINGS AT HOME, OR GET ALONG WITH OTHER PEOPLE: SOMEWHAT DIFFICULT
SUM OF ALL RESPONSES TO PHQ QUESTIONS 1-9: 6
SUM OF ALL RESPONSES TO PHQ QUESTIONS 1-9: 6

## 2022-12-29 NOTE — PROGRESS NOTES
"  Assessment & Plan     Acute sinusitis with symptoms > 10 days  Re treat given on going symptoms. Follow up if not improving.   - doxycycline monohydrate (MONODOX) 100 MG capsule  Dispense: 14 capsule; Refill: 0      Prescription drug management      No follow-ups on file.    Muriel Rodriguez, [facility] PRIOR MELISSA Murphy is a 77 year old, presenting for the following health issues:  RECHECK  Patient is here for follow up visit from 12/14/2022. Patient reports light headed, stuffy, cough, wheezing, low on energy.    History of Present Illness       Reason for visit:  Bronchitis    He eats 0-1 servings of fruits and vegetables daily.He consumes 1 sweetened beverage(s) daily.He exercises with enough effort to increase his heart rate 60 or more minutes per day.  He exercises with enough effort to increase his heart rate 5 days per week.   He is taking medications regularly.    Today's PHQ-9         PHQ-9 Total Score: 6    PHQ-9 Q9 Thoughts of better off dead/self-harm past 2 weeks :   Not at all    How difficult have these problems made it for you to do your work, take care of things at home, or get along with other people: Somewhat difficult  Today's WALDO-7 Score: 1     Cough somewhat better but now more sinus pressure and drainage ongoing. Overall still quite fatigued and not able to be as active as usual with ongoing symptoms.     Review of Systems   Constitutional, HEENT, cardiovascular, pulmonary, GI, , musculoskeletal, neuro, skin, endocrine and psych systems are negative, except as otherwise noted.      Objective    /64   Pulse 69   Temp (!) 96.1  F (35.6  C) (Tympanic)   Resp 18   Ht 1.689 m (5' 6.5\")   Wt 64.7 kg (142 lb 11.2 oz)   SpO2 98%   BMI 22.69 kg/m    Body mass index is 22.69 kg/m .  Physical Exam   GENERAL: healthy, alert and no distress  HENT: normal cephalic/atraumatic, ear canals and TM's normal, nose and mouth without ulcers or lesions, oropharynx clear, " oral mucous membranes moist and sinuses: maxillary tenderness on bilateral  NECK: bilateral anterior cervical adenopathy, no asymmetry, masses, or scars and thyroid normal to palpation  RESP: lungs clear to auscultation - no rales, rhonchi or wheezes  CV: regular rate and rhythm, normal S1 S2, no S3 or S4, no murmur, click or rub, no peripheral edema and peripheral pulses strong  ABDOMEN: soft, nontender, no hepatosplenomegaly, no masses and bowel sounds normal  MS: no gross musculoskeletal defects noted, no edema              JAIME Shannon     [address] [address]  [email or web address]   Office: [phone]                     Answers for HPI/ROS submitted by the patient on 12/29/2022  If you checked off any problems, how difficult have these problems made it for you to do your work, take care of things at home, or get along with other people?: Somewhat difficult  PHQ9 TOTAL SCORE: 6  WALDO 7 TOTAL SCORE: 1

## 2023-01-03 ENCOUNTER — TRANSFERRED RECORDS (OUTPATIENT)
Dept: HEALTH INFORMATION MANAGEMENT | Facility: CLINIC | Age: 78
End: 2023-01-03
Payer: COMMERCIAL

## 2023-02-27 NOTE — PROGRESS NOTES
[facility]    Hematology/Oncology Established Patient Note      Today's Date: 06/13/19    Reason for Follow-up: SLL      HISTORY OF PRESENT ILLNESS: Jasen Rosario is a 73 year old male who presents with SLL and history of prostate cancer.       He had prostate biopsy performed in 2013, which showed adenocarcinoma.  In addition, he had an abnormal collection of lymphocytes identified on the biopsy.  Immunostains of material obtained from the right apex of the prostate were positive for a monoclonal population of lymphocytes expressing CD20.  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  He was seen by Dr. Davide Matamoros at the [facility].  PET-CT showed no evidence of lymphoma.  Bone marrow aspirate and biopsy was ~40% cellular, and ~10% involvement involvement with a monoclonal B-cell population compatible with SLL.  Trisomy of chromosome 10 and 12 is found in 13% of cells.  These findings were typical of SLL.  CBC at the time was normal.  Serum IgG was slightly low at 560.    He has history of prostate cancer s/p robotic radical prostatectomy on 4/13/15 by Dr. Smyth.  Final pathology was low volume [place] 6, pT2cN0.  Initial PSA was 5.8.  It became undetectable after surgery.  He is getting PSA's followed yearly.    He was recommended to have yearly follow-up with Dr. Matamoros, but he was lost to follow-up in the hematology clinic, and has been following with his PCP both the the prostate cancer and lymphoma.        INTERIM HISTORY: Patient is here for his annual follow-up today.  He says that he is feeling quite well.  He notes that had right hip replacement surgery in November 2018.  He has recovered fine.  He denies any new complaints today.        REVIEW OF SYSTEMS:   14 point ROS was reviewed and is negative other than as noted above in HPI.       HOME MEDICATIONS:  Current Outpatient Medications   Medication Sig Dispense Refill     creatine 400 MG  capsule Take by mouth daily       GLUCOSAMINE-CHONDROITIN PO Take 2 tablets by mouth daily       Multiple Vitamin (DAILY MULTIVITAMIN PO) Take 2 tablets by mouth daily        Omega-3 Fatty Acids (FISH OIL PO) Take 2 tablets by mouth daily            ALLERGIES:  Allergies   Allergen Reactions     Seasonal Allergies          PAST MEDICAL HISTORY:  Past Medical History:   Diagnosis Date     Anxiety state, unspecified 1991     Basal cell carcinoma     Left Ear - Moh's Dr Young     Colon polyp      ED (erectile dysfunction) 2005     Hyperlipidemia LDL goal <130 2001     Hypoglycemia, unspecified 1991     Infectious mononucleosis 1969     Lumbar stenosis     TCO - Dr Irene - with neuritis     Lymphoma (H)     SLL - B Cell - Dr Barrios/Marcus     Major depressive disorder, single episode in full remission (H) 1991     OA (osteoarthritis)     Severe Lt Hip     Prostate cancer (H) 5/13    Dr Smyth -  - Darian 3+3 - intermediate progression risk - pT2cN0     Seasonal allergies          PAST SURGICAL HISTORY:  Past Surgical History:   Procedure Laterality Date     APPENDECTOMY  1960    APPY     ARTHROPLASTY HIP Left 6/21/2017    Left BETTY - Dr MELISSA Madrigal     ARTHROPLASTY KNEE Left 10/2/2017    LEFT TOTAL KNEE ARTHROPLASTY - Dr MELISSA Madrigal     COLONOSCOPY  12/04, 12/14    colon polyp - serrated adenoma 5 mm - due 3 yrs     COLONOSCOPY  03/16/2018    Dr. Naila BELLE - normal - due 5 yrs - will need MAC     COLONOSCOPY N/A 4/18/2018    Dr. Naila BELLE - normal - due 5 yrs - will need MAC     DAVINCI PROSTATECTOMY, LYMPHADENECTOMY N/A 4/13/2015    RALP - Dr Smyth     DENTAL SURGERY  1985    wisdom teeth     SURGICAL HISTORY OF -   1980    BACK SURGERY     SURGICAL HISTORY OF -  Left 1987    ANKLE FX ORIF - Lt     SURGICAL HISTORY OF -  Left 1972    LT KNEE MCL TEAR AND MENISCUS      SURGICAL HISTORY OF -  Left 2001, 2008    LT SHOULDER ARTHROSCOPY     SURGICAL HISTORY OF -  Right 01/2007    Rt Shoulder Surgery - Dr Ferrer      SURGICAL HISTORY OF -  Right 2018    Rt BETTY - Dr Galdamez         SOCIAL HISTORY:  Social History     Socioeconomic History     Marital status:      Spouse name: Danielle     Number of children: 3     Years of education: 18     Highest education level: Not on file   Occupational History     Occupation: teacher [employer]      Employer: [id number]     Comment: retired    Social Needs     Financial resource strain: Not on file     Food insecurity:     Worry: Not on file     Inability: Not on file     Transportation needs:     Medical: Not on file     Non-medical: Not on file   Tobacco Use     Smoking status: Former Smoker     Packs/day: 2.00     Years: 7.00     Pack years: 14.00     Types: Cigarettes     Last attempt to quit: 1965     Years since quittin.4     Smokeless tobacco: Never Used   Substance and Sexual Activity     Alcohol use: Yes     Alcohol/week: 3.0 - 3.6 oz     Types: 5 - 6 Standard drinks or equivalent per week     Comment: ocassionally     Drug use: No     Sexual activity: Yes     Partners: Female   Lifestyle     Physical activity:     Days per week: Not on file     Minutes per session: Not on file     Stress: Not on file   Relationships     Social connections:     Talks on phone: Not on file     Gets together: Not on file     Attends [employer] service: Not on file     Active member of club or organization: Not on file     Attends meetings of clubs or organizations: Not on file     Relationship status: Not on file     Intimate partner violence:     Fear of current or ex partner: Not on file     Emotionally abused: Not on file     Physically abused: Not on file     Forced sexual activity: Not on file   Other Topics Concern      Service Not Asked     Blood Transfusions Not Asked     Caffeine Concern Yes     Comment: 2 cup qd     Occupational Exposure Not Asked     Hobby Hazards Not Asked     Sleep Concern Not Asked     Stress Concern Not Asked     Weight Concern Not  "Asked     Special Diet Not Asked     Back Care Not Asked     Exercise Yes     Comment: 3+ times per week     Bike Helmet Not Asked     Seat Belt Yes     Self-Exams Not Asked     Parent/sibling w/ CABG, MI or angioplasty before 65F 55M? No   Social History Narrative     Not on file   He smoked 2 packs a day from age [age], but no longer smokes.  He drinks 5-6 drinks a week.  He used to be a .  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  He is  with 3 children.      FAMILY HISTORY:  Family History   Problem Relation Age of Onset     Alcohol/Drug Father      Cancer Mother         ? abdominal     Breast Cancer Sister         age 42     Colon Cancer No family hx of          PHYSICAL EXAM:  Vital signs:  /67   Pulse 67   Temp 97  F (36.1  C) (Tympanic)   Resp 16   Ht 1.715 m (5' 7.5\")   Wt 66.5 kg (146 lb 8 oz)   SpO2 98%   BMI 22.61 kg/m     ECO  GENERAL/CONSTITUTIONAL: No acute distress.  EYES: No scleral icterus.  LYMPH: No anterior cervical, posterior cervical, or supraclavicular adenopathy.   RESPIRATORY: Clear to auscultation bilaterally. No crackles or wheezing.   CARDIOVASCULAR: Regular rate and rhythm without murmurs, gallops, or rubs.  GASTROINTESTINAL: No tenderness. The patient has normal bowel sounds. No guarding.  No distention.  MUSCULOSKELETAL: Warm and well-perfused, no cyanosis, clubbing, or edema.  NEUROLOGIC: Alert, oriented, answers questions appropriately.  INTEGUMENTARY: No jaundice.  GAIT: Steady, does not use assistive device      LABS:  CBC RESULTS:   Recent Labs   Lab Test 06/10/19  1030   WBC 6.8   RBC 4.93   HGB 15.3   HCT 43.7   MCV 89   MCH 31.0   MCHC 35.0   RDW 13.0        Recent Labs   Lab Test 06/10/19  1030 19  0759    143   POTASSIUM 4.4 3.8   CHLORIDE 106 108   CO2 26 26   ANIONGAP 8 9   GLC 89 108*   BUN 16 14   CR 1.26* 1.35*   KALPESH 8.6 8.5     Lab Results   Component " Value Date    AST 27 06/10/2019     Lab Results   Component Value Date    ALT 28 06/10/2019     Lab Results   Component Value Date    BILICONJ 0.0 12/10/2010      Lab Results   Component Value Date    BILITOTAL 0.8 06/10/2019     Lab Results   Component Value Date    ALBUMIN 3.9 06/10/2019     Lab Results   Component Value Date    PROTTOTAL 6.3 06/10/2019      Lab Results   Component Value Date    ALKPHOS 86 06/10/2019     PSA <0.01          ASSESSMENT/PLAN:  Jasen Rosario is a 73. year old male with:    1) SLL: He is asymptomatic without B-symptoms.  CBC is normal.  PET scan done on 6/13/15 showed no evidence of disease.  No intervention needed at this time, and would observe and monitor labs intermittently.  CBC and LDH on 6/3/19 are normal.  He can continue getting annual CBC's.  We discussed continuing surveillance here or with his PCP.  Since he is doing so well, he will continue his annual physicals and CBC check with his PCP, and he is welcome to return to heme/onc clinic as needed if further issues arise.      2) History of prostate cancer: s/p robotic radical prostatectomy on 4/13/15 by Dr. Smyth.  Final pathology was low volume [place] 6, pT2cN0.  Initial PSA was 5.8.  It became undetectable after surgery.  He is getting PSA's followed yearly by his PCP.      I spent a total of 15 minutes with the patient, with over >50% of the time in counseling and/or coordination of care.       Nicole Velazquez MD  Hematology/Oncology  [facility]     Pre-Procedure: The patient identified treatment areas were cleansed and treated using the parameters noted above. Prior to proceeding all present put on protective eye-wear.

## 2023-04-13 ENCOUNTER — TRANSFERRED RECORDS (OUTPATIENT)
Dept: HEALTH INFORMATION MANAGEMENT | Facility: CLINIC | Age: 78
End: 2023-04-13
Payer: COMMERCIAL

## 2023-05-16 ENCOUNTER — TRANSFERRED RECORDS (OUTPATIENT)
Dept: HEALTH INFORMATION MANAGEMENT | Facility: CLINIC | Age: 78
End: 2023-05-16
Payer: COMMERCIAL

## 2023-07-11 ENCOUNTER — NURSE TRIAGE (OUTPATIENT)
Dept: FAMILY MEDICINE | Facility: CLINIC | Age: 78
End: 2023-07-11
Payer: COMMERCIAL

## 2023-07-11 NOTE — TELEPHONE ENCOUNTER
Nurse Triage SBAR    Is this a 2nd Level Triage? NO    Situation: Call transferred to nurse to discuss cough symptoms and appointment request.     Background: Patient states he had bronchitis about a year ago that got pretty bad before he came in. This cough feels similar and wanting to be evaluated before symptoms get worse.     Assessment: Cough started a week ago or longer. He states it is a little harder to breath, but is not having shortness of breathing or wheezing. No chest pain. He is able to speak in complete sentences during phone call. He is coughing frequently, but states the cough mostly occurs during the day and he is able to sleep at night. Negative Covid test yesterday. Not checking temp, but does not feel chilled. He has not had recent surgery or long-distance travel.     Protocol Recommended Disposition:   See in Office Today or Tomorrow    Recommendation: Agree with appointment, assisted patient with scheduling appointment for tomorrow.   Appointments in Next Year    Jul 12, 2023 10:00 AM  (Arrive by 9:40 AM)  Provider Visit with MEAGAN Boyle CNP  [facility] ([facility] ) [phone]   Sep 12, 2023  2:00 PM  (Arrive by 1:45 PM)  Return Visit with Danna Evangelista PA-C  [facility] ([facility] ) [phone]   Oct 16, 2023  8:00 AM  (Arrive by 7:40 AM)  Annual Wellness Visit with Archie Meadows MD  [facility] ([facility] ) [phone]            Does the patient meet one of the following criteria for ADS visit consideration? 16+ years old, with an [facility] PCP     TIP  Providers, please consider if this condition is appropriate for management at one of our Acute and Diagnostic Services sites.     If patient is a good candidate, please use dotphrase <dot>triageresponse and select Refer to ADS to document.    Lizbeth  JAISON Davis  [facility]     Reason for Disposition    Patient wants to be seen    Additional Information    Negative: Bluish (or gray) lips or face    Negative: SEVERE difficulty breathing (e.g., struggling for each breath, speaks in single words)    Negative: Rapid onset of cough and has hives    Negative: Coughing started suddenly after medicine, an allergic food or bee sting    Negative: Difficulty breathing after exposure to flames, smoke, or fumes    Negative: Sounds like a life-threatening emergency to the triager    Negative: Previous asthma attacks and this feels like asthma attack    Negative: Dry cough (non-productive; no sputum or minimal clear sputum) and within 14 days of COVID-19 Exposure    Negative: MODERATE difficulty breathing (e.g., speaks in phrases, SOB even at rest, pulse 100-120) and still present when not coughing    Negative: Chest pain present when not coughing    Negative: Passed out (i.e., fainted, collapsed and was not responding)    Negative: Patient sounds very sick or weak to the triager    Negative: MILD difficulty breathing (e.g., minimal/no SOB at rest, SOB with walking, pulse <100) and still present when not coughing    Negative: Coughed up > 1 tablespoon (15 ml) blood (Exception: Blood-tinged sputum.)    Negative: Fever > 103 F (39.4 C)    Negative: Fever > 101 F (38.3 C) and over 60 years of age    Negative: Fever > 100.0 F (37.8 C) and has diabetes mellitus or a weak immune system (e.g., HIV positive, cancer chemotherapy, organ transplant, splenectomy, chronic steroids)    Negative: Fever > 100.0 F (37.8 C) and bedridden (e.g., nursing home patient, stroke, chronic illness, recovering from surgery)    Negative: Increasing ankle swelling    Negative: Wheezing is present    Negative: SEVERE coughing spells (e.g., whooping sound after coughing, vomiting after coughing)    Negative: Coughing up jarocho-colored (reddish-brown) or blood-tinged sputum     Negative: Fever present > 3 days (72 hours)    Negative: Fever returns after gone for over 24 hours and symptoms worse or not improved    Negative: Using nasal washes and pain medicine > 24 hours and sinus pain persists    Negative: Known COPD or other severe lung disease (i.e., bronchiectasis, cystic fibrosis, lung surgery) and worsening symptoms (i.e., increased sputum purulence or amount, increased breathing difficulty)    Protocols used: COUGH-A-OH

## 2023-07-11 NOTE — TELEPHONE ENCOUNTER
Patient is calling to report that he is experiencing SOB at times and whistling when he coughs. Patient reports that he had really bad bronchitis last year and it feels like it again. Writer tried to make patient an appointment to be seen, but patient just wanted to speak to a nurse first. Patient transferred to RN line to speak with a nurse, per request. Appointment not scheduled.

## 2023-07-12 ENCOUNTER — OFFICE VISIT (OUTPATIENT)
Dept: FAMILY MEDICINE | Facility: CLINIC | Age: 78
End: 2023-07-12
Payer: COMMERCIAL

## 2023-07-12 VITALS
DIASTOLIC BLOOD PRESSURE: 72 MMHG | RESPIRATION RATE: 14 BRPM | HEIGHT: 67 IN | BODY MASS INDEX: 23.07 KG/M2 | TEMPERATURE: 97.8 F | OXYGEN SATURATION: 99 % | SYSTOLIC BLOOD PRESSURE: 124 MMHG | HEART RATE: 63 BPM | WEIGHT: 147 LBS

## 2023-07-12 DIAGNOSIS — R06.2 WHEEZING: ICD-10-CM

## 2023-07-12 DIAGNOSIS — F32.5 MAJOR DEPRESSIVE DISORDER, SINGLE EPISODE IN FULL REMISSION (H): ICD-10-CM

## 2023-07-12 DIAGNOSIS — R05.9 COUGH, UNSPECIFIED TYPE: ICD-10-CM

## 2023-07-12 DIAGNOSIS — J06.9 UPPER RESPIRATORY TRACT INFECTION, UNSPECIFIED TYPE: Primary | ICD-10-CM

## 2023-07-12 DIAGNOSIS — C83.00 SMALL B-CELL LYMPHOMA, UNSPECIFIED BODY REGION (H): ICD-10-CM

## 2023-07-12 PROCEDURE — 99214 OFFICE O/P EST MOD 30 MIN: CPT | Performed by: NURSE PRACTITIONER

## 2023-07-12 RX ORDER — ALBUTEROL SULFATE 90 UG/1
2 AEROSOL, METERED RESPIRATORY (INHALATION) EVERY 4 HOURS PRN
Qty: 18 G | Refills: 0 | Status: SHIPPED | OUTPATIENT
Start: 2023-07-12 | End: 2023-10-11

## 2023-07-12 RX ORDER — PREDNISONE 20 MG/1
40 TABLET ORAL DAILY
Qty: 10 TABLET | Refills: 0 | Status: SHIPPED | OUTPATIENT
Start: 2023-07-12 | End: 2023-07-17

## 2023-07-12 ASSESSMENT — ENCOUNTER SYMPTOMS: COUGH: 1

## 2023-07-12 ASSESSMENT — ANXIETY QUESTIONNAIRES
GAD7 TOTAL SCORE: 8
7. FEELING AFRAID AS IF SOMETHING AWFUL MIGHT HAPPEN: NOT AT ALL
2. NOT BEING ABLE TO STOP OR CONTROL WORRYING: SEVERAL DAYS
GAD7 TOTAL SCORE: 8
6. BECOMING EASILY ANNOYED OR IRRITABLE: MORE THAN HALF THE DAYS
IF YOU CHECKED OFF ANY PROBLEMS ON THIS QUESTIONNAIRE, HOW DIFFICULT HAVE THESE PROBLEMS MADE IT FOR YOU TO DO YOUR WORK, TAKE CARE OF THINGS AT HOME, OR GET ALONG WITH OTHER PEOPLE: VERY DIFFICULT
5. BEING SO RESTLESS THAT IT IS HARD TO SIT STILL: SEVERAL DAYS
4. TROUBLE RELAXING: MORE THAN HALF THE DAYS
3. WORRYING TOO MUCH ABOUT DIFFERENT THINGS: SEVERAL DAYS
1. FEELING NERVOUS, ANXIOUS, OR ON EDGE: SEVERAL DAYS

## 2023-07-12 ASSESSMENT — PATIENT HEALTH QUESTIONNAIRE - PHQ9
10. IF YOU CHECKED OFF ANY PROBLEMS, HOW DIFFICULT HAVE THESE PROBLEMS MADE IT FOR YOU TO DO YOUR WORK, TAKE CARE OF THINGS AT HOME, OR GET ALONG WITH OTHER PEOPLE: SOMEWHAT DIFFICULT
SUM OF ALL RESPONSES TO PHQ QUESTIONS 1-9: 5
SUM OF ALL RESPONSES TO PHQ QUESTIONS 1-9: 5

## 2023-07-12 NOTE — PROGRESS NOTES
Assessment & Plan     Upper respiratory tract infection, unspecified type  Patient symptoms highly suspicious of viral etiology.  Exam today is reassuring-no reason for higher level of care felt at this time.   Steroids and inhaler.   Written education provided.   Red flag symptoms discussed and if these occur present to the emergency room or call 911.   Jasen verbalizes understanding of plan of care and is in agreement.    - predniSONE (DELTASONE) 20 MG tablet  Dispense: 10 tablet; Refill: 0    Wheezing    - albuterol (PROAIR HFA/PROVENTIL HFA/VENTOLIN HFA) 108 (90 Base) MCG/ACT inhaler  Dispense: 18 g; Refill: 0  - predniSONE (DELTASONE) 20 MG tablet  Dispense: 10 tablet; Refill: 0    Cough, unspecified type    - predniSONE (DELTASONE) 20 MG tablet  Dispense: 10 tablet; Refill: 0    Small B-cell lymphoma, unspecified body region (H)      Major depressive disorder, single episode in full remission (H)      Return for Recheck, if symptoms persist or worsening please be seen.        MEAGAN Torres [facility]   Jeffrey is a 77 year old, presenting for the following health issues:  Cough        7/12/2023     9:47 AM   Additional Questions   Roomed by Gisel HODGES   Accompanied by Self     Cough    History of Present Illness       Reason for visit:  Cough  breathing difficulty  Symptom onset:  1-2 weeks ago  Symptom intensity:  Moderate  Symptom progression:  Worsening  Had these symptoms before:  Yes  Has tried/received treatment for these symptoms:  Yes  Previous treatment was successful:  Yes  Prior treatment description:  Steroids, inhaler, andsomething else  What makes it worse:  No  What makes it better:  No    He eats 0-1 servings of fruits and vegetables daily.He consumes 1 sweetened beverage(s) daily.He exercises with enough effort to increase his heart rate 20 to 29 minutes per day.  He exercises with enough effort to increase his heart rate 6 days per week.   He  "is taking medications regularly.    Today's PHQ-9         PHQ-9 Total Score: 5    PHQ-9 Q9 Thoughts of better off dead/self-harm past 2 weeks :   Not at all    How difficult have these problems made it for you to do your work, take care of things at home, or get along with other people: Somewhat difficult  Today's WALDO-7 Score: 8         Acute Illness  Acute illness concerns: Cough  Onset/Duration: x10-14 days  Symptoms:  Fever: No  Chills/Sweats: No  Headache (location?): No  Sinus Pressure: No  Conjunctivitis:  No  Ear Pain: no  Rhinorrhea: YES- was runny-getting better - does have seasonal allergies  Congestion: YES  Sore Throat: YES- for 3 days - none since  Cough: YES-some productive-unsure of color  Wheeze: YES  Decreased Appetite: No  Nausea: No  Vomiting: No  Diarrhea: No  Dysuria/Freq.: No - 72oz of water due to dizzy/lightheadedness - Specialty - has seen dizzy center -- has gotten worse since this illness - foggy feeling  Dysuria or Hematuria: No  Fatigue/Achiness: YES- more fatigued  Sick/Strep Exposure: No  Therapies tried and outcome: Once night took Nyquil to sleep - did help - Covid tested at home - NEGATIVE      Review of Systems   Respiratory: Positive for cough.          Objective    /72 (BP Location: Right arm, Patient Position: Chair, Cuff Size: Adult Regular)   Pulse 63   Temp 97.8  F (36.6  C) (Tympanic)   Resp 14   Ht 1.689 m (5' 6.5\")   Wt 66.7 kg (147 lb)   SpO2 99%   BMI 23.37 kg/m    Body mass index is 23.37 kg/m .  Physical Exam   GENERAL: healthy, alert and no distress  EYES: Eyes grossly normal to inspection, PERRL and conjunctivae and sclerae normal  HENT: ear canals and TM's normal, nose and mouth without ulcers or lesions  NECK: no adenopathy, no asymmetry, masses, or scars and thyroid normal to palpation  RESP: lungs clear to auscultation - no rales, rhonchi; intermittent wheeze  CV: regular rate and rhythm, normal S1 S2, no S3 or S4, no murmur, click or rub, no " peripheral edema and peripheral pulses strong  ABDOMEN: soft, nontender, no hepatosplenomegaly, no masses and bowel sounds normal  MS: no gross musculoskeletal defects noted, no edema  SKIN: no suspicious lesions or rashes  NEURO: Normal strength and tone, mentation intact and speech normal  PSYCH: mentation appears normal, affect normal/bright

## 2023-08-28 ENCOUNTER — MEDICAL CORRESPONDENCE (OUTPATIENT)
Dept: HEALTH INFORMATION MANAGEMENT | Facility: CLINIC | Age: 78
End: 2023-08-28

## 2023-08-28 ENCOUNTER — TRANSFERRED RECORDS (OUTPATIENT)
Dept: HEALTH INFORMATION MANAGEMENT | Facility: CLINIC | Age: 78
End: 2023-08-28
Payer: COMMERCIAL

## 2023-09-12 ENCOUNTER — OFFICE VISIT (OUTPATIENT)
Dept: DERMATOLOGY | Facility: CLINIC | Age: 78
End: 2023-09-12
Payer: COMMERCIAL

## 2023-09-12 DIAGNOSIS — D18.01 ANGIOMA OF SKIN: ICD-10-CM

## 2023-09-12 DIAGNOSIS — L81.4 LENTIGO: ICD-10-CM

## 2023-09-12 DIAGNOSIS — L82.1 SEBORRHEIC KERATOSIS: ICD-10-CM

## 2023-09-12 DIAGNOSIS — L57.0 ACTINIC KERATOSIS: ICD-10-CM

## 2023-09-12 DIAGNOSIS — D22.9 NEVUS: Primary | ICD-10-CM

## 2023-09-12 PROCEDURE — 99213 OFFICE O/P EST LOW 20 MIN: CPT | Mod: 25 | Performed by: PHYSICIAN ASSISTANT

## 2023-09-12 PROCEDURE — 17000 DESTRUCT PREMALG LESION: CPT | Performed by: PHYSICIAN ASSISTANT

## 2023-09-12 PROCEDURE — 17003 DESTRUCT PREMALG LES 2-14: CPT | Performed by: PHYSICIAN ASSISTANT

## 2023-09-12 ASSESSMENT — PAIN SCALES - GENERAL: PAINLEVEL: NO PAIN (0)

## 2023-09-12 NOTE — LETTER
9/12/2023         RE: Jasen Rosario  [address]  [facility] [address]        Dear Colleague,    Thank you for referring your patient, Jasen Rosario, to the [facility]. Please see a copy of my visit note below.    HPI:   Chief complaints: Jasen Rosario is a pleasant 78 year old male who presents for Full skin cancer screening to rule out skin cancer   Last Skin Exam:1 year ago      1st Baseline: no  Personal HX of Skin Cancer: yes BCC on the left helix 2018   Personal HX of Malignant Melanoma: no   Family HX of Skin Cancer / Malignant Melanoma: no  Personal HX of Atypical Moles:   no  Risk factors: history of sun exposure and burns; limited sunscreen use  New / Changing lesions: none  Social History: very active - plays pickle ball frequently. Rides motorcycles frequently as well  On review of systems, there are no further skin complaints, patient is feeling otherwise well.  See patient intake sheet.  ROS of the following were done and are negative: Constitutional, Eyes, Ears, Nose,   Mouth, Throat, Cardiovascular, Respiratory, GI, Genitourinary, Musculoskeletal,   Psychiatric, Endocrine, Allergic/Immunologic.    PHYSICAL EXAM:   There were no vitals taken for this visit.  Skin exam performed as follows: Type 2 skin. Mood appropriate  Alert and Oriented X 3. Well developed, well nourished in no distress.  General appearance: Normal  Head including face: Normal  Eyes: conjunctiva and lids: Normal  Mouth: Lips, teeth, gums: Normal  Neck: Normal  Chest-breast/axillae: Normal  Back: Normal  Spleen and liver: Normal  Cardiovascular: Exam of peripheral vascular system by observation for swelling, varicosities, edema: Normal  Genitalia: groin, buttocks: Normal  Extremities: digits/nails (clubbing): Normal  Eccrine and Apocrine glands: Normal  Right upper extremity: Normal  Left upper extremity: Normal  Right lower extremity: Normal  Left lower extremity:  Normal  Skin: Scalp and body hair: See below    Pt deferred exam of breasts, groin, buttocks: No    Other physical findings:  1. Multiple pigmented macules on extremities and trunk  2. Multiple pigmented macules on face, trunk and extremities  3. Multiple vascular papules on trunk, arms and legs  4. Multiple scattered keratotic plaques  5. Pink gritty papule on the left forearm x 3         Except as noted above, no other signs of skin cancer or melanoma.     ASSESSMENT/PLAN:   Benign Full skin cancer screening today. . Patient with history of BCC  Advised on monthly self exams and 1 year  Patient Education: Appropriate brochures given.    Multiple benign appearing nevi on arms, legs and trunk. Discussed ABCDEs of melanoma and sunscreen.   Multiple lentigos on arms, legs and trunk. Advised benign, no treatment needed.  Multiple scattered angiomas. Advised benign, no treatment needed.   Seborrheic keratosis on arms, legs and trunk. Advised benign, no treatment needed.  Actinic keratosis on the left forearm x 3. As precancerous, cryosurgery performed. Advised on blistering and post-op care. Advised if not resolved in 1-2 months to return for evaluation            Follow-up: yearly FSE/PRN sooner    1.) Patient was asked about new and changing moles. YES  2.) Patient received a complete physical skin examination: YES  3.) Patient was counseled to perform a monthly self skin examination: YES  Scribed By: Danna Evangelista MS, PAPABLO      Again, thank you for allowing me to participate in the care of your patient.        Sincerely,        Danna Evangelista PA-C

## 2023-10-09 ENCOUNTER — TELEPHONE (OUTPATIENT)
Dept: FAMILY MEDICINE | Facility: CLINIC | Age: 78
End: 2023-10-09

## 2023-10-09 ENCOUNTER — OFFICE VISIT (OUTPATIENT)
Dept: FAMILY MEDICINE | Facility: CLINIC | Age: 78
End: 2023-10-09
Payer: COMMERCIAL

## 2023-10-09 VITALS
TEMPERATURE: 97 F | HEIGHT: 67 IN | OXYGEN SATURATION: 98 % | BODY MASS INDEX: 23.23 KG/M2 | DIASTOLIC BLOOD PRESSURE: 72 MMHG | RESPIRATION RATE: 18 BRPM | WEIGHT: 148 LBS | SYSTOLIC BLOOD PRESSURE: 120 MMHG | HEART RATE: 65 BPM

## 2023-10-09 DIAGNOSIS — B02.8 HERPES ZOSTER WITH OTHER COMPLICATION: Primary | ICD-10-CM

## 2023-10-09 DIAGNOSIS — C83.00 SMALL B-CELL LYMPHOMA, UNSPECIFIED BODY REGION (H): ICD-10-CM

## 2023-10-09 PROCEDURE — 99214 OFFICE O/P EST MOD 30 MIN: CPT | Performed by: FAMILY MEDICINE

## 2023-10-09 RX ORDER — GABAPENTIN 100 MG/1
100 CAPSULE ORAL 3 TIMES DAILY
Qty: 45 CAPSULE | Refills: 1 | Status: SHIPPED | OUTPATIENT
Start: 2023-10-09 | End: 2023-10-20

## 2023-10-09 RX ORDER — VALACYCLOVIR HYDROCHLORIDE 1 G/1
1000 TABLET, FILM COATED ORAL 3 TIMES DAILY
Qty: 21 TABLET | Refills: 0 | Status: SHIPPED | OUTPATIENT
Start: 2023-10-09 | End: 2023-10-20

## 2023-10-09 NOTE — PATIENT INSTRUCTIONS
[facility]  [address] [place] [id number]  Office: [phone]   Fax:    [phone]     Please, call our clinic or go to the ER immediately if signs or symptoms worsen or fail to improve as anticipated.     See ophthalmology today or tomorrow at the latest, please.
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
## 2023-10-09 NOTE — TELEPHONE ENCOUNTER
Reason for Call:  Appointment Request    Patient requesting this type of appt:  canker sore, skin hurts right side of head, little thing in hear red and sore headache right side of throat sore x4 days     Requested provider: Archie Meadows    Reason patient unable to be scheduled: Not with their preferred provider    When does patient want to be seen/preferred time: Same day    Comments: patient would like to get in asap.     Could we send this information to you in [facility] or would you prefer to receive a phone call?:   Patient would prefer a phone call   Okay to leave a detailed message?: Yes at Cell number on file:    Telephone Information:   Mobile [phone]       Call taken on 10/9/2023 at 7:57 AM by Karime Trivedi

## 2023-10-09 NOTE — TELEPHONE ENCOUNTER
Ok with Dr. Damico to see in person today arrival time 11am     Scheduled. Advised of location and times. Advised to mask.   Future Appointments 10/9/2023 - 4/6/2024        Date Visit Type Length Department Provider     10/9/2023 11:20 AM OFFICE VISIT 20 min [facility] Sheri Damico MD    Location Instructions:     [facility] is located at [address], [address] Free parking is available; access the lot by turning [address] from [address]  onto [address], then west onto [address].              10/11/2023  8:00 AM PRE-OPERATIVE 30 min  [facility] Muriel Garcia APRN CNP    Location Instructions:     [facility] is located at [address], [address] Free parking is available; access the lot by turning north from [address] onto [address], then west onto [address].              10/16/2023  8:00 AM ANNUAL WELLNESS 30 min [facility] Archie Meadows MD    Location Instructions:     [facility] is located at [address], [address] Free parking is available; access the lot by turning [address] from [address]  onto [address], then west onto [address].

## 2023-10-11 ENCOUNTER — OFFICE VISIT (OUTPATIENT)
Dept: FAMILY MEDICINE | Facility: CLINIC | Age: 78
End: 2023-10-11
Payer: COMMERCIAL

## 2023-10-11 VITALS
BODY MASS INDEX: 23.07 KG/M2 | RESPIRATION RATE: 12 BRPM | WEIGHT: 147 LBS | OXYGEN SATURATION: 98 % | DIASTOLIC BLOOD PRESSURE: 70 MMHG | HEIGHT: 67 IN | TEMPERATURE: 97.6 F | SYSTOLIC BLOOD PRESSURE: 116 MMHG | HEART RATE: 67 BPM

## 2023-10-11 DIAGNOSIS — C61 PROSTATE CANCER (H): ICD-10-CM

## 2023-10-11 DIAGNOSIS — M19.012 PRIMARY OSTEOARTHRITIS OF LEFT SHOULDER: ICD-10-CM

## 2023-10-11 DIAGNOSIS — E78.5 HYPERLIPIDEMIA LDL GOAL <130: ICD-10-CM

## 2023-10-11 DIAGNOSIS — Z01.818 PREOP GENERAL PHYSICAL EXAM: Primary | ICD-10-CM

## 2023-10-11 DIAGNOSIS — Z13.29 SCREENING FOR THYROID DISORDER: ICD-10-CM

## 2023-10-11 DIAGNOSIS — Z13.1 SCREENING FOR DIABETES MELLITUS: ICD-10-CM

## 2023-10-11 DIAGNOSIS — Z79.899 MEDICATION MANAGEMENT: ICD-10-CM

## 2023-10-11 DIAGNOSIS — B02.8 HERPES ZOSTER WITH OTHER COMPLICATION: ICD-10-CM

## 2023-10-11 DIAGNOSIS — Z13.0 SCREENING FOR DEFICIENCY ANEMIA: ICD-10-CM

## 2023-10-11 DIAGNOSIS — Z12.11 SPECIAL SCREENING FOR MALIGNANT NEOPLASMS, COLON: ICD-10-CM

## 2023-10-11 LAB
ALBUMIN SERPL BCG-MCNC: 4.4 G/DL (ref 3.5–5.2)
ALBUMIN UR-MCNC: NEGATIVE MG/DL
ALP SERPL-CCNC: 76 U/L (ref 40–129)
ALT SERPL W P-5'-P-CCNC: 19 U/L (ref 0–70)
ANION GAP SERPL CALCULATED.3IONS-SCNC: 11 MMOL/L (ref 7–15)
APPEARANCE UR: CLEAR
AST SERPL W P-5'-P-CCNC: 30 U/L (ref 0–45)
BILIRUB SERPL-MCNC: 0.6 MG/DL
BILIRUB UR QL STRIP: NEGATIVE
BUN SERPL-MCNC: 16 MG/DL (ref 8–23)
CALCIUM SERPL-MCNC: 9.4 MG/DL (ref 8.8–10.2)
CHLORIDE SERPL-SCNC: 104 MMOL/L (ref 98–107)
CHOLEST SERPL-MCNC: 198 MG/DL
CK SERPL-CCNC: 115 U/L (ref 39–308)
COLOR UR AUTO: YELLOW
CREAT SERPL-MCNC: 1.2 MG/DL (ref 0.67–1.17)
CREAT UR-MCNC: 41.6 MG/DL
DEPRECATED HCO3 PLAS-SCNC: 25 MMOL/L (ref 22–29)
EGFRCR SERPLBLD CKD-EPI 2021: 62 ML/MIN/1.73M2
ERYTHROCYTE [DISTWIDTH] IN BLOOD BY AUTOMATED COUNT: 12.6 % (ref 10–15)
GLUCOSE SERPL-MCNC: 97 MG/DL (ref 70–99)
GLUCOSE UR STRIP-MCNC: NEGATIVE MG/DL
HCT VFR BLD AUTO: 46.4 % (ref 40–53)
HDLC SERPL-MCNC: 72 MG/DL
HGB BLD-MCNC: 16.1 G/DL (ref 13.3–17.7)
HGB UR QL STRIP: NEGATIVE
KETONES UR STRIP-MCNC: NEGATIVE MG/DL
LDH SERPL L TO P-CCNC: 202 U/L (ref 0–250)
LDLC SERPL CALC-MCNC: 111 MG/DL
LEUKOCYTE ESTERASE UR QL STRIP: NEGATIVE
MCH RBC QN AUTO: 31.4 PG (ref 26.5–33)
MCHC RBC AUTO-ENTMCNC: 34.7 G/DL (ref 31.5–36.5)
MCV RBC AUTO: 90 FL (ref 78–100)
MICROALBUMIN UR-MCNC: <12 MG/L
MICROALBUMIN/CREAT UR: NORMAL MG/G{CREAT}
NITRATE UR QL: NEGATIVE
NONHDLC SERPL-MCNC: 126 MG/DL
PH UR STRIP: 6 [PH] (ref 5–7)
PLATELET # BLD AUTO: 153 10E3/UL (ref 150–450)
POTASSIUM SERPL-SCNC: 4.8 MMOL/L (ref 3.4–5.3)
PROT SERPL-MCNC: 6.3 G/DL (ref 6.4–8.3)
PSA SERPL DL<=0.01 NG/ML-MCNC: <0.01 NG/ML (ref 0–6.5)
RBC # BLD AUTO: 5.13 10E6/UL (ref 4.4–5.9)
SODIUM SERPL-SCNC: 140 MMOL/L (ref 135–145)
SP GR UR STRIP: 1.01 (ref 1–1.03)
TRIGL SERPL-MCNC: 77 MG/DL
TSH SERPL DL<=0.005 MIU/L-ACNC: 2.78 UIU/ML (ref 0.3–4.2)
UROBILINOGEN UR STRIP-ACNC: 0.2 E.U./DL
WBC # BLD AUTO: 6.4 10E3/UL (ref 4–11)

## 2023-10-11 PROCEDURE — 80053 COMPREHEN METABOLIC PANEL: CPT | Performed by: NURSE PRACTITIONER

## 2023-10-11 PROCEDURE — 99214 OFFICE O/P EST MOD 30 MIN: CPT | Mod: 25 | Performed by: NURSE PRACTITIONER

## 2023-10-11 PROCEDURE — 82043 UR ALBUMIN QUANTITATIVE: CPT | Performed by: NURSE PRACTITIONER

## 2023-10-11 PROCEDURE — 85027 COMPLETE CBC AUTOMATED: CPT | Performed by: NURSE PRACTITIONER

## 2023-10-11 PROCEDURE — 82570 ASSAY OF URINE CREATININE: CPT | Performed by: NURSE PRACTITIONER

## 2023-10-11 PROCEDURE — 36415 COLL VENOUS BLD VENIPUNCTURE: CPT | Performed by: NURSE PRACTITIONER

## 2023-10-11 PROCEDURE — 93000 ELECTROCARDIOGRAM COMPLETE: CPT | Performed by: NURSE PRACTITIONER

## 2023-10-11 PROCEDURE — 84443 ASSAY THYROID STIM HORMONE: CPT | Performed by: NURSE PRACTITIONER

## 2023-10-11 PROCEDURE — 84153 ASSAY OF PSA TOTAL: CPT | Performed by: NURSE PRACTITIONER

## 2023-10-11 PROCEDURE — 80061 LIPID PANEL: CPT | Performed by: NURSE PRACTITIONER

## 2023-10-11 PROCEDURE — 81003 URINALYSIS AUTO W/O SCOPE: CPT | Performed by: NURSE PRACTITIONER

## 2023-10-11 PROCEDURE — 83615 LACTATE (LD) (LDH) ENZYME: CPT | Performed by: NURSE PRACTITIONER

## 2023-10-11 PROCEDURE — 82550 ASSAY OF CK (CPK): CPT | Performed by: NURSE PRACTITIONER

## 2023-10-11 RX ORDER — RESPIRATORY SYNCYTIAL VIRUS VACCINE 120MCG/0.5
0.5 KIT INTRAMUSCULAR ONCE
Qty: 1 EACH | Refills: 0 | Status: CANCELLED | OUTPATIENT
Start: 2023-10-11 | End: 2023-10-11

## 2023-10-11 NOTE — LETTER
October 16, 2023      Jeffrey Rosario  [address] [facility] [address]  [place] [address]        Dear ,    We are writing to inform you of your test results.    Labs are overall quite good     We advise:     Continue current cares.   Balanced low cholesterol diet.   Regular exercise.     Will review further at upcoming physical     For additional lab test information, labtestsonline.org is an excellent reference.     Resulted Orders   UA Macroscopic with reflex to Microscopic and Culture - Clinic Collect   Result Value Ref Range    Color Urine Yellow Colorless, Straw, Light Yellow, Yellow    Appearance Urine Clear Clear    Glucose Urine Negative Negative mg/dL    Bilirubin Urine Negative Negative    Ketones Urine Negative Negative mg/dL    Specific Gravity Urine 1.010 1.003 - 1.035    Blood Urine Negative Negative    pH Urine 6.0 5.0 - 7.0    Protein Albumin Urine Negative Negative mg/dL    Urobilinogen Urine 0.2 0.2, 1.0 E.U./dL    Nitrite Urine Negative Negative    Leukocyte Esterase Urine Negative Negative    Narrative    Microscopic not indicated   PSA, tumor marker   Result Value Ref Range    PSA Tumor Marker <0.01 0.00 - 6.50 ng/mL    Narrative    This result is obtained using the Roche Elecsys total PSA method on the naz [id number] immunoassay analyzer. Results obtained with different assay methods or kits cannot be used interchangeably.   Lactate Dehydrogenase   Result Value Ref Range    Lactate Dehydrogenase 202 0 - 250 U/L   Albumin Random Urine Quantitative with Creat Ratio   Result Value Ref Range    Creatinine Urine mg/dL 41.6 mg/dL      Comment:      The reference ranges have not been established in urine creatinine. The results should be integrated into the clinical context for interpretation.    Albumin Urine mg/L <12.0 mg/L      Comment:      The reference ranges have not been established in urine albumin. The results should be integrated into the clinical context for interpretation.     Albumin Urine mg/g Cr        Comment:      Unable to calculate, urine albumin and/or urine creatinine is outside detectable limits.  Microalbuminuria is defined as an albumin:creatinine ratio of 17 to 299 for males and 25 to 299 for females. A ratio of albumin:creatinine of 300 or higher is indicative of overt proteinuria.  Due to biologic variability, positive results should be confirmed by a second, first-morning random or 24-hour timed urine specimen. If there is discrepancy, a third specimen is recommended. When 2 out of 3 results are in the microalbuminuria range, this is evidence for incipient nephropathy and warrants increased efforts at glucose control, blood pressure control, and institution of therapy with an angiotensin-converting-enzyme (ACE) inhibitor (if the patient can tolerate it).     TSH with free T4 reflex   Result Value Ref Range    TSH 2.78 0.30 - 4.20 uIU/mL   Lipid panel reflex to direct LDL Fasting   Result Value Ref Range    Cholesterol 198 <200 mg/dL    Triglycerides 77 <150 mg/dL    Direct Measure HDL 72 >=40 mg/dL    LDL Cholesterol Calculated 111 (H) <=100 mg/dL    Non HDL Cholesterol 126 <130 mg/dL    Narrative    Cholesterol  Desirable:  <200 mg/dL    Triglycerides  Normal:  Less than 150 mg/dL  Borderline High:  150-199 mg/dL  High:  200-499 mg/dL  Very High:  Greater than or equal to 500 mg/dL    Direct Measure HDL  Female:  Greater than or equal to 50 mg/dL   Male:  Greater than or equal to 40 mg/dL    LDL Cholesterol  Desirable:  <100mg/dL  Above Desirable:  100-129 mg/dL   Borderline High:  130-159 mg/dL   High:  160-189 mg/dL   Very High:  >= 190 mg/dL    Non HDL Cholesterol  Desirable:  130 mg/dL  Above Desirable:  130-159 mg/dL  Borderline High:  160-189 mg/dL  High:  190-219 mg/dL  Very High:  Greater than or equal to 220 mg/dL   CK total   Result Value Ref Range     39 - 308 U/L       If you have any questions or concerns, please call the clinic at the number listed  above.       Sincerely,            Archie Meadows M.D.

## 2023-10-11 NOTE — PROGRESS NOTES
[address] [address]  Phone: [phone]  Primary Provider: Archie Meadows  Pre-op Performing Provider: CHAPO OWUSU      PREOPERATIVE EVALUATION:  Today's date: 10/11/2023    Jeffrey is a 78 year old male who presents for a preoperative evaluation.      10/11/2023     7:42 AM   Additional Questions   Roomed by Csaba CMA   Accompanied by Self       Surgical Information:  Surgery/Procedure: LEFT REVERSE TOTAL SHOULDER ARTHROPLASTY   Surgery Location: [facility]  Surgeon: Dr Tee Rodriguez  Surgery Date: 10/23/2023  Time of Surgery: 1:00pm  Where patient plans to recover: At home with family  Fax number for surgical facility: Note does not need to be faxed, will be available electronically in Epic.    Assessment & Plan     The proposed surgical procedure is considered INTERMEDIATE risk.    Preop general physical exam  Cleared for surgery without further diagnostics needed.    Follow-up with PCP next week for wellness examination.  Labs completed today for preop as well as his wellness exam.  - EKG 12-lead complete w/read - Clinics  - CBC with platelets  - Comprehensive metabolic panel (BMP + Alb, Alk Phos, ALT, AST, Total. Bili, TP)  - CBC with platelets  - Comprehensive metabolic panel (BMP + Alb, Alk Phos, ALT, AST, Total. Bili, TP)    Primary osteoarthritis of left shoulder  - CBC with platelets  - Comprehensive metabolic panel (BMP + Alb, Alk Phos, ALT, AST, Total. Bili, TP)  - CBC with platelets  - Comprehensive metabolic panel (BMP + Alb, Alk Phos, ALT, AST, Total. Bili, TP)    Herpes zoster with other complication- - right side of  face and having some periorbital symptoms  Continue Valtrex gabapentin as needed follow-up with ophthalmology.    h/o Prostate cancer (H) - prostatectomy - 2015    - PSA, tumor marker  - PSA, tumor marker    Hyperlipidemia LDL goal <130    - Lipid panel reflex to direct LDL  Fasting  - CK total  - Lipid panel reflex to direct LDL Fasting  - CK total    Screening for deficiency anemia      Screening for thyroid disorder    - TSH with free T4 reflex  - TSH with free T4 reflex    Special screening for malignant neoplasms, colon  - Fecal colorectal cancer screen (FIT)  - Fecal colorectal cancer screen (FIT)    Medication management    - Lactate Dehydrogenase  - Albumin Random Urine Quantitative with Creat Ratio  - UA Macroscopic with reflex to Microscopic and Culture - Clinic Collect  - Lactate Dehydrogenase  - Albumin Random Urine Quantitative with Creat Ratio    Screening for diabetes mellitus                - No identified additional risk factors other than previously addressed    Antiplatelet or Anticoagulation Medication Instructions:   - Patient is on no antiplatelet or anticoagulation medications.    Additional Medication Instructions:  Patient is to take all scheduled medications on the day of surgery EXCEPT for modifications listed below:  Encourage no NSAIDS, aspirin or vitamin supplementation for the next 7 days.  Tylenol is okay.   Encourage no NSAIDS(ibuprofen, advil, aleve), aspirin or vitamin supplementation for the next 7 days.  Tylenol is okay.   Okay to take gabapentin for shingles pain okay to take the night before.   Taking no medicines the morning of surgery.     RECOMMENDATION:  APPROVAL GIVEN to proceed with proposed procedure, without further diagnostic evaluation.    Subjective       HPI related to upcoming procedure: LEFT OA shoulder        10/10/2023    12:21 PM   Preop Questions   1. Have you ever had a heart attack or stroke? No   2. Have you ever had surgery on your heart or blood vessels, such as a stent placement, a coronary artery bypass, or surgery on an artery in your head, neck, heart, or legs? No   3. Do you have chest pain with activity? No   4. Do you have a history of  heart failure? No   5. Do you currently have a cold, bronchitis or symptoms of  other infection? NO shingles over a week   6. Do you have a cough, shortness of breath, or wheezing? No   7. Do you or anyone in your family have previous history of blood clots? No   8. Do you or does anyone in your family have a serious bleeding problem such as prolonged bleeding following surgeries or cuts? No   9. Have you ever had problems with anemia or been told to take iron pills? No   10. Have you had any abnormal blood loss such as black, tarry or bloody stools? No   11. Have you ever had a blood transfusion? No   12. Are you willing to have a blood transfusion if it is medically needed before, during, or after your surgery? Yes   13. Have you or any of your relatives ever had problems with anesthesia? No   14. Do you have sleep apnea, excessive snoring or daytime drowsiness? No   15. Do you have any artifical heart valves or other implanted medical devices like a pacemaker, defibrillator, or continuous glucose monitor? No   16. Do you have artificial joints? YES - Left knee; left hip and right hip   17. Are you allergic to latex? No       Health Care Directive:  Patient has a Health Care Directive on file      Preoperative Review of :   reviewed - controlled substances reflected in medication list.      Status of Chronic Conditions:  See problem list for active medical problems.  Problems all longstanding and stable, except as noted/documented.  See ROS for pertinent symptoms related to these conditions.    Review of Systems  Constitutional, neuro, ENT, endocrine, pulmonary, cardiac, gastrointestinal, genitourinary, musculoskeletal, integument and psychiatric systems are negative, except as otherwise noted.    Patient Active Problem List    Diagnosis Date Noted    h/o Prostate cancer (H) - prostatectomy - 2015      Priority: High     Dr Smyth -  - [place] 3+3      Hyperlipidemia LDL goal <130      Priority: High    Small B-cell lymphoma (H)- very minor and very slow progression - needs yearly  testing 06/14/2018     Priority: Medium    S/P total knee arthroplasty 10/02/2017     Priority: Medium    Environmental allergies 09/20/2017     Priority: Medium    S/P total hip arthroplasty 06/21/2017     Priority: Medium    Lumbar stenosis      Priority: Medium     [facility] - Dr Irene - with neuritis      Colon polyp      Priority: Medium    ED (erectile dysfunction)      Priority: Medium    Seasonal allergies      Priority: Medium    OA (osteoarthritis)      Priority: Medium     Left knee synvisc- Dr Balderas      Advanced directives, counseling/discussion 04/02/2013     Priority: Low    Major depressive disorder, single episode in full remission (H24)      Priority: Low    Anxiety state 11/30/2004     Priority: Low     Problem list name updated by automated process. Provider to review        Past Medical History:   Diagnosis Date    Anxiety state, unspecified 1991    Basal cell carcinoma     Left Ear - Moh's Dr Young    Colon polyp     ED (erectile dysfunction) 2005    Hyperlipidemia LDL goal <130 2001    Hypoglycemia, unspecified 1991    Infectious mononucleosis 1969    Lumbar stenosis     [facility] - Dr Irene - with neuritis    Lymphoma (H)     SLL - B Cell - Dr Barrios/Marcus    Major depressive disorder, single episode in full remission (H24) 1991    OA (osteoarthritis)     Severe Lt Hip    Prostate cancer (H) 05/2013    Dr Smyth - [facility] - [place] 3+3 - intermediate progression risk - pT2cN0    Seasonal allergies      Past Surgical History:   Procedure Laterality Date    APPENDECTOMY  1960    APPY    ARTHROPLASTY HIP Left 06/21/2017    Left BETTY - Dr MELISSA Madrigal    ARTHROPLASTY KNEE Left 10/02/2017    LEFT TOTAL KNEE ARTHROPLASTY - Dr MELISSA Madrigal    BACK SURGERY  1980    COLONOSCOPY  12/04/2012    colon polyp - serrated adenoma 5 mm - due 3 yrs    COLONOSCOPY  03/16/2018    Dr. Naila BELLE - normal - due 5 yrs - will need MAC    COLONOSCOPY N/A 04/18/2018    Dr. Naila BELLE - normal - due 5 yrs - will need MAC     DAVINCI PROSTATECTOMY, LYMPHADENECTOMY N/A 2015    RALP - Dr Smyth    DENTAL SURGERY  1985    wisdom teeth    SURGICAL HISTORY OF -       BACK SURGERY    SURGICAL HISTORY OF -  Left     ANKLE FX ORIF - Lt    SURGICAL HISTORY OF -  Left     LT KNEE MCL TEAR AND MENISCUS     SURGICAL HISTORY OF -  Left ,     LT SHOULDER ARTHROSCOPY    SURGICAL HISTORY OF -  Right 2007    Rt Shoulder Surgery - Dr Ferrer    SURGICAL HISTORY OF -  Right 2018    Rt BETTY - Dr Galdamez     Current Outpatient Medications   Medication Sig Dispense Refill    cyanocobalamin (VITAMIN B-12) 1000 MCG tablet Take 1,000 mcg by mouth daily      gabapentin (NEURONTIN) 100 MG capsule Take 1 capsule (100 mg) by mouth 3 times daily 45 capsule 1    GLUCOSAMINE-CHONDROITIN PO Take 2 tablets by mouth daily      Multiple Vitamin (DAILY MULTIVITAMIN PO) Take 2 tablets by mouth daily       Omega-3 Fatty Acids (FISH OIL PO) Take 2 tablets by mouth daily       valACYclovir (VALTREX) 1000 mg tablet Take 1 tablet (1,000 mg) by mouth 3 times daily for 7 days 21 tablet 0    vitamin B6 (PYRIDOXINE) 100 MG tablet Take 100 mg by mouth daily         Allergies   Allergen Reactions    Seasonal Allergies         Social History     Tobacco Use    Smoking status: Former     Packs/day: 2.00     Years: 7.00     Additional pack years: 0.00     Total pack years: 14.00     Types: Cigarettes     Start date: 1958     Quit date: 1965     Years since quittin.8    Smokeless tobacco: Never   Substance Use Topics    Alcohol use: Yes     Alcohol/week: 5.0 - 6.0 standard drinks of alcohol     Types: 5 - 6 Standard drinks or equivalent per week     Comment: ocassionally     Family History   Problem Relation Age of Onset    Cancer Mother         ? abdominal    Alcohol/Drug Father     Breast Cancer Sister         age 42    Colon Cancer No family hx of      History   Drug Use No         Objective     /70 (BP Location: Right arm,  "Patient Position: Chair, Cuff Size: Adult Regular)   Pulse 67   Temp 97.6  F (36.4  C) (Tympanic)   Resp 12   Ht 1.689 m (5' 6.5\")   Wt 66.7 kg (147 lb)   SpO2 98%   BMI 23.37 kg/m      Physical Exam    GENERAL APPEARANCE: healthy, alert and no distress     EYES: EOMI,  PERRL     HENT: ear canals and TM's normal and nose and mouth without ulcers or lesions     NECK: no adenopathy, no asymmetry, masses, or scars and thyroid normal to palpation     RESP: lungs clear to auscultation - no rales, rhonchi or wheezes     CV: regular rates and rhythm, normal S1 S2, no S3 or S4 and no murmur, click or rub     ABDOMEN:  soft, nontender, no HSM or masses and bowel sounds normal     MS: extremities normal- no gross deformities noted, no evidence of inflammation in joints, FROM in all extremities.     SKIN: no suspicious lesions or rashes     NEURO: Normal strength and tone, sensory exam grossly normal, mentation intact and speech normal     PSYCH: mentation appears normal. and affect normal/bright     LYMPHATICS: No cervical adenopathy    Recent Labs   Lab Test 10/13/22  0848 07/05/22  1744   HGB 15.5 15.7    145*   INR  --  1.02    137   POTASSIUM 4.8 4.3   CR 1.18 1.08        Diagnostics:  Labs pending at this time.  Results will be reviewed when available.   Results for orders placed or performed in visit on 10/11/23   CBC with platelets     Status: Normal   Result Value Ref Range    WBC Count 6.4 4.0 - 11.0 10e3/uL    RBC Count 5.13 4.40 - 5.90 10e6/uL    Hemoglobin 16.1 13.3 - 17.7 g/dL    Hematocrit 46.4 40.0 - 53.0 %    MCV 90 78 - 100 fL    MCH 31.4 26.5 - 33.0 pg    MCHC 34.7 31.5 - 36.5 g/dL    RDW 12.6 10.0 - 15.0 %    Platelet Count 153 150 - 450 10e3/uL   UA Macroscopic with reflex to Microscopic and Culture - Clinic Collect     Status: Normal    Specimen: Urine, NOS   Result Value Ref Range    Color Urine Yellow Colorless, Straw, Light Yellow, Yellow    Appearance Urine Clear Clear    Glucose " Urine Negative Negative mg/dL    Bilirubin Urine Negative Negative    Ketones Urine Negative Negative mg/dL    Specific Gravity Urine 1.010 1.003 - 1.035    Blood Urine Negative Negative    pH Urine 6.0 5.0 - 7.0    Protein Albumin Urine Negative Negative mg/dL    Urobilinogen Urine 0.2 0.2, 1.0 E.U./dL    Nitrite Urine Negative Negative    Leukocyte Esterase Urine Negative Negative    Narrative    Microscopic not indicated      EKG: appears normal, NSR, normal axis, normal intervals, no acute ST/T changes c/w ischemia, no LVH by voltage criteria, unchanged from previous tracings  Possible right atrial enlargement. Zero cardiac symptoms and normal BP.    Revised Cardiac Risk Index (RCRI):  The patient has the following serious cardiovascular risks for perioperative complications:   - No serious cardiac risks = 0 points     RCRI Interpretation: 0 points: Class I (very low risk - 0.4% complication rate)          Signed Electronically by: MEAGAN Torres CNP  Copy of this evaluation report is provided to requesting physician.

## 2023-10-11 NOTE — PATIENT INSTRUCTIONS
Encourage no NSAIDS(ibuprofen, advil, aleve), aspirin or vitamin supplementation for the next 7 days.  Tylenol is okay.   Okay to take gabapentin for shingles pain okay to take the night before.   You are taking no medicines the morning of surgery.             
Attending Attestation (For Attendings USE Only)...

## 2023-10-13 NOTE — RESULT ENCOUNTER NOTE
Dear Jeffrey,    Here is a summary of your recent test results:    -Normal red blood cell (hgb) levels, normal white blood cell count and normal platelet levels.    -Kidney function (GFR) is decreased stable.  ADVISE: Follow-up physical with your PCP next week.    For additional lab test information, labtestsonline.org is an excellent reference.    In addition, here is a list of due or overdue Health Maintenance reminders:    RSV VACCINE 60+(1 - 1-dose 60+ series) Never done  Zoster (Shingles) Vaccine(1 of 2) due on 05/29/2013  Diptheria Tetanus Pertussis (DTAP/TDAP/TD) Vaccine(2 - Td or Tdap) due on 04/03/2023  Flu Vaccine(1) Never done  Annual Wellness Visit due on 10/13/2023  ANNUAL REVIEW OF HM ORDERS due on 10/13/2023    Please call us at [phone] (or use [employer]) to address the above recommendations if needed.    Thank you for choosing [facility].  It was an honor and a privilege to participate in your care.       Healthy regards,    Muriel Garcia, JESENIA  [facility]

## 2023-10-16 ENCOUNTER — OFFICE VISIT (OUTPATIENT)
Dept: FAMILY MEDICINE | Facility: CLINIC | Age: 78
End: 2023-10-16
Payer: COMMERCIAL

## 2023-10-16 VITALS
BODY MASS INDEX: 23.23 KG/M2 | DIASTOLIC BLOOD PRESSURE: 68 MMHG | SYSTOLIC BLOOD PRESSURE: 112 MMHG | OXYGEN SATURATION: 100 % | WEIGHT: 148 LBS | RESPIRATION RATE: 18 BRPM | HEIGHT: 67 IN | TEMPERATURE: 97.5 F

## 2023-10-16 DIAGNOSIS — F32.5 MAJOR DEPRESSIVE DISORDER, SINGLE EPISODE IN FULL REMISSION (H): ICD-10-CM

## 2023-10-16 DIAGNOSIS — Z12.11 SCREEN FOR COLON CANCER: ICD-10-CM

## 2023-10-16 DIAGNOSIS — C61 PROSTATE CANCER (H): ICD-10-CM

## 2023-10-16 DIAGNOSIS — E78.5 HYPERLIPIDEMIA LDL GOAL <130: ICD-10-CM

## 2023-10-16 DIAGNOSIS — Z51.81 MEDICATION MONITORING ENCOUNTER: ICD-10-CM

## 2023-10-16 DIAGNOSIS — C83.00 SMALL B-CELL LYMPHOMA, UNSPECIFIED BODY REGION (H): ICD-10-CM

## 2023-10-16 DIAGNOSIS — F41.1 ANXIETY STATE: ICD-10-CM

## 2023-10-16 DIAGNOSIS — Z96.643 STATUS POST TOTAL REPLACEMENT OF BOTH HIPS: ICD-10-CM

## 2023-10-16 DIAGNOSIS — M48.061 SPINAL STENOSIS OF LUMBAR REGION, UNSPECIFIED WHETHER NEUROGENIC CLAUDICATION PRESENT: ICD-10-CM

## 2023-10-16 DIAGNOSIS — Z91.09 ENVIRONMENTAL ALLERGIES: ICD-10-CM

## 2023-10-16 DIAGNOSIS — Z00.00 ROUTINE GENERAL MEDICAL EXAMINATION AT A HEALTH CARE FACILITY: Primary | ICD-10-CM

## 2023-10-16 DIAGNOSIS — Z96.652 STATUS POST TOTAL LEFT KNEE REPLACEMENT: ICD-10-CM

## 2023-10-16 DIAGNOSIS — Z00.00 ENCOUNTER FOR MEDICARE ANNUAL WELLNESS EXAM: ICD-10-CM

## 2023-10-16 DIAGNOSIS — K63.5 POLYP OF COLON, UNSPECIFIED PART OF COLON, UNSPECIFIED TYPE: ICD-10-CM

## 2023-10-16 DIAGNOSIS — M19.012 PRIMARY OSTEOARTHRITIS OF LEFT SHOULDER: ICD-10-CM

## 2023-10-16 DIAGNOSIS — B02.8 HERPES ZOSTER WITH OTHER COMPLICATION: ICD-10-CM

## 2023-10-16 DIAGNOSIS — N52.9 ERECTILE DYSFUNCTION, UNSPECIFIED ERECTILE DYSFUNCTION TYPE: ICD-10-CM

## 2023-10-16 DIAGNOSIS — Z00.00 MEDICARE ANNUAL WELLNESS VISIT, SUBSEQUENT: ICD-10-CM

## 2023-10-16 PROCEDURE — G0439 PPPS, SUBSEQ VISIT: HCPCS | Performed by: FAMILY MEDICINE

## 2023-10-16 RX ORDER — PREDNISOLONE ACETATE 10 MG/ML
SUSPENSION/ DROPS OPHTHALMIC
COMMUNITY
Start: 2023-10-09 | End: 2024-07-08

## 2023-10-16 ASSESSMENT — ENCOUNTER SYMPTOMS
DIZZINESS: 1
PARESTHESIAS: 1
PALPITATIONS: 1
HEARTBURN: 0
HEADACHES: 0
SHORTNESS OF BREATH: 1
FREQUENCY: 0
DYSURIA: 1
DIARRHEA: 0
ABDOMINAL PAIN: 0
NERVOUS/ANXIOUS: 1
WEAKNESS: 1
NAUSEA: 1
HEMATOCHEZIA: 0
FEVER: 0
COUGH: 0
CHILLS: 0
EYE PAIN: 1
HEMATURIA: 0
CONSTIPATION: 0
SORE THROAT: 0
ARTHRALGIAS: 1

## 2023-10-16 ASSESSMENT — ACTIVITIES OF DAILY LIVING (ADL): CURRENT_FUNCTION: NO ASSISTANCE NEEDED

## 2023-10-16 ASSESSMENT — PATIENT HEALTH QUESTIONNAIRE - PHQ9
10. IF YOU CHECKED OFF ANY PROBLEMS, HOW DIFFICULT HAVE THESE PROBLEMS MADE IT FOR YOU TO DO YOUR WORK, TAKE CARE OF THINGS AT HOME, OR GET ALONG WITH OTHER PEOPLE: SOMEWHAT DIFFICULT
SUM OF ALL RESPONSES TO PHQ QUESTIONS 1-9: 3
SUM OF ALL RESPONSES TO PHQ QUESTIONS 1-9: 3

## 2023-10-16 NOTE — PATIENT INSTRUCTIONS
Patient Education   Personalized Prevention Plan  You are due for the preventive services outlined below.  Your care team is available to assist you in scheduling these services.  If you have already completed any of these items, please share that information with your care team to update in your medical record.  Health Maintenance Due   Topic Date Due     RSV VACCINE 60+ (1 - 1-dose 60+ series) Never done     Zoster (Shingles) Vaccine (1 of 2) 05/29/2013     Diptheria Tetanus Pertussis (DTAP/TDAP/TD) Vaccine (2 - Td or Tdap) 04/03/2023     Flu Vaccine (1) Never done     Learning About Dietary Guidelines  What are the Dietary Guidelines for Americans?     Dietary Guidelines for Americans provide tips for eating well and staying healthy. This helps reduce the risk for long-term (chronic) diseases.  These guidelines recommend that you:  Eat and drink the right amount for you. The [place] government's food guide is called MyPlate. It can help you make your own well-balanced eating plan.  Try to balance your eating with your activity. This helps you stay at a healthy weight.  Drink alcohol in moderation, if at all.  Limit foods high in salt, saturated fat, trans fat, and added sugar.  These guidelines are from the [place] Department of Agriculture and the [place] Department of Health and Human Services. They are updated every 5 years.  What is MyPlate?  MyPlate is the [place] government's food guide. It can help you make your own well-balanced eating plan. A balanced eating plan means that you eat enough, but not too much, and that your food gives you the nutrients you need to stay healthy.  MyPlate focuses on eating plenty of whole grains, fruits, and vegetables, and on limiting fat and sugar. It is available online at www.ChooseMyPlate.gov.  How can you get started?  If you're trying to eat healthier, you can slowly change your eating habits over time. You don't have to make big changes all at once. Start by adding one or two  "healthy foods to your meals each day.  Grains  Choose whole-grain breads and cereals and whole-wheat pasta and whole-grain crackers.  Vegetables  Eat a variety of vegetables every day. They have lots of nutrients and are part of a heart-healthy diet.  Fruits  Eat a variety of fruits every day. Fruits contain lots of nutrients. Choose fresh fruit instead of fruit juice.  Protein foods  Choose fish and lean poultry more often. Eat red meat and fried meats less often. Dried beans, tofu, and nuts are also good sources of protein.  Dairy  Choose low-fat or fat-free products from this food group. If you have problems digesting milk, try eating cheese or yogurt instead.  Fats and oils  Limit fats and oils if you're trying to cut calories. Choose healthy fats when you cook. These include canola oil and olive oil.  Where can you learn more?  Go to https://www.[employer]net/patiented  Enter D676 in the search box to learn more about \"Learning About Dietary Guidelines.\"  Current as of: March 1, 2023               Content Version: 13.7    0791-7918 [employer].   Care instructions adapted under license by your healthcare professional. If you have questions about a medical condition or this instruction, always ask your healthcare professional. [employer] disclaims any warranty or liability for your use of this information.      Hearing Loss: Care Instructions  Overview     Hearing loss is a sudden or slow decrease in how well you hear. It can range from slight to profound. Permanent hearing loss can occur with aging. It also can happen when you are exposed long-term to loud noise. Examples include listening to loud music, riding motorcycles, or being around other loud machines.  Hearing loss can affect your work and home life. It can make you feel lonely or depressed. You may feel that you have lost your independence. But hearing aids and other devices can help you hear better and feel connected to " others.  Follow-up care is a key part of your treatment and safety. Be sure to make and go to all appointments, and call your doctor if you are having problems. It's also a good idea to know your test results and keep a list of the medicines you take.  How can you care for yourself at home?  Avoid loud noises whenever possible. This helps keep your hearing from getting worse.  Always wear hearing protection around loud noises.  Wear a hearing aid as directed.  A professional can help you pick a hearing aid that will work best for you.  You can also get hearing aids over the counter for mild to moderate hearing loss.  Have hearing tests as your doctor suggests. They can show whether your hearing has changed. Your hearing aid may need to be adjusted.  Use other devices as needed. These may include:  Telephone amplifiers and hearing aids that can connect to a television, stereo, radio, or microphone.  Devices that use lights or vibrations. These alert you to the doorbell, a ringing telephone, or a baby monitor.  Television closed-captioning. This shows the words at the bottom of the screen. Most new TVs can do this.  TTY (text telephone). This lets you type messages back and forth on the telephone instead of talking or listening. These devices are also called TDD. When messages are typed on the keyboard, they are sent over the phone line to a receiving TTY. The message is shown on a monitor.  Use text messaging, social media, and email if it is hard for you to communicate by telephone.  Try to learn a listening technique called speechreading. It is not lipreading. You pay attention to people's gestures, expressions, posture, and tone of voice. These clues can help you understand what a person is saying. Face the person you are talking to, and have them face you. Make sure the lighting is good. You need to see the other person's face clearly.  Think about counseling if you need help to adjust to your hearing loss.  When  "should you call for help?  Watch closely for changes in your health, and be sure to contact your doctor if:    You think your hearing is getting worse.     You have new symptoms, such as dizziness or nausea.   Where can you learn more?  Go to https://www.[employer].net/patiented  Enter R798 in the search box to learn more about \"Hearing Loss: Care Instructions.\"  Current as of: March 1, 2023               Content Version: 13.7    5954-3209 [employer].   Care instructions adapted under license by your healthcare professional. If you have questions about a medical condition or this instruction, always ask your healthcare professional. [employer] disclaims any warranty or liability for your use of this information.      Bladder Training: Care Instructions  Your Care Instructions     Bladder training is used to treat urge incontinence and stress incontinence. Urge incontinence means that the need to urinate comes on so fast that you can't get to a toilet in time. Stress incontinence means that you leak urine because of pressure on your bladder. For example, it may happen when you laugh, cough, or lift something heavy.  Bladder training can increase how long you can wait before you have to urinate. It can also help your bladder hold more urine. And it can give you better control over the urge to urinate.  It is important to remember that bladder training takes a few weeks to a few months to make a difference. You may not see results right away, but don't give up.  Follow-up care is a key part of your treatment and safety. Be sure to make and go to all appointments, and call your doctor if you are having problems. It's also a good idea to know your test results and keep a list of the medicines you take.  How can you care for yourself at home?  Work with your doctor to come up with a bladder training program that is right for you. You may use one or more of the following methods.  Delayed " "urination  In the beginning, try to keep from urinating for 5 minutes after you first feel the need to go.  While you wait, take deep, slow breaths to relax. Kegel exercises can also help you delay the need to go to the bathroom.  After some practice, when you can easily wait 5 minutes to urinate, try to wait 10 minutes before you urinate.  Slowly increase the waiting period until you are able to control when you have to urinate.  Scheduled urination  Empty your bladder when you first wake up in the morning.  Schedule times throughout the day when you will urinate.  Start by going to the bathroom every hour, even if you don't need to go.  Slowly increase the time between trips to the bathroom.  When you have found a schedule that works well for you, keep doing it.  If you wake up during the night and have to urinate, do it. Apply your schedule to waking hours only.  Kegel exercises  These tighten and strengthen pelvic muscles, which can help you control the flow of urine. (If doing these exercises causes pain, stop doing them and talk with your doctor.) To do Kegel exercises:  Squeeze your muscles as if you were trying not to pass gas. Or squeeze your muscles as if you were stopping the flow of urine. Your belly, legs, and buttocks shouldn't move.  Hold the squeeze for 3 seconds, then relax for 5 to 10 seconds.  Start with 3 seconds, then add 1 second each week until you are able to squeeze for 10 seconds.  Repeat the exercise 10 times a session. Do 3 to 8 sessions a day.  When should you call for help?  Watch closely for changes in your health, and be sure to contact your doctor if:    Your incontinence is getting worse.     You do not get better as expected.   Where can you learn more?  Go to https://www.[employer].net/patiented  Enter V684 in the search box to learn more about \"Bladder Training: Care Instructions.\"  Current as of: March 1, 2023               Content Version: 13.7    1596-7579 [employer].   Care instructions adapted under license by your healthcare professional. If you have questions about a medical condition or this instruction, always ask your healthcare professional. [employer] disclaims any warranty or liability for your use of this information.

## 2023-10-16 NOTE — PROGRESS NOTES
"[facility] Grayson Murphy is a 78 year old who presents for Preventive Visit.      10/16/2023     7:56 AM   Additional Questions   Roomed by Angie GARNICA       Are you in the first 12 months of your Medicare coverage?  No    Healthy Habits:     In general, how would you rate your overall health?  Good    Frequency of exercise:  6-7 days/week    Duration of exercise:  45-60 minutes    Do you usually eat at least 4 servings of fruit and vegetables a day, include whole grains    & fiber and avoid regularly eating high fat or \"junk\" foods?  No    Taking medications regularly:  Yes    Medication side effects:  None    Ability to successfully perform activities of daily living:  No assistance needed    Home Safety:  Throw rugs in the hallway and lack of grab bars in the bathroom    Hearing Impairment:  Difficulty following a conversation in a noisy restaurant or crowded room, feel that people are mumbling or not speaking clearly, difficulty following dialogue in the theater, need to ask people to speak up or repeat themselves, find that men's voices are easier to understand than woman's and difficulty understanding soft or whispered speech    In the past 6 months, have you been bothered by leaking of urine? Yes    In general, how would you rate your overall mental or emotional health?  Good    Additional concerns today:  Yes    Today's PHQ-9 Score:       10/16/2023     7:48 AM   PHQ-9 SCORE   PHQ-9 Total Score MyChart 3 (Minimal depression)   PHQ-9 Total Score 3     Shingles is better, but has been using Ibuprofen as well.  Took last Valtrex this morning and still has some gabapentin left.    Have you ever done Advance Care Planning? (For example, a Health Directive, POLST, or a discussion with a medical provider or your loved ones about your wishes): Yes, advance care planning is on file.     Fall risk    Fallen 2 or more times in the past year?: No  Any fall with injury in the past year?: " 2/11/21  Pt currently resides at [facility]. Per [facility] pt is alert and cooperative. Legal guardianship has been established and court document on chart. Pt will be transported by [facility] transportation DOS and aide from [facility] will accompany. Pt does not have wounds, and is not in isolation. She can transfer with assist.  EC[facility] was to obtain medical clearance prior to procedure (refer to note 12/28/21) and COVID screen today. [facility] unsure if done and will verify with supervisor in AM.  Preop instructions reviewed and faxed. 2/15/2021   Medical clearance and COVID results on chart.   Awaiting signed consents from [address] from [facility] No    Cognitive Screening   1) Repeat 3 items (Leader, Season, Table)    2) Clock draw: NORMAL  3) 3 item recall: Recalls 3 objects  Results: 3 items recalled: COGNITIVE IMPAIRMENT LESS LIKELY    Mini-CogTM Copyright S Yeyo. Licensed by the author for use in [facility]; reprinted with permission (juan@[facility]). All rights reserved.      Do you have sleep apnea, excessive snoring or daytime drowsiness? : no    Reviewed and updated as needed this visit by clinical staff   Tobacco  Allergies  Meds              Reviewed and updated as needed this visit by Provider                 Social History     Tobacco Use     Smoking status: Former     Packs/day: 2.00     Years: 7.00     Additional pack years: 0.00     Total pack years: 14.00     Types: Cigarettes     Start date: 1958     Quit date: 1965     Years since quittin.8     Smokeless tobacco: Never   Substance Use Topics     Alcohol use: Yes     Alcohol/week: 5.0 - 6.0 standard drinks of alcohol     Types: 5 - 6 Standard drinks or equivalent per week     Comment: ocassionally             10/16/2023     7:53 AM   Alcohol Use   Prescreen: >3 drinks/day or >7 drinks/week? No     Do you have a current opioid prescription? No  Do you use any other controlled substances or medications that are not prescribed by a provider? None      Current providers sharing in care for this patient include:     Patient Care Team:  Archie Meadows MD as PCP - General  Archie Meadows MD as Referring Physician (Family Practice)  Danna Evangelista PA-C as Assigned Surgical Provider  Babs Puckett MD as MD (Neurology)  Abdiel Burgess DO as Assigned PCP    The following health maintenance items are reviewed in Epic and correct as of today:  Health Maintenance   Topic Date Due     RSV VACCINE 60+ (1 - 1-dose 60+ series) Never done     ZOSTER IMMUNIZATION (1 of 2) 2013     DTAP/TDAP/TD IMMUNIZATION (2 - Td or Tdap) 2023     INFLUENZA VACCINE (1) Never done      COVID-19 Vaccine (3 - 2023-24 season) 12/31/2023 (Originally 9/1/2023)     PHQ-9  04/16/2024     LIPID  10/11/2024     PSA  10/11/2024     MEDICARE ANNUAL WELLNESS VISIT  10/16/2024     ANNUAL REVIEW OF HM ORDERS  10/16/2024     FALL RISK ASSESSMENT  10/16/2024     ADVANCE CARE PLANNING  10/16/2028     HEPATITIS C SCREENING  Completed     DEPRESSION ACTION PLAN  Completed     Pneumococcal Vaccine: 65+ Years  Completed     IPV IMMUNIZATION  Aged Out     HPV IMMUNIZATION  Aged Out     MENINGITIS IMMUNIZATION  Aged Out     COLORECTAL CANCER SCREENING  Discontinued     Review of Systems   Constitutional:  Negative for chills and fever.   HENT:  Positive for ear pain. Negative for congestion, hearing loss and sore throat.    Eyes:  Positive for pain and visual disturbance.   Respiratory:  Positive for shortness of breath. Negative for cough.    Cardiovascular:  Positive for palpitations. Negative for chest pain.   Gastrointestinal:  Positive for nausea. Negative for abdominal pain, constipation, diarrhea, heartburn and hematochezia.   Genitourinary:  Positive for dysuria, impotence and urgency. Negative for frequency, genital sores, hematuria and penile discharge.   Musculoskeletal:  Positive for arthralgias.   Skin:  Negative for rash.   Neurological:  Positive for dizziness, weakness and paresthesias. Negative for headaches.   Psychiatric/Behavioral:  Negative for mood changes. The patient is nervous/anxious.      Shingles is better, but has been using Ibuprofen as well.  Took last Valtrex this morning and still has some gabapentin left. On prednisolone eye gtts for right eye, no hearing/vision issues, 2 residual healed lesions    Prostate Cancer    PSA   Date Value Ref Range Status   09/01/2020 <0.01 0 - 4 ug/L Final     Comment:     Assay Method:  Chemiluminescence using Siemens Vista analyzer     Prostate Specific Antigen Screen   Date Value Ref Range Status   10/13/2022 <0.01 0.00 - 4.00 ug/L Final     PSA  Tumor Marker   Date Value Ref Range Status   10/11/2023 <0.01 0.00 - 6.50 ng/mL Final     Small B cell Lymphoma    Lipids    Recent Labs   Lab Test 10/11/23  0854 10/13/22  0848   CHOL 198 211*   HDL 72 79   * 122*   TRIG 77 50     Depression/Anxiety        12/29/2022     3:10 PM 7/12/2023     9:37 AM 10/16/2023     7:48 AM   PHQ   PHQ-9 Total Score 6 5 3   Q9: Thoughts of better off dead/self-harm past 2 weeks Not at all Not at all Not at all           6/11/2019     9:00 AM 12/29/2022     3:11 PM 7/12/2023     9:38 AM   WALDO-7 SCORE   Total Score  1 (minimal anxiety) 8 (mild anxiety)   Total Score 5 1 8     Upcoming Left shoulder surgery 10/23/2023, Dr Rodriguez    Hx of LBP / OA no issues - stretches daily    Environmental allergies - stable    Health Maintenance     Colonoscopy:  consider   FIT:  consider              PSA:  reviewed   DEXA:  NA    Health Maintenance Due   Topic Date Due     RSV VACCINE 60+ (1 - 1-dose 60+ series) Never done     ZOSTER IMMUNIZATION (1 of 2) 05/29/2013     DTAP/TDAP/TD IMMUNIZATION (2 - Td or Tdap) 04/03/2023     INFLUENZA VACCINE (1) Never done       Current Problem List    Patient Active Problem List   Diagnosis     Anxiety state     Major depressive disorder, single episode in full remission (H24)     Hyperlipidemia LDL goal <130     ED (erectile dysfunction)     Seasonal allergies     OA (osteoarthritis)     Advanced directives, counseling/discussion     h/o Prostate cancer (H) - prostatectomy - 2015     Colon polyp     Lumbar stenosis     S/P total hip arthroplasty     Environmental allergies     S/P total knee arthroplasty     Small B-cell lymphoma (H)- very minor and very slow progression - needs yearly testing       Past Medical History    Past Medical History:   Diagnosis Date     Anxiety state, unspecified 1991     Basal cell carcinoma     Left Ear - Moh's Dr Young     Colon polyp      ED (erectile dysfunction) 2005     Hyperlipidemia LDL goal <130 2001      Hypoglycemia, unspecified 1991     Infectious mononucleosis 1969     Lumbar stenosis     TCO - Dr Irene - with neuritis     Lymphoma (H)     SLL - B Cell - Dr Barrios/Marcus     Major depressive disorder, single episode in full remission (H24) 1991     OA (osteoarthritis)     Severe Lt Hip     Prostate cancer (H) 05/2013    Dr Smyth - [facility] - Darian 3+3 - intermediate progression risk - pT2cN0     Seasonal allergies        Past Surgical History    Past Surgical History:   Procedure Laterality Date     APPENDECTOMY  1960    APPY     ARTHROPLASTY HIP Left 06/21/2017    Left BETTY - Dr MELISSA Madrigal     ARTHROPLASTY KNEE Left 10/02/2017    LEFT TOTAL KNEE ARTHROPLASTY - Dr MELISSA Madrigal     BACK SURGERY  1980     COLONOSCOPY  12/04/2012    colon polyp - serrated adenoma 5 mm - due 3 yrs     COLONOSCOPY  03/16/2018    Dr. Naila BELLE - normal - due 5 yrs - will need MAC     COLONOSCOPY N/A 04/18/2018    Dr. Naila BELLE - normal - due 5 yrs - will need MAC     DAVINCI PROSTATECTOMY, LYMPHADENECTOMY N/A 04/13/2015    RALP - Dr Smyth     DENTAL SURGERY  1985    wisdom teeth     SURGICAL HISTORY OF -   1980    BACK SURGERY     SURGICAL HISTORY OF -  Left 1987    ANKLE FX ORIF - Lt     SURGICAL HISTORY OF -  Left 1972    LT KNEE MCL TEAR AND MENISCUS      SURGICAL HISTORY OF -  Left 2001, 2008    LT SHOULDER ARTHROSCOPY     SURGICAL HISTORY OF -  Right 01/2007    Rt Shoulder Surgery - Dr Ferrer     SURGICAL HISTORY OF -  Right 11/2018    Rt BETTY - Dr Galdamez       Current Medications    Current Outpatient Medications   Medication Sig Dispense Refill     cyanocobalamin (VITAMIN B-12) 1000 MCG tablet Take 1,000 mcg by mouth daily       gabapentin (NEURONTIN) 100 MG capsule Take 1 capsule (100 mg) by mouth 3 times daily 45 capsule 1     GLUCOSAMINE-CHONDROITIN PO Take 2 tablets by mouth daily       Multiple Vitamin (DAILY MULTIVITAMIN PO) Take 2 tablets by mouth daily        Omega-3 Fatty Acids (FISH OIL PO) Take 2 tablets by mouth  daily        prednisoLONE acetate (PRED FORTE) 1 % ophthalmic suspension INSTILL 1 DROP INTO RIGHT EYE FOUR TIMES A DAY AS DIRECTED       valACYclovir (VALTREX) 1000 mg tablet Take 1 tablet (1,000 mg) by mouth 3 times daily for 7 days 21 tablet 0     vitamin B6 (PYRIDOXINE) 100 MG tablet Take 100 mg by mouth daily         Allergies    Allergies   Allergen Reactions     Seasonal Allergies        Immunizations    Immunization History   Administered Date(s) Administered     COVID-19 MONOVALENT 12+ (Pfizer) 2021, 2021     HEPA 2006, 2006     HepB 1995, 1995, 1995     Pneumo Conj 13-V (2010&after) 2015     Pneumococcal 23 valent 2006, 2013     TD,PF 7+ (Tenivac) 06/10/2001, 12/10/2010     TDAP Vaccine (Boostrix) 2013     Zoster vaccine, live 2013       Family History    Family History   Problem Relation Age of Onset     Cancer Mother         ? abdominal     Alcohol/Drug Father      Breast Cancer Sister         age 42     Colon Cancer No family hx of        Social History    Social History     Socioeconomic History     Marital status:      Spouse name: Danielle     Number of children: 3     Years of education: 18     Highest education level: Not on file   Occupational History     Occupation: teacher [employer]     Employer: [id number]     Comment: retired    Tobacco Use     Smoking status: Former     Packs/day: 2.00     Years: 7.00     Additional pack years: 0.00     Total pack years: 14.00     Types: Cigarettes     Start date: 1958     Quit date: 1965     Years since quittin.8     Smokeless tobacco: Never   Vaping Use     Vaping Use: Never used   Substance and Sexual Activity     Alcohol use: Yes     Alcohol/week: 5.0 - 6.0 standard drinks of alcohol     Types: 5 - 6 Standard drinks or equivalent per week     Comment: ocassionally     Drug use: No     Sexual activity: Yes     Partners: Female     Birth control/protection: None    Other Topics Concern      Service Not Asked     Blood Transfusions Not Asked     Caffeine Concern Yes     Comment: 2 cup qd     Occupational Exposure Not Asked     Hobby Hazards Not Asked     Sleep Concern Not Asked     Stress Concern Not Asked     Weight Concern Not Asked     Special Diet Not Asked     Back Care Not Asked     Exercise Yes     Comment: 3+ times per week     Bike Helmet Not Asked     Seat Belt Yes     Self-Exams Not Asked     Parent/sibling w/ CABG, MI or angioplasty before 65F 55M? No   Social History Narrative     Not on file     Social Determinants of Health     Financial Resource Strain: Low Risk  (10/9/2023)    Financial Resource Strain      Within the past 12 months, have you or your family members you live with been unable to get utilities (heat, electricity) when it was really needed?: No   Food Insecurity: Low Risk  (10/9/2023)    Food Insecurity      Within the past 12 months, did you worry that your food would run out before you got money to buy more?: No      Within the past 12 months, did the food you bought just not last and you didn t have money to get more?: No   Transportation Needs: Low Risk  (10/9/2023)    Transportation Needs      Within the past 12 months, has lack of transportation kept you from medical appointments, getting your medicines, non-medical meetings or appointments, work, or from getting things that you need?: No   Physical Activity: Not on file   Stress: Not on file   Social Connections: Not on file   Interpersonal Safety: Low Risk  (10/16/2023)    Interpersonal Safety      Do you feel physically and emotionally safe where you currently live?: Yes      Within the past 12 months, have you been hit, slapped, kicked or otherwise physically hurt by someone?: No      Within the past 12 months, have you been humiliated or emotionally abused in other ways by your partner or ex-partner?: No   Housing Stability: Low Risk  (10/9/2023)    Housing Stability      Do  "you have housing? : Yes      Are you worried about losing your housing?: No       ROS    CONSTITUTIONAL: NEGATIVE for fever, chills, change in weight  INTEGUMENTARY/SKIN: NEGATIVE for worrisome rashes, moles or lesions  EYES: NEGATIVE for vision changes or irritation  ENT/MOUTH: NEGATIVE for ear, mouth and throat problems  RESP: NEGATIVE for significant cough or SOB  CV: NEGATIVE for chest pain, palpitations or peripheral edema  GI: NEGATIVE for nausea, abdominal pain, heartburn, or change in bowel habits  : NEGATIVE for frequency, dysuria, or hematuria  MUSCULOSKELETAL: NEGATIVE for significant arthralgias or myalgia  NEURO: NEGATIVE for weakness, dizziness or paresthesias  ENDOCRINE: NEGATIVE for temperature intolerance, skin/hair changes  HEME: NEGATIVE for bleeding problems  PSYCHIATRIC: NEGATIVE for changes in mood or affect    OBJECTIVE    /68   Temp 97.5  F (36.4  C)   Resp 18   Ht 1.689 m (5' 6.5\")   Wt 67.1 kg (148 lb)   SpO2 100%   BMI 23.53 kg/m      EXAM:    GENERAL: healthy, alert and no distress  EYES: Eyes grossly normal to inspection, PERRL and conjunctivae and sclerae normal  HENT: ear canals and TM's normal, nose and mouth without ulcers or lesions  NECK: no adenopathy, no asymmetry, masses, or scars and thyroid normal to palpation  RESP: lungs clear to auscultation - no rales, rhonchi or wheezes  CV: regular rate and rhythm, normal S1 S2, no S3 or S4, no murmur, click or rub, no peripheral edema and peripheral pulses strong  ABDOMEN: soft, nontender, no hepatosplenomegaly, no masses and bowel sounds normal   (male): pt declines  RECTAL: pt declines  MS: no gross musculoskeletal defects noted, no edema  SKIN: no suspicious lesions or rashes  NEURO: Normal strength and tone, mentation intact and speech normal  PSYCH: mentation appears normal, affect normal/bright  LYMPH: no cervical, supraclavicular, axillary, or inguinal " adenopathy    DIAGNOSTICS/PROCEDURES    Reviewed    ASSESSMENT      ICD-10-CM    1. Routine general medical examination at a health care facility  Z00.00 REVIEW OF HEALTH MAINTENANCE PROTOCOL ORDERS      2. Medicare annual wellness visit, subsequent  Z00.00 REVIEW OF HEALTH MAINTENANCE PROTOCOL ORDERS      3. Herpes zoster with other complication- - right side of  face and having some periorbital symptoms  B02.8 REVIEW OF HEALTH MAINTENANCE PROTOCOL ORDERS     OFFICE/OUTPT VISIT,EST,LEVL IV      4. h/o Prostate cancer (H) - prostatectomy - 2015  C61 REVIEW OF HEALTH MAINTENANCE PROTOCOL ORDERS     OFFICE/OUTPT VISIT,EST,LEVL IV      5. Hyperlipidemia LDL goal <130  E78.5 REVIEW OF HEALTH MAINTENANCE PROTOCOL ORDERS     OFFICE/OUTPT VISIT,EST,LEVL IV      6. Primary osteoarthritis of left shoulder  M19.012 REVIEW OF HEALTH MAINTENANCE PROTOCOL ORDERS     OFFICE/OUTPT VISIT,EST,LEVL IV      7. Small B-cell lymphoma, unspecified body region (H) - - but is on face and having some periorbital symptoms  C83.00 REVIEW OF HEALTH MAINTENANCE PROTOCOL ORDERS     OFFICE/OUTPT VISIT,EST,LEVL IV      8. Major depressive disorder, single episode in full remission (H24)  F32.5 REVIEW OF HEALTH MAINTENANCE PROTOCOL ORDERS     OFFICE/OUTPT VISIT,EST,LEVL IV      9. Anxiety state  F41.1 REVIEW OF HEALTH MAINTENANCE PROTOCOL ORDERS     OFFICE/OUTPT VISIT,EST,LEVL IV      10. Environmental allergies  Z91.09 REVIEW OF HEALTH MAINTENANCE PROTOCOL ORDERS     OFFICE/OUTPT VISIT,EST,LEVL IV      11. Spinal stenosis of lumbar region, unspecified whether neurogenic claudication present  M48.061 REVIEW OF HEALTH MAINTENANCE PROTOCOL ORDERS     OFFICE/OUTPT VISIT,EST,LEVL IV      12. Status post total replacement of both hips  Z96.643 REVIEW OF HEALTH MAINTENANCE PROTOCOL ORDERS     OFFICE/OUTPT VISIT,EST,LEVL IV      13. Status post total left knee replacement  Z96.652 REVIEW OF HEALTH MAINTENANCE PROTOCOL ORDERS     OFFICE/OUTPT  VISIT,EST,LEVL IV      14. Erectile dysfunction, unspecified erectile dysfunction type  N52.9 REVIEW OF HEALTH MAINTENANCE PROTOCOL ORDERS     OFFICE/OUTPT VISIT,EST,LEVL IV      15. Polyp of colon, unspecified part of colon, unspecified type  K63.5 REVIEW OF HEALTH MAINTENANCE PROTOCOL ORDERS     OFFICE/OUTPT VISIT,EST,LEVL IV      16. Screen for colon cancer  Z12.11 REVIEW OF HEALTH MAINTENANCE PROTOCOL ORDERS     OFFICE/OUTPT VISIT,EST,LEVL IV      17. Medication monitoring encounter  Z51.81 REVIEW OF HEALTH MAINTENANCE PROTOCOL ORDERS     OFFICE/OUTPT VISIT,EST,LEVL IV      18. Encounter for Medicare annual wellness exam  Z00.00           PLAN    Discussed treatment/modality options, including risk and benefits, he desires:    advised alcohol consumption 1oz per day or less, advised aspirin 81 mg po daily, advised 1 multivitamin per day, advised calcium 7606-9207 mg/d and Vitamin D 800-1200 IU/d, advised dentist every 6 months, advised diet and exercise, advised opthalmologist every 1-2 years, advised self testicular exam q month, further health care maintenance, further lab(s), immunization(s), WALDO 7, completed and reviewed, PHQ-9, Depression Action Plan, completed and reviewed, and observation    Discussed controversies surrounding PSA. Specifically reviewed 2017 USPSTF findings recommending discussion of PSA testing for men ages 55-69.  Reviewed findings of the  Randomized Study of Screening for Prostate Cancer which showed a 30% reduction in advanced stage prostate cancer and a 20% reduction in death rate from prostate cancer in this age group. PSA-based screening may prevent up to 2 deaths and up to 3 cases of metastatic disease per 1,000 men screened over 13 years.    We've elected to do PSA this year after discussing these controversies.    All diagnosis above reviewed and noted above, otherwise stable.      See [facility] orders for further details.      1) meds / supplements reviewed    2) labs  "reviewed    3) immunizations reviewed - advised covid, flu, prevner 20, tdap, shingrix, rsv when able    4) Surgery left reverse total shoulder 10/23/2023 - Dr Rodriguez    5) consider colonoscopy    No follow-ups on file.    Health Maintenance Due   Topic Date Due     RSV VACCINE 60+ (1 - 1-dose 60+ series) Never done     ZOSTER IMMUNIZATION (1 of 2) 05/29/2013     DTAP/TDAP/TD IMMUNIZATION (2 - Td or Tdap) 04/03/2023     INFLUENZA VACCINE (1) Never done       COUNSELING    Reviewed preventive health counseling, as reflected in patient instructions    BP Readings from Last 1 Encounters:   10/16/23 112/68     Estimated body mass index is 23.53 kg/m  as calculated from the following:    Height as of this encounter: 1.689 m (5' 6.5\").    Weight as of this encounter: 67.1 kg (148 lb).           reports that he quit smoking about 58 years ago. His smoking use included cigarettes. He started smoking about 65 years ago. He has a 14.00 pack-year smoking history. He has never used smokeless tobacco.      Counseling Resources:    ATP IV Guidelines  Pooled Cohorts Equation Calculator  FRAX Risk Assessment  ICSI Preventive Guidelines  Dietary Guidelines for Americans, 2010  USDA's MyPlate  ASA Prophylaxis  Lung CA Screening           Archie Meadows MD, FAAFP     [facility]  [address] [place] [address] [id number] [place], [place] [email or web address]   Office: [phone]  Fax: [phone]  Pager: [phone]     Identified Health Risks:  I have reviewed Opioid Use Disorder and Substance Use Disorder risk factors and made any needed referrals.   Answers submitted by the patient for this visit:  Patient Health Questionnaire (Submitted on 10/16/2023)  If you checked off any problems, how difficult have these problems made it for you to do your work, take care of things at home, or get along with other people?: Somewhat difficult  PHQ9 TOTAL SCORE: 3    The patient " was counseled and encouraged to consider modifying their diet and eating habits. He was provided with information on recommended healthy diet options.  The patient was provided with written information regarding signs of hearing loss.  Information on urinary incontinence and treatment options given to patient.

## 2023-10-18 ENCOUNTER — TRANSFERRED RECORDS (OUTPATIENT)
Dept: HEALTH INFORMATION MANAGEMENT | Facility: CLINIC | Age: 78
End: 2023-10-18
Payer: COMMERCIAL

## 2023-10-20 RX ORDER — AMOXICILLIN 500 MG/1
4 CAPSULE ORAL
COMMUNITY

## 2023-10-20 RX ORDER — ALBUTEROL SULFATE 90 UG/1
2 AEROSOL, METERED RESPIRATORY (INHALATION) EVERY 6 HOURS PRN
COMMUNITY
End: 2024-07-08

## 2023-10-20 NOTE — PROGRESS NOTES
PTA medications updated by Medication Scribe prior to surgery via phone call with patient (last doses completed by Nurse)     Medication history sources: Patient, Surescripts, and H&P  In the past week, patient estimated taking medication this percent of the time: Greater than 90%      Significant changes made to the medication list:  Patient reports no longer taking the following meds (med scribe removed from PTA med list): Gabapentin, Valacyclovir       Additional medication history information:   Patient was advised to bring: Prednisolone OP    Medication reconciliation completed by provider prior to medication history? No    Time spent in this activity: 25 minutes    The information provided in this note is only as accurate as the sources available at the time of update(s)    Prior to Admission medications    Medication Sig Last Dose Taking? Auth Provider Long Term End Date   albuterol (PROAIR HFA/PROVENTIL HFA/VENTOLIN HFA) 108 (90 Base) MCG/ACT inhaler Inhale 2 puffs into the lungs every 6 hours as needed for shortness of breath, wheezing or cough Unknown at prn Yes Reported, Patient No    amoxicillin (AMOXIL) 500 MG capsule Take 4 capsules by mouth once as needed (1 hour prior to dental appointments 4 x 500 mg = 2,000 mg) Unknown at prn Yes Reported, Patient     cyanocobalamin (VITAMIN B-12) 1000 MCG tablet Take 1,000 mcg by mouth daily 10/20/2023 at am Yes Reported, Patient     GLUCOSAMINE-CHONDROITIN PO Take 2 tablets by mouth daily 10/20/2023 at am Yes Reported, Patient     Multiple Vitamin (DAILY MULTIVITAMIN PO) Take 2 tablets by mouth daily  10/20/2023 at am Yes Reported, Patient     Omega-3 Fatty Acids (FISH OIL PO) Take 2 tablets by mouth daily  10/20/2023 at am Yes Reported, Patient     prednisoLONE acetate (PRED FORTE) 1 % ophthalmic suspension INSTILL 1 DROP INTO RIGHT EYE FOUR TIMES A DAY AS DIRECTED  at pm Yes Reported, Patient     vitamin B6 (PYRIDOXINE) 100 MG tablet Take 100 mg by mouth daily  10/20/2023 at am Yes Reported, Patient       Medication history completed by:    Escobar Solano CPhT  Medication [facility]

## 2023-10-23 ENCOUNTER — HOSPITAL ENCOUNTER (OUTPATIENT)
Facility: CLINIC | Age: 78
Discharge: HOME | End: 2023-10-24
Attending: ORTHOPAEDIC SURGERY | Admitting: ORTHOPAEDIC SURGERY
Payer: COMMERCIAL

## 2023-10-23 ENCOUNTER — ANESTHESIA (OUTPATIENT)
Dept: SURGERY | Facility: CLINIC | Age: 78
End: 2023-10-23
Payer: COMMERCIAL

## 2023-10-23 ENCOUNTER — APPOINTMENT (OUTPATIENT)
Dept: GENERAL RADIOLOGY | Facility: CLINIC | Age: 78
End: 2023-10-23
Attending: ORTHOPAEDIC SURGERY
Payer: COMMERCIAL

## 2023-10-23 ENCOUNTER — ANESTHESIA EVENT (OUTPATIENT)
Dept: SURGERY | Facility: CLINIC | Age: 78
End: 2023-10-23
Payer: COMMERCIAL

## 2023-10-23 PROBLEM — M12.812 LEFT ROTATOR CUFF TEAR ARTHROPATHY: Status: ACTIVE | Noted: 2023-10-23

## 2023-10-23 PROBLEM — M75.102 LEFT ROTATOR CUFF TEAR ARTHROPATHY: Status: ACTIVE | Noted: 2023-10-23

## 2023-10-23 PROCEDURE — 710N000009 HC RECOVERY PHASE 1, LEVEL 1, PER MIN: Performed by: ORTHOPAEDIC SURGERY

## 2023-10-23 PROCEDURE — 258N000003 HC RX IP 258 OP 636: Performed by: NURSE ANESTHETIST, CERTIFIED REGISTERED

## 2023-10-23 PROCEDURE — 999N000063 XR SHOULDER LEFT PORT G/E 2 VIEWS: Mod: LT

## 2023-10-23 PROCEDURE — 271N000001 HC OR GENERAL SUPPLY NON-STERILE: Performed by: ORTHOPAEDIC SURGERY

## 2023-10-23 PROCEDURE — 250N000025 HC SEVOFLURANE, PER MIN: Performed by: ORTHOPAEDIC SURGERY

## 2023-10-23 PROCEDURE — 250N000009 HC RX 250: Performed by: PHYSICIAN ASSISTANT

## 2023-10-23 PROCEDURE — C1713 ANCHOR/SCREW BN/BN,TIS/BN: HCPCS | Performed by: ORTHOPAEDIC SURGERY

## 2023-10-23 PROCEDURE — 250N000009 HC RX 250: Performed by: NURSE ANESTHETIST, CERTIFIED REGISTERED

## 2023-10-23 PROCEDURE — 250N000009 HC RX 250: Performed by: ORTHOPAEDIC SURGERY

## 2023-10-23 PROCEDURE — 250N000011 HC RX IP 250 OP 636: Performed by: NURSE ANESTHETIST, CERTIFIED REGISTERED

## 2023-10-23 PROCEDURE — 272N000001 HC OR GENERAL SUPPLY STERILE: Performed by: ORTHOPAEDIC SURGERY

## 2023-10-23 PROCEDURE — 370N000017 HC ANESTHESIA TECHNICAL FEE, PER MIN: Performed by: ORTHOPAEDIC SURGERY

## 2023-10-23 PROCEDURE — C1776 JOINT DEVICE (IMPLANTABLE): HCPCS | Performed by: ORTHOPAEDIC SURGERY

## 2023-10-23 PROCEDURE — 250N000011 HC RX IP 250 OP 636: Mod: JZ | Performed by: ANESTHESIOLOGY

## 2023-10-23 PROCEDURE — 250N000011 HC RX IP 250 OP 636: Performed by: PHYSICIAN ASSISTANT

## 2023-10-23 PROCEDURE — 999N000141 HC STATISTIC PRE-PROCEDURE NURSING ASSESSMENT: Performed by: ORTHOPAEDIC SURGERY

## 2023-10-23 PROCEDURE — L3670 SO ACRO/CLAV CAN WEB PRE OTS: HCPCS

## 2023-10-23 PROCEDURE — 250N000011 HC RX IP 250 OP 636: Mod: JZ | Performed by: ORTHOPAEDIC SURGERY

## 2023-10-23 PROCEDURE — 250N000013 HC RX MED GY IP 250 OP 250 PS 637: Performed by: ORTHOPAEDIC SURGERY

## 2023-10-23 PROCEDURE — 258N000001 HC RX 258: Performed by: ORTHOPAEDIC SURGERY

## 2023-10-23 PROCEDURE — 360N000077 HC SURGERY LEVEL 4, PER MIN: Performed by: ORTHOPAEDIC SURGERY

## 2023-10-23 PROCEDURE — 258N000003 HC RX IP 258 OP 636: Performed by: ORTHOPAEDIC SURGERY

## 2023-10-23 PROCEDURE — 258N000003 HC RX IP 258 OP 636: Performed by: ANESTHESIOLOGY

## 2023-10-23 DEVICE — IMP SCR FIXATION 4.5X26MM AQLS VDV226: Type: IMPLANTABLE DEVICE | Site: SHOULDER | Status: FUNCTIONAL

## 2023-10-23 DEVICE — IMPLANTABLE DEVICE
Type: IMPLANTABLE DEVICE | Site: SHOULDER | Status: FUNCTIONAL
Brand: AEQUALIS REVERSED II

## 2023-10-23 DEVICE — IMPLANTABLE DEVICE
Type: IMPLANTABLE DEVICE | Site: SHOULDER | Status: FUNCTIONAL
Brand: TORNIER FLEX SHOULDER SYSTEM

## 2023-10-23 DEVICE — IMP SCR FIXATION 4.5X20MM AQLS VDV220: Type: IMPLANTABLE DEVICE | Site: SHOULDER | Status: FUNCTIONAL

## 2023-10-23 DEVICE — IMP SCR LOCKING 4.5X35MM AQLS DWD035: Type: IMPLANTABLE DEVICE | Site: SHOULDER | Status: FUNCTIONAL

## 2023-10-23 DEVICE — IMP SCR LOCKING 4.5X41MM AQLS DWD041: Type: IMPLANTABLE DEVICE | Site: SHOULDER | Status: FUNCTIONAL

## 2023-10-23 DEVICE — IMPLANTABLE DEVICE
Type: IMPLANTABLE DEVICE | Site: SHOULDER | Status: FUNCTIONAL
Brand: FLEX SHOULDER SYSTEM

## 2023-10-23 RX ORDER — LIDOCAINE HYDROCHLORIDE 20 MG/ML
INJECTION, SOLUTION INFILTRATION; PERINEURAL PRN
Status: DISCONTINUED | OUTPATIENT
Start: 2023-10-23 | End: 2023-10-23

## 2023-10-23 RX ORDER — POLYETHYLENE GLYCOL 3350 17 G/17G
17 POWDER, FOR SOLUTION ORAL DAILY
Status: DISCONTINUED | OUTPATIENT
Start: 2023-10-24 | End: 2023-10-24 | Stop reason: HOSPADM

## 2023-10-23 RX ORDER — KETAMINE HYDROCHLORIDE 10 MG/ML
INJECTION INTRAMUSCULAR; INTRAVENOUS PRN
Status: DISCONTINUED | OUTPATIENT
Start: 2023-10-23 | End: 2023-10-23

## 2023-10-23 RX ORDER — NALOXONE HYDROCHLORIDE 0.4 MG/ML
0.2 INJECTION, SOLUTION INTRAMUSCULAR; INTRAVENOUS; SUBCUTANEOUS
Status: DISCONTINUED | OUTPATIENT
Start: 2023-10-23 | End: 2023-10-24 | Stop reason: HOSPADM

## 2023-10-23 RX ORDER — HYDROXYZINE HYDROCHLORIDE 10 MG/1
10 TABLET, FILM COATED ORAL EVERY 6 HOURS PRN
Qty: 30 TABLET | Refills: 0 | Status: SHIPPED | OUTPATIENT
Start: 2023-10-23 | End: 2024-07-08

## 2023-10-23 RX ORDER — CEFAZOLIN SODIUM/WATER 2 G/20 ML
2 SYRINGE (ML) INTRAVENOUS
Status: COMPLETED | OUTPATIENT
Start: 2023-10-23 | End: 2023-10-23

## 2023-10-23 RX ORDER — FENTANYL CITRATE 50 UG/ML
25 INJECTION, SOLUTION INTRAMUSCULAR; INTRAVENOUS EVERY 5 MIN PRN
Status: DISCONTINUED | OUTPATIENT
Start: 2023-10-23 | End: 2023-10-23 | Stop reason: HOSPADM

## 2023-10-23 RX ORDER — CEFAZOLIN SODIUM 1 G/3ML
1 INJECTION, POWDER, FOR SOLUTION INTRAMUSCULAR; INTRAVENOUS EVERY 8 HOURS
Qty: 10 ML | Refills: 0 | Status: COMPLETED | OUTPATIENT
Start: 2023-10-23 | End: 2023-10-24

## 2023-10-23 RX ORDER — ASPIRIN 325 MG
325 TABLET, DELAYED RELEASE (ENTERIC COATED) ORAL DAILY
Qty: 21 TABLET | Refills: 0 | Status: SHIPPED | OUTPATIENT
Start: 2023-10-23 | End: 2023-11-13

## 2023-10-23 RX ORDER — HYDROMORPHONE HCL IN WATER/PF 6 MG/30 ML
0.1 PATIENT CONTROLLED ANALGESIA SYRINGE INTRAVENOUS
Status: DISCONTINUED | OUTPATIENT
Start: 2023-10-23 | End: 2023-10-24 | Stop reason: HOSPADM

## 2023-10-23 RX ORDER — DIPHENHYDRAMINE HCL 12.5MG/5ML
12.5 LIQUID (ML) ORAL EVERY 6 HOURS PRN
Status: DISCONTINUED | OUTPATIENT
Start: 2023-10-23 | End: 2023-10-24 | Stop reason: HOSPADM

## 2023-10-23 RX ORDER — FENTANYL CITRATE 50 UG/ML
50 INJECTION, SOLUTION INTRAMUSCULAR; INTRAVENOUS EVERY 5 MIN PRN
Status: DISCONTINUED | OUTPATIENT
Start: 2023-10-23 | End: 2023-10-23 | Stop reason: HOSPADM

## 2023-10-23 RX ORDER — ACETAMINOPHEN 325 MG/1
975 TABLET ORAL EVERY 8 HOURS
Qty: 27 TABLET | Refills: 0 | Status: DISCONTINUED | OUTPATIENT
Start: 2023-10-23 | End: 2023-10-24 | Stop reason: HOSPADM

## 2023-10-23 RX ORDER — ONDANSETRON 4 MG/1
4 TABLET, ORALLY DISINTEGRATING ORAL EVERY 6 HOURS PRN
Status: DISCONTINUED | OUTPATIENT
Start: 2023-10-23 | End: 2023-10-24 | Stop reason: HOSPADM

## 2023-10-23 RX ORDER — LANOLIN ALCOHOL/MO/W.PET/CERES
1000 CREAM (GRAM) TOPICAL DAILY
Status: DISCONTINUED | OUTPATIENT
Start: 2023-10-24 | End: 2023-10-24 | Stop reason: HOSPADM

## 2023-10-23 RX ORDER — BISACODYL 10 MG
10 SUPPOSITORY, RECTAL RECTAL DAILY PRN
Status: DISCONTINUED | OUTPATIENT
Start: 2023-10-23 | End: 2023-10-24 | Stop reason: HOSPADM

## 2023-10-23 RX ORDER — SODIUM CHLORIDE, SODIUM LACTATE, POTASSIUM CHLORIDE, CALCIUM CHLORIDE 600; 310; 30; 20 MG/100ML; MG/100ML; MG/100ML; MG/100ML
INJECTION, SOLUTION INTRAVENOUS CONTINUOUS
Status: DISCONTINUED | OUTPATIENT
Start: 2023-10-23 | End: 2023-10-24 | Stop reason: HOSPADM

## 2023-10-23 RX ORDER — ONDANSETRON 4 MG/1
4 TABLET, ORALLY DISINTEGRATING ORAL EVERY 30 MIN PRN
Status: DISCONTINUED | OUTPATIENT
Start: 2023-10-23 | End: 2023-10-23 | Stop reason: HOSPADM

## 2023-10-23 RX ORDER — ACETAMINOPHEN 325 MG/1
650 TABLET ORAL EVERY 4 HOURS PRN
Status: DISCONTINUED | OUTPATIENT
Start: 2023-10-26 | End: 2023-10-24 | Stop reason: HOSPADM

## 2023-10-23 RX ORDER — SODIUM CHLORIDE, SODIUM LACTATE, POTASSIUM CHLORIDE, CALCIUM CHLORIDE 600; 310; 30; 20 MG/100ML; MG/100ML; MG/100ML; MG/100ML
INJECTION, SOLUTION INTRAVENOUS CONTINUOUS
Status: DISCONTINUED | OUTPATIENT
Start: 2023-10-23 | End: 2023-10-23 | Stop reason: HOSPADM

## 2023-10-23 RX ORDER — HYDROMORPHONE HCL IN WATER/PF 6 MG/30 ML
0.4 PATIENT CONTROLLED ANALGESIA SYRINGE INTRAVENOUS EVERY 5 MIN PRN
Status: DISCONTINUED | OUTPATIENT
Start: 2023-10-23 | End: 2023-10-23 | Stop reason: HOSPADM

## 2023-10-23 RX ORDER — TRANEXAMIC ACID 10 MG/ML
1 INJECTION, SOLUTION INTRAVENOUS ONCE
Status: COMPLETED | OUTPATIENT
Start: 2023-10-23 | End: 2023-10-23

## 2023-10-23 RX ORDER — NALOXONE HYDROCHLORIDE 0.4 MG/ML
0.4 INJECTION, SOLUTION INTRAMUSCULAR; INTRAVENOUS; SUBCUTANEOUS
Status: DISCONTINUED | OUTPATIENT
Start: 2023-10-23 | End: 2023-10-24 | Stop reason: HOSPADM

## 2023-10-23 RX ORDER — ONDANSETRON 2 MG/ML
4 INJECTION INTRAMUSCULAR; INTRAVENOUS EVERY 6 HOURS PRN
Status: DISCONTINUED | OUTPATIENT
Start: 2023-10-23 | End: 2023-10-24 | Stop reason: HOSPADM

## 2023-10-23 RX ORDER — PROPOFOL 10 MG/ML
INJECTION, EMULSION INTRAVENOUS CONTINUOUS PRN
Status: DISCONTINUED | OUTPATIENT
Start: 2023-10-23 | End: 2023-10-23

## 2023-10-23 RX ORDER — HYDROXYZINE HYDROCHLORIDE 10 MG/1
10 TABLET, FILM COATED ORAL EVERY 6 HOURS PRN
Status: DISCONTINUED | OUTPATIENT
Start: 2023-10-23 | End: 2023-10-24 | Stop reason: HOSPADM

## 2023-10-23 RX ORDER — HYDROMORPHONE HCL IN WATER/PF 6 MG/30 ML
0.2 PATIENT CONTROLLED ANALGESIA SYRINGE INTRAVENOUS EVERY 5 MIN PRN
Status: DISCONTINUED | OUTPATIENT
Start: 2023-10-23 | End: 2023-10-23 | Stop reason: HOSPADM

## 2023-10-23 RX ORDER — PROCHLORPERAZINE MALEATE 5 MG
5 TABLET ORAL EVERY 6 HOURS PRN
Status: DISCONTINUED | OUTPATIENT
Start: 2023-10-23 | End: 2023-10-24 | Stop reason: HOSPADM

## 2023-10-23 RX ORDER — EPHEDRINE SULFATE 50 MG/ML
INJECTION, SOLUTION INTRAMUSCULAR; INTRAVENOUS; SUBCUTANEOUS PRN
Status: DISCONTINUED | OUTPATIENT
Start: 2023-10-23 | End: 2023-10-23

## 2023-10-23 RX ORDER — AMOXICILLIN 250 MG
1-2 CAPSULE ORAL 2 TIMES DAILY
Qty: 30 TABLET | Refills: 0 | Status: SHIPPED | OUTPATIENT
Start: 2023-10-23 | End: 2024-07-08

## 2023-10-23 RX ORDER — AMOXICILLIN 250 MG
1 CAPSULE ORAL 2 TIMES DAILY
Status: DISCONTINUED | OUTPATIENT
Start: 2023-10-23 | End: 2023-10-24 | Stop reason: HOSPADM

## 2023-10-23 RX ORDER — PREDNISOLONE ACETATE 10 MG/ML
1 SUSPENSION/ DROPS OPHTHALMIC 4 TIMES DAILY
Status: DISCONTINUED | OUTPATIENT
Start: 2023-10-23 | End: 2023-10-24 | Stop reason: HOSPADM

## 2023-10-23 RX ORDER — OXYCODONE HYDROCHLORIDE 5 MG/1
5-10 TABLET ORAL EVERY 4 HOURS PRN
Qty: 30 TABLET | Refills: 0 | Status: SHIPPED | OUTPATIENT
Start: 2023-10-23 | End: 2024-07-08

## 2023-10-23 RX ORDER — ALBUTEROL SULFATE 90 UG/1
2 AEROSOL, METERED RESPIRATORY (INHALATION) EVERY 6 HOURS PRN
Status: DISCONTINUED | OUTPATIENT
Start: 2023-10-23 | End: 2023-10-24 | Stop reason: HOSPADM

## 2023-10-23 RX ORDER — LIDOCAINE 40 MG/G
CREAM TOPICAL
Status: DISCONTINUED | OUTPATIENT
Start: 2023-10-23 | End: 2023-10-24 | Stop reason: HOSPADM

## 2023-10-23 RX ORDER — ONDANSETRON 2 MG/ML
INJECTION INTRAMUSCULAR; INTRAVENOUS PRN
Status: DISCONTINUED | OUTPATIENT
Start: 2023-10-23 | End: 2023-10-23

## 2023-10-23 RX ORDER — ONDANSETRON 2 MG/ML
4 INJECTION INTRAMUSCULAR; INTRAVENOUS EVERY 30 MIN PRN
Status: DISCONTINUED | OUTPATIENT
Start: 2023-10-23 | End: 2023-10-23 | Stop reason: HOSPADM

## 2023-10-23 RX ORDER — DEXAMETHASONE SODIUM PHOSPHATE 4 MG/ML
INJECTION, SOLUTION INTRA-ARTICULAR; INTRALESIONAL; INTRAMUSCULAR; INTRAVENOUS; SOFT TISSUE PRN
Status: DISCONTINUED | OUTPATIENT
Start: 2023-10-23 | End: 2023-10-23

## 2023-10-23 RX ORDER — MULTIVITAMIN,THERAPEUTIC
1 TABLET ORAL DAILY
Status: DISCONTINUED | OUTPATIENT
Start: 2023-10-24 | End: 2023-10-24 | Stop reason: HOSPADM

## 2023-10-23 RX ORDER — CEFAZOLIN SODIUM/WATER 2 G/20 ML
2 SYRINGE (ML) INTRAVENOUS SEE ADMIN INSTRUCTIONS
Status: DISCONTINUED | OUTPATIENT
Start: 2023-10-23 | End: 2023-10-23 | Stop reason: HOSPADM

## 2023-10-23 RX ORDER — HYDROMORPHONE HCL IN WATER/PF 6 MG/30 ML
0.2 PATIENT CONTROLLED ANALGESIA SYRINGE INTRAVENOUS
Status: DISCONTINUED | OUTPATIENT
Start: 2023-10-23 | End: 2023-10-24 | Stop reason: HOSPADM

## 2023-10-23 RX ORDER — MAGNESIUM HYDROXIDE 1200 MG/15ML
LIQUID ORAL PRN
Status: DISCONTINUED | OUTPATIENT
Start: 2023-10-23 | End: 2023-10-23 | Stop reason: HOSPADM

## 2023-10-23 RX ORDER — FENTANYL CITRATE 50 UG/ML
INJECTION, SOLUTION INTRAMUSCULAR; INTRAVENOUS PRN
Status: DISCONTINUED | OUTPATIENT
Start: 2023-10-23 | End: 2023-10-23

## 2023-10-23 RX ORDER — PROPOFOL 10 MG/ML
INJECTION, EMULSION INTRAVENOUS PRN
Status: DISCONTINUED | OUTPATIENT
Start: 2023-10-23 | End: 2023-10-23

## 2023-10-23 RX ORDER — OXYCODONE HYDROCHLORIDE 5 MG/1
5 TABLET ORAL
Status: DISCONTINUED | OUTPATIENT
Start: 2023-10-23 | End: 2023-10-24 | Stop reason: HOSPADM

## 2023-10-23 RX ORDER — ASPIRIN 325 MG
325 TABLET, DELAYED RELEASE (ENTERIC COATED) ORAL DAILY
Status: DISCONTINUED | OUTPATIENT
Start: 2023-10-23 | End: 2023-10-24 | Stop reason: HOSPADM

## 2023-10-23 RX ADMIN — OXYCODONE HYDROCHLORIDE 5 MG: 5 TABLET ORAL at 20:49

## 2023-10-23 RX ADMIN — PROPOFOL 50 MG: 10 INJECTION, EMULSION INTRAVENOUS at 12:42

## 2023-10-23 RX ADMIN — ROPIVACAINE HYDROCHLORIDE: 5 INJECTION, SOLUTION EPIDURAL; INFILTRATION; PERINEURAL at 13:27

## 2023-10-23 RX ADMIN — Medication 2 G: at 12:03

## 2023-10-23 RX ADMIN — FENTANYL CITRATE 50 MCG: 50 INJECTION, SOLUTION INTRAMUSCULAR; INTRAVENOUS at 14:22

## 2023-10-23 RX ADMIN — SODIUM CHLORIDE, POTASSIUM CHLORIDE, SODIUM LACTATE AND CALCIUM CHLORIDE: 600; 310; 30; 20 INJECTION, SOLUTION INTRAVENOUS at 17:47

## 2023-10-23 RX ADMIN — PROPOFOL 30 MCG/KG/MIN: 10 INJECTION, EMULSION INTRAVENOUS at 12:08

## 2023-10-23 RX ADMIN — Medication 5 MG: at 12:02

## 2023-10-23 RX ADMIN — SODIUM CHLORIDE, POTASSIUM CHLORIDE, SODIUM LACTATE AND CALCIUM CHLORIDE: 600; 310; 30; 20 INJECTION, SOLUTION INTRAVENOUS at 11:00

## 2023-10-23 RX ADMIN — SENNOSIDES AND DOCUSATE SODIUM 1 TABLET: 50; 8.6 TABLET ORAL at 20:39

## 2023-10-23 RX ADMIN — Medication 2.5 MG: at 12:58

## 2023-10-23 RX ADMIN — PROPOFOL 200 MG: 10 INJECTION, EMULSION INTRAVENOUS at 11:53

## 2023-10-23 RX ADMIN — FENTANYL CITRATE 75 MCG: 50 INJECTION INTRAMUSCULAR; INTRAVENOUS at 12:31

## 2023-10-23 RX ADMIN — SODIUM CHLORIDE, POTASSIUM CHLORIDE, SODIUM LACTATE AND CALCIUM CHLORIDE: 600; 310; 30; 20 INJECTION, SOLUTION INTRAVENOUS at 14:22

## 2023-10-23 RX ADMIN — TRANEXAMIC ACID 1 G: 10 INJECTION, SOLUTION INTRAVENOUS at 13:30

## 2023-10-23 RX ADMIN — TRANEXAMIC ACID 1 G: 10 INJECTION, SOLUTION INTRAVENOUS at 11:58

## 2023-10-23 RX ADMIN — HYDROMORPHONE HYDROCHLORIDE 0.5 MG: 1 INJECTION, SOLUTION INTRAMUSCULAR; INTRAVENOUS; SUBCUTANEOUS at 12:15

## 2023-10-23 RX ADMIN — ONDANSETRON 4 MG: 2 INJECTION INTRAMUSCULAR; INTRAVENOUS at 13:28

## 2023-10-23 RX ADMIN — Medication 20 MG: at 12:38

## 2023-10-23 RX ADMIN — OXYCODONE HYDROCHLORIDE 5 MG: 5 TABLET ORAL at 17:47

## 2023-10-23 RX ADMIN — CEFAZOLIN 1 G: 1 INJECTION, POWDER, FOR SOLUTION INTRAMUSCULAR; INTRAVENOUS at 20:40

## 2023-10-23 RX ADMIN — PHENYLEPHRINE HYDROCHLORIDE 0.3 MCG/KG/MIN: 10 INJECTION INTRAVENOUS at 12:08

## 2023-10-23 RX ADMIN — DEXAMETHASONE SODIUM PHOSPHATE 4 MG: 4 INJECTION, SOLUTION INTRA-ARTICULAR; INTRALESIONAL; INTRAMUSCULAR; INTRAVENOUS; SOFT TISSUE at 12:15

## 2023-10-23 RX ADMIN — Medication 10 MG: at 12:42

## 2023-10-23 RX ADMIN — ACETAMINOPHEN 975 MG: 325 TABLET, FILM COATED ORAL at 20:39

## 2023-10-23 RX ADMIN — HYDROMORPHONE HYDROCHLORIDE 0.4 MG: 0.2 INJECTION, SOLUTION INTRAMUSCULAR; INTRAVENOUS; SUBCUTANEOUS at 15:04

## 2023-10-23 RX ADMIN — FENTANYL CITRATE 25 MCG: 50 INJECTION INTRAMUSCULAR; INTRAVENOUS at 11:53

## 2023-10-23 RX ADMIN — LIDOCAINE HYDROCHLORIDE 100 MG: 20 INJECTION, SOLUTION INFILTRATION; PERINEURAL at 11:53

## 2023-10-23 RX ADMIN — HYDROMORPHONE HYDROCHLORIDE 0.4 MG: 0.2 INJECTION, SOLUTION INTRAMUSCULAR; INTRAVENOUS; SUBCUTANEOUS at 15:50

## 2023-10-23 RX ADMIN — ASPIRIN 325 MG: 325 TABLET, COATED ORAL at 17:47

## 2023-10-23 RX ADMIN — HYDROMORPHONE HYDROCHLORIDE 0.4 MG: 0.2 INJECTION, SOLUTION INTRAMUSCULAR; INTRAVENOUS; SUBCUTANEOUS at 15:28

## 2023-10-23 RX ADMIN — FENTANYL CITRATE 50 MCG: 50 INJECTION, SOLUTION INTRAMUSCULAR; INTRAVENOUS at 14:45

## 2023-10-23 ASSESSMENT — ACTIVITIES OF DAILY LIVING (ADL)
ADLS_ACUITY_SCORE: 20

## 2023-10-23 NOTE — ANESTHESIA PROCEDURE NOTES
Airway       Patient location during procedure: OR  Staff -        Anesthesiologist:  Ahmet Pastor MD       CRNA: Lois Vilchis APRN CRNA       Other Anesthesia Staff: Lino Rubio RN       Performed By: with CRNAs and SRNA       Procedure performed by resident/fellow/CRNA in presence of a teaching physician.    Consent for Airway        Urgency: elective  Indications and Patient Condition       Indications for airway management: alexandra-procedural       Induction type:intravenous       Mask difficulty assessment: 0 - not attempted    Final Airway Details       Final airway type: supraglottic airway    Supraglottic Airway Details        Type: LMA       Brand: Ambu AuraGain       LMA size: 5    Post intubation assessment        Placement verified by: capnometry, equal breath sounds and chest rise        Number of attempts at approach: 1       Number of other approaches attempted: 0       Secured with: silk tape       Ease of procedure: easy       Dentition: Intact and Unchanged

## 2023-10-23 NOTE — ANESTHESIA CARE TRANSFER NOTE
Patient: Jasen Rosario    Procedure: Procedure(s):  LEFT REVERSE TOTAL SHOULDER ARTHROPLASTY       Diagnosis: Arthritis of right shoulder region [M19.011]  Diagnosis Additional Information: No value filed.    Anesthesia Type:   General     Note:    Oropharynx: oropharynx clear of all foreign objects  Level of Consciousness: awake  Oxygen Supplementation: face mask  Level of Supplemental Oxygen (L/min / FiO2): 6  Independent Airway: airway patency satisfactory and stable  Dentition: dentition unchanged  Vital Signs Stable: post-procedure vital signs reviewed and stable  Report to RN Given: handoff report given  Patient transferred to: PACU    Handoff Report: Identifed the Patient, Identified the Reponsible Provider, Reviewed the pertinent medical history, Discussed the surgical course, Reviewed Intra-OP anesthesia mangement and issues during anesthesia, Set expectations for post-procedure period and Allowed opportunity for questions and acknowledgement of understanding      Vitals:  Vitals Value Taken Time   BP     Temp     Pulse     Resp 21 10/23/23 1358   SpO2 100 % 10/23/23 1358   Vitals shown include unfiled device data.    Electronically Signed By: MEAGAN Taylor CRNA  October 23, 2023  1:59 PM

## 2023-10-23 NOTE — PLAN OF CARE
Goal Outcome Evaluation:    .Patient vital signs are at baseline: No,  Reason:  BP improving, hypertensive.   Patient able to ambulate as they were prior to admission or with assist devices provided by therapies during their stay:  No,  Reason:  Not OOB yet.  Patient MUST void prior to discharge:  No,  Reason:  DTV  Patient able to tolerate oral intake:  Yes  Pain has adequate pain control using Oral analgesics:  Yes  Does patient have an identified :  Yes  Has goal D/C date and time been discussed with patient:  Yes    Patient arrived to unit around 1700. CMS intact, dressing CDI to L shoulder. Sling in place. Discharge goals reviewed with patient and patients wife at bedside.

## 2023-10-23 NOTE — ANESTHESIA POSTPROCEDURE EVALUATION
Patient: Jasen Rosario    Procedure: Procedure(s):  LEFT REVERSE TOTAL SHOULDER ARTHROPLASTY       Anesthesia Type:  General    Note:  Disposition: Inpatient   Postop Pain Control: Uneventful            Sign Out: Well controlled pain   PONV:    Neuro/Psych: Uneventful            Sign Out: Acceptable/Baseline neuro status   Airway/Respiratory: Uneventful            Sign Out: Acceptable/Baseline resp. status   CV/Hemodynamics: Uneventful            Sign Out: Acceptable CV status   Other NRE: NONE   DID A NON-ROUTINE EVENT OCCUR? No           Last vitals:  Vitals Value Taken Time   /95 10/23/23 1530   Temp 36.6  C (97.8  F) 10/23/23 1515   Pulse 64 10/23/23 1541   Resp 10 10/23/23 1541   SpO2 99 % 10/23/23 1541   Vitals shown include unfiled device data.    Electronically Signed By: Ahmet Pastor MD  October 23, 2023  3:42 PM

## 2023-10-23 NOTE — ANESTHESIA PREPROCEDURE EVALUATION
Anesthesia Pre-Procedure Evaluation    Patient: Jasen Rosario   MRN: [id number] : 1945        Procedure : Procedure(s):  LEFT REVERSE TOTAL SHOULDER ARTHROPLASTY          Past Medical History:   Diagnosis Date    Anxiety state, unspecified     Basal cell carcinoma     Left Ear - Moh's Dr Young    Colon polyp     ED (erectile dysfunction)     Hyperlipidemia LDL goal <130     Hypoglycemia, unspecified     Infectious mononucleosis     Lumbar stenosis     [facility] - Dr Irene - with neuritis    Lymphoma (H)     SLL - B Cell - Dr Barrios/Marcus    Major depressive disorder, single episode in full remission (H24)     OA (osteoarthritis)     Severe Lt Hip    Prostate cancer (H) 2013    Dr Smyth - [facility] - [place] 3+3 - intermediate progression risk - pT2cN0    Seasonal allergies       Past Surgical History:   Procedure Laterality Date    APPENDECTOMY  1960    APPY    ARTHROPLASTY HIP Left 2017    Left BETTY - Dr MELISSA Madrigal    ARTHROPLASTY KNEE Left 10/02/2017    LEFT TOTAL KNEE ARTHROPLASTY - Dr MELISSA Madrigal    BACK SURGERY  1980    COLONOSCOPY  2012    colon polyp - serrated adenoma 5 mm - due 3 yrs    COLONOSCOPY  2018    Dr. Naila BELLE - normal - due 5 yrs - will need MAC    COLONOSCOPY N/A 2018    Dr. Naila BELLE - normal - due 5 yrs - will need MAC    DAVINCI PROSTATECTOMY, LYMPHADENECTOMY N/A 2015    RALP - Dr Smyth    DENTAL SURGERY  1985    wisdom teeth    SURGICAL HISTORY OF -       BACK SURGERY    SURGICAL HISTORY OF -  Left     ANKLE FX ORIF - Lt    SURGICAL HISTORY OF -  Left     LT KNEE MCL TEAR AND MENISCUS     SURGICAL HISTORY OF -  Left ,     LT SHOULDER ARTHROSCOPY    SURGICAL HISTORY OF -  Right 2007    Rt Shoulder Surgery - Dr Ferrer    SURGICAL HISTORY OF -  Right 2018    Rt BETTY - Dr Galdamez      Allergies   Allergen Reactions    Seasonal Allergies       Social History     Tobacco Use    Smoking status: Former      Packs/day: 2.00     Years: 7.00     Additional pack years: 0.00     Total pack years: 14.00     Types: Cigarettes     Start date: 1958     Quit date: 1965     Years since quittin.8    Smokeless tobacco: Never   Substance Use Topics    Alcohol use: Yes     Alcohol/week: 5.0 - 6.0 standard drinks of alcohol     Types: 5 - 6 Standard drinks or equivalent per week     Comment: ocassionally      Wt Readings from Last 1 Encounters:   10/16/23 67.1 kg (148 lb)        Anesthesia Evaluation            ROS/MED HX  ENT/Pulmonary: Comment: Recent episode of shingles - Right side of face. Resolving.    (-) sleep apnea   Neurologic:  - neg neurologic ROS     Cardiovascular:     (+) Dyslipidemia - -   -  - -                                      METS/Exercise Tolerance:     Hematologic: Comments: B Cell lymphoma      Musculoskeletal:   (+)  arthritis,             GI/Hepatic:    (-) GERD   Renal/Genitourinary: Comment: H/o prostate CA s/p prostatectomy      Endo:     (+)          thyroid problem, hypothyroidism,           Psychiatric/Substance Use:     (+) psychiatric history anxiety and depression       Infectious Disease:  - neg infectious disease ROS     Malignancy:  - neg malignancy ROS     Other:            Physical Exam    Airway        Mallampati: II   TM distance: > 3 FB   Neck ROM: full   Mouth opening: > 3 cm    Respiratory Devices and Support         Dental       (+) Minor Abnormalities - some fillings, tiny chips      Cardiovascular   cardiovascular exam normal          Pulmonary   pulmonary exam normal                OUTSIDE LABS:  CBC:   Lab Results   Component Value Date    WBC 6.4 10/11/2023    WBC 6.1 10/13/2022    HGB 16.1 10/11/2023    HGB 15.5 10/13/2022    HCT 46.4 10/11/2023    HCT 44.2 10/13/2022     10/11/2023     10/13/2022     BMP:   Lab Results   Component Value Date     10/11/2023     10/13/2022    POTASSIUM 4.8 10/11/2023    POTASSIUM 4.8 10/13/2022    CHLORIDE 104  10/11/2023    CHLORIDE 108 10/13/2022    CO2 25 10/11/2023    CO2 24 10/13/2022    BUN 16.0 10/11/2023    BUN 17 10/13/2022    CR 1.20 (H) 10/11/2023    CR 1.18 10/13/2022    GLC 97 10/11/2023    GLC 98 10/13/2022     COAGS:   Lab Results   Component Value Date    PTT 22 07/05/2022    INR 1.02 07/05/2022     POC:   Lab Results   Component Value Date     (H) 06/23/2017     HEPATIC:   Lab Results   Component Value Date    ALBUMIN 4.4 10/11/2023    PROTTOTAL 6.3 (L) 10/11/2023    ALT 19 10/11/2023    AST 30 10/11/2023    ALKPHOS 76 10/11/2023    BILITOTAL 0.6 10/11/2023     OTHER:   Lab Results   Component Value Date    A1C 5.4 09/01/2020    KALPESH 9.4 10/11/2023    TSH 2.78 10/11/2023       Anesthesia Plan    ASA Status:  2    NPO Status:  NPO Appropriate    Anesthesia Type: General.     - Airway: LMA   Induction: Intravenous, Propofol.   Maintenance: Balanced.        Consents    Anesthesia Plan(s) and associated risks, benefits, and realistic alternatives discussed. Questions answered and patient/representative(s) expressed understanding.     - Discussed:     - Discussed with:  Patient            Postoperative Care    Pain management: IV analgesics, Multi-modal analgesia.   PONV prophylaxis: Ondansetron (or other 5HT-3), Dexamethasone or Solumedrol, Background Propofol Infusion     Comments:                Eliel Cabrera DO, DO

## 2023-10-23 NOTE — PROGRESS NOTES
S: Pt. was in surgery at [facility]. Raymon Madrigal PA-C. Ultrasling 3 left Large. DX: Shoulder surgery.  O:A: Today I Dropped off a Ultrasling 3 size medium right to the pool room, Pre op.  P: Pt. to be seen as needed.    Raymon Lynch LP, BOCP, COF

## 2023-10-23 NOTE — OP NOTE
PREOPERATIVE DIAGNOSES:   Left glenohumeral osteoarthrosis, advanced, with high grade partial thickness RCT.     POSTOPERATIVE DIAGNOSES:  Left glenohumeral osteoarthrosis, advanced, with high grade partial thickness RCT, chronic LHB rupture.     PROCEDURES:   1.  Left reverse total shoulder arthroplasty.      SURGEON:  Tee Rodriguze MD      ASSISTANT:  Shawn Madrigal PA-C      ANESTHESIA:  General endotracheal.      ESTIMATED BLOOD LOSS:  100 mL.      FLUID REPLACEMENT:  Two liters crystalloid.      COMPLICATIONS:  No immediate complications.      INDICATIONS:  Please see preoperative clinical notes.      EXAMINATION OF LEFT SHOULDER UNDER ANESTHESIA FINDINGS:  Passive range of motion 135/70/60/75/40.      COMPONENTS UTILIZED:   1.  Tornier Ascend Flex humeral component, size 4B, secured in roughly 20 degrees retroversion with cementless fixation.   2.  Standard high offset tray with maximal offset posterior and inferior.   3.  A 9 mm polyethylene insert.   4.  Standard 25 mm baseplate.  5.  Standard 42 mm glenosphere component.      SCREW CONFIGURATION:   1.  Superior locking screw, 35 mm.   2.  Inferior locking screw, 41 mm.   3.  Anterior compression screw, 18 mm, excellent purchase.  4.  Posterior compression screw, 20 mm, excellent purchase.     DESCRIPTION OF PROCEDURE:  The patient was identified in the preoperative holding area and taken to room #35 of the main OR.  Monitors were placed per the Anesthesia Service.  After smooth IV induction, general anesthesia was introduced and his endotracheal tube secured.  He was given 2 g Ancef IV.  He was then repositioned in the modified beach chair position with the head and neck secured in neutral position and all peripheral extremities thoroughly padded with foam.  Left upper extremity was widely prepped and draped in the usual sterile fashion.  DEE hose and pneumoboots were in place and operational during the procedure.      Surgical team took timeout  to confirm the correct patient, correct site marking, correct equipment and implants, correct images were available, and that she had been administered IV antibiotic therapy.      An oblique skin incision was centered over the anterior left shoulder.  Skin flaps elevated.  Cephalic vein identified, retracted laterally with the deltoid, and the conjoined tendons, swept medially without excessive tension on the musculocutaneous nerve.       The subscapularis was tenotomized en bloc with the anterior capsule and elevated medially, while protecting the medial neurovascular bundle.  The posterosuperior rotator cuff was inspected.  There was a chronic high grade partial thickness rotator cuff tear.  We therefore elected to proceed with reverse arthroplasty.  The long head biceps was absent due to chronic rupture/attnuation.  The shoulder was then gently dislocated.  Marginal osteophytes which were significant were removed circumferentially around the periphery of the humeral head.  An anatomic neck cut was then performed, and favored a slightly inferior position due to reverse implant placement.  Reaming and broaching was then undertaken up to a size 4B trial, which had excellent fit and no instability.  A high offset tray had appropriate coverage posteroinferior and was accepted in that setting.      The glenoid was then circumferentially exposed.  A complete labrectomy/capsulectomy was performed anterior and posterior to allow full visualization.  The central starting point for reaming was undertaken and this was revisited with sequential reamers, as well as the high speed bur.  Thorough waterpik irrigation was utilized to remove all bony debris peripherally.  The opening drill was then utilized and after thorough irrigation, we then impacted the baseplate to appropriate press-fit with excellent fixation and backed this up with superior and inferior locking screws, as well as the anterior and posterior compression  screws, obtaining excellent fixation of the construct.  Thorough irrigation was then performed.  After thorough lavage, the glenosphere was impacted and had excellent press-fit and then backed up with a central locking screw.      We then repeated trialing on the humeral side and a 9 mm insert that had appropriate soft tissue tension without rocking or instability was accepted.  The humeral trials were then removed.  Thorough irrigation was then performed.  The stem was impacted to appropriate press-fit with excellent security.  The trunnion was pulse-lavaged and suctioned dry, and the tray was then impacted in the chosen setting.  Following this, the polyethylene insert was snap fit and impacted with excellent fixation.  The shoulder was then reduced, found to have excellent soft tissue tension, no signs of rocking or instability throughout a full range of motion was accepted.      The arm was then placed in the 30/30 position.  After thorough irrigation, we closed the subscapularis and anterior capsule back to the lateral soft tissues, as well as the lesser tuberosity with several figure-of-eight #2 Ethibond sutures.  The proximal portion of the subscapularis was closed to the leading edge of the rotator cuff posteriorly to completely cover the implant.  Excellent overall closure and fixation was noted.      The axillary nerve was palpated and found to be intact.  After thorough irrigation, we then closed the deltopectoral interval over a medium Hemovac drain, brought through clean anterolateral skin with #2 Ethibond suture.  The deltoid musculature, as well as the subcutaneous tissue planes were then infiltrated with a total of 20 mL of 0.5% ropivacaine plain, 15 mg toradol, and 1 mg Duramorph.  The skin was closed in layers with 3-0 Monocryl and 3-0 Prolene running subcuticular for skin.  Steri-Strips and a bulky sterile dressing were applied.  He was then placed into an EZ wrap device and UltraSling in neutral  position, was then reversed, extubated and taken back to the recovery room in stable condition.  There were no immediate complications and he appeared to tolerate the procedure well.      A skilled first assistant was necessary for this procedure for assistance with patient positioning, prepping, draping, surgical visualization, implantation of the prosthesis, wound closure, dressing and sling application.      PQRI MEASURES:   1.  The patient was given 2 g Ancef within 1 hour prior to skin incision.  IV antibiotic therapy was discontinued within 24 hours postoperatively.   2.  Deep venous thrombosis prophylaxis will be with aspirin x 3 weeks.  3.  UltraSling x 6 weeks for protection.   4.  Standard phase 1 reverse TSA protocol for physical therapy.   5.  He should have a true AP and outlet radiograph of the left shoulder at return to office in 10 days for suture removal.

## 2023-10-24 ENCOUNTER — APPOINTMENT (OUTPATIENT)
Dept: OCCUPATIONAL THERAPY | Facility: CLINIC | Age: 78
End: 2023-10-24
Attending: ORTHOPAEDIC SURGERY
Payer: COMMERCIAL

## 2023-10-24 VITALS
TEMPERATURE: 99.2 F | RESPIRATION RATE: 18 BRPM | SYSTOLIC BLOOD PRESSURE: 112 MMHG | HEART RATE: 68 BPM | OXYGEN SATURATION: 97 % | WEIGHT: 145 LBS | HEIGHT: 67 IN | BODY MASS INDEX: 22.76 KG/M2 | DIASTOLIC BLOOD PRESSURE: 66 MMHG

## 2023-10-24 LAB
GLUCOSE BLDC GLUCOMTR-MCNC: 111 MG/DL (ref 70–99)
HGB BLD-MCNC: 13.9 G/DL (ref 13.3–17.7)

## 2023-10-24 PROCEDURE — 82962 GLUCOSE BLOOD TEST: CPT

## 2023-10-24 PROCEDURE — 97165 OT EVAL LOW COMPLEX 30 MIN: CPT | Mod: GO | Performed by: OCCUPATIONAL THERAPIST

## 2023-10-24 PROCEDURE — 85018 HEMOGLOBIN: CPT | Performed by: ORTHOPAEDIC SURGERY

## 2023-10-24 PROCEDURE — 97535 SELF CARE MNGMENT TRAINING: CPT | Mod: GO | Performed by: OCCUPATIONAL THERAPIST

## 2023-10-24 PROCEDURE — 250N000013 HC RX MED GY IP 250 OP 250 PS 637: Performed by: ORTHOPAEDIC SURGERY

## 2023-10-24 PROCEDURE — 36415 COLL VENOUS BLD VENIPUNCTURE: CPT | Performed by: ORTHOPAEDIC SURGERY

## 2023-10-24 PROCEDURE — 250N000011 HC RX IP 250 OP 636: Performed by: ORTHOPAEDIC SURGERY

## 2023-10-24 PROCEDURE — 97110 THERAPEUTIC EXERCISES: CPT | Mod: GO | Performed by: OCCUPATIONAL THERAPIST

## 2023-10-24 RX ADMIN — CEFAZOLIN 1 G: 1 INJECTION, POWDER, FOR SOLUTION INTRAMUSCULAR; INTRAVENOUS at 03:10

## 2023-10-24 RX ADMIN — OXYCODONE HYDROCHLORIDE 5 MG: 5 TABLET ORAL at 07:25

## 2023-10-24 RX ADMIN — MULTIVITAMIN TABLET 1 TABLET: TABLET at 08:32

## 2023-10-24 RX ADMIN — OXYCODONE HYDROCHLORIDE 5 MG: 5 TABLET ORAL at 00:00

## 2023-10-24 RX ADMIN — ACETAMINOPHEN 975 MG: 325 TABLET, FILM COATED ORAL at 03:09

## 2023-10-24 RX ADMIN — POLYETHYLENE GLYCOL 3350 17 G: 17 POWDER, FOR SOLUTION ORAL at 08:33

## 2023-10-24 RX ADMIN — OXYCODONE HYDROCHLORIDE 5 MG: 5 TABLET ORAL at 10:36

## 2023-10-24 RX ADMIN — CYANOCOBALAMIN TAB 1000 MCG 1000 MCG: 1000 TAB at 08:32

## 2023-10-24 RX ADMIN — SENNOSIDES AND DOCUSATE SODIUM 1 TABLET: 50; 8.6 TABLET ORAL at 08:32

## 2023-10-24 RX ADMIN — OXYCODONE HYDROCHLORIDE 5 MG: 5 TABLET ORAL at 03:09

## 2023-10-24 RX ADMIN — Medication 100 MG: at 08:32

## 2023-10-24 RX ADMIN — ASPIRIN 325 MG: 325 TABLET, COATED ORAL at 08:32

## 2023-10-24 ASSESSMENT — ACTIVITIES OF DAILY LIVING (ADL)
ADLS_ACUITY_SCORE: 20
ADLS_ACUITY_SCORE: 21
ADLS_ACUITY_SCORE: 20
ADLS_ACUITY_SCORE: 21
ADLS_ACUITY_SCORE: 20
ADLS_ACUITY_SCORE: 20

## 2023-10-24 NOTE — PROGRESS NOTES
"ORTHOPEDIC UPPER EXTREMITY PROGRESS NOTE    POD# 1  Patient is a 78 year old male who underwent Procedure(s):  LEFT REVERSE TOTAL SHOULDER ARTHROPLASTY on 10/23/2023 on left. Pain is well controlled, discussed multi-modal approach.. Discussed expectations for discharge    Vitals:   /66 (BP Location: Right arm)   Pulse 68   Temp 99.2  F (37.3  C) (Oral)   Resp 18   Ht 1.689 m (5' 6.5\")   Wt 65.8 kg (145 lb)   SpO2 97%   BMI 23.05 kg/m        EXAM   The patient is awake and alert.   Sensation is intact.  Digital Flexion/Extension maintained.   Brisk cap refill.   The incision is covered with bulky dressing, drain in place.     Labs:   Recent Labs   Lab Test 10/11/23  0854 10/13/22  0848 07/05/22  1744   HGB 16.1 15.5 15.7   INR  --   --  1.02       ASSESSMENT  S/p left reverse TSA   PLAN  1.  The patient was given 2 g Ancef within 1 hour prior to skin incision.  IV antibiotic therapy was discontinued within 24 hours postoperatively.   2.  Deep venous thrombosis prophylaxis will be with aspirin x 3 weeks.  3.  UltraSling x 6 weeks for protection.   4.  Standard phase 1 reverse TSA protocol for physical therapy.   5.  He should have a true AP and outlet radiograph of the left shoulder at return to office in 10 days for suture removal.    Kjerstin Foss PA-C [facility]    "

## 2023-10-24 NOTE — PLAN OF CARE
Goal Outcome Evaluation:    Patient vital signs are at baseline: Yes, ex hypertensive  Patient able to ambulate as they were prior to admission or with assist devices provided by therapies during their stay:  Yes, SBA   Patient MUST void prior to discharge:  Yes, adequate output  Patient able to tolerate oral intake:  Yes  Pain has adequate pain control using Oral analgesics:  Yes, PRN Oxy 5 mg  Does patient have an identified :  Yes  Has goal D/C date and time been discussed with patient:  Yes, TBD today 10/24    AO x4. CMS intact. NWB to L arm. Dressing CDI, hemovac drain in place with adequate output. Sling in place. PIV SL. Reg diet.

## 2023-10-24 NOTE — PROGRESS NOTES
provided handout and education on shoulder protocal ex's, elbow to digit. and deltoid isometic. pt was able to demo 10 reps of each ex's, reports a little increase in pain but otherwise was able to demo ind after education. While standing practicing deltoid isometrics, pt suddenly became lightheaded, sweaty and pale and asked to sit down. Sat into chair, drank some water and BP checked. 109/67. Pt immediately felt better after sitting.

## 2023-10-24 NOTE — PLAN OF CARE
Goal Outcome Evaluation:    .Patient vital signs are at baseline: Yes  Patient able to ambulate as they were prior to admission or with assist devices provided by therapies during their stay:  Yes  Patient MUST void prior to discharge:  Yes  Patient able to tolerate oral intake:  Yes  Pain has adequate pain control using Oral analgesics:  Yes  Does patient have an identified :  Yes  Has goal D/C date and time been discussed with patient:  Yes    Hemovac drain removed, Medipore dressing applied. LUE CMS intact, bruising noted to L axilla. AVS and discharge medications reviewed with patient at patients bedside. Teaching verified by teachback. Patient discharged with all of his belongings.

## 2023-10-24 NOTE — PROGRESS NOTES
10/24/23 0905   Appointment Info   Signing Clinician's Name / Credentials (OT) Kaylee Stokes OTR/L   Rehab Comments (OT) initial eval   Quick Adds   Quick Adds Certification   Living Environment   People in Home spouse   Current Living Arrangements house   Home Accessibility stairs to enter home;stairs within home   Number of Stairs, Main Entrance 1   Stair Railings, Main Entrance none   Number of Stairs, Within Home, Primary seven   Stair Railings, Within Home, Primary railings safe and in good condition   Transportation Anticipated car, drives self   Living Environment Comments split entry house. walk in shower   Self-Care   Usual Activity Tolerance good   Current Activity Tolerance good   Regular Exercise Yes   Exercise Amount/Frequency daily   Equipment Currently Used at Home grab bar, tub/shower;cane, straight;walker, rolling   Fall history within last six months no   Instrumental Activities of Daily Living (IADL)   Previous Responsibilities driving;finances;medication management;yardwork   General Information   Onset of Illness/Injury or Date of Surgery 10/23/23   Referring Physician Tee Rodriguez MD   Patient/Family Therapy Goal Statement (OT) none stated   Additional Occupational Profile Info/Pertinent History of Current Problem S/p left reverse TSA   Existing Precautions/Restrictions shoulder   General Observations and Info pt sitting up in recliner, agreeable to OT. son present at start of eval.   Cognitive Status Examination   Orientation Status orientation to person, place and time   Visual Perception   Visual Impairment/Limitations WNL   Sensory   Sensory Quick Adds sensation intact   Pain Assessment   Patient Currently in Pain Yes, see Vital Sign flowsheet   Posture   Posture not impaired   Range of Motion Comprehensive   Comment, General Range of Motion R UE WFL, L elbow to digit WFL. L shoulder NT   Strength Comprehensive (MMT)   Comment, General Manual Muscle Testing (MMT) Assessment R  UE WFL, L NT   Muscle Tone Assessment   Muscle Tone Quick Adds No deficits were identified   Coordination   Upper Extremity Coordination Left UE impaired   Bed Mobility   Comment (Bed Mobility) mod I   Transfers   Transfers No deficits identified   Balance   Balance Assessment   (pt reports baseline dizziness that he has seen a neurologist for)   Activities of Daily Living   BADL Assessment/Intervention upper body dressing;lower body dressing;clothing fastener management;toileting   Upper Body Dressing Assessment/Training   [place] Level (Upper Body Dressing) pull-over garment;moderate assist (50% patient effort)   Lower Body Dressing Assessment/Training   [place] Level (Lower Body Dressing) minimum assist (75% patient effort)   Clothes Fastener Management   [place] Level (Clothes Fastener Management) verbal cues   Position (Clothes Fastener Management) unsupported standing   Comment, (Clothes Fastener Management) belt and button   Toileting   [place] Level (Toileting) supervision   Clinical Impression   Criteria for Skilled Therapeutic Interventions Met (OT) Yes, treatment indicated   OT Diagnosis decreased I with ADLs and functional transfers   OT Problem List-Impairments impacting ADL problems related to;activity tolerance impaired;flexibility;post-surgical precautions;pain   Assessment of Occupational Performance 1-3 Performance Deficits   Identified Performance Deficits decreased dressing, home mgmt   Planned Therapy Interventions (OT) ADL retraining;home program guidelines;progressive activity/exercise;transfer training   Clinical Decision Making Complexity (OT) problem focused assessment/low complexity   Risk & Benefits of therapy have been explained evaluation/treatment results reviewed;care plan/treatment goals reviewed;risks/benefits reviewed;current/potential barriers reviewed;participants voiced agreement with care plan;participants included;patient   OT Total Evaluation Time   OT  Eval, Low Complexity Minutes (12319) 10   Therapy Certification   Medical Diagnosis L TSA   Start of Care Date 10/24/23   Certification date from 10/24/23   Certification date to 10/24/23   OT Goals   Therapy Frequency (OT) One time eval and treatment   OT Predicted Duration/Target Date for Goal Attainment 10/24/23   OT Goals Hygiene/Grooming;Upper Body Dressing;Lower Body Dressing;Toilet Transfer/Toileting;OT Goal 1   OT: Hygiene/Grooming modified independent;while standing   OT: Upper Body Dressing Supervision/stand-by assist;Goal Met  (except sling)   OT: Lower Body Dressing Modified independent;Goal Met   OT: Toilet Transfer/Toileting Modified independent;Goal Met;toilet transfer   OT: Goal 1 demo I with shoulder protocol ex's  (goal met)   Interventions   Interventions Quick Adds Self-Care/Home Management;Therapeutic Procedures/Exercise   Self-Care/Home Management   Self-Care/Home Mgmt/ADL, Compensatory, Meal Prep Minutes (29289) 29   Symptoms Noted During/After Treatment (Meal Preparation/Planning Training) fatigue   Treatment Detail/Skilled Intervention seen for OT eval and treatment. pt provided with handout for self care after shoulder surgery. educated on shoulder precautions in reguard to ADLs. pt expressed verbal understanding.  educated on UB dressing and pt was then able to demo SBA with UB dressing and LB dressing. Wife is able to help as needed with any ADLs.  mod A with doff and don of sling, but pts wife can help as needed. Pt declined needing to trial stairs, feels confident with railing and wife can provide SBA. Pt demo's good standing balance, gathering items around room. pt reports he has been to the toilet and had no difficulty managing clothes or hygiene. pt with no further ADL questions for OT   Therapeutic Procedures/Exercise   Therapeutic Procedure: strength, endurance, ROM, flexibillity minutes (11653) 20   Symptoms Noted During/After Treatment dizziness   Treatment Detail/Skilled  Intervention provided handout and education on shoulder protocal ex's, elbow to digit. and deltoid isometic. pt was able to demo 10 reps of each ex's, reports a little increase in pain but otherwise was able to demo ind after education. While standing practicing deltoid isometrics, pt suddenly became lightheaded, sweaty and pale and asked to sit down. Sat into chair, drank some water and BP checked. 109/67. Pt immediately felt better after sitting   OT Discharge Planning   OT Plan OT goals met   OT Rationale for DC Rec pt seen for eval and treat, doing well, one episode of lightheadedness during session. pt was able to dress with SBA and demo transfer with mod I. ind with elbow to digit ex's and deltoid isometrics.   OT Brief overview of current status all OT goals met   Total Session Time   Timed Code Treatment Minutes 49   Total Session Time (sum of timed and untimed services) 59    [facility]  OUTPATIENT OCCUPATIONAL THERAPY  EVALUATION  PLAN OF TREATMENT FOR OUTPATIENT REHABILITATION  (COMPLETE FOR INITIAL CLAIMS ONLY)  Patient's Last Name, First Name, M.I.  YOB: 1945  CarmenzaJasen campbell                             Provider's Name  [facility] Medical Record No.  [id number]                             Onset Date:  10/23/23   Start of Care Date:  10/24/23   Type:     ___PT   _X_OT   ___SLP Medical Diagnosis:  L TSA                    OT Diagnosis:  decreased I with ADLs and functional transfers Visits from SOC:  1     See note for plan of treatment, functional goals and certification details    I CERTIFY THE NEED FOR THESE SERVICES FURNISHED UNDER        THIS PLAN OF TREATMENT AND WHILE UNDER MY CARE     (Physician co-signature of this document indicates review and certification of the therapy plan).

## 2023-11-02 ENCOUNTER — TRANSFERRED RECORDS (OUTPATIENT)
Dept: HEALTH INFORMATION MANAGEMENT | Facility: CLINIC | Age: 78
End: 2023-11-02
Payer: COMMERCIAL

## 2023-12-05 ENCOUNTER — TRANSFERRED RECORDS (OUTPATIENT)
Dept: HEALTH INFORMATION MANAGEMENT | Facility: CLINIC | Age: 78
End: 2023-12-05
Payer: COMMERCIAL

## 2024-01-23 ENCOUNTER — TRANSFERRED RECORDS (OUTPATIENT)
Dept: HEALTH INFORMATION MANAGEMENT | Facility: CLINIC | Age: 79
End: 2024-01-23
Payer: COMMERCIAL

## 2024-04-25 ENCOUNTER — VIRTUAL VISIT (OUTPATIENT)
Dept: FAMILY MEDICINE | Facility: CLINIC | Age: 79
End: 2024-04-25
Payer: COMMERCIAL

## 2024-04-25 DIAGNOSIS — J20.9 ACUTE BRONCHITIS, UNSPECIFIED ORGANISM: Primary | ICD-10-CM

## 2024-04-25 PROCEDURE — 99213 OFFICE O/P EST LOW 20 MIN: CPT | Mod: 95 | Performed by: PHYSICIAN ASSISTANT

## 2024-04-25 RX ORDER — ALBUTEROL SULFATE 90 UG/1
1-2 AEROSOL, METERED RESPIRATORY (INHALATION) EVERY 6 HOURS PRN
Qty: 9 G | Refills: 1 | Status: SHIPPED | OUTPATIENT
Start: 2024-04-25

## 2024-04-25 RX ORDER — PREDNISONE 20 MG/1
20 TABLET ORAL DAILY
Qty: 5 TABLET | Refills: 0 | Status: SHIPPED | OUTPATIENT
Start: 2024-04-25 | End: 2024-04-30

## 2024-04-25 RX ORDER — BENZONATATE 100 MG/1
100 CAPSULE ORAL 3 TIMES DAILY PRN
Qty: 42 CAPSULE | Refills: 0 | Status: SHIPPED | OUTPATIENT
Start: 2024-04-25 | End: 2024-07-08

## 2024-04-25 NOTE — PROGRESS NOTES
Jeffrey is a 78 year old who is being evaluated via a billable video visit.    How would you like to obtain your AVS? Tymphanyhart  If the video visit is dropped, the invitation should be resent by: Text to cell phone: [phone]  Will anyone else be joining your video visit? No      Assessment & Plan     Acute bronchitis, unspecified organism  BRONCHITIS    I discussed the pathophysiology of bronchitis and likely viral etiology.  I reviewed the risks and benefits of various over the counter and prescription medications.  Additionally, we reviewed the infectious nature of this condition and techniques to minimize transmission and future infections.    Medication ordered, see .     Patient advised to follow up if symptoms worsen or fail to improve as anticipated (if symptoms do not improve next week, he will mychart message me and we'll check a CXR and consider starting an antibiotic then).       - benzonatate (TESSALON) 100 MG capsule; Take 1 capsule (100 mg) by mouth 3 times daily as needed for cough  - predniSONE (DELTASONE) 20 MG tablet; Take 1 tablet (20 mg) by mouth daily for 5 days  - albuterol (PROAIR HFA/PROVENTIL HFA/VENTOLIN HFA) 108 (90 Base) MCG/ACT inhaler; Inhale 1-2 puffs into the lungs every 6 hours as needed for shortness of breath, wheezing or cough      14 minutes spent by me on the date of the encounter doing chart review, history and exam, documentation and further activities per the note            Subjective   Jeffrey is a 78 year old, presenting for the following health issues:  Cough        4/25/2024    10:54 AM   Additional Questions   Roomed by Danna   Accompanied by Self         4/25/2024    10:54 AM   Patient Reported Additional Medications   Patient reports taking the following new medications Nyquil       Video Start Time: 11:26 AM    HPI     Patient arrived to discuss Productive Cough and Congestion x 9 days.    Patient recently came back from [place] and says he had similar  symptoms when he had Bronchitis around this time last year. Took home covid test with negative results.     Acute Illness  Acute illness concerns: Cough   Onset/Duration: 9 days  Symptoms:  Fever: No   Chills/Sweats: No  Headache (location?): No  Sinus Pressure: YES  Conjunctivitis:  No  Ear Pain: YES: Pressure in left  Rhinorrhea: YES  Congestion: YES  Sore Throat: No  Cough: YES-productive of green sputum  Wheeze: YES  Decreased Appetite: No  Nausea: No  Vomiting: No  Diarrhea: No  Dysuria/Freq.: No  Dysuria or Hematuria: No  Fatigue/Achiness: YES  Sick/Strep Exposure: No  Therapies tried and outcome: Nyquil and old Albuterol Inhaler with some relief    No fevers.    He does have an albuterol inhaler, however he thinks it is running out.    Has a h/o small B Cell lymphoma, no longer seen by specialist.             Review of Systems  Constitutional, HEENT, cardiovascular, pulmonary, gi and gu systems are negative, except as otherwise noted.      Objective           Vitals:  No vitals were obtained today due to virtual visit.    Physical Exam   GENERAL: alert and no distress  EYES: Eyes grossly normal to inspection.  No discharge or erythema, or obvious scleral/conjunctival abnormalities.  RESP: No audible wheeze, cough, or visible cyanosis.    SKIN: Visible skin clear. No significant rash, abnormal pigmentation or lesions.  NEURO: Cranial nerves grossly intact.  Mentation and speech appropriate for age.  PSYCH: Appropriate affect, tone, and pace of words          Video-Visit Details    Type of service:  Video Visit   Video End Time:11:34 AM  Originating Location (pt. Location): Home    Distant Location (provider location):  On-site  Platform used for Video Visit: Well  Signed Electronically by: Sheri Smith PA-C

## 2024-05-02 ENCOUNTER — TRANSFERRED RECORDS (OUTPATIENT)
Dept: HEALTH INFORMATION MANAGEMENT | Facility: CLINIC | Age: 79
End: 2024-05-02
Payer: COMMERCIAL

## 2024-05-10 NOTE — PROGRESS NOTES
As explained, I feel that you would benefit from a full pelvic exam and possibly an ultrasound but you were unable to tolerated here.  I recommend you follow-up with your OBGYN.    In the future, you may use warm compresses to help with your pain or ibuprofen.  A prescription was provided.    Also follow up with your primary care doctor for additional evaluation.    Return to the ER for any severe pain, inability to tolerate fluids, fever, or other concerning symptoms.   HPI:   Chief complaints: Jasen Rosario is a pleasant 78 year old male who presents for Full skin cancer screening to rule out skin cancer   Last Skin Exam:1 year ago      1st Baseline: no  Personal HX of Skin Cancer: yes BCC on the left helix 2018   Personal HX of Malignant Melanoma: no   Family HX of Skin Cancer / Malignant Melanoma: no  Personal HX of Atypical Moles:   no  Risk factors: history of sun exposure and burns; limited sunscreen use  New / Changing lesions: none  Social History: very active - plays pickle ball frequently. Rides motorcycles frequently as well  On review of systems, there are no further skin complaints, patient is feeling otherwise well.  See patient intake sheet.  ROS of the following were done and are negative: Constitutional, Eyes, Ears, Nose,   Mouth, Throat, Cardiovascular, Respiratory, GI, Genitourinary, Musculoskeletal,   Psychiatric, Endocrine, Allergic/Immunologic.    PHYSICAL EXAM:   There were no vitals taken for this visit.  Skin exam performed as follows: Type 2 skin. Mood appropriate  Alert and Oriented X 3. Well developed, well nourished in no distress.  General appearance: Normal  Head including face: Normal  Eyes: conjunctiva and lids: Normal  Mouth: Lips, teeth, gums: Normal  Neck: Normal  Chest-breast/axillae: Normal  Back: Normal  Spleen and liver: Normal  Cardiovascular: Exam of peripheral vascular system by observation for swelling, varicosities, edema: Normal  Genitalia: groin, buttocks: Normal  Extremities: digits/nails (clubbing): Normal  Eccrine and Apocrine glands: Normal  Right upper extremity: Normal  Left upper extremity: Normal  Right lower extremity: Normal  Left lower extremity: Normal  Skin: Scalp and body hair: See below    Pt deferred exam of breasts, groin, buttocks: No    Other physical findings:  1. Multiple pigmented macules on extremities and trunk  2. Multiple pigmented macules on face, trunk and extremities  3. Multiple vascular papules on  trunk, arms and legs  4. Multiple scattered keratotic plaques  5. Pink gritty papule on the left forearm x 3         Except as noted above, no other signs of skin cancer or melanoma.     ASSESSMENT/PLAN:   Benign Full skin cancer screening today. . Patient with history of BCC  Advised on monthly self exams and 1 year  Patient Education: Appropriate brochures given.    Multiple benign appearing nevi on arms, legs and trunk. Discussed ABCDEs of melanoma and sunscreen.   Multiple lentigos on arms, legs and trunk. Advised benign, no treatment needed.  Multiple scattered angiomas. Advised benign, no treatment needed.   Seborrheic keratosis on arms, legs and trunk. Advised benign, no treatment needed.  Actinic keratosis on the left forearm x 3. As precancerous, cryosurgery performed. Advised on blistering and post-op care. Advised if not resolved in 1-2 months to return for evaluation            Follow-up: yearly FSE/PRN sooner    1.) Patient was asked about new and changing moles. YES  2.) Patient received a complete physical skin examination: YES  3.) Patient was counseled to perform a monthly self skin examination: YES  Scribed By: Danna Evangelista, MS, PA-C

## 2024-07-08 ENCOUNTER — OFFICE VISIT (OUTPATIENT)
Dept: FAMILY MEDICINE | Facility: CLINIC | Age: 79
End: 2024-07-08
Payer: COMMERCIAL

## 2024-07-08 ENCOUNTER — TELEPHONE (OUTPATIENT)
Dept: FAMILY MEDICINE | Facility: CLINIC | Age: 79
End: 2024-07-08

## 2024-07-08 VITALS
OXYGEN SATURATION: 99 % | HEIGHT: 67 IN | WEIGHT: 147 LBS | BODY MASS INDEX: 23.07 KG/M2 | SYSTOLIC BLOOD PRESSURE: 120 MMHG | HEART RATE: 66 BPM | DIASTOLIC BLOOD PRESSURE: 70 MMHG | RESPIRATION RATE: 16 BRPM | TEMPERATURE: 98 F

## 2024-07-08 DIAGNOSIS — J20.9 ACUTE BRONCHITIS, UNSPECIFIED ORGANISM: Primary | ICD-10-CM

## 2024-07-08 DIAGNOSIS — C83.00 SMALL B-CELL LYMPHOMA, UNSPECIFIED BODY REGION (H): ICD-10-CM

## 2024-07-08 PROBLEM — R42 DIZZINESS: Status: ACTIVE | Noted: 2022-06-13

## 2024-07-08 PROCEDURE — 99213 OFFICE O/P EST LOW 20 MIN: CPT | Performed by: FAMILY MEDICINE

## 2024-07-08 RX ORDER — AZITHROMYCIN 250 MG/1
TABLET, FILM COATED ORAL
Qty: 6 TABLET | Refills: 0 | Status: SHIPPED | OUTPATIENT
Start: 2024-07-08

## 2024-07-08 RX ORDER — LORATADINE 10 MG/1
10 TABLET ORAL DAILY
COMMUNITY
Start: 2024-07-08

## 2024-07-08 ASSESSMENT — PATIENT HEALTH QUESTIONNAIRE - PHQ9
10. IF YOU CHECKED OFF ANY PROBLEMS, HOW DIFFICULT HAVE THESE PROBLEMS MADE IT FOR YOU TO DO YOUR WORK, TAKE CARE OF THINGS AT HOME, OR GET ALONG WITH OTHER PEOPLE: SOMEWHAT DIFFICULT
SUM OF ALL RESPONSES TO PHQ QUESTIONS 1-9: 2
SUM OF ALL RESPONSES TO PHQ QUESTIONS 1-9: 2

## 2024-07-08 ASSESSMENT — ENCOUNTER SYMPTOMS: COUGH: 1

## 2024-07-08 NOTE — PROGRESS NOTES
Assessment & Plan   Acute bronchitis, unspecified organism  Persistent cough and will treat for possible atypical infection, will add loratadine to see if there is any allergy component.  Okay to continue with albuterol as needed.  Exam was normal and did not have wheezing at all today.  - azithromycin (ZITHROMAX) 250 MG tablet  Dispense: 6 tablet; Refill: 0  - loratadine (CLARITIN) 10 MG tablet    Small B-cell lymphoma, unspecified body region (H)  Stable and follows with annual labs at wellness.          No follow-ups on file.          Poli Diaz MD      [address] [address]  [employer]   Office: [phone]         Yuridia Murphy is a 78 year old, presenting for the following health issues:  URI      History of Present Illness       Reason for visit:  Swollen sinuses   coughing lungs out  - difficulty breathing    He eats 0-1 servings of fruits and vegetables daily.He consumes 1 sweetened beverage(s) daily.He exercises with enough effort to increase his heart rate 30 to 60 minutes per day.  He exercises with enough effort to increase his heart rate 5 days per week.   He is taking medications regularly.     Pt has had ongoing cough, previous years diag w/ bronchitis. Pt had virtual visit 2 months ago and was given steroid and inhaler and pt is not getting better.    Coughing so much causing back pain-had a disc worked on once.     Acute Illness  Acute illness concerns: Cough-ongoing   Onset/Duration: three months ago-had VV in April and got a steroid and inhaler-has happened every year for 3 years after coming back from [place].   Symptoms:  Fever: No  Chills/Sweats: No  Headache (location?): No  Sinus Pressure: YES  Conjunctivitis:  No  Ear Pain: hearing has lessened no pain.   Rhinorrhea: No  Congestion: YES  Sore Throat: No  Cough: YES-productive of clear sputum (whitish gray)  Wheeze: YES  Decreased Appetite: No  Nausea: No  Vomiting:  "No  Diarrhea: No  Dysuria/Freq.: No  Dysuria or Hematuria: No  Fatigue/Achiness: No  Sick/Strep Exposure: No  Therapies tried and outcome: had steroid and inhaler-helped relieve symptoms when he was taking it. Took two puffs every 4 hours. Brought it down-weaned down and started to be ineffective.         Objective    /70   Pulse 66   Temp 98  F (36.7  C) (Tympanic)   Resp 16   Ht 1.689 m (5' 6.5\")   Wt 66.7 kg (147 lb)   SpO2 99%   BMI 23.37 kg/m    Body mass index is 23.37 kg/m .  Physical Exam   GENERAL: no acute distress  EYES: Conjunctiva are not injected, no discharge.  EARS: Left TM -no erythema, no effusion,  not bulged.               Right TM -no erythema, no effusion,  not bulged.  NOSE: no discharge, no sinus tenderness  THROAT: no erythema, no exudate, no lesions  NECK: supple, no adenopathy.  CARDIAC: regular rate and rhythm, no murmur  RESP: clear, no wheezing, no rales, no rhonchi  ABD: soft, no distension, no tenderness  SKIN: No rashes            Signed Electronically by: Eliel Diaz MD    "

## 2024-07-08 NOTE — TELEPHONE ENCOUNTER
Reason for Call:  Appointment Request    Patient requesting this type of appt:  Primary care    Requested provider: Archie Meadows or anyone available    Reason patient unable to be scheduled: Not within requested timeframe    When does patient want to be seen/preferred time: Same day (if possible)    Comments: Pt has had ongoing cough, previous years diag w/ bronchitis. Pt had virtual visit 2 months ago and was given steroid and inhaler and pt is not getting better.     Could we send this information to you in [place] or would you prefer to receive a phone call?:   Patient would prefer a phone call   Okay to leave a detailed message?: Yes at Home number on file [phone] (home)    Call taken on 7/8/2024 at 8:48 AM by Kandis Philip

## 2024-07-08 NOTE — TELEPHONE ENCOUNTER
Patient has appt today at 11 with Dr. Diaz    Next 5 appointments (look out 90 days)      Jul 08, 2024 11:00 AM  (Arrive by 10:40 AM)  Provider Visit with Eliel Diaz MD  [facility] ([facility] ) [address] [place] [id number]  [phone]     Sep 13, 2024 11:00 AM  (Arrive by 10:45 AM)  Return Visit with Danna Evangelista PA-C  [facility] ([facility] ) [address] [id number] [phone]             MISHA CARMICHAEL RN on 7/8/2024 at 10:18 AM   [facility]

## 2024-08-06 ENCOUNTER — TELEPHONE (OUTPATIENT)
Dept: FAMILY MEDICINE | Facility: CLINIC | Age: 79
End: 2024-08-06
Payer: COMMERCIAL

## 2024-08-06 NOTE — TELEPHONE ENCOUNTER
General Call    Contacts       Contact Date/Time Type Contact Phone/Fax    08/06/2024 08:06 AM CDT Phone (Outgoing) Jeffrey Rosario (Self) [phone] (M)    Left Message           Reason for Call:  Try to be seen sooner for annual wellness    What are your questions or concerns:  LVM to see if he wants to be seen sooner for the annual wellness     Date of last appointment with provider: 10/16/23    Could we send this information to you in [place] or would you prefer to receive a phone call?:   No preference   Okay to leave a detailed message?: No at Home number on file [phone] (home)

## 2024-08-23 ENCOUNTER — TRANSFERRED RECORDS (OUTPATIENT)
Dept: HEALTH INFORMATION MANAGEMENT | Facility: CLINIC | Age: 79
End: 2024-08-23
Payer: COMMERCIAL

## 2024-08-30 ENCOUNTER — OFFICE VISIT (OUTPATIENT)
Dept: DERMATOLOGY | Facility: CLINIC | Age: 79
End: 2024-08-30
Payer: COMMERCIAL

## 2024-08-30 DIAGNOSIS — L82.1 SEBORRHEIC KERATOSIS: ICD-10-CM

## 2024-08-30 DIAGNOSIS — D22.9 NEVUS: Primary | ICD-10-CM

## 2024-08-30 DIAGNOSIS — L81.4 LENTIGO: ICD-10-CM

## 2024-08-30 DIAGNOSIS — Z85.828 HISTORY OF BASAL CELL CARCINOMA (BCC): ICD-10-CM

## 2024-08-30 DIAGNOSIS — D18.01 ANGIOMA OF SKIN: ICD-10-CM

## 2024-08-30 PROCEDURE — 99213 OFFICE O/P EST LOW 20 MIN: CPT | Performed by: PHYSICIAN ASSISTANT

## 2024-08-30 PROCEDURE — G2211 COMPLEX E/M VISIT ADD ON: HCPCS | Performed by: PHYSICIAN ASSISTANT

## 2024-08-30 NOTE — PROGRESS NOTES
HPI:   Chief complaints: Jasen Rosario is a pleasant 79 year old male who presents for Full skin cancer screening to rule out skin cancer   Last Skin Exam:1 year ago      1st Baseline: no  Personal HX of Skin Cancer: yes BCC on the left helix 2018   Personal HX of Malignant Melanoma: no   Family HX of Skin Cancer / Malignant Melanoma: no  Personal HX of Atypical Moles:   no  Risk factors: history of sun exposure and burns; limited sunscreen use  New / Changing lesions: none  Social History: very active - plays pickle ball frequently. Rides motorcycles frequently as well  On review of systems, there are no further skin complaints, patient is feeling otherwise well.  See patient intake sheet.  ROS of the following were done and are negative: Constitutional, Eyes, Ears, Nose,   Mouth, Throat, Cardiovascular, Respiratory, GI, Genitourinary, Musculoskeletal,   Psychiatric, Endocrine, Allergic/Immunologic.    PHYSICAL EXAM:   There were no vitals taken for this visit.  Skin exam performed as follows: Type 2 skin. Mood appropriate  Alert and Oriented X 3. Well developed, well nourished in no distress.  General appearance: Normal  Head including face: Normal  Eyes: conjunctiva and lids: Normal  Mouth: Lips, teeth, gums: Normal  Neck: Normal  Chest-breast/axillae: Normal  Back: Normal  Spleen and liver: Normal  Cardiovascular: Exam of peripheral vascular system by observation for swelling, varicosities, edema: Normal  Genitalia: groin, buttocks: Normal  Extremities: digits/nails (clubbing): Normal  Eccrine and Apocrine glands: Normal  Right upper extremity: Normal  Left upper extremity: Normal  Right lower extremity: Normal  Left lower extremity: Normal  Skin: Scalp and body hair: See below    Pt deferred exam of breasts, groin, buttocks: No    Other physical findings:  1. Multiple pigmented macules on extremities and trunk  2. Multiple pigmented macules on face, trunk and extremities  3. Multiple vascular papules on  trunk, arms and legs  4. Multiple scattered keratotic plaques           Except as noted above, no other signs of skin cancer or melanoma.     ASSESSMENT/PLAN:   Benign Full skin cancer screening today. . Patient with history of BCC  Advised on monthly self exams and 1 year  Patient Education: Appropriate brochures given.    Multiple benign appearing nevi on arms, legs and trunk. Discussed ABCDEs of melanoma and sunscreen.   Multiple lentigos on arms, legs and trunk. Advised benign, no treatment needed.  Multiple scattered angiomas. Advised benign, no treatment needed.   Seborrheic keratosis on arms, legs and trunk. Advised benign, no treatment needed.              Follow-up: yearly FSE/PRN sooner    1.) Patient was asked about new and changing moles. YES  2.) Patient received a complete physical skin examination: YES  3.) Patient was counseled to perform a monthly self skin examination: YES  Scribed By: Danna Evangelista MS, PA-C

## 2024-08-30 NOTE — LETTER
8/30/2024      Jasen Rosario  [address]  [facility] [address]      Dear Colleague,    Thank you for referring your patient, Jasen Rosario, to the [facility]. Please see a copy of my visit note below.    HPI:   Chief complaints: Jasen Rosario is a pleasant 79 year old male who presents for Full skin cancer screening to rule out skin cancer   Last Skin Exam:1 year ago      1st Baseline: no  Personal HX of Skin Cancer: yes BCC on the left helix 2018   Personal HX of Malignant Melanoma: no   Family HX of Skin Cancer / Malignant Melanoma: no  Personal HX of Atypical Moles:   no  Risk factors: history of sun exposure and burns; limited sunscreen use  New / Changing lesions: none  Social History: very active - plays pickle ball frequently. Rides motorcycles frequently as well  On review of systems, there are no further skin complaints, patient is feeling otherwise well.  See patient intake sheet.  ROS of the following were done and are negative: Constitutional, Eyes, Ears, Nose,   Mouth, Throat, Cardiovascular, Respiratory, GI, Genitourinary, Musculoskeletal,   Psychiatric, Endocrine, Allergic/Immunologic.    PHYSICAL EXAM:   There were no vitals taken for this visit.  Skin exam performed as follows: Type 2 skin. Mood appropriate  Alert and Oriented X 3. Well developed, well nourished in no distress.  General appearance: Normal  Head including face: Normal  Eyes: conjunctiva and lids: Normal  Mouth: Lips, teeth, gums: Normal  Neck: Normal  Chest-breast/axillae: Normal  Back: Normal  Spleen and liver: Normal  Cardiovascular: Exam of peripheral vascular system by observation for swelling, varicosities, edema: Normal  Genitalia: groin, buttocks: Normal  Extremities: digits/nails (clubbing): Normal  Eccrine and Apocrine glands: Normal  Right upper extremity: Normal  Left upper extremity: Normal  Right lower extremity: Normal  Left lower extremity: Normal  Skin:  Scalp and body hair: See below    Pt deferred exam of breasts, groin, buttocks: No    Other physical findings:  1. Multiple pigmented macules on extremities and trunk  2. Multiple pigmented macules on face, trunk and extremities  3. Multiple vascular papules on trunk, arms and legs  4. Multiple scattered keratotic plaques           Except as noted above, no other signs of skin cancer or melanoma.     ASSESSMENT/PLAN:   Benign Full skin cancer screening today. . Patient with history of BCC  Advised on monthly self exams and 1 year  Patient Education: Appropriate brochures given.    Multiple benign appearing nevi on arms, legs and trunk. Discussed ABCDEs of melanoma and sunscreen.   Multiple lentigos on arms, legs and trunk. Advised benign, no treatment needed.  Multiple scattered angiomas. Advised benign, no treatment needed.   Seborrheic keratosis on arms, legs and trunk. Advised benign, no treatment needed.              Follow-up: yearly FSE/PRN sooner    1.) Patient was asked about new and changing moles. YES  2.) Patient received a complete physical skin examination: YES  3.) Patient was counseled to perform a monthly self skin examination: YES  Scribed By: Danna Evangelista, MS, PAKeithC      Again, thank you for allowing me to participate in the care of your patient.        Sincerely,        Danna Evangelista PA-C

## 2024-09-24 ENCOUNTER — TRANSFERRED RECORDS (OUTPATIENT)
Dept: HEALTH INFORMATION MANAGEMENT | Facility: CLINIC | Age: 79
End: 2024-09-24
Payer: COMMERCIAL

## 2024-10-22 ENCOUNTER — OFFICE VISIT (OUTPATIENT)
Dept: FAMILY MEDICINE | Facility: CLINIC | Age: 79
End: 2024-10-22
Attending: FAMILY MEDICINE
Payer: COMMERCIAL

## 2024-10-22 VITALS
HEIGHT: 67 IN | WEIGHT: 145 LBS | DIASTOLIC BLOOD PRESSURE: 70 MMHG | HEART RATE: 62 BPM | OXYGEN SATURATION: 99 % | SYSTOLIC BLOOD PRESSURE: 100 MMHG | RESPIRATION RATE: 16 BRPM | BODY MASS INDEX: 22.76 KG/M2 | TEMPERATURE: 96.2 F

## 2024-10-22 DIAGNOSIS — Z00.00 MEDICARE ANNUAL WELLNESS VISIT, SUBSEQUENT: ICD-10-CM

## 2024-10-22 DIAGNOSIS — Z96.60 HISTORY OF JOINT REPLACEMENT, UNSPECIFIED JOINT: ICD-10-CM

## 2024-10-22 DIAGNOSIS — Z85.828 HISTORY OF SKIN CANCER: ICD-10-CM

## 2024-10-22 DIAGNOSIS — F41.1 ANXIETY STATE: ICD-10-CM

## 2024-10-22 DIAGNOSIS — M15.0 PRIMARY OSTEOARTHRITIS INVOLVING MULTIPLE JOINTS: ICD-10-CM

## 2024-10-22 DIAGNOSIS — Z00.00 ROUTINE GENERAL MEDICAL EXAMINATION AT A HEALTH CARE FACILITY: Primary | ICD-10-CM

## 2024-10-22 DIAGNOSIS — C83.00 SMALL B-CELL LYMPHOMA, UNSPECIFIED BODY REGION (H): ICD-10-CM

## 2024-10-22 DIAGNOSIS — K63.5 POLYP OF COLON, UNSPECIFIED PART OF COLON, UNSPECIFIED TYPE: ICD-10-CM

## 2024-10-22 DIAGNOSIS — Z91.09 ENVIRONMENTAL ALLERGIES: ICD-10-CM

## 2024-10-22 DIAGNOSIS — C61 PROSTATE CANCER (H): ICD-10-CM

## 2024-10-22 DIAGNOSIS — E78.5 HYPERLIPIDEMIA LDL GOAL <130: ICD-10-CM

## 2024-10-22 DIAGNOSIS — Z51.81 MEDICATION MONITORING ENCOUNTER: ICD-10-CM

## 2024-10-22 DIAGNOSIS — Z12.11 SCREEN FOR COLON CANCER: ICD-10-CM

## 2024-10-22 DIAGNOSIS — M48.061 SPINAL STENOSIS OF LUMBAR REGION, UNSPECIFIED WHETHER NEUROGENIC CLAUDICATION PRESENT: ICD-10-CM

## 2024-10-22 LAB
ALBUMIN SERPL BCG-MCNC: 4.4 G/DL (ref 3.5–5.2)
ALBUMIN UR-MCNC: NEGATIVE MG/DL
ALP SERPL-CCNC: 72 U/L (ref 40–150)
ALT SERPL W P-5'-P-CCNC: 18 U/L (ref 0–70)
ANION GAP SERPL CALCULATED.3IONS-SCNC: 9 MMOL/L (ref 7–15)
APPEARANCE UR: CLEAR
AST SERPL W P-5'-P-CCNC: 25 U/L (ref 0–45)
BILIRUB SERPL-MCNC: 0.6 MG/DL
BILIRUB UR QL STRIP: NEGATIVE
BUN SERPL-MCNC: 20.5 MG/DL (ref 8–23)
CALCIUM SERPL-MCNC: 9.3 MG/DL (ref 8.8–10.4)
CHLORIDE SERPL-SCNC: 104 MMOL/L (ref 98–107)
CHOLEST SERPL-MCNC: 229 MG/DL
CK SERPL-CCNC: 143 U/L (ref 39–308)
COLOR UR AUTO: YELLOW
CREAT SERPL-MCNC: 1.24 MG/DL (ref 0.67–1.17)
CREAT UR-MCNC: 40.2 MG/DL
EGFRCR SERPLBLD CKD-EPI 2021: 59 ML/MIN/1.73M2
ERYTHROCYTE [DISTWIDTH] IN BLOOD BY AUTOMATED COUNT: 12.4 % (ref 10–15)
FASTING STATUS PATIENT QL REPORTED: ABNORMAL
FASTING STATUS PATIENT QL REPORTED: ABNORMAL
GLUCOSE SERPL-MCNC: 93 MG/DL (ref 70–99)
GLUCOSE UR STRIP-MCNC: NEGATIVE MG/DL
HCO3 SERPL-SCNC: 24 MMOL/L (ref 22–29)
HCT VFR BLD AUTO: 43.4 % (ref 40–53)
HDLC SERPL-MCNC: 72 MG/DL
HGB BLD-MCNC: 15.1 G/DL (ref 13.3–17.7)
HGB UR QL STRIP: NEGATIVE
KETONES UR STRIP-MCNC: NEGATIVE MG/DL
LDH SERPL L TO P-CCNC: 193 U/L (ref 0–250)
LDLC SERPL CALC-MCNC: 145 MG/DL
LEUKOCYTE ESTERASE UR QL STRIP: NEGATIVE
MCH RBC QN AUTO: 31.4 PG (ref 26.5–33)
MCHC RBC AUTO-ENTMCNC: 34.8 G/DL (ref 31.5–36.5)
MCV RBC AUTO: 90 FL (ref 78–100)
MICROALBUMIN UR-MCNC: <12 MG/L
MICROALBUMIN/CREAT UR: NORMAL MG/G{CREAT}
NITRATE UR QL: NEGATIVE
NONHDLC SERPL-MCNC: 157 MG/DL
PH UR STRIP: 7 [PH] (ref 5–7)
PLATELET # BLD AUTO: 166 10E3/UL (ref 150–450)
POTASSIUM SERPL-SCNC: 4.9 MMOL/L (ref 3.4–5.3)
PROT SERPL-MCNC: 6.2 G/DL (ref 6.4–8.3)
PSA SERPL DL<=0.01 NG/ML-MCNC: <0.01 NG/ML (ref 0–6.5)
RBC # BLD AUTO: 4.81 10E6/UL (ref 4.4–5.9)
SODIUM SERPL-SCNC: 137 MMOL/L (ref 135–145)
SP GR UR STRIP: 1.01 (ref 1–1.03)
TRIGL SERPL-MCNC: 59 MG/DL
TSH SERPL DL<=0.005 MIU/L-ACNC: 2.59 UIU/ML (ref 0.3–4.2)
UROBILINOGEN UR STRIP-ACNC: 0.2 E.U./DL
WBC # BLD AUTO: 6.6 10E3/UL (ref 4–11)

## 2024-10-22 PROCEDURE — 80061 LIPID PANEL: CPT | Performed by: FAMILY MEDICINE

## 2024-10-22 PROCEDURE — 82043 UR ALBUMIN QUANTITATIVE: CPT | Performed by: FAMILY MEDICINE

## 2024-10-22 PROCEDURE — 84153 ASSAY OF PSA TOTAL: CPT | Performed by: FAMILY MEDICINE

## 2024-10-22 PROCEDURE — 80053 COMPREHEN METABOLIC PANEL: CPT | Performed by: FAMILY MEDICINE

## 2024-10-22 PROCEDURE — 81003 URINALYSIS AUTO W/O SCOPE: CPT | Performed by: FAMILY MEDICINE

## 2024-10-22 PROCEDURE — 82570 ASSAY OF URINE CREATININE: CPT | Performed by: FAMILY MEDICINE

## 2024-10-22 PROCEDURE — 36415 COLL VENOUS BLD VENIPUNCTURE: CPT | Performed by: FAMILY MEDICINE

## 2024-10-22 PROCEDURE — 84443 ASSAY THYROID STIM HORMONE: CPT | Performed by: FAMILY MEDICINE

## 2024-10-22 PROCEDURE — 85027 COMPLETE CBC AUTOMATED: CPT | Performed by: FAMILY MEDICINE

## 2024-10-22 PROCEDURE — 83615 LACTATE (LD) (LDH) ENZYME: CPT | Performed by: FAMILY MEDICINE

## 2024-10-22 PROCEDURE — 82550 ASSAY OF CK (CPK): CPT | Performed by: FAMILY MEDICINE

## 2024-10-22 PROCEDURE — G0439 PPPS, SUBSEQ VISIT: HCPCS | Performed by: FAMILY MEDICINE

## 2024-10-22 RX ORDER — AMOXICILLIN 500 MG/1
2000 CAPSULE ORAL
Qty: 12 CAPSULE | Refills: 1 | Status: SHIPPED | OUTPATIENT
Start: 2024-10-22

## 2024-10-22 SDOH — HEALTH STABILITY: PHYSICAL HEALTH: ON AVERAGE, HOW MANY MINUTES DO YOU ENGAGE IN EXERCISE AT THIS LEVEL?: 90 MIN

## 2024-10-22 SDOH — HEALTH STABILITY: PHYSICAL HEALTH: ON AVERAGE, HOW MANY DAYS PER WEEK DO YOU ENGAGE IN MODERATE TO STRENUOUS EXERCISE (LIKE A BRISK WALK)?: 6 DAYS

## 2024-10-22 ASSESSMENT — SOCIAL DETERMINANTS OF HEALTH (SDOH): HOW OFTEN DO YOU GET TOGETHER WITH FRIENDS OR RELATIVES?: THREE TIMES A WEEK

## 2024-10-22 NOTE — LETTER
November 4, 2024      Jeffrey Rosario  [address]  [facility] [address]        Dear ,    We are writing to inform you of your test results.    Labs are overall quite good     Mildly elevated lipids   Mildly low kidney function     We advise:     Continue current cares.   Balanced low cholesterol diet, Mediterranean diet   Regular exercise, 150 minutes per week.     Consider recheck fasting in 4-6 months     For additional lab test information, [email or web address] is an excellent reference.       Resulted Orders   Comprehensive metabolic panel   Result Value Ref Range    Sodium 137 135 - 145 mmol/L    Potassium 4.9 3.4 - 5.3 mmol/L    Carbon Dioxide (CO2) 24 22 - 29 mmol/L    Anion Gap 9 7 - 15 mmol/L    Urea Nitrogen 20.5 8.0 - 23.0 mg/dL    Creatinine 1.24 (H) 0.67 - 1.17 mg/dL    GFR Estimate 59 (L) >60 mL/min/1.73m2      Comment:      eGFR calculated using 2021 CKD-EPI equation.    Calcium 9.3 8.8 - 10.4 mg/dL      Comment:      Reference intervals for this test were updated on 7/16/2024 to reflect our healthy population more accurately. There may be differences in the flagging of prior results with similar values performed with this method. Those prior results can be interpreted in the context of the updated reference intervals.    Chloride 104 98 - 107 mmol/L    Glucose 93 70 - 99 mg/dL    Alkaline Phosphatase 72 40 - 150 U/L    AST 25 0 - 45 U/L    ALT 18 0 - 70 U/L    Protein Total 6.2 (L) 6.4 - 8.3 g/dL    Albumin 4.4 3.5 - 5.2 g/dL    Bilirubin Total 0.6 <=1.2 mg/dL    Patient Fasting > 8hrs? Unknown    Lipid panel reflex to direct LDL Fasting   Result Value Ref Range    Cholesterol 229 (H) <200 mg/dL    Triglycerides 59 <150 mg/dL    Direct Measure HDL 72 >=40 mg/dL    LDL Cholesterol Calculated 145 (H) <100 mg/dL    Non HDL Cholesterol 157 (H) <130 mg/dL    Patient Fasting > 8hrs? Unknown     Narrative    Cholesterol  Desirable: < 200 mg/dL  Borderline High: 200 - 239  mg/dL  High: >= 240 mg/dL    Triglycerides  Normal: < 150 mg/dL  Borderline High: 150 - 199 mg/dL  High: 200-499 mg/dL  Very High: >= 500 mg/dL    Direct Measure HDL  Female: >= 50 mg/dL   Male: >= 40 mg/dL    LDL Cholesterol  Desirable: < 100 mg/dL  Above Desirable: 100 - 129 mg/dL   Borderline High: 130 - 159 mg/dL   High:  160 - 189 mg/dL   Very High: >= 190 mg/dL    Non HDL Cholesterol  Desirable: < 130 mg/dL  Above Desirable: 130 - 159 mg/dL  Borderline High: 160 - 189 mg/dL  High: 190 - 219 mg/dL  Very High: >= 220 mg/dL   CBC with platelets   Result Value Ref Range    WBC Count 6.6 4.0 - 11.0 10e3/uL    RBC Count 4.81 4.40 - 5.90 10e6/uL    Hemoglobin 15.1 13.3 - 17.7 g/dL    Hematocrit 43.4 40.0 - 53.0 %    MCV 90 78 - 100 fL    MCH 31.4 26.5 - 33.0 pg    MCHC 34.8 31.5 - 36.5 g/dL    RDW 12.4 10.0 - 15.0 %    Platelet Count 166 150 - 450 10e3/uL   CK total   Result Value Ref Range     39 - 308 U/L   UA Macroscopic with reflex to Microscopic and Culture   Result Value Ref Range    Color Urine Yellow Colorless, Straw, Light Yellow, Yellow    Appearance Urine Clear Clear    Glucose Urine Negative Negative mg/dL    Bilirubin Urine Negative Negative    Ketones Urine Negative Negative mg/dL    Specific Gravity Urine 1.010 1.003 - 1.035    Blood Urine Negative Negative    pH Urine 7.0 5.0 - 7.0    Protein Albumin Urine Negative Negative mg/dL    Urobilinogen Urine 0.2 0.2, 1.0 E.U./dL    Nitrite Urine Negative Negative    Leukocyte Esterase Urine Negative Negative    Narrative    Microscopic not indicated   Albumin Random Urine Quantitative with Creat Ratio   Result Value Ref Range    Creatinine Urine mg/dL 40.2 mg/dL      Comment:      The reference ranges have not been established in urine creatinine. The results should be integrated into the clinical context for interpretation.    Albumin Urine mg/L <12.0 mg/L      Comment:      The reference ranges have not been established in urine albumin. The results  should be integrated into the clinical context for interpretation.    Albumin Urine mg/g Cr        Comment:      Unable to calculate, urine albumin and/or urine creatinine is outside detectable limits.  Microalbuminuria is defined as an albumin:creatinine ratio of 17 to 299 for males and 25 to 299 for females. A ratio of albumin:creatinine of 300 or higher is indicative of overt proteinuria.  Due to biologic variability, positive results should be confirmed by a second, first-morning random or 24-hour timed urine specimen. If there is discrepancy, a third specimen is recommended. When 2 out of 3 results are in the microalbuminuria range, this is evidence for incipient nephropathy and warrants increased efforts at glucose control, blood pressure control, and institution of therapy with an angiotensin-converting-enzyme (ACE) inhibitor (if the patient can tolerate it).     TSH with free T4 reflex   Result Value Ref Range    TSH 2.59 0.30 - 4.20 uIU/mL   PSA, tumor marker   Result Value Ref Range    PSA Tumor Marker <0.01 0.00 - 6.50 ng/mL    Narrative    This result is obtained using the Roche Elecsys total PSA method on the naz [id number] immunoassay analyzer, which is an ultrasensitive method. Results obtained with different assay methods or kits cannot be used interchangeably.  An undetectable (<0.01 ng/mL) ultrasensitive prostate-specific antigen (USPSA) concentration after radical prostatectomy is reassuring and may aid in postoperative risk stratification of patients.     A detectable USPSA concentration (> or =0.01 ng/mL) after radical prostatectomy (RP) does not necessarily translate into disease progression or recurrence. Interpretation of a detectable USPSA needs to be made in conjunction with other clinicopathologic risk factors. The cutpoint for interpretation of USPSA assays remains controversial and has ranged from 0.01 to 0.05 ng/mL. For example, in a study that included 754 men after RP, a cutpoint of 0.01  ng/mL was an independent predictor of biochemical recurrence (BCR). BCR-free survival at 5 years was 92.4% for patients with an USPSA post-RP of less than 0.01 ng/mL and 56.8% for patients with an USPSA post-RP of 0.01 ng/mL or higher.(1) In the same study a cutoff of 0.03 ng/mL also predicted BCR independent of clinicopathological factors and BCR-free survival at 5 yrs was 90.8% for patients with an USPSA post-RP of less than 0.03 ng/mL and 26.9% for patients with a PSA post-RP of greater or equal to 0.03 ng/mL. (1)    1. Erickson LJ, Cruz Z, Eladio DW, et al. Do ultrasensitive prostate specific antigen measurements have a role in predicting long-term biochemical recurrence-free survival in men after radical prostatectomy? J Urol. 2016;195(2):330-336. doi:10.1016/j.juro.2015.08.080   Lactate Dehydrogenase   Result Value Ref Range    Lactate Dehydrogenase 193 0 - 250 U/L       If you have any questions or concerns, please call the clinic at the number listed above.       Sincerely,            Archie Meadows M.D.

## 2025-08-27 ENCOUNTER — OFFICE VISIT (OUTPATIENT)
Dept: DERMATOLOGY | Facility: CLINIC | Age: 80
End: 2025-08-27
Payer: COMMERCIAL

## 2025-08-27 DIAGNOSIS — Z12.83 SKIN CANCER SCREENING: Primary | ICD-10-CM

## 2025-08-27 DIAGNOSIS — L57.0 ACTINIC KERATOSIS: ICD-10-CM

## 2025-08-27 DIAGNOSIS — L82.1 SEBORRHEIC KERATOSIS: ICD-10-CM

## 2025-08-27 DIAGNOSIS — Z85.828 HISTORY OF NONMELANOMA SKIN CANCER: ICD-10-CM

## 2025-08-27 DIAGNOSIS — D22.9 MULTIPLE NEVI: ICD-10-CM

## 2025-08-27 DIAGNOSIS — L81.4 LENTIGO: ICD-10-CM

## 2025-08-27 DIAGNOSIS — D18.01 CHERRY ANGIOMA: ICD-10-CM

## 2025-08-27 PROCEDURE — 99213 OFFICE O/P EST LOW 20 MIN: CPT | Mod: 25 | Performed by: STUDENT IN AN ORGANIZED HEALTH CARE EDUCATION/TRAINING PROGRAM

## 2025-08-27 PROCEDURE — 17000 DESTRUCT PREMALG LESION: CPT | Performed by: STUDENT IN AN ORGANIZED HEALTH CARE EDUCATION/TRAINING PROGRAM

## 2025-08-27 PROCEDURE — 17003 DESTRUCT PREMALG LES 2-14: CPT | Performed by: STUDENT IN AN ORGANIZED HEALTH CARE EDUCATION/TRAINING PROGRAM

## (undated) DEVICE — DRSG GAUZE 4X4" 3033

## (undated) DEVICE — IMM KNEE 20" 0814-2660

## (undated) DEVICE — KIT ENDO TURNOVER/PROCEDURE W/CLEAN A SCOPE LINERS 103888

## (undated) DEVICE — SU VICRYL 0 CT-1 CR 8X18" J740D

## (undated) DEVICE — DRSG XEROFORM 5X9" 8884431605

## (undated) DEVICE — DRAPE IOBAN INCISE 23X17" 6650EZ

## (undated) DEVICE — MANIFOLD NEPTUNE 4 PORT 700-20

## (undated) DEVICE — DRSG ABDOMINAL 07 1/2X8" 7197D

## (undated) DEVICE — WRAP MCCONNELL ARM SUPPORT LG 12-401

## (undated) DEVICE — DRSG KERLIX FLUFFS X5

## (undated) DEVICE — SYR 30ML LL W/O NDL

## (undated) DEVICE — WRAP EZY KNEE

## (undated) DEVICE — DRSG XEROFORM 3X4" 8884432000

## (undated) DEVICE — CAST PADDING 6" UNSTERILE 9046

## (undated) DEVICE — SU WND CLOSURE VLOC 180 ABS 0 24" GS-25 VLOCL0436

## (undated) DEVICE — GLOVE PROTEXIS W/NEU-THERA 8.5  2D73TE85

## (undated) DEVICE — GLOVE PROTEXIS POWDER FREE 8.0 ORTHOPEDIC 2D73ET80

## (undated) DEVICE — ESU ELEC NDL 1" E1552

## (undated) DEVICE — SPONGE LAP 4X18" X8415

## (undated) DEVICE — GLOVE PROTEXIS W/NEU-THERA 8.0  2D73TE80

## (undated) DEVICE — BLADE CLIPPER 4412A

## (undated) DEVICE — SU VICRYL 2-0 CP-1 27" UND J266H

## (undated) DEVICE — BONE CLEANING TIP INTERPULSE FEMORAL CANAL 0210-008-000

## (undated) DEVICE — Device

## (undated) DEVICE — PACK TOTAL HIP W/U DRAPE SOP15HUFSC

## (undated) DEVICE — SUCTION IRR SYSTEM W/O TIP INTERPULSE HANDPIECE 0210-100-000

## (undated) DEVICE — ESU PENCIL W/HOLSTER E2350H

## (undated) DEVICE — GLOVE PROTEXIS POWDER FREE 8.5 ORTHOPEDIC 2D73ET85

## (undated) DEVICE — BONE CLEANING TIP INTERPULSE  0210-010-000

## (undated) DEVICE — ESU GROUND PAD UNIVERSAL W/O CORD

## (undated) DEVICE — BONE CEMENT MIXEVAC III HI VAC KIT  0206-015-000

## (undated) DEVICE — NDL SPINAL 18GA 3.5" 405184

## (undated) DEVICE — DRSG STERI STRIP 1/2X4" R1547

## (undated) DEVICE — HOOD FLYTE W/PEELAWAY 408-800-100

## (undated) DEVICE — DRAIN ROUND W/RESERV KIT JACKSON PRATT 10FR 400ML SU130-402D

## (undated) DEVICE — SPONGE PACK VAGINAL 2"X9

## (undated) DEVICE — BLADE SAW SAGITTAL STRK 29X83.5X1.27MM 4/2000 2108-183-000

## (undated) DEVICE — IMM PILLOW ABDUCT HIP MED 31143061

## (undated) DEVICE — DRSG DRAIN 4X4" 7086

## (undated) DEVICE — DRAPE STERI TOWEL LG 1010

## (undated) DEVICE — SYR 03ML LL W/O NDL 309657

## (undated) DEVICE — DRAPE POUCH INSTRUMENT 1018

## (undated) DEVICE — DRSG KERLIX 4 1/2"X4YDS ROLL 6715

## (undated) DEVICE — SOL NACL 0.9% IRRIG 3000ML BAG 2B7477

## (undated) DEVICE — BLADE SAW SAGITTAL STRK 25X89.5X0.96MM 4/2000 2108-393-005

## (undated) DEVICE — PACK OPEN SHOULDER SOP15OCFSC

## (undated) DEVICE — PREP CHLORAPREP 26ML TINTED ORANGE  260815

## (undated) DEVICE — SU ETHIBOND 2 V-37 4X30" MX69G

## (undated) DEVICE — PREP SKIN SCRUB TRAY 4461A

## (undated) DEVICE — GLOVE BIOGEL PI ULTRATOUCH G SZ 8.5 42185

## (undated) DEVICE — LINEN TOWEL PACK X5 5464

## (undated) DEVICE — SU MONOCRYL 3-0 PS-2 27" Y427H

## (undated) DEVICE — SU VICRYL 0 CTX 36" J370H

## (undated) DEVICE — DRAPE STERI U 1015

## (undated) DEVICE — SPONGE BALL KERLIX ROUND XL W/O STRING LATEX 4935

## (undated) DEVICE — PACK TOTAL KNEE SOP15TKFSD

## (undated) DEVICE — DRILL BIT TORNIER 3MM REVERSE STERILE DWD055

## (undated) DEVICE — CATH TRAY FOLEY SURESTEP 16FR DRAIN BAG STATOCK A899916

## (undated) DEVICE — BUR ROUND 5MM CARBIDE LONG 5092-230

## (undated) DEVICE — CAST PADDING 4" UNSTERILE 9044

## (undated) DEVICE — BLADE SAW SAGITTAL STRK 21X90X1.27MM HD SYS 6 6221-127-090

## (undated) DEVICE — GOWN IMPERVIOUS SPECIALTY XL/XLONG 39049

## (undated) DEVICE — TOURNIQUET CUFF 30" STERILE

## (undated) DEVICE — SU ETHIBOND 0 CTX CR  8X18" CX31D

## (undated) DEVICE — SPONGE RAY-TEC 4X8" 7318

## (undated) DEVICE — CAST PADDING 6" STERILE 9046S

## (undated) DEVICE — PREP DURAPREP 26ML APL 8630

## (undated) DEVICE — WIPES FOLEY CARE SURESTEP PROVON DFC100

## (undated) DEVICE — BLADE SAW SAGITTAL STRK 25X79.5X1.24MM 4/2000 2108-318-000

## (undated) DEVICE — ESU CLEANER TIP 31142717

## (undated) DEVICE — SU PROLENE 3-0 PS-2 18" 8687H

## (undated) DEVICE — SOL WATER IRRIG 1000ML BOTTLE 2F7114

## (undated) DEVICE — ESU ELEC BLADE 6" COATED E1450-6

## (undated) DEVICE — SOL NACL 0.9% IRRIG 1000ML BOTTLE 2F7124

## (undated) DEVICE — PACK UNIVERSAL SPLIT 29131

## (undated) DEVICE — GLOVE BIOGEL PI MICRO INDICATOR UNDERGLOVE SZ 8.5 48985

## (undated) DEVICE — SOL NACL 0.9% IRRIG 1000ML BOTTLE 07138-09

## (undated) RX ORDER — ACETAMINOPHEN 325 MG/1
TABLET ORAL
Status: DISPENSED
Start: 2017-06-21

## (undated) RX ORDER — FENTANYL CITRATE 50 UG/ML
INJECTION, SOLUTION INTRAMUSCULAR; INTRAVENOUS
Status: DISPENSED
Start: 2017-06-21

## (undated) RX ORDER — ROPIVACAINE HYDROCHLORIDE 2 MG/ML
INJECTION, SOLUTION EPIDURAL; INFILTRATION; PERINEURAL
Status: DISPENSED
Start: 2017-10-02

## (undated) RX ORDER — VANCOMYCIN HYDROCHLORIDE 1 G/20ML
INJECTION, POWDER, LYOPHILIZED, FOR SOLUTION INTRAVENOUS
Status: DISPENSED
Start: 2023-10-23

## (undated) RX ORDER — DEXAMETHASONE SODIUM PHOSPHATE 4 MG/ML
INJECTION, SOLUTION INTRA-ARTICULAR; INTRALESIONAL; INTRAMUSCULAR; INTRAVENOUS; SOFT TISSUE
Status: DISPENSED
Start: 2023-10-23

## (undated) RX ORDER — DEXAMETHASONE SODIUM PHOSPHATE 4 MG/ML
INJECTION, SOLUTION INTRA-ARTICULAR; INTRALESIONAL; INTRAMUSCULAR; INTRAVENOUS; SOFT TISSUE
Status: DISPENSED
Start: 2017-06-21

## (undated) RX ORDER — FENTANYL CITRATE 50 UG/ML
INJECTION, SOLUTION INTRAMUSCULAR; INTRAVENOUS
Status: DISPENSED
Start: 2023-10-23

## (undated) RX ORDER — FENTANYL CITRATE 50 UG/ML
INJECTION, SOLUTION INTRAMUSCULAR; INTRAVENOUS
Status: DISPENSED
Start: 2017-10-02

## (undated) RX ORDER — CEFAZOLIN SODIUM 2 G/100ML
INJECTION, SOLUTION INTRAVENOUS
Status: DISPENSED
Start: 2017-10-02

## (undated) RX ORDER — CEFAZOLIN SODIUM 2 G/100ML
INJECTION, SOLUTION INTRAVENOUS
Status: DISPENSED
Start: 2017-06-21

## (undated) RX ORDER — CEFAZOLIN SODIUM 1 G/3ML
INJECTION, POWDER, FOR SOLUTION INTRAMUSCULAR; INTRAVENOUS
Status: DISPENSED
Start: 2017-06-21

## (undated) RX ORDER — PROPOFOL 10 MG/ML
INJECTION, EMULSION INTRAVENOUS
Status: DISPENSED
Start: 2017-10-02

## (undated) RX ORDER — ONDANSETRON 2 MG/ML
INJECTION INTRAMUSCULAR; INTRAVENOUS
Status: DISPENSED
Start: 2017-06-21

## (undated) RX ORDER — KETOROLAC TROMETHAMINE 15 MG/ML
INJECTION, SOLUTION INTRAMUSCULAR; INTRAVENOUS
Status: DISPENSED
Start: 2017-06-21

## (undated) RX ORDER — HYDROMORPHONE HYDROCHLORIDE 1 MG/ML
INJECTION, SOLUTION INTRAMUSCULAR; INTRAVENOUS; SUBCUTANEOUS
Status: DISPENSED
Start: 2023-10-23

## (undated) RX ORDER — PROPOFOL 10 MG/ML
INJECTION, EMULSION INTRAVENOUS
Status: DISPENSED
Start: 2023-10-23

## (undated) RX ORDER — HYDROMORPHONE HCL IN WATER/PF 6 MG/30 ML
PATIENT CONTROLLED ANALGESIA SYRINGE INTRAVENOUS
Status: DISPENSED
Start: 2023-10-23

## (undated) RX ORDER — CEFAZOLIN SODIUM/WATER 2 G/20 ML
SYRINGE (ML) INTRAVENOUS
Status: DISPENSED
Start: 2023-10-23

## (undated) RX ORDER — HYDROMORPHONE HYDROCHLORIDE 1 MG/ML
INJECTION, SOLUTION INTRAMUSCULAR; INTRAVENOUS; SUBCUTANEOUS
Status: DISPENSED
Start: 2017-10-02

## (undated) RX ORDER — LIDOCAINE HYDROCHLORIDE 20 MG/ML
INJECTION, SOLUTION EPIDURAL; INFILTRATION; INTRACAUDAL; PERINEURAL
Status: DISPENSED
Start: 2017-06-21

## (undated) RX ORDER — EPHEDRINE SULFATE 50 MG/ML
INJECTION, SOLUTION INTRAMUSCULAR; INTRAVENOUS; SUBCUTANEOUS
Status: DISPENSED
Start: 2023-10-23

## (undated) RX ORDER — ACYCLOVIR 200 MG/1
CAPSULE ORAL
Status: DISPENSED
Start: 2017-10-02

## (undated) RX ORDER — FENTANYL CITRATE 0.05 MG/ML
INJECTION, SOLUTION INTRAMUSCULAR; INTRAVENOUS
Status: DISPENSED
Start: 2023-10-23

## (undated) RX ORDER — HYDROMORPHONE HYDROCHLORIDE 1 MG/ML
INJECTION, SOLUTION INTRAMUSCULAR; INTRAVENOUS; SUBCUTANEOUS
Status: DISPENSED
Start: 2017-06-21

## (undated) RX ORDER — KETOROLAC TROMETHAMINE 30 MG/ML
INJECTION, SOLUTION INTRAMUSCULAR; INTRAVENOUS
Status: DISPENSED
Start: 2017-10-02

## (undated) RX ORDER — TRANEXAMIC ACID 10 MG/ML
INJECTION, SOLUTION INTRAVENOUS
Status: DISPENSED
Start: 2023-10-23

## (undated) RX ORDER — EPINEPHRINE 1 MG/ML
INJECTION, SOLUTION INTRAMUSCULAR; SUBCUTANEOUS
Status: DISPENSED
Start: 2023-10-23

## (undated) RX ORDER — ONDANSETRON 2 MG/ML
INJECTION INTRAMUSCULAR; INTRAVENOUS
Status: DISPENSED
Start: 2018-04-18

## (undated) RX ORDER — KETOROLAC TROMETHAMINE 15 MG/ML
INJECTION, SOLUTION INTRAMUSCULAR; INTRAVENOUS
Status: DISPENSED
Start: 2017-10-02

## (undated) RX ORDER — FENTANYL CITRATE 50 UG/ML
INJECTION, SOLUTION INTRAMUSCULAR; INTRAVENOUS
Status: DISPENSED
Start: 2018-04-18

## (undated) RX ORDER — PROPOFOL 10 MG/ML
INJECTION, EMULSION INTRAVENOUS
Status: DISPENSED
Start: 2017-06-21

## (undated) RX ORDER — ONDANSETRON 2 MG/ML
INJECTION INTRAMUSCULAR; INTRAVENOUS
Status: DISPENSED
Start: 2023-10-23